# Patient Record
Sex: FEMALE | Race: BLACK OR AFRICAN AMERICAN | NOT HISPANIC OR LATINO | Employment: UNEMPLOYED | URBAN - METROPOLITAN AREA
[De-identification: names, ages, dates, MRNs, and addresses within clinical notes are randomized per-mention and may not be internally consistent; named-entity substitution may affect disease eponyms.]

---

## 2017-01-09 PROCEDURE — 96523 IRRIG DRUG DELIVERY DEVICE: CPT

## 2017-01-09 PROCEDURE — 25000125 ZZHC RX 250: Performed by: OBSTETRICS & GYNECOLOGY

## 2017-01-09 RX ORDER — HEPARIN SODIUM (PORCINE) LOCK FLUSH IV SOLN 100 UNIT/ML 100 UNIT/ML
5 SOLUTION INTRAVENOUS ONCE
Status: COMPLETED | OUTPATIENT
Start: 2017-01-09 | End: 2017-01-09

## 2017-01-09 RX ADMIN — SODIUM CHLORIDE, PRESERVATIVE FREE 5 ML: 5 INJECTION INTRAVENOUS at 10:40

## 2017-01-09 NOTE — NURSING NOTE
Chief Complaint   Patient presents with     Port Flush     Port flushed by RN     Port accessed by RN with 20g 3/4in Gripper needle. Labs drawn, port flushed with NS and Heparin and de-accessed.  Christa Cline RN

## 2017-01-23 ENCOUNTER — PRE VISIT (OUTPATIENT)
Dept: UROLOGY | Facility: CLINIC | Age: 52
End: 2017-01-23

## 2017-01-23 ENCOUNTER — ONCOLOGY VISIT (OUTPATIENT)
Dept: ONCOLOGY | Facility: CLINIC | Age: 52
End: 2017-01-23
Attending: OBSTETRICS & GYNECOLOGY
Payer: MEDICAID

## 2017-01-23 VITALS
BODY MASS INDEX: 33.63 KG/M2 | SYSTOLIC BLOOD PRESSURE: 152 MMHG | HEIGHT: 67 IN | DIASTOLIC BLOOD PRESSURE: 86 MMHG | WEIGHT: 214.3 LBS | HEART RATE: 87 BPM | TEMPERATURE: 98.2 F | OXYGEN SATURATION: 100 %

## 2017-01-23 DIAGNOSIS — C53.9 CERVICAL CANCER (H): Primary | ICD-10-CM

## 2017-01-23 PROCEDURE — 99215 OFFICE O/P EST HI 40 MIN: CPT | Mod: ZP | Performed by: OBSTETRICS & GYNECOLOGY

## 2017-01-23 ASSESSMENT — PAIN SCALES - GENERAL: PAINLEVEL: NO PAIN (0)

## 2017-01-23 NOTE — PROGRESS NOTES
Follow Up Notes on Referred Patient    Date: 2017       Dr. Joyce Charles MD  No address on file       RE: Angella St  : 1965  DAYSI: 2017    Dear Dr. Joyce Charles:    Angella St is a 51 year old woman with a diagnosis of  Recurrent cervical cancer.           Patient presents today for followup.  Again, she had recurrent cervical cancer after a radical hysterectomy in [facility] without any adjuvant treatment.  Left pelvic sidewall recurrence as well as a lymph node recurrence.  She has been treated with 9 cycles of Cisplatin, Taxol and Avastin based on  protocol.  Since then, been stable of disease with no progression since 2015.  She also has a nonfunctioning left kidney due to complete obstruction of left ureter and sidewall recurrence.  She has had a left nephrostomy placed which works without difficulties for her.  She is otherwise eating and drinking well.  No nausea, vomiting, fevers, or chills.  No vaginal bleeding or B symptoms.         Past Medical History:    Past Medical History   Diagnosis Date     Chronic kidney disease      Cancer (H)      Metastatic squamous cell cervical carcinoma     Hypertension      Obesity      BMI >30     Anemia          Past Surgical History:    Past Surgical History   Procedure Laterality Date     Hysterectomy       [facility], [place]     Genitourinary surgery  2015     PNT placement     Cystoscopy, retrogrades, combined Left 2016     Procedure: COMBINED CYSTOSCOPY, RETROGRADES;  Surgeon: Deja Salinas MD;  [facility]     Combined cystoscopy, insert stent ureter(s) Left 2016     Procedure: COMBINED CYSTOSCOPY, INSERT STENT URETER(S);  Surgeon: Deja Salinas MD;  [facility]         Health Maintenance Due   Topic Date Due     GRACIELA QUESTIONNAIRE 1 YEAR  1965     PHQ-9 Q1YR (NO INBASKET)  1965     HEPATITIS C SCREENING  1981     MAMMO SCREEN Q2 YR (SYSTEM ASSIGNED)   11/02/2003     COLON CANCER SCREEN (SYSTEM ASSIGNED)  11/02/2013     INFLUENZA VACCINE (SYSTEM ASSIGNED)  09/01/2016       Current Medications:     Current Outpatient Prescriptions   Medication Sig Dispense Refill     enalapril (VASOTEC) 20 MG tablet Take 1 tablet (20 mg) by mouth daily 90 tablet 1     amLODIPine (NORVASC) 5 MG tablet Take 1 tablet (5 mg) by mouth daily 90 tablet 1     Acetaminophen (TYLENOL PO) Take 1,000 mg by mouth       Ferrous Sulfate (IRON SUPPLEMENT PO) Take 27 mg by mouth daily (with breakfast)       lidocaine-prilocaine (EMLA) cream Apply topically as needed for moderate pain Apply to chest port site 30 minutes prior to access 30 g 1     senna-docusate (SENOKOT-S;PERICOLACE) 8.6-50 MG per tablet Take 2 tablets by mouth 2 times daily to prevent constipation with narcotic pain medication. Hold if you have loose stools. 60 tablet 1     COMPRESSION STOCKINGS 1 each daily 2 Units 1     Pyridoxine HCl (VITAMIN B6 PO) Take 50 mg by mouth daily       Simethicone 125 MG CAPS Take 1 tablet by mouth 4 times daily as needed 60 capsule 3     omeprazole (PRILOSEC) 40 MG capsule Take 1 capsule (40 mg) by mouth daily 90 capsule 3     LORazepam (ATIVAN) 1 MG tablet Take 1 tablet (1 mg) by mouth 2 times daily as needed (Anxiety, Nausea/Vomiting or Sleep) 45 tablet 2     ondansetron (ZOFRAN) 8 MG tablet Take 1 tablet (8 mg) by mouth every 8 hours as needed for nausea 30 tablet 1     alum & mag hydroxide-simethicone (MYLANTA/MAALOX) 200-200-20 MG/5ML SUSP Take 30 mLs by mouth every 6 hours as needed for indigestion       polyethylene glycol (MIRALAX/GLYCOLAX) packet Take 17 g by mouth 2 times daily 60 packet 5     ranitidine (ZANTAC) 150 MG tablet Take 1 tablet (150 mg) by mouth 2 times daily (Patient taking differently: Take 150 mg by mouth 2 times daily as needed ) 60 tablet 5         Allergies:      No Known Allergies     Social History:     Social History   Substance Use Topics     Smoking status: Never  "Smoker      Smokeless tobacco: Not on file     Alcohol Use: No      Comment: Does not drink alcohol       History   Drug Use No     Comment: No drug use         Family History:       Family History   Problem Relation Age of Onset     Hypertension Brother          Physical Exam:     /86 mmHg  Pulse 87  Temp(Src) 98.2  F (36.8  C) (Oral)  Ht 1.702 m (5' 7\")  Wt 97.206 kg (214 lb 4.8 oz)  BMI 33.56 kg/m2  SpO2 100%  Body mass index is 33.56 kg/(m^2).    General Appearance: healthy and alert, no distress     HEMATOLOGIC:  No cervical, supra or infraclavicular or axillary lymphadenopathy.  No inguinal lymphadenopathy   ABDOMEN:  Soft, nontender, nondistended.  No organomegaly.   PELVIC:  Normal external genitalia, normal vagina mucosa, normal vaginal cuff.  No adnexal masses or tenderness.  Rectovaginal confirms.         Assessment:    Angella St is a 51 year old woman with a diagnosis of  Recurrent cervical cancer.     A total of 40 minutes was spent with the patient, 30 minutes of which were spent in counseling the patient and/or treatment planning.    1.  Recurrent cervical cancer.   2.  Status post 9 cycles of cisplatin, Taxol and Avastin based on  protocol.   3.  Left ureteral obstruction with nephrostomy tube.   4.  Atrophic left kidney.        I discussed with the patient in detail that her scan shows no disease progression.  She has been now off treatment since 11/2015.  We will see her back at the 18-month haresh, which will be this summer with another CT chest, abdomen and pelvis.  We have discussed in detail options for her with, again, obstruction of the left ureter.  My preference would be to keep the nephrostomy tube as this has not caused her any symptoms at this point, and then address a more permanent solution if there, is again, no evidence of disease progression by 18 months.  She again will stay here, as she would not be able to get any treatment back in [facility] if needed.  Again, " once we have hit 18 months and is stable from a cancer perspective, could consider either left nephrectomy versus niyah or retrograde ureteroscopy with a stent placement to open up the area versus do an intervention to resect some of the mass on the left pelvic sidewall versus lysing it.  The patient agrees with this plan.  She is very appreciative of her care.  All questions were answered.     Lam Moran MD, MS    Department of Obstetrics and Gynecology   Division of Gynecologic Oncology   [facility]  Phone: [phone]          CC  Patient Care Team:  Arthur Hernandez MD as PCP - General  Arthur Hernandez MD as PCP - Internal Medicine  Christy Kraus RN as Continuity Care Coordinator (Oncology)  Arthur Hernandez MD as Resident  Melida Rutledge APRN CNP as Referring Physician (Nurse Practitioner - Women's Health)  Shamir Pugh MD as MD (Internal Medicine)  Lam Moran MD as MD (Obstetrics & Gynecology, Maternal & Fetal Medicine)  Deja Salinas MD as MD (Urology)  Maryse Rodriguez RN as Registered Nurse  SELF, REFERRED

## 2017-01-23 NOTE — Clinical Note
Angella St  was seen in our clinic on 1/23/2017    Index [id number]    To Whom It MayConcern:     Angella St is a patient of mine at the [facility]. She has been treated for her recurrent cervical cancer, her last CT scan was 1/18/2017. This showed stable disease. She will need close follow up for the next 6 months.     If you have any questions or concerns please call the clinic [phone]      Provider Signature:         Lam Moran MD

## 2017-01-23 NOTE — Clinical Note
2017       RE: Angella St  [address]   [place] [facility] [address]     Dear Colleague,    Thank you for referring your patient, Angella St, to the [facility]. Please see a copy of my visit note below.                Follow Up Notes on Referred Patient    Date: 2017       Dr. Joyce Charles MD  No address on file       RE: Angella St  : 1965  DAYSI: 2017    Dear Dr. Joyce Charles:    Angella St is a 51 year old woman with a diagnosis of  Recurrent cervical cancer.            Dictation on: 2017  1:50 PM by: FELISHA WATSON [[email or web address]]         Past Medical History:    Past Medical History   Diagnosis Date     Chronic kidney disease      Cancer (H)      Metastatic squamous cell cervical carcinoma     Hypertension      Obesity      BMI >30     Anemia          Past Surgical History:    Past Surgical History   Procedure Laterality Date     Hysterectomy       [place], [place]     Genitourinary surgery  2015     PNT placement     Cystoscopy, retrogrades, combined Left 2016     Procedure: COMBINED CYSTOSCOPY, RETROGRADES;  Surgeon: Deja Salinas MD;  [facility]     Combined cystoscopy, insert stent ureter(s) Left 2016     Procedure: COMBINED CYSTOSCOPY, INSERT STENT URETER(S);  Surgeon: Deja Salinas MD;  [facility]         Health Maintenance Due   Topic Date Due     GRACIELA QUESTIONNAIRE 1 YEAR  1965     PHQ-9 Q1YR (NO INBASKET)  1965     HEPATITIS C SCREENING  1981     MAMMO SCREEN Q2 YR (SYSTEM ASSIGNED)  2003     COLON CANCER SCREEN (SYSTEM ASSIGNED)  2013     INFLUENZA VACCINE (SYSTEM ASSIGNED)  2016       Current Medications:     Current Outpatient Prescriptions   Medication Sig Dispense Refill     enalapril (VASOTEC) 20 MG tablet Take 1 tablet (20 mg) by mouth daily 90 tablet 1     amLODIPine (NORVASC) 5 MG tablet Take 1 tablet (5 mg) by mouth daily 90 tablet 1      "Acetaminophen (TYLENOL PO) Take 1,000 mg by mouth       Ferrous Sulfate (IRON SUPPLEMENT PO) Take 27 mg by mouth daily (with breakfast)       lidocaine-prilocaine (EMLA) cream Apply topically as needed for moderate pain Apply to chest port site 30 minutes prior to access 30 g 1     senna-docusate (SENOKOT-S;PERICOLACE) 8.6-50 MG per tablet Take 2 tablets by mouth 2 times daily to prevent constipation with narcotic pain medication. Hold if you have loose stools. 60 tablet 1     COMPRESSION STOCKINGS 1 each daily 2 Units 1     Pyridoxine HCl (VITAMIN B6 PO) Take 50 mg by mouth daily       Simethicone 125 MG CAPS Take 1 tablet by mouth 4 times daily as needed 60 capsule 3     omeprazole (PRILOSEC) 40 MG capsule Take 1 capsule (40 mg) by mouth daily 90 capsule 3     LORazepam (ATIVAN) 1 MG tablet Take 1 tablet (1 mg) by mouth 2 times daily as needed (Anxiety, Nausea/Vomiting or Sleep) 45 tablet 2     ondansetron (ZOFRAN) 8 MG tablet Take 1 tablet (8 mg) by mouth every 8 hours as needed for nausea 30 tablet 1     alum & mag hydroxide-simethicone (MYLANTA/MAALOX) 200-200-20 MG/5ML SUSP Take 30 mLs by mouth every 6 hours as needed for indigestion       polyethylene glycol (MIRALAX/GLYCOLAX) packet Take 17 g by mouth 2 times daily 60 packet 5     ranitidine (ZANTAC) 150 MG tablet Take 1 tablet (150 mg) by mouth 2 times daily (Patient taking differently: Take 150 mg by mouth 2 times daily as needed ) 60 tablet 5         Allergies:      No Known Allergies     Social History:     Social History   Substance Use Topics     Smoking status: Never Smoker      Smokeless tobacco: Not on file     Alcohol Use: No      Comment: Does not drink alcohol       History   Drug Use No     Comment: No drug use         Family History:       Family History   Problem Relation Age of Onset     Hypertension Brother          Physical Exam:     /86 mmHg  Pulse 87  Temp(Src) 98.2  F (36.8  C) (Oral)  Ht 1.702 m (5' 7\")  Wt 97.206 kg (214 lb " 4.8 oz)  BMI 33.56 kg/m2  SpO2 100%  Body mass index is 33.56 kg/(m^2).    General Appearance: healthy and alert, no distress      Dictation on: 01/23/2017  3:22 PM by: FELISHA WATSON [[email or web address]]         Assessment:    Angella St is a 51 year old woman with a diagnosis of  Recurrent cervical cancer.     A total of 40 minutes was spent with the patient, 30 minutes of which were spent in counseling the patient and/or treatment planning.     Dictation on: 01/23/2017  3:25 PM by: FELISHA WATSON [[email or web address]]     Felisha Watson MD, MS    Department of Obstetrics and Gynecology   Division of Gynecologic Oncology   [facility]  Phone: [phone]          CC  Patient Care Team:  Arthur Hernandez MD as PCP - General  Arthur Hernandez MD as PCP - Internal Medicine  Christy Kraus RN as Continuity Care Coordinator (Oncology)  Arthur Hernandez MD as Resident  Melida Rutledge APRN CNP as Referring Physician (Nurse Practitioner - Women's Health)  Shamir Pugh MD as MD (Internal Medicine)  Felisha Watson MD as MD (Obstetrics & Gynecology, Maternal & Fetal Medicine)  Deja Salinas MD as MD (Urology)  Maryse Rodriguez RN as Registered Nurse  SELF, REFERRED      Patient presents today for followup.  Again, she had recurrent cervical cancer after a radical hysterectomy in [facility] without any adjuvant treatment.  Left pelvic sidewall recurrence as well as a lymph node recurrence.  She has been treated with 9 cycles of Taxol and Avastin based on  protocol.  Since then, been stable of disease with no progression since 11/2015.  She also has a nonfunctioning left kidney due to complete obstruction of left ureter and sidewall recurrence.  She has had a left nephrostomy placed which works without difficulties for her.  She is otherwise eating and drinking well.  No nausea, vomiting, fevers, or chills.  No vaginal bleeding or B symptoms.      HEMATOLOGIC:  No cervical, supra or infraclavicular or axillary lymphadenopathy.  No inguinal lymphadenopathy   ABDOMEN:  Soft, nontender, nondistended.  No organomegaly.   PELVIC:  Normal external genitalia, normal vagina mucosa, normal vaginal cuff.  No adnexal masses or tenderness.  Rectovaginal confirms.     1.  Recurrent cervical cancer.   2.  Status post 9 cycles of cisplatin, Taxol and Avastin based on  protocol.   3.  Left ureteral obstruction with nephrostomy tube.   4.  Atrophic left kidney.        I discussed with the patient in detail that her scan shows no disease progression.  She has been now off treatment since 11/2015.  We will see her back at the 18-month haresh, which will be this summer with another CT chest, abdomen and pelvis.  We have discussed in detail options for her with, again, obstruction of the left ureter.  My preference would be to keep the nephrostomy tube as this has not caused her any symptoms at this point, and then address a more permanent solution if there, is again, no evidence of disease progression by 18 months.  She again will stay here, as she would not be able to get any treatment back in [facility] if needed.  Again, once we have hit 18 months and is stable from a cancer perspective, could consider either left nephrectomy versus niyah or retrograde ureteroscopy with a stent placement to open up the area versus do an intervention to resect some of the mass on the left pelvic sidewall versus lysing it.  The patient agrees with this plan.  She is very appreciative of her care.  All questions were answered.      Again, thank you for allowing me to participate in the care of your patient.      Sincerely,    Lam Moran MD

## 2017-01-23 NOTE — MR AVS SNAPSHOT
After Visit Summary   1/23/2017    Angella St    MRN: [id number]           Patient Information     Date Of Birth          1965        Visit Information        Provider Department      1/23/2017 1:20 PM Lam Moran MD [facility]        Today's Diagnoses     Cervical cancer (H)    -  1        Follow-ups after your visit        Your next 10 appointments already scheduled     Jan 27, 2017  2:15 PM   (Arrive by 2:00 PM)   Return Visit with Deja Salinas MD   [facility]rologic [facility] ([facility])    [address]  [address] [place] [address]   [phone]            Jean Claude 15, 2017  9:00 AM   (Arrive by 8:45 AM)   CT CHEST ABDOMEN PELVIS W/O & W CONTRAST with UC[facility]1   [facility] CT ([facility])    [address]  [address] [place] [address] [phone]           Please bring any scans or X-rays taken at other hospitals, if similar tests were done. Also bring a list of your medicines, including vitamins, minerals and over-the-counter drugs. It is safest to leave personal items at home.  Be sure to tell your doctor:   If you have any allergies.   If there s any chance you are pregnant.   If you are breastfeeding.   If you have any special needs.  You may have contrast for this exam. To prepare:   Do not eat or drink for 2 hours before your exam. If you need to take medicine, you may take it with small sips of water. (We may ask you to take liquid medicine as well.)   The day before your exam, drink extra fluids at least six 8-ounce glasses (unless your doctor tells you to restrict your fluids).  Patients over 70 or patients with diabetes or kidney problems:   If you haven t had a blood test (creatinine test) within the last 30 days, go to your clinic or Diagnostic Imaging Department for this test.  If you have diabetes:   If your kidney  function is normal, continue taking your metformin (Avandamet, Glucophage, Glucovance, Metaglip) on the day of your exam.   If your kidney function is abnormal, wait 48 hours before restarting this medicine.  You will have oral contrast for this exam:   You will drink the contrast at home. Get this from your clinic or Diagnostic Imaging Department. Please follow the directions given.  Please wear loose clothing, such as a sweat suit or jogging clothes. Avoid snaps, zippers and other metal. We may ask you to undress and put on a hospital gown.  If you have any questions, please call the Imaging Department where you will have your exam.            Jun 19, 2017  2:00 PM   (Arrive by 1:45 PM)   Return Visit with Lam Moran MD   [facility] ([facility])    [address] [place] [address] [phone]              Future tests that were ordered for you today     Open Future Orders        Priority Expected Expires Ordered    CT Chest Abdomen Pelvis w/o & w Contrast Routine  1/24/2018 1/23/2017            Who to contact     If you have questions or need follow up information about today's clinic visit or your schedule please contact [facility] directly at [phone].  Normal or non-critical lab and imaging results will be communicated to you by MyChart, letter or phone within 4 business days after the clinic has received the results. If you do not hear from us within 7 days, please contact the clinic through [employer] or phone. If you have a critical or abnormal lab result, we will notify you by phone as soon as possible.  Submit refill requests through [employer] or call your pharmacy and they will forward the refill request to us. Please allow 3 business days for your refill to be completed.          Additional Information About Your Visit        [employer] Information     [employer] gives you secure access to your electronic  "health record. If you see a primary care provider, you can also send messages to your care team and make appointments. If you have questions, please call your primary care clinic.  If you do not have a primary care provider, please call [phone] and they will assist you.        Care EveryWhere ID     This is your Care EveryWhere ID. This could be used by other organizations to access your [place] medical records  [id number]        Your Vitals Were     Pulse Temperature Height BMI (Body Mass Index) Pulse Oximetry       87 98.2  F (36.8  C) (Oral) 1.702 m (5' 7\") 33.56 kg/m2 100%        Blood Pressure from Last 3 Encounters:   01/23/17 152/86   12/16/16 133/84   12/11/16 126/75    Weight from Last 3 Encounters:   01/23/17 97.206 kg (214 lb 4.8 oz)   12/16/16 98.204 kg (216 lb 8 oz)   12/11/16 95.255 kg (210 lb)                 Today's Medication Changes          These changes are accurate as of: 1/23/17  2:14 PM.  If you have any questions, ask your nurse or doctor.               These medicines have changed or have updated prescriptions.        Dose/Directions    ranitidine 150 MG tablet   Commonly known as:  ZANTAC   This may have changed:    - when to take this  - reasons to take this   Used for:  Esophageal reflux        Dose:  150 mg   Take 1 tablet (150 mg) by mouth 2 times daily   Quantity:  60 tablet   Refills:  5                Primary Care Provider Office Phone # Fax #    Atrhur Hernandez [phone]       [address]  [facility] [place] [address]        Thank you!     Thank you for choosing [facility]  for your care. Our goal is always to provide you with excellent care. Hearing back from our patients is one way we can continue to improve our services. Please take a few minutes to complete the written survey that you may receive in the mail after your visit with us. Thank you!             Your Updated Medication List - Protect others around you: " Learn how to safely use, store and throw away your medicines at www.disposemymeds.org.          This list is accurate as of: 1/23/17  2:14 PM.  Always use your most recent med list.                   Brand Name Dispense Instructions for use    alum & mag hydroxide-simethicone 200-200-20 MG/5ML Susp suspension    MYLANTA/MAALOX     Take 30 mLs by mouth every 6 hours as needed for indigestion       amLODIPine 5 MG tablet    NORVASC    90 tablet    Take 1 tablet (5 mg) by mouth daily       COMPRESSION STOCKINGS     2 Units    1 each daily       enalapril 20 MG tablet    VASOTEC    90 tablet    Take 1 tablet (20 mg) by mouth daily       IRON SUPPLEMENT PO      Take 27 mg by mouth daily (with breakfast)       lidocaine-prilocaine cream    EMLA    30 g    Apply topically as needed for moderate pain Apply to chest port site 30 minutes prior to access       LORazepam 1 MG tablet    ATIVAN    45 tablet    Take 1 tablet (1 mg) by mouth 2 times daily as needed (Anxiety, Nausea/Vomiting or Sleep)       omeprazole 40 MG capsule    priLOSEC    90 capsule    Take 1 capsule (40 mg) by mouth daily       ondansetron 8 MG tablet    ZOFRAN    30 tablet    Take 1 tablet (8 mg) by mouth every 8 hours as needed for nausea       polyethylene glycol Packet    MIRALAX/GLYCOLAX    60 packet    Take 17 g by mouth 2 times daily       ranitidine 150 MG tablet    ZANTAC    60 tablet    Take 1 tablet (150 mg) by mouth 2 times daily       senna-docusate 8.6-50 MG per tablet    SENOKOT-S;PERICOLACE    60 tablet    Take 2 tablets by mouth 2 times daily to prevent constipation with narcotic pain medication. Hold if you have loose stools.       Simethicone 125 MG Caps     60 capsule    Take 1 tablet by mouth 4 times daily as needed       TYLENOL PO      Take 1,000 mg by mouth       VITAMIN B6 PO      Take 50 mg by mouth daily

## 2017-01-27 ENCOUNTER — OFFICE VISIT (OUTPATIENT)
Dept: UROLOGY | Facility: CLINIC | Age: 52
End: 2017-01-27

## 2017-01-27 VITALS
WEIGHT: 215 LBS | BODY MASS INDEX: 33.74 KG/M2 | HEIGHT: 67 IN | DIASTOLIC BLOOD PRESSURE: 69 MMHG | SYSTOLIC BLOOD PRESSURE: 123 MMHG | HEART RATE: 72 BPM

## 2017-01-27 DIAGNOSIS — N13.1 HYDRONEPHROSIS WITH URETERAL STRICTURE, NOT ELSEWHERE CLASSIFIED: Primary | ICD-10-CM

## 2017-01-27 PROCEDURE — 87086 URINE CULTURE/COLONY COUNT: CPT | Performed by: UROLOGY

## 2017-01-27 PROCEDURE — 87088 URINE BACTERIA CULTURE: CPT | Performed by: UROLOGY

## 2017-01-27 PROCEDURE — 87186 SC STD MICRODIL/AGAR DIL: CPT | Performed by: UROLOGY

## 2017-01-27 ASSESSMENT — PAIN SCALES - GENERAL: PAINLEVEL: NO PAIN (0)

## 2017-01-27 NOTE — PROGRESS NOTES
Office Visit Note    Jan 27, 2017 1965    UROLOGIC DIAGNOSES:    Non-function kidney     CURRENT INTERVENTIONS:    Nephrostomy tube     History:    Patient has history of cervical cancer with obstructed left ureter.   Had attempted ureteroscopy but could not even pass a wire past the obstructed area.   Patient has had several renal scans that show no function in the left kidney but patient still produces some urine and develops pain (and infection) with when nephrostomy tube is capped.       We discussed left nephrectomy vs percutaneous antegrade ureteroscopy to open the ureter (though this is unlikely to be successful given what I have seen on ureteroscopy).     Patient recently saw her gynecology oncologist and he noted stable disease, but noted he would like to defer intervention for her ureteral obstruction until after follow up in one year.       Imaging:      Labs:      MEDICATIONS:    Current outpatient prescriptions:      enalapril (VASOTEC) 20 MG tablet, Take 1 tablet (20 mg) by mouth daily, Disp: 90 tablet, Rfl: 1     amLODIPine (NORVASC) 5 MG tablet, Take 1 tablet (5 mg) by mouth daily, Disp: 90 tablet, Rfl: 1     Acetaminophen (TYLENOL PO), Take 1,000 mg by mouth, Disp: , Rfl:      senna-docusate (SENOKOT-S;PERICOLACE) 8.6-50 MG per tablet, Take 2 tablets by mouth 2 times daily to prevent constipation with narcotic pain medication. Hold if you have loose stools., Disp: 60 tablet, Rfl: 1     COMPRESSION STOCKINGS, 1 each daily, Disp: 2 Units, Rfl: 1     Ferrous Sulfate (IRON SUPPLEMENT PO), Take 27 mg by mouth daily (with breakfast), Disp: , Rfl:      Pyridoxine HCl (VITAMIN B6 PO), Take 50 mg by mouth daily, Disp: , Rfl:      Simethicone 125 MG CAPS, Take 1 tablet by mouth 4 times daily as needed, Disp: 60 capsule, Rfl: 3     omeprazole (PRILOSEC) 40 MG capsule, Take 1 capsule (40 mg) by mouth daily, Disp: 90 capsule, Rfl: 3     LORazepam (ATIVAN) 1 MG tablet, Take 1 tablet (1 mg) by mouth 2  "times daily as needed (Anxiety, Nausea/Vomiting or Sleep), Disp: 45 tablet, Rfl: 2     ondansetron (ZOFRAN) 8 MG tablet, Take 1 tablet (8 mg) by mouth every 8 hours as needed for nausea, Disp: 30 tablet, Rfl: 1     lidocaine-prilocaine (EMLA) cream, Apply topically as needed for moderate pain Apply to chest port site 30 minutes prior to access, Disp: 30 g, Rfl: 1     alum & mag hydroxide-simethicone (MYLANTA/MAALOX) 200-200-20 MG/5ML SUSP, Take 30 mLs by mouth every 6 hours as needed for indigestion, Disp: , Rfl:      polyethylene glycol (MIRALAX/GLYCOLAX) packet, Take 17 g by mouth 2 times daily, Disp: 60 packet, Rfl: 5     ranitidine (ZANTAC) 150 MG tablet, Take 1 tablet (150 mg) by mouth 2 times daily (Patient taking differently: Take 150 mg by mouth 2 times daily as needed ), Disp: 60 tablet, Rfl: 5  No current facility-administered medications for this visit.    Facility-Administered Medications Ordered in Other Visits:      heparin 100 UNIT/ML injection 5 mL, 5 mL, Intracatheter, Q8H, Madi Brannon MD, 5 mL at 09/29/16 1013     heparin 100 UNIT/ML injection 500 Units, 500 Units, Intracatheter, Q8H, Javier Webster MD, 500 Units at 04/04/16 1002    ALLERGIES:   No Known Allergies    REVIEW OF SYSTEMS: Ten point review of systems without change as outlined in HPI    SURGICAL HISTORY:    Past Surgical History   Procedure Laterality Date     Hysterectomy  2011     [place]     Genitourinary surgery  5/2015     PNT placement     Cystoscopy, retrogrades, combined Left 6/13/2016     Procedure: COMBINED CYSTOSCOPY, RETROGRADES;  Surgeon: Deja Salinas MD;  [facility]     Combined cystoscopy, insert stent ureter(s) Left 6/13/2016     Procedure: COMBINED CYSTOSCOPY, INSERT STENT URETER(S);  Surgeon: Deja Salinas MD;  [facility]         PHYSICAL EXAM:    /69 mmHg  Pulse 72  Ht 1.702 m (5' 7\")  Wt 97.523 kg (215 lb)  BMI 33.67 kg/m2    HEENT: Normocephalic and " atraumatic    Cardiac: Not done    Back/Flank: Not done    CNS/PNS: Alert and oriented    Respiratory: Normal nonlabored breathing    Abdomen: Soft nontender and nondistended    Peripheral Vascular: No peripheral edema    Mental Status: Normal    External Genitalia: Not done    Bladder: Not done    Urethra: Not done     Vagina: Not done    Cystoscopy:  Not Done      Urinalysis:  UA RESULTS:  Recent Labs   Lab Test  12/11/16   0534   COLOR  Light Yellow   APPEARANCE  Clear   URINEGLC  250   URINEBILI  Negative   URINEKETONE  Negative   SG  1.010   UBLD  Large*   URINEPH  8.5*   PROTEIN  Negative   NITRITE  Negative   LEUKEST  Large*   RBCU  2   WBCU  5         IMPRESSION:    52 y/o F with non-functioning left kidney with severe ureteral obstruction     PLAN:    Will send PNT urine for urine culture   Schedule for PNT tube change   Will follow up in one year for updates (with q 3 nephrostomy tube changes)   Will plan for nephrectomy once cleared by gyn onc     Total Time:  15 minutes   Time in Consultation: greater than 50%         Discussed possible pyelo, PNT tube change, possible intervention for ureteral obstruction

## 2017-01-27 NOTE — Clinical Note
1/27/2017       RE: [address]  [address]   [facility] [address]     Dear Colleague,    Thank you for referring your patient, Angella St, to the [facility] AND [facility] FOR PROSTATE AND UROLOGIC CANCERS at [facility]. Please see a copy of my visit note below.    Office Visit Note    Jan 27, 2017 1965    UROLOGIC DIAGNOSES:    Non-function kidney     CURRENT INTERVENTIONS:    Nephrostomy tube     History:    Patient has history of cervical cancer with obstructed left ureter.   Had attempted ureteroscopy but could not even pass a wire past the obstructed area.   Patient has had several renal scans that show no function in the left kidney but patient still produces some urine and develops pain (and infection) with when nephrostomy tube is capped.       We discussed left nephrectomy vs percutaneous antegrade ureteroscopy to open the ureter (though this is unlikely to be successful given what I have seen on ureteroscopy).     Patient recently saw her gynecology oncologist and he noted stable disease, but noted he would like to defer intervention for her ureteral obstruction until after follow up in one year.       Imaging:      Labs:      MEDICATIONS:    Current outpatient prescriptions:      enalapril (VASOTEC) 20 MG tablet, Take 1 tablet (20 mg) by mouth daily, Disp: 90 tablet, Rfl: 1     amLODIPine (NORVASC) 5 MG tablet, Take 1 tablet (5 mg) by mouth daily, Disp: 90 tablet, Rfl: 1     Acetaminophen (TYLENOL PO), Take 1,000 mg by mouth, Disp: , Rfl:      senna-docusate (SENOKOT-S;PERICOLACE) 8.6-50 MG per tablet, Take 2 tablets by mouth 2 times daily to prevent constipation with narcotic pain medication. Hold if you have loose stools., Disp: 60 tablet, Rfl: 1     COMPRESSION STOCKINGS, 1 each daily, Disp: 2 Units, Rfl: 1     Ferrous Sulfate (IRON SUPPLEMENT PO), Take 27 mg by mouth daily (with breakfast), Disp: , Rfl:      Pyridoxine HCl (VITAMIN B6  PO), Take 50 mg by mouth daily, Disp: , Rfl:      Simethicone 125 MG CAPS, Take 1 tablet by mouth 4 times daily as needed, Disp: 60 capsule, Rfl: 3     omeprazole (PRILOSEC) 40 MG capsule, Take 1 capsule (40 mg) by mouth daily, Disp: 90 capsule, Rfl: 3     LORazepam (ATIVAN) 1 MG tablet, Take 1 tablet (1 mg) by mouth 2 times daily as needed (Anxiety, Nausea/Vomiting or Sleep), Disp: 45 tablet, Rfl: 2     ondansetron (ZOFRAN) 8 MG tablet, Take 1 tablet (8 mg) by mouth every 8 hours as needed for nausea, Disp: 30 tablet, Rfl: 1     lidocaine-prilocaine (EMLA) cream, Apply topically as needed for moderate pain Apply to chest port site 30 minutes prior to access, Disp: 30 g, Rfl: 1     alum & mag hydroxide-simethicone (MYLANTA/MAALOX) 200-200-20 MG/5ML SUSP, Take 30 mLs by mouth every 6 hours as needed for indigestion, Disp: , Rfl:      polyethylene glycol (MIRALAX/GLYCOLAX) packet, Take 17 g by mouth 2 times daily, Disp: 60 packet, Rfl: 5     ranitidine (ZANTAC) 150 MG tablet, Take 1 tablet (150 mg) by mouth 2 times daily (Patient taking differently: Take 150 mg by mouth 2 times daily as needed ), Disp: 60 tablet, Rfl: 5  No current facility-administered medications for this visit.    Facility-Administered Medications Ordered in Other Visits:      heparin 100 UNIT/ML injection 5 mL, 5 mL, Intracatheter, Q8H, Madi Brannon MD, 5 mL at 09/29/16 1013     heparin 100 UNIT/ML injection 500 Units, 500 Units, Intracatheter, Q8H, Javier Webster MD, 500 Units at 04/04/16 1002    ALLERGIES:   No Known Allergies    REVIEW OF SYSTEMS: Ten point review of systems without change as outlined in HPI    SURGICAL HISTORY:    Past Surgical History   Procedure Laterality Date     Hysterectomy  2011     [place], [place]     Genitourinary surgery  5/2015     PNT placement     Cystoscopy, retrogrades, combined Left 6/13/2016     Procedure: COMBINED CYSTOSCOPY, RETROGRADES;  Surgeon: Deja Salinas MD;  Location:  OR      "Combined cystoscopy, insert stent ureter(s) Left 6/13/2016     Procedure: COMBINED CYSTOSCOPY, INSERT STENT URETER(S);  Surgeon: Deja Salinas MD;  Location:  OR         PHYSICAL EXAM:    /69 mmHg  Pulse 72  Ht 1.702 m (5' 7\")  Wt 97.523 kg (215 lb)  BMI 33.67 kg/m2    HEENT: Normocephalic and atraumatic    Cardiac: Not done    Back/Flank: Not done    CNS/PNS: Alert and oriented    Respiratory: Normal nonlabored breathing    Abdomen: Soft nontender and nondistended    Peripheral Vascular: No peripheral edema    Mental Status: Normal    External Genitalia: Not done    Bladder: Not done    Urethra: Not done     Vagina: Not done    Cystoscopy:  Not Done      Urinalysis:  UA RESULTS:  Recent Labs   Lab Test  12/11/16   0534   COLOR  Light Yellow   APPEARANCE  Clear   URINEGLC  250   URINEBILI  Negative   URINEKETONE  Negative   SG  1.010   UBLD  Large*   URINEPH  8.5*   PROTEIN  Negative   NITRITE  Negative   LEUKEST  Large*   RBCU  2   WBCU  5         IMPRESSION:    50 y/o F with non-functioning left kidney with severe ureteral obstruction     PLAN:    Will send PNT urine for urine culture   Schedule for PNT tube change   Will follow up in one year for updates (with q 3 nephrostomy tube changes)   Will plan for nephrectomy once cleared by gyn onc     Total Time:  15 minutes   Time in Consultation: greater than 50%         Discussed possible pyelo, PNT tube change, possible intervention for ureteral obstruction     Quick Note:    Reviewed by Dr. Salinas team. Per note: Left detailed message on  that Dr. Salinas has prescribed antibiotic for her. Asked that she return my call so that I know she received this message. Maryse Rodriguez RN  ______         "

## 2017-01-30 ENCOUNTER — CARE COORDINATION (OUTPATIENT)
Dept: UROLOGY | Facility: CLINIC | Age: 52
End: 2017-01-30

## 2017-01-30 LAB
BACTERIA SPEC CULT: ABNORMAL
MICRO REPORT STATUS: ABNORMAL
MICROORGANISM SPEC CULT: ABNORMAL
MICROORGANISM SPEC CULT: ABNORMAL
SPECIMEN SOURCE: ABNORMAL

## 2017-01-30 RX ORDER — SULFAMETHOXAZOLE/TRIMETHOPRIM 800-160 MG
1 TABLET ORAL 2 TIMES DAILY
Qty: 20 TABLET | Refills: 0 | Status: SHIPPED | OUTPATIENT
Start: 2017-01-30 | End: 2017-02-09

## 2017-01-30 NOTE — PROGRESS NOTES
Quick Note:    Reviewed by Dr. Salinas team. Per note: Left detailed message on VM that Dr. Salinas has prescribed antibiotic for her. Asked that she return my call so that I know she received this message. Maryse Rodriguez RN  ______

## 2017-01-30 NOTE — PROGRESS NOTES
Left detailed message on  that Dr. Salinas has prescribed antibiotic for her. Asked that she return my call so that I know she received this message. Maryse Rodriguez RN

## 2017-01-31 ENCOUNTER — CARE COORDINATION (OUTPATIENT)
Dept: UROLOGY | Facility: CLINIC | Age: 52
End: 2017-01-31

## 2017-01-31 NOTE — PROGRESS NOTES
Patient returned call indicating that she received my message to start antibiotics. Maryse Rodriguez RN

## 2017-02-27 ENCOUNTER — OFFICE VISIT (OUTPATIENT)
Dept: INTERNAL MEDICINE | Facility: CLINIC | Age: 52
End: 2017-02-27

## 2017-02-27 VITALS
DIASTOLIC BLOOD PRESSURE: 87 MMHG | BODY MASS INDEX: 33.92 KG/M2 | WEIGHT: 216.6 LBS | TEMPERATURE: 97.5 F | SYSTOLIC BLOOD PRESSURE: 138 MMHG | HEART RATE: 78 BPM

## 2017-02-27 DIAGNOSIS — K08.89 TOOTH PAIN: Primary | ICD-10-CM

## 2017-02-27 PROCEDURE — 96523 IRRIG DRUG DELIVERY DEVICE: CPT

## 2017-02-27 PROCEDURE — 25000128 H RX IP 250 OP 636: Performed by: OBSTETRICS & GYNECOLOGY

## 2017-02-27 RX ORDER — HEPARIN SODIUM (PORCINE) LOCK FLUSH IV SOLN 100 UNIT/ML 100 UNIT/ML
5 SOLUTION INTRAVENOUS DAILY PRN
Status: DISCONTINUED | OUTPATIENT
Start: 2017-02-27 | End: 2017-03-07 | Stop reason: HOSPADM

## 2017-02-27 RX ADMIN — SODIUM CHLORIDE, PRESERVATIVE FREE 5 ML: 5 INJECTION INTRAVENOUS at 08:27

## 2017-02-27 ASSESSMENT — PAIN SCALES - GENERAL: PAINLEVEL: NO PAIN (0)

## 2017-02-27 NOTE — MR AVS SNAPSHOT
After Visit Summary   2/27/2017    Angella St    MRN: [id number]           Patient Information     Date Of Birth          1965        Visit Information        Provider Department      2/27/2017 7:40 AM Shamir Pugh MD [facility]        Today's Diagnoses     Tooth pain    -  1      Care Instructions    Primary Care Center Medication Refill Request Information:  * Please contact your pharmacy regarding ANY request for medication refills.  ** [facility] Prescription Fax = [phone]  * Please allow 3 business days for routine medication refills.  * Please allow 5 business days for controlled substance medication refills.     Primary Care Center Test Result notification information:  *You will be notified with in 7-10 days of your appointment day regarding the results of your test.  If you are on MyChart you will be notified as soon as the provider has reviewed the results and signed off on them.          Follow-ups after your visit        Additional Services     DENTAL REFERRAL       Assessment and treatment of tooth pain                  Your next 10 appointments already scheduled     Feb 27, 2017  8:30 AM CST   [place] [place]   [facility] [place] ([facility])    [address] [place] [address]   [phone]            Mar 27, 2017 10:30 AM CDT   [place] [place]   [facility] [place] ([facility])    [address] [place] [address]   [phone]            Apr 27, 2017 10:30 AM CDT   [place] [place]   [facility] [place] ([facility])    [address] [place] [address]   [phone]            May 29, 2017 10:30 AM CDT   [place] [place]   [facility] [place] ([facility])    [address] [address]  [address] [facility] [address]   [phone]            Jean Claude 15, 2017  9:00 AM CDT   (Arrive by 8:45 AM)   CT CHEST ABDOMEN PELVIS W/O & W CONTRAST with [facility]   [facility] CT ([facility])    [address] [place] [address]   [phone]           Please bring any scans or X-rays taken at other hospitals, if similar tests were done. Also bring a list of your medicines, including vitamins, minerals and over-the-counter drugs. It is safest to leave personal items at home.  Be sure to tell your doctor:   If you have any allergies.   If there s any chance you are pregnant.   If you are breastfeeding.   If you have any special needs.  You may have contrast for this exam. To prepare:   Do not eat or drink for 2 hours before your exam. If you need to take medicine, you may take it with small sips of water. (We may ask you to take liquid medicine as well.)   The day before your exam, drink extra fluids at least six 8-ounce glasses (unless your doctor tells you to restrict your fluids).  Patients over 70 or patients with diabetes or kidney problems:   If you haven t had a blood test (creatinine test) within the last 30 days, go to your clinic or Diagnostic Imaging Department for this test.  If you have diabetes:   If your kidney function is normal, continue taking your metformin (Avandamet, Glucophage, Glucovance, Metaglip) on the day of your exam.   If your kidney function is abnormal, wait 48 hours before restarting this medicine.  You will have oral contrast for this exam:   You will drink the contrast at home. Get this from your clinic or Diagnostic Imaging Department. Please follow the directions given.  Please wear loose clothing, such as a sweat suit or jogging clothes. Avoid snaps, zippers and other metal. We may ask you to undress and put on a hospital gown.  If you have any questions, please call the Imaging  Department where you will have your exam.            Jun 19, 2017  1:30 PM CDT   [place]  [place]   [facility])    [address] [place] [address] [phone]            Jun 19, 2017  2:00 PM CDT   (Arrive by 1:45 PM)   Return Visit with Lam Moran MD   [facility] ([facility])    [address] [place] [address] [phone]              Who to contact     Please call your clinic at [phone] to:    Ask questions about your health    Make or cancel appointments    Discuss your medicines    Learn about your test results    Speak to your doctor   If you have compliments or concerns about an experience at your clinic, or if you wish to file a complaint, please contact [facility] Patient Relations at [phone] or email us at [email or web address]         Additional Information About Your Visit        [employer] Information     [employer] gives you secure access to your electronic health record. If you see a primary care provider, you can also send messages to your care team and make appointments. If you have questions, please call your primary care clinic.  If you do not have a primary care provider, please call [phone] and they will assist you.      [employer] is an electronic gateway that provides easy, online access to your medical records. With [employer], you can request a clinic appointment, read your test results, renew a prescription or communicate with your care team.     To access your existing account, please contact your [facility] or call [phone] for assistance.        Care EveryWhere ID     This is your Care EveryWhere ID. This could be used by other organizations to access your [facility] medical records  [id number]        Your Vitals Were     Pulse  Temperature BMI (Body Mass Index)             78 97.5  F (36.4  C) 33.92 kg/m2          Blood Pressure from Last 3 Encounters:   02/27/17 138/87   01/27/17 123/69   01/23/17 152/86    Weight from Last 3 Encounters:   02/27/17 98.2 kg (216 lb 9.6 oz)   01/27/17 97.5 kg (215 lb)   01/23/17 97.2 kg (214 lb 4.8 oz)              We Performed the Following     DENTAL REFERRAL          Today's Medication Changes          These changes are accurate as of: 2/27/17  7:51 AM.  If you have any questions, ask your nurse or doctor.               These medicines have changed or have updated prescriptions.        Dose/Directions    ranitidine 150 MG tablet   Commonly known as:  ZANTAC   This may have changed:    - when to take this  - reasons to take this   Used for:  Esophageal reflux        Dose:  150 mg   Take 1 tablet (150 mg) by mouth 2 times daily   Quantity:  60 tablet   Refills:  5                Primary Care Provider Office Phone # Fax #    Arthur Hernandez [phone]       [address] [address]        Thank you!     Thank you for choosing [facility]  for your care. Our goal is always to provide you with excellent care. Hearing back from our patients is one way we can continue to improve our services. Please take a few minutes to complete the written survey that you may receive in the mail after your visit with us. Thank you!             Your Updated Medication List - Protect others around you: Learn how to safely use, store and throw away your medicines at www.disposemymeds.org.          This list is accurate as of: 2/27/17  7:51 AM.  Always use your most recent med list.                   Brand Name Dispense Instructions for use    alum & mag hydroxide-simethicone 200-200-20 MG/5ML Susp suspension    MYLANTA/MAALOX     Take 30 mLs by mouth every 6 hours as needed for indigestion       amLODIPine 5 MG tablet    NORVASC    90 tablet    Take 1 tablet  (5 mg) by mouth daily       COMPRESSION STOCKINGS     2 Units    1 each daily       enalapril 20 MG tablet    VASOTEC    90 tablet    Take 1 tablet (20 mg) by mouth daily       IRON SUPPLEMENT PO      Take 27 mg by mouth daily (with breakfast)       lidocaine-prilocaine cream    EMLA    30 g    Apply topically as needed for moderate pain Apply to chest port site 30 minutes prior to access       LORazepam 1 MG tablet    ATIVAN    45 tablet    Take 1 tablet (1 mg) by mouth 2 times daily as needed (Anxiety, Nausea/Vomiting or Sleep)       omeprazole 40 MG capsule    priLOSEC    90 capsule    Take 1 capsule (40 mg) by mouth daily       ondansetron 8 MG tablet    ZOFRAN    30 tablet    Take 1 tablet (8 mg) by mouth every 8 hours as needed for nausea       polyethylene glycol Packet    MIRALAX/GLYCOLAX    60 packet    Take 17 g by mouth 2 times daily       ranitidine 150 MG tablet    ZANTAC    60 tablet    Take 1 tablet (150 mg) by mouth 2 times daily       senna-docusate 8.6-50 MG per tablet    SENOKOT-S;PERICOLACE    60 tablet    Take 2 tablets by mouth 2 times daily to prevent constipation with narcotic pain medication. Hold if you have loose stools.       Simethicone 125 MG Caps     60 capsule    Take 1 tablet by mouth 4 times daily as needed       TYLENOL PO      Take 1,000 mg by mouth       VITAMIN B6 PO      Take 50 mg by mouth daily

## 2017-02-27 NOTE — NURSING NOTE
Chief Complaint   Patient presents with     Dental Pain     upper dental pain     ILIANA ANAND at 7:30 AM on 2/27/2017.

## 2017-02-27 NOTE — PROGRESS NOTES
PRIMARY CARE CLINIC    Resident Clinic Note        Visit Date: February 27, 2017    Angella St  MRN: [id number]  YOB: 1965    HPI:  Angella St is a 51 year old female  has a past medical history of Anemia; Cancer (H); Chronic kidney disease; Hypertension (2007); and Obesity. She also has no past medical history of Asymptomatic human immunodeficiency virus (HIV) infection status (H); Cerebral palsy (H); Chronic infection; Congenital renal agenesis and dysgenesis; Goiter; Gout; Hernia, abdominal; History of spinal cord injury; History of thrombophlebitis; Horseshoe kidney; Hydrocephalus; Mumps; Palpitations; Parkinsons disease (H); Spider veins; Spina bifida (H); STD (sexually transmitted disease); Swelling, mass, or lump in head and neck; Tethered cord (H); or Tuberculosis.    Patient presents to clinic to assess tooth pain that has been present since September bilaterally, worse on the right and is increased with chewing.  Pain is only localized in the tooth with radiation into the roof of her mouth.  The pain is between and 1 and 7 out of 10, and says that she is using Listerine to attempt to treat the pain.  She also take tylenol for the pain occasionally, but is not looking for stronger pain medication at today's visit.  Her last visit to a Dentist was over 3 year ago, and has had tooth pain like this and cavities filled in the past.    No mouth swelling, throat pain or problems swallowing. No fever, chills, and night sweats.      ROS:  5 point ROS was reviewed and negative other than stated in HPI    Past medical history reviewed.    Past Medical History   Diagnosis Date     Anemia      Cancer (H)      Metastatic squamous cell cervical carcinoma     Chronic kidney disease      Hypertension 2007     Obesity      BMI >30       No Known Allergies    Past Surgical History   Procedure Laterality Date     Hysterectomy  2011     [place], [place]     Genitourinary surgery  5/2015     PNT placement      Cystoscopy, retrogrades, combined Left 2016     Procedure: COMBINED CYSTOSCOPY, RETROGRADES;  Surgeon: Deja Salinas MD;  [facility]     Combined cystoscopy, insert stent ureter(s) Left 2016     Procedure: COMBINED CYSTOSCOPY, INSERT STENT URETER(S);  Surgeon: Deja Salinas MD;  [facility]       Family History   Problem Relation Age of Onset     Hypertension Brother        Social History     Social History     Marital status:      Spouse name: N/A     Number of children: 3     Years of education: N/A     Occupational History     no working      Social History Main Topics     Smoking status: Never Smoker     Smokeless tobacco: Not on file     Alcohol use No      Comment: Does not drink alcohol     Drug use: No      Comment: No drug use     Sexual activity: Not Currently     Other Topics Concern     Not on file     Social History Narrative    Mom  from [place] in her 40's    Dad  in his 60's presumed to be from hunger as a result of the on going war in [facility] at the time.    Patient has a brother and a sister who are alive and well.      Patient has three children:  Female age 37 alive and well; Female age 35 alive and well; Female age 33 alive and well    Patient reports  is in [facility].       Current Outpatient Prescriptions   Medication     enalapril (VASOTEC) 20 MG tablet     amLODIPine (NORVASC) 5 MG tablet     Acetaminophen (TYLENOL PO)     senna-docusate (SENOKOT-S;PERICOLACE) 8.6-50 MG per tablet     COMPRESSION STOCKINGS     Ferrous Sulfate (IRON SUPPLEMENT PO)     Pyridoxine HCl (VITAMIN B6 PO)     Simethicone 125 MG CAPS     omeprazole (PRILOSEC) 40 MG capsule     LORazepam (ATIVAN) 1 MG tablet     ondansetron (ZOFRAN) 8 MG tablet     lidocaine-prilocaine (EMLA) cream     alum & mag hydroxide-simethicone (MYLANTA/MAALOX) 200-200-20 MG/5ML SUSP     polyethylene glycol (MIRALAX/GLYCOLAX) packet     ranitidine (ZANTAC) 150 MG tablet     No  current facility-administered medications for this visit.      Facility-Administered Medications Ordered in Other Visits   Medication     heparin 100 UNIT/ML injection 5 mL     heparin 100 UNIT/ML injection 500 Units     Vitals:  /87  Pulse 78  Temp 97.5  F (36.4  C)  Wt 98.2 kg (216 lb 9.6 oz)  BMI 33.92 kg/m2    Physical Exam:  General: NAD, pleasant female  HEENT: Oral mucosa moist and non-erythematous, PERRLA, EOM intact  Dental: Pain localized in tooth 3 and 14, with old filling seen in tooth 14.  No signs of swelling of acute soft tissue infection.  Extremities: WWP, no pedal edema  Neuro: AAOx3, no lateralizing symptoms or focal neurologic deficits    Assessment/Plan:  Angella was seen today for dental pain.    # Tooth pain:  Bilateral upper molar pain with no signs of infection or systemic symptoms, last dental appointment 3 years prior.  Referral to dentistry requested and provided to the patient at today's visit.   -     DENTAL REFERRAL    Health Maintenance: No health Maintenance completed this visit    Follow-up: No acute follow up needed at this time    Patient seen and discussed with Dr. Keaton Warner MD, A  PGY-1, Internal Medicine   [phone]       Pt was seen and examined with Dr. Warner.  I agree with his documentation as noted above.    My additional comments: None    Shamir Pugh

## 2017-02-27 NOTE — PATIENT INSTRUCTIONS
Primary Care Center Medication Refill Request Information:  * Please contact your pharmacy regarding ANY request for medication refills.  ** [facility] Prescription Fax = [phone]  * Please allow 3 business days for routine medication refills.  * Please allow 5 business days for controlled substance medication refills.     Primary Care Center Test Result notification information:  *You will be notified with in 7-10 days of your appointment day regarding the results of your test.  If you are on MyChart you will be notified as soon as the provider has reviewed the results and signed off on them.

## 2017-02-27 NOTE — NURSING NOTE
Chief Complaint   Patient presents with     Port Flush     port accessed by RN, flushed with NS and heparin and de-accessed immediately. Pt tolerated well.      Bhavana English

## 2017-03-09 ENCOUNTER — ANESTHESIA (OUTPATIENT)
Dept: SURGERY | Facility: AMBULATORY SURGERY CENTER | Age: 52
End: 2017-03-09

## 2017-03-09 ENCOUNTER — ANESTHESIA EVENT (OUTPATIENT)
Dept: SURGERY | Facility: AMBULATORY SURGERY CENTER | Age: 52
End: 2017-03-09

## 2017-03-09 ENCOUNTER — HOSPITAL ENCOUNTER (OUTPATIENT)
Facility: AMBULATORY SURGERY CENTER | Age: 52
End: 2017-03-09
Attending: PHYSICIAN ASSISTANT

## 2017-03-09 VITALS
DIASTOLIC BLOOD PRESSURE: 90 MMHG | HEART RATE: 73 BPM | WEIGHT: 216 LBS | RESPIRATION RATE: 14 BRPM | BODY MASS INDEX: 35.99 KG/M2 | SYSTOLIC BLOOD PRESSURE: 133 MMHG | TEMPERATURE: 98.4 F | HEIGHT: 65 IN | OXYGEN SATURATION: 100 %

## 2017-03-09 DIAGNOSIS — N13.1 HYDRONEPHROSIS WITH URETERAL STRICTURE: Primary | ICD-10-CM

## 2017-03-09 LAB
BASOPHILS # BLD AUTO: 0 10E9/L (ref 0–0.2)
BASOPHILS NFR BLD AUTO: 0.5 %
DIFFERENTIAL METHOD BLD: NORMAL
EOSINOPHIL # BLD AUTO: 0.1 10E9/L (ref 0–0.7)
EOSINOPHIL NFR BLD AUTO: 1.8 %
ERYTHROCYTE [DISTWIDTH] IN BLOOD BY AUTOMATED COUNT: 13.5 % (ref 10–15)
HCT VFR BLD AUTO: 38.9 % (ref 35–47)
HGB BLD-MCNC: 12.5 G/DL (ref 11.7–15.7)
IMM GRANULOCYTES # BLD: 0 10E9/L (ref 0–0.4)
IMM GRANULOCYTES NFR BLD: 0.3 %
INR PPP: 0.93 (ref 0.86–1.14)
LYMPHOCYTES # BLD AUTO: 3.3 10E9/L (ref 0.8–5.3)
LYMPHOCYTES NFR BLD AUTO: 54.2 %
MCH RBC QN AUTO: 26.8 PG (ref 26.5–33)
MCHC RBC AUTO-ENTMCNC: 32.1 G/DL (ref 31.5–36.5)
MCV RBC AUTO: 84 FL (ref 78–100)
MONOCYTES # BLD AUTO: 0.4 10E9/L (ref 0–1.3)
MONOCYTES NFR BLD AUTO: 6.7 %
NEUTROPHILS # BLD AUTO: 2.2 10E9/L (ref 1.6–8.3)
NEUTROPHILS NFR BLD AUTO: 36.5 %
NRBC # BLD AUTO: 0 10*3/UL
NRBC BLD AUTO-RTO: 0 /100
PLATELET # BLD AUTO: 227 10E9/L (ref 150–450)
RBC # BLD AUTO: 4.66 10E12/L (ref 3.8–5.2)
WBC # BLD AUTO: 6 10E9/L (ref 4–11)

## 2017-03-09 DEVICE — IMPLANTABLE DEVICE
Type: IMPLANTABLE DEVICE | Site: BACK | Status: NON-FUNCTIONAL
Removed: 2017-10-30

## 2017-03-09 RX ORDER — ONDANSETRON 4 MG/1
4 TABLET, ORALLY DISINTEGRATING ORAL EVERY 30 MIN PRN
Status: DISCONTINUED | OUTPATIENT
Start: 2017-03-09 | End: 2017-03-10 | Stop reason: HOSPADM

## 2017-03-09 RX ORDER — SODIUM CHLORIDE 9 MG/ML
INJECTION, SOLUTION INTRAVENOUS CONTINUOUS
Status: DISCONTINUED | OUTPATIENT
Start: 2017-03-09 | End: 2017-03-10 | Stop reason: HOSPADM

## 2017-03-09 RX ORDER — MEPERIDINE HYDROCHLORIDE 25 MG/ML
12.5 INJECTION INTRAMUSCULAR; INTRAVENOUS; SUBCUTANEOUS
Status: DISCONTINUED | OUTPATIENT
Start: 2017-03-09 | End: 2017-03-10 | Stop reason: HOSPADM

## 2017-03-09 RX ORDER — FENTANYL CITRATE 50 UG/ML
25-50 INJECTION, SOLUTION INTRAMUSCULAR; INTRAVENOUS EVERY 5 MIN PRN
Status: DISCONTINUED | OUTPATIENT
Start: 2017-03-09 | End: 2017-03-10 | Stop reason: HOSPADM

## 2017-03-09 RX ORDER — IOPAMIDOL 510 MG/ML
INJECTION, SOLUTION INTRAVASCULAR PRN
Status: DISCONTINUED | OUTPATIENT
Start: 2017-03-09 | End: 2017-03-09 | Stop reason: HOSPADM

## 2017-03-09 RX ORDER — PROPOFOL 10 MG/ML
INJECTION, EMULSION INTRAVENOUS PRN
Status: DISCONTINUED | OUTPATIENT
Start: 2017-03-09 | End: 2017-03-09

## 2017-03-09 RX ORDER — SODIUM CHLORIDE, SODIUM LACTATE, POTASSIUM CHLORIDE, CALCIUM CHLORIDE 600; 310; 30; 20 MG/100ML; MG/100ML; MG/100ML; MG/100ML
INJECTION, SOLUTION INTRAVENOUS CONTINUOUS
Status: DISCONTINUED | OUTPATIENT
Start: 2017-03-09 | End: 2017-03-10 | Stop reason: HOSPADM

## 2017-03-09 RX ORDER — NALOXONE HYDROCHLORIDE 0.4 MG/ML
.1-.4 INJECTION, SOLUTION INTRAMUSCULAR; INTRAVENOUS; SUBCUTANEOUS
Status: DISCONTINUED | OUTPATIENT
Start: 2017-03-09 | End: 2017-03-10 | Stop reason: HOSPADM

## 2017-03-09 RX ORDER — ONDANSETRON 2 MG/ML
4 INJECTION INTRAMUSCULAR; INTRAVENOUS EVERY 30 MIN PRN
Status: DISCONTINUED | OUTPATIENT
Start: 2017-03-09 | End: 2017-03-10 | Stop reason: HOSPADM

## 2017-03-09 RX ORDER — HEPARIN SODIUM (PORCINE) LOCK FLUSH IV SOLN 100 UNIT/ML 100 UNIT/ML
5 SOLUTION INTRAVENOUS
Status: DISCONTINUED | OUTPATIENT
Start: 2017-03-09 | End: 2017-03-10 | Stop reason: HOSPADM

## 2017-03-09 RX ORDER — AMPICILLIN 1 G/1
1 INJECTION, POWDER, FOR SOLUTION INTRAMUSCULAR; INTRAVENOUS
Status: DISCONTINUED | OUTPATIENT
Start: 2017-03-09 | End: 2017-03-10 | Stop reason: HOSPADM

## 2017-03-09 RX ADMIN — PROPOFOL 25 MG: 10 INJECTION, EMULSION INTRAVENOUS at 08:40

## 2017-03-09 RX ADMIN — SODIUM CHLORIDE: 9 INJECTION, SOLUTION INTRAVENOUS at 08:36

## 2017-03-09 RX ADMIN — PROPOFOL 25 MG: 10 INJECTION, EMULSION INTRAVENOUS at 08:45

## 2017-03-09 ASSESSMENT — LIFESTYLE VARIABLES: TOBACCO_USE: 0

## 2017-03-09 ASSESSMENT — COPD QUESTIONNAIRES: COPD: 0

## 2017-03-09 NOTE — PROCEDURES
Interventional Radiology Brief Post Procedure Note    Procedure: @FVRISFRMTLINK([id number])@    Proceduralist: Bridger Meyer PA-C    Assistant: None    Time Out: Prior to the start of the procedure and with procedural staff participation, I verbally confirmed the patient s identity using two indicators, relevant allergies, that the procedure was appropriate and matched the consent or emergent situation, and that the correct equipment/implants were available. Immediately prior to starting the procedure I conducted the Time Out with the procedural staff and re-confirmed the patient s name, procedure, and site/side. (The Joint Commission universal protocol was followed.)  Yes    Sedation: Monitored Anesthesia Care (MAC) administered and documented by Anesthesia Care Provider    Findings: Completed routine 3 month exchange of left percutaneous nephrostomy tube. New 8 [place] locking pigtail drain placed. No immediate complication. Plan: return in 3 months for routine exchange. Dx: Hydronephrosis with ureteral stricture. Chucky MAC <1    Estimated Blood Loss: Minimal    Fluoroscopy Time: Less than 1 minute    SPECIMENS: None    Complications: 1. None     Condition: Stable    Plan:     Comments: See dictated procedure note for full details.    Bridger Meyer PA-C

## 2017-03-09 NOTE — IP AVS SNAPSHOT
MRN:[id number]                      After Visit Summary   3/9/2017    Angella St    MRN: [id number]           Thank you!     Thank you for choosing [facility] for your care. Our goal is always to provide you with excellent care. Hearing back from our patients is one way we can continue to improve our services. Please take a few minutes to complete the written survey that you may receive in the mail after you visit with us. Thank you!        Patient Information     Date Of Birth          1965        About your hospital stay     You were admitted on:  March 9, 2017 You last received care in the:  [facility]    You were discharged on:  March 9, 2017       Who to Call     For medical emergencies, please call 911.  For non-urgent questions about your medical care, please call your primary care provider or clinic, [phone]  For questions related to your surgery, please call your surgery clinic        Attending Provider     Provider Specialty    Keanu Betts PA-C Radiology       Primary Care Provider Office Phone # Fax #    Arthur Hernandez [phone]       [address] [address]        Your next 10 appointments already scheduled     Mar 27, 2017 10:30 AM CDT   [facility] with [facility]   [facility] ([facility])    [address] [place] [address]   [phone]            Apr 27, 2017 10:30 AM CDT   [facility]   [facility] ([facility])    [address] [address]   [phone]            May 29, 2017 10:30 AM CDT   [facility]   [facility] ([facility])    [address] [address]   [phone]            Jean Claude 15, 2017   9:00 AM CDT   (Arrive by 8:45 AM)   CT CHEST ABDOMEN PELVIS W/O & W CONTRAST with [facility]   [facility] CT ([facility])    [address] [facility]  [address] [facility] [address] [phone]           Please bring any scans or X-rays taken at other hospitals, if similar tests were done. Also bring a list of your medicines, including vitamins, minerals and over-the-counter drugs. It is safest to leave personal items at home.  Be sure to tell your doctor:   If you have any allergies.   If there s any chance you are pregnant.   If you are breastfeeding.   If you have any special needs.  You may have contrast for this exam. To prepare:   Do not eat or drink for 2 hours before your exam. If you need to take medicine, you may take it with small sips of water. (We may ask you to take liquid medicine as well.)   The day before your exam, drink extra fluids at least six 8-ounce glasses (unless your doctor tells you to restrict your fluids).  Patients over 70 or patients with diabetes or kidney problems:   If you haven t had a blood test (creatinine test) within the last 30 days, go to your clinic or Diagnostic Imaging Department for this test.  If you have diabetes:   If your kidney function is normal, continue taking your metformin (Avandamet, Glucophage, Glucovance, Metaglip) on the day of your exam.   If your kidney function is abnormal, wait 48 hours before restarting this medicine.  You will have oral contrast for this exam:   You will drink the contrast at home. Get this from your clinic or Diagnostic Imaging Department. Please follow the directions given.  Please wear loose clothing, such as a sweat suit or jogging clothes. Avoid snaps, zippers and other metal. We may ask you to undress and put on a hospital gown.  If you have any questions, please call the Imaging Department where you will have your exam.            Jun 19, 2017  1:30 PM CDT   [facility] Lab Draw with [facility]  "LAB DRAW   [facility] Lab Draw ([facility])    [address] [facility]  [address] [facility] [address] [phone]            Jun 19, 2017  2:00 PM CDT   (Arrive by 1:45 PM)   Return Visit with Lam Moran MD   [facility] ([facility])    [address] [id number] [phone]              Future tests that were ordered for you     IR Nephrostomy Tube Change Left                 Further instructions from your care team       Care for nephrostomy tube per routine at home. Follow up for replacement in 90 days.    Pending Results     Date and Time Order Name Status Description    3/9/2017 0755 IR NEPHROSTOMY TUBE CHANGE LEFT In process             Admission Information     Date & Time Provider Department Dept. Phone    3/9/2017 Keanu Betts PA-C [facility] [phone]      Your Vitals Were     Blood Pressure Pulse Temperature Respirations Height Weight    133/90 73 98.4  F (36.9  C) (Oral) 14 1.651 m (5' 5\") 98 kg (216 lb)    Pulse Oximetry BMI (Body Mass Index)                100% 35.94 kg/m2          [employer] Information     [employer] gives you secure access to your electronic health record. If you see a primary care provider, you can also send messages to your care team and make appointments. If you have questions, please call your primary care clinic.  If you do not have a primary care provider, please call [phone] and they will assist you.      [employer] is an electronic gateway that provides easy, online access to your medical records. With [employer], you can request a clinic appointment, read your test results, renew a prescription or communicate with your care team.     To access your existing account, please contact your [facility] or call [phone] for assistance.        Care EveryWhere ID     This is your Care " EveryWhere ID. This could be used by other organizations to access your [facility] medical records  [id number]           Review of your medicines      CONTINUE these medicines which have NOT CHANGED        Dose / Directions    amLODIPine 5 MG tablet   Commonly known as:  NORVASC   Used for:  HTN (hypertension)        Dose:  5 mg   Take 1 tablet (5 mg) by mouth daily   Quantity:  90 tablet   Refills:  1       COMPRESSION STOCKINGS   Used for:  Leg swelling        Dose:  1 each   1 each daily   Quantity:  2 Units   Refills:  1       enalapril 20 MG tablet   Commonly known as:  VASOTEC   Used for:  HTN (hypertension)        Dose:  20 mg   Take 1 tablet (20 mg) by mouth daily   Quantity:  90 tablet   Refills:  1       IRON SUPPLEMENT PO        Dose:  27 mg   Take 27 mg by mouth daily (with breakfast)   Refills:  0       lidocaine-prilocaine cream   Commonly known as:  EMLA   Used for:  Cervical cancer (H)        Apply topically as needed for moderate pain Apply to chest port site 30 minutes prior to access   Quantity:  30 g   Refills:  1       VITAMIN B6 PO        Dose:  50 mg   Take 50 mg by mouth daily   Refills:  0                Protect others around you: Learn how to safely use, store and throw away your medicines at www.disposemymeds.org.             Medication List: This is a list of all your medications and when to take them. Check marks below indicate your daily home schedule. Keep this list as a reference.      Medications           Morning Afternoon Evening Bedtime As Needed    amLODIPine 5 MG tablet   Commonly known as:  NORVASC   Take 1 tablet (5 mg) by mouth daily                                COMPRESSION STOCKINGS   1 each daily                                enalapril 20 MG tablet   Commonly known as:  VASOTEC   Take 1 tablet (20 mg) by mouth daily                                IRON SUPPLEMENT PO   Take 27 mg by mouth daily (with breakfast)                                lidocaine-prilocaine cream    Commonly known as:  EMLA   Apply topically as needed for moderate pain Apply to chest port site 30 minutes prior to access                                VITAMIN B6 PO   Take 50 mg by mouth daily

## 2017-03-09 NOTE — ANESTHESIA PREPROCEDURE EVALUATION
Angella St is a 51 year old female with a PMH of  Hydronephrosis with Ureteral Rupture, Not Elsewhere Specifi* who is scheduled for Procedure(s):  Nephrostomy Tube Change, Left - Wound Class: I-Clean    NPO Status: Adequate.  > 6 hours solids, > 2 hours clear liquids.       Past Surgical History   Procedure Laterality Date     Hysterectomy  2011     [place], [place]     Genitourinary surgery  5/2015     PNT placement     Cystoscopy, retrogrades, combined Left 6/13/2016     Procedure: COMBINED CYSTOSCOPY, RETROGRADES;  Surgeon: Deja Salinas MD;  [facility]     Combined cystoscopy, insert stent ureter(s) Left 6/13/2016     Procedure: COMBINED CYSTOSCOPY, INSERT STENT URETER(S);  Surgeon: Deja Salinas MD;  [facility]       Anesthesia Evaluation     . Pt has had prior anesthetic. Type: General    No history of anesthetic complications     ROS/MED HX    ENT/Pulmonary:      (-) tobacco use, asthma and COPD   Neurologic:      (-) CVA, TIA and Neuropathy   Cardiovascular:     (+) hypertension----. : . . . :. .      (-) CAD, irregular heartbeat/palpitations and stent   METS/Exercise Tolerance:     Hematologic:        (-) anemia   Musculoskeletal:         GI/Hepatic:        (-) GERD and liver disease   Renal/Genitourinary:     (+) chronic renal disease,       Endo:     (+) Obesity, .   (-) Type I DM, Type II DM and thyroid disease   Psychiatric:         Infectious Disease:  - neg infectious disease ROS       Malignancy:         Other:               Physical Exam  Normal systems: cardiovascular, pulmonary and dental    Airway   Mallampati: II  TM distance: >3 FB  Neck ROM: full    Dental     Cardiovascular   Rhythm and rate: regular and normal      Pulmonary    breath sounds clear to auscultation                    Anesthesia Plan      History & Physical Review  History and physical reviewed and following examination; no interval change.    ASA Status:  2 .        Plan for MAC (with GA  backup) with Intravenous induction. Maintenance will be TIVA.  Reason for MAC:  Deep or markedly invasive procedure (G8)  PONV prophylaxis:  Ondansetron       Postoperative Care  Postoperative pain management:  IV analgesics.      Consents  Anesthetic plan, risks, benefits and alternatives discussed with:  Patient..        Gianfranco Aquino MD  8:13 AM March 9, 2017                       .

## 2017-03-09 NOTE — ANESTHESIA POSTPROCEDURE EVALUATION
Patient: Angella St    Procedure(s):  Nephrostomy Tube Change, Left - Wound Class: I-Clean    Diagnosis:Hydronephrosis with Ureteral Rupture, Not Elsewhere Specified  Diagnosis Additional Information: No value filed.    Anesthesia Type:  MAC    Note:  Anesthesia Post Evaluation    Patient location during evaluation: Phase 2  Patient participation: Able to fully participate in evaluation  Level of consciousness: awake and alert  Pain management: adequate  Airway patency: patent  Cardiovascular status: acceptable  Respiratory status: acceptable  Hydration status: acceptable  PONV: none     Anesthetic complications: None          Last vitals:  Vitals:    03/09/17 0801 03/09/17 0907   BP: 136/83 133/90   Pulse: 73    Resp:  14   Temp: 36.6  C (97.9  F) 36.9  C (98.4  F)   SpO2: 100% 100%         Electronically Signed By: Gianfranco Aquino MD  March 9, 2017  9:22 AM

## 2017-03-09 NOTE — ANESTHESIA CARE TRANSFER NOTE
Patient: Angella St    Procedure(s):  Nephrostomy Tube Change, Left - Wound Class: I-Clean    Diagnosis: Hydronephrosis with Ureteral Rupture, Not Elsewhere Specified  Diagnosis Additional Information: No value filed.    Anesthesia Type:   MAC     Note:  Airway :Room Air  Patient transferred to:Phase II        Vitals: (Last set prior to Anesthesia Care Transfer)    CRNA VITALS  3/9/2017 0828 - 3/9/2017 0901      3/9/2017             SpO2: 99 %    Resp Rate (observed): (!)  1    Resp Rate (set): 10                Electronically Signed By: BARBARA Luna CRNA  March 9, 2017  9:01 AM

## 2017-03-27 DIAGNOSIS — I10 HTN (HYPERTENSION): ICD-10-CM

## 2017-03-27 NOTE — NURSING NOTE
Chief Complaint   Patient presents with     Port Flush     Port accessed by RN.      Routine port flush with NS and Heparin.   Carolina Morrow RN

## 2017-03-29 RX ORDER — AMLODIPINE BESYLATE 5 MG/1
5 TABLET ORAL DAILY
Qty: 90 TABLET | Refills: 3 | Status: SHIPPED | OUTPATIENT
Start: 2017-03-29 | End: 2018-03-27

## 2017-03-29 NOTE — TELEPHONE ENCOUNTER
amLODIPine (NORVASC) 5 MG      Last Written Prescription Date:  7/19/16  Last Fill Quantity: 90,   # refills: 1  Last Office Visit : 2/27/17  Future Office visit:  NONE  BP Readings from Last 3 Encounters:   03/09/17 133/90   02/27/17 138/87   01/27/17 123/69

## 2017-04-05 ENCOUNTER — TELEPHONE (OUTPATIENT)
Dept: ONCOLOGY | Facility: CLINIC | Age: 52
End: 2017-04-05

## 2017-04-05 NOTE — TELEPHONE ENCOUNTER
Patient had called clinic with a tooth ache on her Right side. She was having a hard time making an appointment anywhere and asked for assistance. Patient was called with the below information.    RN called [facility]. They have a walk-in clinic daily. Per  the information is as follows:  [address]   [place], [place]  [address]  M-TH 8-3:30  F 8-12  [phone]

## 2017-04-27 DIAGNOSIS — I10 HTN (HYPERTENSION): ICD-10-CM

## 2017-04-27 PROCEDURE — 25000128 H RX IP 250 OP 636: Performed by: OBSTETRICS & GYNECOLOGY

## 2017-04-27 PROCEDURE — 96523 IRRIG DRUG DELIVERY DEVICE: CPT

## 2017-04-27 RX ORDER — HEPARIN SODIUM (PORCINE) LOCK FLUSH IV SOLN 100 UNIT/ML 100 UNIT/ML
500 SOLUTION INTRAVENOUS EVERY 8 HOURS
Status: DISCONTINUED | OUTPATIENT
Start: 2017-04-27 | End: 2017-04-27 | Stop reason: HOSPADM

## 2017-04-27 RX ADMIN — SODIUM CHLORIDE, PRESERVATIVE FREE 500 UNITS: 5 INJECTION INTRAVENOUS at 11:10

## 2017-04-28 RX ORDER — ENALAPRIL MALEATE 20 MG/1
20 TABLET ORAL DAILY
Qty: 90 TABLET | Refills: 1 | Status: SHIPPED | OUTPATIENT
Start: 2017-04-28 | End: 2018-02-15

## 2017-04-28 NOTE — TELEPHONE ENCOUNTER
enalapril (VASOTEC) 20 MG       Last Written Prescription Date: 7/19/16  Last Fill Quantity: 90, # refills: 1  Last Office Visit : 2/27/17  Next Clinic Visit: NONE    Potassium   Date Value Ref Range Status   12/11/2016 3.6 3.4 - 5.3 mmol/L Final     Creatinine   Date Value Ref Range Status   12/11/2016 0.69 0.52 - 1.04 mg/dL Final     BP Readings from Last 3 Encounters:   03/09/17 133/90   02/27/17 138/87   01/27/17 123/69

## 2017-05-29 PROCEDURE — 25000128 H RX IP 250 OP 636: Performed by: OBSTETRICS & GYNECOLOGY

## 2017-05-29 PROCEDURE — 96523 IRRIG DRUG DELIVERY DEVICE: CPT

## 2017-05-29 RX ORDER — HEPARIN SODIUM (PORCINE) LOCK FLUSH IV SOLN 100 UNIT/ML 100 UNIT/ML
5 SOLUTION INTRAVENOUS DAILY PRN
Status: DISCONTINUED | OUTPATIENT
Start: 2017-05-29 | End: 2017-06-06 | Stop reason: HOSPADM

## 2017-05-29 RX ADMIN — SODIUM CHLORIDE, PRESERVATIVE FREE 5 ML: 5 INJECTION INTRAVENOUS at 11:04

## 2017-05-29 NOTE — NURSING NOTE
Port accessed by RN, flushed with NS and heparin and de-accessed immediately. Pt tolerated well and will return in one month. Bhavana English

## 2017-06-26 ENCOUNTER — APPOINTMENT (OUTPATIENT)
Dept: LAB | Facility: CLINIC | Age: 52
End: 2017-06-26
Attending: OBSTETRICS & GYNECOLOGY
Payer: MEDICAID

## 2017-06-26 ENCOUNTER — ONCOLOGY VISIT (OUTPATIENT)
Dept: ONCOLOGY | Facility: CLINIC | Age: 52
End: 2017-06-26
Attending: OBSTETRICS & GYNECOLOGY
Payer: MEDICAID

## 2017-06-26 VITALS
BODY MASS INDEX: 35.64 KG/M2 | RESPIRATION RATE: 16 BRPM | WEIGHT: 213.9 LBS | HEART RATE: 73 BPM | DIASTOLIC BLOOD PRESSURE: 70 MMHG | HEIGHT: 65 IN | OXYGEN SATURATION: 100 % | SYSTOLIC BLOOD PRESSURE: 138 MMHG | TEMPERATURE: 97.6 F

## 2017-06-26 DIAGNOSIS — C53.9 CERVICAL CANCER (H): Primary | ICD-10-CM

## 2017-06-26 PROCEDURE — 25000128 H RX IP 250 OP 636: Mod: ZF | Performed by: OBSTETRICS & GYNECOLOGY

## 2017-06-26 PROCEDURE — 99212 OFFICE O/P EST SF 10 MIN: CPT | Mod: ZF

## 2017-06-26 PROCEDURE — 99215 OFFICE O/P EST HI 40 MIN: CPT | Mod: ZP | Performed by: OBSTETRICS & GYNECOLOGY

## 2017-06-26 RX ORDER — HYDROCHLOROTHIAZIDE 25 MG/1
25 TABLET ORAL
COMMUNITY
Start: 2015-05-01 | End: 2018-03-27

## 2017-06-26 RX ORDER — HYDROMORPHONE HYDROCHLORIDE 2 MG/1
2-4 TABLET ORAL
Status: ON HOLD | COMMUNITY
Start: 2015-05-01 | End: 2018-05-03

## 2017-06-26 RX ORDER — HEPARIN SODIUM (PORCINE) LOCK FLUSH IV SOLN 100 UNIT/ML 100 UNIT/ML
5 SOLUTION INTRAVENOUS ONCE
Status: COMPLETED | OUTPATIENT
Start: 2017-06-26 | End: 2017-06-26

## 2017-06-26 RX ADMIN — SODIUM CHLORIDE, PRESERVATIVE FREE 5 ML: 5 INJECTION INTRAVENOUS at 09:03

## 2017-06-26 ASSESSMENT — PAIN SCALES - GENERAL: PAINLEVEL: NO PAIN (0)

## 2017-06-26 NOTE — NURSING NOTE
"Oncology Rooming Note    June 26, 2017 9:29 AM   Angella St is a 51 year old female who presents for:    Chief Complaint   Patient presents with     Port Flush     Port flushed by RN.      Oncology Clinic Visit     return patient visit for 5 mo ck related to Cervical cancer (H)     Initial Vitals: /70  Pulse 73  Temp 97.6  F (36.4  C) (Oral)  Resp 16  Ht 1.651 m (5' 5\")  Wt 97 kg (213 lb 14.4 oz)  SpO2 100%  BMI 35.59 kg/m2 Estimated body mass index is 35.59 kg/(m^2) as calculated from the following:    Height as of this encounter: 1.651 m (5' 5\").    Weight as of this encounter: 97 kg (213 lb 14.4 oz). Body surface area is 2.11 meters squared.  No Pain (0) Comment: Data Unavailable   No LMP recorded. Patient has had a hysterectomy.  Allergies reviewed: yes  Medications reviewed: Yes    Medications: Medication refills not needed today.  Pharmacy name entered into [facility]: [facility] [place], [place] - [address] [facility] [phone]    Clinical concerns: none dr. gary was notified.    5 minutes for nursing intake (face to face time)     Salina Alcocer CMA              "

## 2017-06-26 NOTE — LETTER
2017       RE: Angella St  C/O MO RODRIGUEZ  [address]   [facility] [address]     Dear Colleague,    Thank you for referring your patient, Angella St, to the [facility]. Please see a copy of my visit note below.                Follow Up Notes on Referred Patient    Date: 2017       Dr. Joyce Charles MD  No address on file       RE: Angella St  : 1965  DAYSI: 2017    Dear Dr. Jyoce Charles:    Angella St is a 51 year old woman with a diagnosis of recurrent cervical cancer.      The patient presents today for followup.  She has been doing well.  No nausea or vomiting, fevers or chills.  Normal urinary and bowel function, as well as her left nephrostomy tube is in place working without difficulties.  No B symptoms.  As a matter of fact, she has gained weight.         Past Medical History:    Past Medical History:   Diagnosis Date     Anemia      Cancer (H)     Metastatic squamous cell cervical carcinoma     Chronic kidney disease      Hypertension      Obesity     BMI >30         Past Surgical History:    Past Surgical History:   Procedure Laterality Date     COMBINED CYSTOSCOPY, INSERT STENT URETER(S) Left 2016    Procedure: COMBINED CYSTOSCOPY, INSERT STENT URETER(S);  Surgeon: Deja Salinas MD;  [facility]     CYSTOSCOPY, RETROGRADES, COMBINED Left 2016    Procedure: COMBINED CYSTOSCOPY, RETROGRADES;  Surgeon: Deja Salinas MD;  [facility]     GENITOURINARY SURGERY  2015    PNT placement     HYSTERECTOMY      [place], [place]     PERCUTANEOUS NEPHROSTOMY Left 3/9/2017    Procedure: PERCUTANEOUS NEPHROSTOMY;  Surgeon: Bridger Meyer PA-C;  [facility]         Health Maintenance Due   Topic Date Due     GRACIELA QUESTIONNAIRE 1 YEAR  1965     PHQ-9 Q1YR  1965     HEPATITIS C SCREENING  1981     MAMMO SCREEN Q2 YR (SYSTEM ASSIGNED)  2003     COLON CANCER SCREEN (SYSTEM  "ASSIGNED)  11/02/2013       Current Medications:     Current Outpatient Prescriptions   Medication Sig Dispense Refill     hydrochlorothiazide (HYDRODIURIL) 25 MG tablet Take 25 mg by mouth       HYDROmorphone (DILAUDID) 2 MG tablet Take 2-4 mg by mouth       enalapril (VASOTEC) 20 MG tablet Take 1 tablet (20 mg) by mouth daily 90 tablet 1     amLODIPine (NORVASC) 5 MG tablet Take 1 tablet (5 mg) by mouth daily 90 tablet 3     COMPRESSION STOCKINGS 1 each daily 2 Units 1     Ferrous Sulfate (IRON SUPPLEMENT PO) Take 27 mg by mouth daily (with breakfast)       Pyridoxine HCl (VITAMIN B6 PO) Take 50 mg by mouth daily       lidocaine-prilocaine (EMLA) cream Apply topically as needed for moderate pain Apply to chest port site 30 minutes prior to access 30 g 1         Allergies:      No Known Allergies     Social History:     Social History   Substance Use Topics     Smoking status: Never Smoker     Smokeless tobacco: Not on file     Alcohol use No      Comment: Does not drink alcohol       History   Drug Use No     Comment: No drug use         Family History:         Family History   Problem Relation Age of Onset     Hypertension Brother          Physical Exam:     /70  Pulse 73  Temp 97.6  F (36.4  C) (Oral)  Resp 16  Ht 1.651 m (5' 5\")  Wt 97 kg (213 lb 14.4 oz)  SpO2 100%  BMI 35.59 kg/m2  Body mass index is 35.59 kg/(m^2).    General Appearance: healthy and alert, no distress     IMMUNOLOGIC:  No cervical, supra-, infraclavicular, axillary or inguinal lymphadenopathy.     ABDOMEN:  Soft, nontender and nondistended.  No organomegaly.   PELVIC:  Normal external genitalia.  Normal-appearing vaginal mucosa.  Well-healed vaginal cuff.  No adnexal mass or tenderness.  Rectovaginal confirms.             Assessment:    Angella St is a 51 year old woman with a diagnosis of recurrent cervical cancer.     A total of 40 minutes was spent with the patient, 30 minutes of which were spent in counseling the patient " and/or treatment planning.    1.  Recurrent cervical cancer.   2.  Status post 9 cycles of cis/Taxol and Avastin based on .   3.  Stable disease.     4.  Left urinary diversion and nephrostomy tube.      I had a long discussion with the patient.  Again, the disease is unchanged.  She still has a mass on the left pelvic exam with this.  She has been off treatment now for 18 months.  Again, 9 cycles of cis/Taxol and Avastin based on .  We discussed that we will see her back again in 6 months with another scan of the chest CT, abdomen and pelvis.  We again discussed the possibility of a permanent solution with a possible left nephrectomy versus stent placement.  We discussed we will have them exchange the nephrostomy tube now.  We can do that every 3 months and then reassess once we are at 2 years and at the end of the year.  If she again has stable disease, we could proceed with that.  The patient agrees with this plan.  She is very appreciative of her care.  All questions were answered.       Lam Moran MD, MS    Department of Obstetrics and Gynecology   Division of Gynecologic Oncology   [facility]  Phone: [phone]        CC  Patient Care Team:  Arthur Hernandez MD as PCP - General  Christy Kraus RN as Continuity Care Coordinator (Oncology)  Melida Rutledge APRN CNP as Referring Physician (Nurse Practitioner - Women's Health)  Shamir Pugh MD as MD (Internal Medicine)  Deja Salinas MD as MD (Urology)  Maryse Rodriguez, TONYA as Registered Nurse

## 2017-06-26 NOTE — PROGRESS NOTES
Follow Up Notes on Referred Patient    Date: 2017       Dr. Joyce Charles MD  No address on file       RE: Angella St  : 1965  DAYSI: 2017    Dear Dr. Joyce Charles:    Angella St is a 51 year old woman with a diagnosis of recurrent cervical cancer.      The patient presents today for followup.  She has been doing well.  No nausea or vomiting, fevers or chills.  Normal urinary and bowel function, as well as her left nephrostomy tube is in place working without difficulties.  No B symptoms.  As a matter of fact, she has gained weight.         Past Medical History:    Past Medical History:   Diagnosis Date     Anemia      Cancer (H)     Metastatic squamous cell cervical carcinoma     Chronic kidney disease      Hypertension      Obesity     BMI >30         Past Surgical History:    Past Surgical History:   Procedure Laterality Date     COMBINED CYSTOSCOPY, INSERT STENT URETER(S) Left 2016    Procedure: COMBINED CYSTOSCOPY, INSERT STENT URETER(S);  Surgeon: Deja Salinas MD;  [facility]     CYSTOSCOPY, RETROGRADES, COMBINED Left 2016    Procedure: COMBINED CYSTOSCOPY, RETROGRADES;  Surgeon: Deja Salinas MD;  [facility]     GENITOURINARY SURGERY  2015    PNT placement     HYSTERECTOMY      [facility], [place]     PERCUTANEOUS NEPHROSTOMY Left 3/9/2017    Procedure: PERCUTANEOUS NEPHROSTOMY;  Surgeon: Bridger Meyer PA-C;  [facility]         Health Maintenance Due   Topic Date Due     GRACIELA QUESTIONNAIRE 1 YEAR  1965     PHQ-9 Q1YR  1965     HEPATITIS C SCREENING  1981     MAMMO SCREEN Q2 YR (SYSTEM ASSIGNED)  2003     COLON CANCER SCREEN (SYSTEM ASSIGNED)  2013       Current Medications:     Current Outpatient Prescriptions   Medication Sig Dispense Refill     hydrochlorothiazide (HYDRODIURIL) 25 MG tablet Take 25 mg by mouth       HYDROmorphone (DILAUDID) 2 MG tablet Take 2-4 mg by mouth        "enalapril (VASOTEC) 20 MG tablet Take 1 tablet (20 mg) by mouth daily 90 tablet 1     amLODIPine (NORVASC) 5 MG tablet Take 1 tablet (5 mg) by mouth daily 90 tablet 3     COMPRESSION STOCKINGS 1 each daily 2 Units 1     Ferrous Sulfate (IRON SUPPLEMENT PO) Take 27 mg by mouth daily (with breakfast)       Pyridoxine HCl (VITAMIN B6 PO) Take 50 mg by mouth daily       lidocaine-prilocaine (EMLA) cream Apply topically as needed for moderate pain Apply to chest port site 30 minutes prior to access 30 g 1         Allergies:      No Known Allergies     Social History:     Social History   Substance Use Topics     Smoking status: Never Smoker     Smokeless tobacco: Not on file     Alcohol use No      Comment: Does not drink alcohol       History   Drug Use No     Comment: No drug use         Family History:         Family History   Problem Relation Age of Onset     Hypertension Brother          Physical Exam:     /70  Pulse 73  Temp 97.6  F (36.4  C) (Oral)  Resp 16  Ht 1.651 m (5' 5\")  Wt 97 kg (213 lb 14.4 oz)  SpO2 100%  BMI 35.59 kg/m2  Body mass index is 35.59 kg/(m^2).    General Appearance: healthy and alert, no distress     IMMUNOLOGIC:  No cervical, supra-, infraclavicular, axillary or inguinal lymphadenopathy.     ABDOMEN:  Soft, nontender and nondistended.  No organomegaly.   PELVIC:  Normal external genitalia.  Normal-appearing vaginal mucosa.  Well-healed vaginal cuff.  No adnexal mass or tenderness.  Rectovaginal confirms.             Assessment:    Angella St is a 51 year old woman with a diagnosis of recurrent cervical cancer.     A total of 40 minutes was spent with the patient, 30 minutes of which were spent in counseling the patient and/or treatment planning.    1.  Recurrent cervical cancer.   2.  Status post 9 cycles of cis/Taxol and Avastin based on .   3.  Stable disease.     4.  Left urinary diversion and nephrostomy tube.      I had a long discussion with the patient.  Again, " the disease is unchanged.  She still has a mass on the left pelvic exam with this.  She has been off treatment now for 18 months.  Again, 9 cycles of cis/Taxol and Avastin based on .  We discussed that we will see her back again in 6 months with another scan of the chest CT, abdomen and pelvis.  We again discussed the possibility of a permanent solution with a possible left nephrectomy versus stent placement.  We discussed we will have them exchange the nephrostomy tube now.  We can do that every 3 months and then reassess once we are at 2 years and at the end of the year.  If she again has stable disease, we could proceed with that.  The patient agrees with this plan.  She is very appreciative of her care.  All questions were answered.       Lam Moran MD, MS    Department of Obstetrics and Gynecology   Division of Gynecologic Oncology   [facility]  Phone: [phone]        CC  Patient Care Team:  Arthur Hernandez MD as PCP - General  Arthur Hernandez MD as PCP - Internal Medicine  Christy Kraus RN as Continuity Care Coordinator (Oncology)  Arthur Hernandez MD as Resident  Melida Rutledge APRN CNP as Referring Physician (Nurse Practitioner - Women's Health)  Shamir Pugh MD as MD (Internal Medicine)  Lam Moran MD as MD (Obstetrics & Gynecology, Maternal & Fetal Medicine)  Deja Salinas MD as MD (Urology)  Maryse Rodriguez RN as Registered Nurse  SELF, REFERRED

## 2017-06-26 NOTE — NURSING NOTE
Chief Complaint   Patient presents with     Port Flush     Port flushed by RN.      Port accessed,blood return noted, flushed with normal saline and heparin.     Carolina Morrow RN

## 2017-06-26 NOTE — MR AVS SNAPSHOT
After Visit Summary   6/26/2017    Angella St    MRN: [id number]           Patient Information     Date Of Birth          1965        Visit Information        Provider Department      6/26/2017 10:00 AM Lam Moran MD [facility]        Today's Diagnoses     Cervical cancer (H)    -  1       Follow-ups after your visit        Your next 10 appointments already scheduled     Jan 08, 2018  9:20 AM CST   (Arrive by 9:05 AM)   CT CHEST ABDOMEN PELVIS W/O & W CONTRAST with UCCT2   [facility] [place] [facility] ([facility])    [address] [address] [phone]           Please bring any scans or X-rays taken at other hospitals, if similar tests were done. Also bring a list of your medicines, including vitamins, minerals and over-the-counter drugs. It is safest to leave personal items at home.  Be sure to tell your doctor:   If you have any allergies.   If there s any chance you are pregnant.   If you are breastfeeding.   If you have any special needs.  You may have contrast for this exam. To prepare:   Do not eat or drink for 2 hours before your exam. If you need to take medicine, you may take it with small sips of water. (We may ask you to take liquid medicine as well.)   The day before your exam, drink extra fluids at least six 8-ounce glasses (unless your doctor tells you to restrict your fluids).  Patients over 70 or patients with diabetes or kidney problems:   If you haven t had a blood test (creatinine test) within the last 30 days, go to your clinic or Diagnostic Imaging Department for this test.  If you have diabetes:   If your kidney function is normal, continue taking your metformin (Avandamet, Glucophage, Glucovance, Metaglip) on the day of your exam.   If your kidney function is abnormal, wait 48 hours before restarting this medicine.  You will have oral contrast for this exam:   You will drink the  contrast at home. Get this from your clinic or Diagnostic Imaging Department. Please follow the directions given.  Please wear loose clothing, such as a sweat suit or jogging clothes. Avoid snaps, zippers and other metal. We may ask you to undress and put on a hospital gown.  If you have any questions, please call the Imaging Department where you will have your exam.            Jan 08, 2018 11:20 AM CST   (Arrive by 11:05 AM)   Return Visit with Lam Moran MD   [facility] ([facility])    [address] [place] [address]  2nd [facility] [address] [phone]              Future tests that were ordered for you today     Open Future Orders        Priority Expected Expires Ordered    CT Chest Abdomen Pelvis w/o & w Contrast Routine  6/26/2018 6/26/2017            Who to contact     If you have questions or need follow up information about today's clinic visit or your schedule please contact [facility] directly at [phone].  Normal or non-critical lab and imaging results will be communicated to you by [email or web address], letter or phone within 4 business days after the clinic has received the results. If you do not hear from us within 7 days, please contact the clinic through [email or web address] or phone. If you have a critical or abnormal lab result, we will notify you by phone as soon as possible.  Submit refill requests through [employer] or call your pharmacy and they will forward the refill request to us. Please allow 3 business days for your refill to be completed.          Additional Information About Your Visit        [employer] Information     [employer] gives you secure access to your electronic health record. If you see a primary care provider, you can also send messages to your care team and make appointments. If you have questions, please call your primary care clinic.  If you do not have a primary care provider, please call [phone] and they will assist  "you.        Care EveryWhere ID     This is your Care EveryWhere ID. This could be used by other organizations to access your [place] medical records  [id number]        Your Vitals Were     Pulse Temperature Respirations Height Pulse Oximetry BMI (Body Mass Index)    73 97.6  F (36.4  C) (Oral) 16 1.651 m (5' 5\") 100% 35.59 kg/m2       Blood Pressure from Last 3 Encounters:   06/26/17 138/70   03/09/17 133/90   02/27/17 138/87    Weight from Last 3 Encounters:   06/26/17 97 kg (213 lb 14.4 oz)   03/09/17 98 kg (216 lb)   02/27/17 98.2 kg (216 lb 9.6 oz)              Today, you had the following     No orders found for display         Today's Medication Changes          These changes are accurate as of: 6/26/17 10:08 AM.  If you have any questions, ask your nurse or doctor.               Start taking these medicines.        Dose/Directions    iohexol 140 MG/ML Soln solution   Commonly known as:  OMNIPAQUE   Used for:  Cervical cancer (H)   Started by:  Lam Moran MD        Mix entire bottle (50ml) of contast with 600ml (20 ounces) of water and drink half 2 hrs prior to CT scan and half 1 hr prior to scan   Quantity:  50 mL   Refills:  0            Where to get your medicines      These medications were sent to [facility] [place], [place] - [address]  [address]-Pending sale to [facility] [address]    Hours:  TRANSPLANT PHONE NUMBER [phone] Phone:  [phone]     iohexol 140 MG/ML Soln solution                Primary Care Provider Office Phone # Fax #    Arthur Hernandez [phone]       [facility] [address] [facility] [address]  [facility] [address]        Equal Access to Services     DORIS RODRIGUES AH: Sabiha Jo, wacleve luqbib, qaybta kaalmajudah decker, jin yusuf. So [facility] [phone].    ATENCIÓN: Si habla español, tiene a andrew disposición servicios gratuitos de asistencia lingüística. Llame " al [phone].    We comply with applicable federal civil rights laws and [place] laws. We do not discriminate on the basis of race, color, national origin, age, disability sex, sexual orientation or gender identity.            Thank you!     Thank you for choosing [facility]  for your care. Our goal is always to provide you with excellent care. Hearing back from our patients is one way we can continue to improve our services. Please take a few minutes to complete the written survey that you may receive in the mail after your visit with us. Thank you!             Your Updated Medication List - Protect others around you: Learn how to safely use, store and throw away your medicines at www.disposemymeds.org.          This list is accurate as of: 6/26/17 10:08 AM.  Always use your most recent med list.                   Brand Name Dispense Instructions for use Diagnosis    amLODIPine 5 MG tablet    NORVASC    90 tablet    Take 1 tablet (5 mg) by mouth daily    HTN (hypertension)       COMPRESSION STOCKINGS     2 Units    1 each daily    Leg swelling       enalapril 20 MG tablet    VASOTEC    90 tablet    Take 1 tablet (20 mg) by mouth daily    HTN (hypertension)       hydrochlorothiazide 25 MG tablet    HYDRODIURIL     Take 25 mg by mouth        HYDROmorphone 2 MG tablet    DILAUDID     Take 2-4 mg by mouth        iohexol 140 MG/ML Soln solution    OMNIPAQUE    50 mL    Mix entire bottle (50ml) of contast with 600ml (20 ounces) of water and drink half 2 hrs prior to CT scan and half 1 hr prior to scan    Cervical cancer (H)       IRON SUPPLEMENT PO      Take 27 mg by mouth daily (with breakfast)        lidocaine-prilocaine cream    EMLA    30 g    Apply topically as needed for moderate pain Apply to chest port site 30 minutes prior to access    Cervical cancer (H)       VITAMIN B6 PO      Take 50 mg by mouth daily

## 2017-06-28 ENCOUNTER — HOSPITAL ENCOUNTER (OUTPATIENT)
Facility: AMBULATORY SURGERY CENTER | Age: 52
End: 2017-06-28
Attending: PHYSICIAN ASSISTANT

## 2017-06-28 ENCOUNTER — SURGERY (OUTPATIENT)
Age: 52
End: 2017-06-28

## 2017-06-28 ENCOUNTER — ANESTHESIA EVENT (OUTPATIENT)
Dept: SURGERY | Facility: AMBULATORY SURGERY CENTER | Age: 52
End: 2017-06-28

## 2017-06-28 ENCOUNTER — ANESTHESIA (OUTPATIENT)
Dept: SURGERY | Facility: AMBULATORY SURGERY CENTER | Age: 52
End: 2017-06-28

## 2017-06-28 VITALS
HEART RATE: 81 BPM | DIASTOLIC BLOOD PRESSURE: 72 MMHG | HEIGHT: 65 IN | SYSTOLIC BLOOD PRESSURE: 118 MMHG | WEIGHT: 213.9 LBS | BODY MASS INDEX: 35.64 KG/M2 | RESPIRATION RATE: 14 BRPM | TEMPERATURE: 97 F | OXYGEN SATURATION: 99 %

## 2017-06-28 DIAGNOSIS — N13.30 HYDRONEPHROSIS, UNSPECIFIED HYDRONEPHROSIS TYPE: Primary | ICD-10-CM

## 2017-06-28 RX ORDER — LIDOCAINE HYDROCHLORIDE 20 MG/ML
INJECTION, SOLUTION INFILTRATION; PERINEURAL PRN
Status: DISCONTINUED | OUTPATIENT
Start: 2017-06-28 | End: 2017-06-28

## 2017-06-28 RX ORDER — LIDOCAINE 40 MG/G
CREAM TOPICAL
Status: DISCONTINUED | OUTPATIENT
Start: 2017-06-28 | End: 2017-06-29 | Stop reason: HOSPADM

## 2017-06-28 RX ORDER — HEPARIN SODIUM (PORCINE) LOCK FLUSH IV SOLN 100 UNIT/ML 100 UNIT/ML
5 SOLUTION INTRAVENOUS
Status: DISCONTINUED | OUTPATIENT
Start: 2017-06-28 | End: 2017-06-29 | Stop reason: HOSPADM

## 2017-06-28 RX ORDER — SODIUM CHLORIDE 9 MG/ML
INJECTION, SOLUTION INTRAVENOUS CONTINUOUS
Status: DISCONTINUED | OUTPATIENT
Start: 2017-06-28 | End: 2017-06-29 | Stop reason: HOSPADM

## 2017-06-28 RX ORDER — HEPARIN SODIUM,PORCINE 10 UNIT/ML
5-10 VIAL (ML) INTRAVENOUS
Status: DISCONTINUED | OUTPATIENT
Start: 2017-06-28 | End: 2017-06-29 | Stop reason: HOSPADM

## 2017-06-28 RX ORDER — AMPICILLIN 1 G/1
1 INJECTION, POWDER, FOR SOLUTION INTRAMUSCULAR; INTRAVENOUS
Status: COMPLETED | OUTPATIENT
Start: 2017-06-28 | End: 2017-06-28

## 2017-06-28 RX ORDER — HEPARIN SODIUM,PORCINE 10 UNIT/ML
5-10 VIAL (ML) INTRAVENOUS EVERY 24 HOURS
Status: DISCONTINUED | OUTPATIENT
Start: 2017-06-28 | End: 2017-06-29 | Stop reason: HOSPADM

## 2017-06-28 RX ORDER — GLYCOPYRROLATE 0.2 MG/ML
INJECTION, SOLUTION INTRAMUSCULAR; INTRAVENOUS PRN
Status: DISCONTINUED | OUTPATIENT
Start: 2017-06-28 | End: 2017-06-28

## 2017-06-28 RX ORDER — SODIUM CHLORIDE, SODIUM LACTATE, POTASSIUM CHLORIDE, CALCIUM CHLORIDE 600; 310; 30; 20 MG/100ML; MG/100ML; MG/100ML; MG/100ML
INJECTION, SOLUTION INTRAVENOUS CONTINUOUS
Status: DISCONTINUED | OUTPATIENT
Start: 2017-06-28 | End: 2017-06-29 | Stop reason: HOSPADM

## 2017-06-28 RX ORDER — IOPAMIDOL 510 MG/ML
INJECTION, SOLUTION INTRAVASCULAR PRN
Status: DISCONTINUED | OUTPATIENT
Start: 2017-06-28 | End: 2017-06-28 | Stop reason: HOSPADM

## 2017-06-28 RX ORDER — PROPOFOL 10 MG/ML
INJECTION, EMULSION INTRAVENOUS PRN
Status: DISCONTINUED | OUTPATIENT
Start: 2017-06-28 | End: 2017-06-28

## 2017-06-28 RX ADMIN — IOPAMIDOL 10 ML: 510 INJECTION, SOLUTION INTRAVASCULAR at 10:35

## 2017-06-28 RX ADMIN — PROPOFOL 20 MG: 10 INJECTION, EMULSION INTRAVENOUS at 10:26

## 2017-06-28 RX ADMIN — LIDOCAINE HYDROCHLORIDE 60 MG: 20 INJECTION, SOLUTION INFILTRATION; PERINEURAL at 10:20

## 2017-06-28 RX ADMIN — PROPOFOL 20 MG: 10 INJECTION, EMULSION INTRAVENOUS at 10:30

## 2017-06-28 RX ADMIN — PROPOFOL 20 MG: 10 INJECTION, EMULSION INTRAVENOUS at 10:23

## 2017-06-28 RX ADMIN — PROPOFOL 20 MG: 10 INJECTION, EMULSION INTRAVENOUS at 10:20

## 2017-06-28 RX ADMIN — PROPOFOL 20 MG: 10 INJECTION, EMULSION INTRAVENOUS at 10:33

## 2017-06-28 RX ADMIN — PROPOFOL 20 MG: 10 INJECTION, EMULSION INTRAVENOUS at 10:34

## 2017-06-28 RX ADMIN — SODIUM CHLORIDE, SODIUM LACTATE, POTASSIUM CHLORIDE, CALCIUM CHLORIDE: 600; 310; 30; 20 INJECTION, SOLUTION INTRAVENOUS at 10:14

## 2017-06-28 RX ADMIN — AMPICILLIN 1 G: 1 INJECTION, POWDER, FOR SOLUTION INTRAMUSCULAR; INTRAVENOUS at 10:18

## 2017-06-28 RX ADMIN — PROPOFOL 20 MG: 10 INJECTION, EMULSION INTRAVENOUS at 10:32

## 2017-06-28 RX ADMIN — Medication 3 ML: at 10:35

## 2017-06-28 RX ADMIN — HEPARIN SODIUM (PORCINE) LOCK FLUSH IV SOLN 100 UNIT/ML 5 ML: 100 SOLUTION at 11:04

## 2017-06-28 RX ADMIN — PROPOFOL 20 MG: 10 INJECTION, EMULSION INTRAVENOUS at 10:35

## 2017-06-28 RX ADMIN — PROPOFOL 20 MG: 10 INJECTION, EMULSION INTRAVENOUS at 10:28

## 2017-06-28 RX ADMIN — GLYCOPYRROLATE 0.2 MG: 0.2 INJECTION, SOLUTION INTRAMUSCULAR; INTRAVENOUS at 10:31

## 2017-06-28 NOTE — DISCHARGE INSTRUCTIONS
A collaboration between [facility] and [facility]  Experts in minimally invasive, targeted treatments performed using imaging guidance    Percutaneous Nephrostomy Tube (PNT) or Ureterostomy Tube Exchange    Today you had your existing kidney drainage tube catheter exchanged, either for periodic routine change or because there was malfunction or infection issues.    One of our Radiology PAs performed this procedure for you today:  ? Keanu Betts, [facility], PA-C  ? SAMSON Scott PA-C  ? Melvin Beltran PA-C  ? Maryann Chavez MS, PA-C    After you go home:  - Drink plenty of fluids.  Generally 6-8 (8 ounce) glasses a day is recommended.  - Resume your regular diet unless otherwise ordered by a medical provider.  - Keep the dressing clean and dry.  Change the dressing if it gets wet or soiled.  Routinely change the dressing every 2-3 days to inspect the insertion site.  When changing dressings, clean around tube site gently with a washcloth and antibacterial soap.  - Take care during strenuous activities, such as mowing the lawn, vacuuming, playing sports, or engaging in anything that will cause your drain to be pulled or moved.    For 24 hours after any sedation used:  - Relax and take it easy.  No strenuous activities.  - Do not drive or operate machines at home or at work.  - No alcohol consumption.  - Do not make any important or legal decisions.    Call our Interventional Radiology (IR) service if:  - If you start bleeding from the procedure site.  If you do start to bleed from the site, lie down and hold some pressure on the site.  Our radiology provider can help you decide if you need to return to the hospital.  - If you develop persistent nausea or vomiting.  - If you develop hives or a rash or any unexplained itching.    Additional Instructions:    Please call our IR service for the following problems:        - If no urine is draining from your tube.   "First, check that tube is not kinked.  - If urine is leaking from around tube.  - If your urine output becomes very foul (bad) smelling or new blood is seen in the urine.  - If the skin around the tube is swollen, reddened, painful, or has any discharge.  - If you have persistent pain in your back, over your kidney.  - If you have a fever of greater than 100.5  F and chills.  - If you feel nauseated and \"just not right.\"      [facility]  Interventional Radiology (IR)  [address] [facility]  2nd Floor, [facility] [place] [address]    Contact Number:  [phone]  (IR control desk)  - Monday - Friday 8:00 am - 4:30 pm    After hours for urgent concerns:  [phone]  - After 4:30 pm Monday - Friday, Weekends and Holidays.   - Ask for Interventional Radiology on-call.  Someone is available 24 hours a day.  - [facility] toll free number:  [phone]               A collaboration between [facility] and [facility]  Experts in minimally invasive, targeted treatments performed using imaging guidance    Percutaneous Nephrostomy Tube (PNT) or Ureterostomy Tube Exchange    Today you had your existing kidney drainage tube catheter exchanged, either for periodic routine change or because there was malfunction or infection issues.    One of our Radiology PAs performed this procedure for you today:  ? Keanu Betts [facility], ESTELLE  ? SAMSON Scott PA-C  ? Melvin Beltran PA-C  ? Maryann Chavez MS, PA-C    After you go home:  - Drink plenty of fluids.  Generally 6-8 (8 ounce) glasses a day is recommended.  - Resume your regular diet unless otherwise ordered by a medical provider.  - Keep the dressing clean and dry.  Change the dressing if it gets wet or soiled.  Routinely change the dressing every 2-3 days to inspect the insertion site.  When changing dressings, clean around tube site gently with a washcloth and antibacterial soap.  - Take " "care during strenuous activities, such as mowing the lawn, vacuuming, playing sports, or engaging in anything that will cause your drain to be pulled or moved.    For 24 hours after any sedation used:  - Relax and take it easy.  No strenuous activities.  - Do not drive or operate machines at home or at work.  - No alcohol consumption.  - Do not make any important or legal decisions.    Call our Interventional Radiology (IR) service if:  - If you start bleeding from the procedure site.  If you do start to bleed from the site, lie down and hold some pressure on the site.  Our radiology provider can help you decide if you need to return to the hospital.  - If you develop persistent nausea or vomiting.  - If you develop hives or a rash or any unexplained itching.    Additional Instructions:    Please call our IR service for the following problems:        - If no urine is draining from your tube.  First, check that tube is not kinked.  - If urine is leaking from around tube.  - If your urine output becomes very foul (bad) smelling or new blood is seen in the urine.  - If the skin around the tube is swollen, reddened, painful, or has any discharge.  - If you have persistent pain in your back, over your kidney.  - If you have a fever of greater than 100.5  F and chills.  - If you feel nauseated and \"just not right.\"      [facility]  Interventional Radiology (IR)  [address] [place] [address]    Contact Number:  [phone]  (IR control desk)  - Monday - Friday 8:00 am - 4:30 pm    After hours for urgent concerns:  [phone]  - After 4:30 pm Monday - Friday, Weekends and Holidays.   - Ask for Interventional Radiology on-call.  Someone is available 24 hours a day.  - [facility] toll free number:  [phone]               "

## 2017-06-28 NOTE — ANESTHESIA POSTPROCEDURE EVALUATION
Patient: Angella St    Procedure(s):  Left Nephrostomy Tube Change - Wound Class: I-Clean    Diagnosis:Hydronephrosis, Unspecified  Diagnosis Additional Information: No value filed.    Anesthesia Type:  MAC    Note:  Anesthesia Post Evaluation    Patient location during evaluation: Phase 2  Patient participation: Able to fully participate in evaluation  Level of consciousness: awake and alert  Pain management: adequate  Airway patency: patent  Cardiovascular status: acceptable  Respiratory status: acceptable  Hydration status: acceptable  PONV: none     Anesthetic complications: None          Last vitals:  Vitals:    06/28/17 0938   BP: 137/86   Pulse: 64   Resp: 16   Temp: 36.6  C (97.9  F)   SpO2: 98%         Electronically Signed By: Danni Wolf MD  June 28, 2017  10:56 AM

## 2017-06-28 NOTE — ANESTHESIA CARE TRANSFER NOTE
Patient: Angella St    Procedure(s):  Left Nephrostomy Tube Change - Wound Class: I-Clean    Diagnosis: Hydronephrosis, Unspecified  Diagnosis Additional Information: No value filed.    Anesthesia Type:   MAC     Note:  Airway :Room Air  Patient transferred to:Phase II  Comments: Arrive Phase II, Stable, Airway Intact  122/78, 89,16,98,36  All questions answered.      Vitals: (Last set prior to Anesthesia Care Transfer)    CRNA VITALS  6/28/2017 1016 - 6/28/2017 1053      6/28/2017             EKG: NSR                Electronically Signed By: BARBARA Herman CRNA  June 28, 2017  10:53 AM

## 2017-06-28 NOTE — IP AVS SNAPSHOT
MRN:[id number]                      After Visit Summary   6/28/2017    Angella St    MRN: [id number]           Thank you!     Thank you for choosing [facility] for your care. Our goal is always to provide you with excellent care. Hearing back from our patients is one way we can continue to improve our services. Please take a few minutes to complete the written survey that you may receive in the mail after you visit with us. Thank you!        Patient Information     Date Of Birth          1965        About your hospital stay     You were admitted on:  June 28, 2017 You last received care in the:  [facility]    You were discharged on:  June 28, 2017       Who to Call     For medical emergencies, please call 911.  For non-urgent questions about your medical care, please call your primary care provider or clinic, [phone]  For questions related to your surgery, please call your surgery clinic        Attending Provider     Provider Specialty    Bridger Meyer PA-C Radiology       Primary Care Provider Office Phone # Fax #    Arthur Hernandez [phone]      Your next 10 appointments already scheduled     Jan 08, 2018  9:20 AM CST   (Arrive by 9:05 AM)   CT CHEST ABDOMEN PELVIS W/O & W CONTRAST with UCCT2   [facility] ([facility])    [address] [address] [phone]           Please bring any scans or X-rays taken at other hospitals, if similar tests were done. Also bring a list of your medicines, including vitamins, minerals and over-the-counter drugs. It is safest to leave personal items at home.  Be sure to tell your doctor:   If you have any allergies.   If there s any chance you are pregnant.   If you are breastfeeding.   If you have any special needs.  You may have contrast for this exam. To prepare:   Do not eat or drink for 2 hours before your exam. If  you need to take medicine, you may take it with small sips of water. (We may ask you to take liquid medicine as well.)   The day before your exam, drink extra fluids at least six 8-ounce glasses (unless your doctor tells you to restrict your fluids).  Patients over 70 or patients with diabetes or kidney problems:   If you haven t had a blood test (creatinine test) within the last 30 days, go to your clinic or Diagnostic Imaging Department for this test.  If you have diabetes:   If your kidney function is normal, continue taking your metformin (Avandamet, Glucophage, Glucovance, Metaglip) on the day of your exam.   If your kidney function is abnormal, wait 48 hours before restarting this medicine.  You will have oral contrast for this exam:   You will drink the contrast at home. Get this from your clinic or Diagnostic Imaging Department. Please follow the directions given.  Please wear loose clothing, such as a sweat suit or jogging clothes. Avoid snaps, zippers and other metal. We may ask you to undress and put on a hospital gown.  If you have any questions, please call the Imaging Department where you will have your exam.            Jan 08, 2018 11:20 AM CST   (Arrive by 11:05 AM)   Return Visit with Lam Moran MD   [facility] ([facility])    [address] [place] [address] [phone]              Future tests that were ordered for you     IR Nephrostomy Tube Change Left       Routine exchange of 8 Fr LEFT PNT                  Further instructions from your care team         A collaboration between [facility] and [facility]  Experts in minimally invasive, targeted treatments performed using imaging guidance    Percutaneous Nephrostomy Tube (PNT) or Ureterostomy Tube Exchange    Today you had your existing kidney drainage tube catheter exchanged, either for periodic routine change  or because there was malfunction or infection issues.    One of our Radiology PAs performed this procedure for you today:  ? Keanu Betts, [facility] ESTELLE  ? SAMSON Scott PA-C  ? Melvin Beltran PA-C  ? Maryann Chavez MS, ESTELLE    After you go home:  - Drink plenty of fluids.  Generally 6-8 (8 ounce) glasses a day is recommended.  - Resume your regular diet unless otherwise ordered by a medical provider.  - Keep the dressing clean and dry.  Change the dressing if it gets wet or soiled.  Routinely change the dressing every 2-3 days to inspect the insertion site.  When changing dressings, clean around tube site gently with a washcloth and antibacterial soap.  - Take care during strenuous activities, such as mowing the lawn, vacuuming, playing sports, or engaging in anything that will cause your drain to be pulled or moved.    For 24 hours after any sedation used:  - Relax and take it easy.  No strenuous activities.  - Do not drive or operate machines at home or at work.  - No alcohol consumption.  - Do not make any important or legal decisions.    Call our Interventional Radiology (IR) service if:  - If you start bleeding from the procedure site.  If you do start to bleed from the site, lie down and hold some pressure on the site.  Our radiology provider can help you decide if you need to return to the hospital.  - If you develop persistent nausea or vomiting.  - If you develop hives or a rash or any unexplained itching.    Additional Instructions:    Please call our IR service for the following problems:        - If no urine is draining from your tube.  First, check that tube is not kinked.  - If urine is leaking from around tube.  - If your urine output becomes very foul (bad) smelling or new blood is seen in the urine.  - If the skin around the tube is swollen, reddened, painful, or has any discharge.  - If you have persistent pain in your back, over your kidney.  - If you have a fever of greater than 100.5  F  "and chills.  - If you feel nauseated and \"just not right.\"      [facility]  Interventional Radiology (IR)  [address] [facility]  2nd Floor, [facility] [place] [address]    Contact Number:  [phone]  (IR control desk)  - Monday - Friday 8:00 am - 4:30 pm    After hours for urgent concerns:  [phone]  - After 4:30 pm Monday - Friday, Weekends and Holidays.   - Ask for Interventional Radiology on-call.  Someone is available 24 hours a day.  - [facility] toll free number:  [phone]               A collaboration between [facility] and [facility]  Experts in minimally invasive, targeted treatments performed using imaging guidance    Percutaneous Nephrostomy Tube (PNT) or Ureterostomy Tube Exchange    Today you had your existing kidney drainage tube catheter exchanged, either for periodic routine change or because there was malfunction or infection issues.    One of our Radiology PAs performed this procedure for you today:  ? Keanu Betts [facility] [place]  ? SAMSON Scott PA-C  ? Melvin Beltran PA-C  ? Maryann Chavez MS, PA-C    After you go home:  - Drink plenty of fluids.  Generally 6-8 (8 ounce) glasses a day is recommended.  - Resume your regular diet unless otherwise ordered by a medical provider.  - Keep the dressing clean and dry.  Change the dressing if it gets wet or soiled.  Routinely change the dressing every 2-3 days to inspect the insertion site.  When changing dressings, clean around tube site gently with a washcloth and antibacterial soap.  - Take care during strenuous activities, such as mowing the lawn, vacuuming, playing sports, or engaging in anything that will cause your drain to be pulled or moved.    For 24 hours after any sedation used:  - Relax and take it easy.  No strenuous activities.  - Do not drive or operate machines at home or at work.  - No alcohol consumption.  - Do not make any important " "or legal decisions.    Call our Interventional Radiology (IR) service if:  - If you start bleeding from the procedure site.  If you do start to bleed from the site, lie down and hold some pressure on the site.  Our radiology provider can help you decide if you need to return to the hospital.  - If you develop persistent nausea or vomiting.  - If you develop hives or a rash or any unexplained itching.    Additional Instructions:    Please call our IR service for the following problems:        - If no urine is draining from your tube.  First, check that tube is not kinked.  - If urine is leaking from around tube.  - If your urine output becomes very foul (bad) smelling or new blood is seen in the urine.  - If the skin around the tube is swollen, reddened, painful, or has any discharge.  - If you have persistent pain in your back, over your kidney.  - If you have a fever of greater than 100.5  F and chills.  - If you feel nauseated and \"just not right.\"      [facility]  Interventional Radiology (IR)  [address] [place] [address]    Contact Number:  [phone]  (IR control desk)  - Monday - Friday 8:00 am - 4:30 pm    After hours for urgent concerns:  [phone]  - After 4:30 pm Monday - Friday, Weekends and Holidays.   - Ask for Interventional Radiology on-call.  Someone is available 24 hours a day.  - [facility] toll free number:  [phone]                 Pending Results     Date and Time Order Name Status Description    6/28/2017 0936 IR NEPHROSTOMY TUBE CHANGE LEFT In process             Admission Information     Date & Time Provider Department Dept. Phone    6/28/2017 Bridger Meyer PA-C [facility] [phone]      Your Vitals Were     Blood Pressure Pulse Temperature Respirations Height Weight    116/74 80 96.8  F (36  C) (Temporal) 14 1.651 m (5' 5\") 97 kg (213 lb 14.4 oz)    Pulse Oximetry BMI (Body " Mass Index)                99% 35.59 kg/m2          Infusion Resource Information     Infusion Resource gives you secure access to your electronic health record. If you see a primary care provider, you can also send messages to your care team and make appointments. If you have questions, please call your primary care clinic.  If you do not have a primary care provider, please call [phone] and they will assist you.      Infusion Resource is an electronic gateway that provides easy, online access to your medical records. With Infusion Resource, you can request a clinic appointment, read your test results, renew a prescription or communicate with your care team.     To access your existing account, please contact your [facility] or call [phone] for assistance.        Care EveryWhere ID     This is your Care EveryWhere ID. This could be used by other organizations to access your [place] medical records  [id number]        Equal Access to Services     DORIS RODRIGUES : Sabiha Jo, saleem dela cruz, razia decker, jin yusuf. So [facility] [phone].    ATENCIÓN: Si habla español, tiene a andrew disposición servicios gratuitos de asistencia lingüística. Shemar al [phone].    We comply with applicable federal civil rights laws and [place] laws. We do not discriminate on the basis of race, color, national origin, age, disability sex, sexual orientation or gender identity.               Review of your medicines      CONTINUE these medicines which have NOT CHANGED        Dose / Directions    amLODIPine 5 MG tablet   Commonly known as:  NORVASC   Used for:  HTN (hypertension)        Dose:  5 mg   Take 1 tablet (5 mg) by mouth daily   Quantity:  90 tablet   Refills:  3       COMPRESSION STOCKINGS   Used for:  Leg swelling        Dose:  1 each   1 each daily   Quantity:  2 Units   Refills:  1       enalapril 20 MG tablet   Commonly known as:  VASOTEC   Used for:  HTN  (hypertension)        Dose:  20 mg   Take 1 tablet (20 mg) by mouth daily   Quantity:  90 tablet   Refills:  1       hydrochlorothiazide 25 MG tablet   Commonly known as:  HYDRODIURIL        Dose:  25 mg   Take 25 mg by mouth   Refills:  0       HYDROmorphone 2 MG tablet   Commonly known as:  DILAUDID        Dose:  2-4 mg   Take 2-4 mg by mouth   Refills:  0       * iohexol 140 MG/ML Soln solution   Commonly known as:  OMNIPAQUE   Used for:  Cervical cancer (H)        Mix entire bottle (50ml) of contast with 600ml (20 ounces) of water and drink half 2 hrs prior to CT scan and half 1 hr prior to scan   Quantity:  50 mL   Refills:  0       * iohexol 140 MG/ML Soln solution   Commonly known as:  OMNIPAQUE   Used for:  Cervical cancer (H)        Mix entire bottle (50ml) of contast with 600ml (20 ounces) of water and drink half 2 hrs prior to CT scan and half 1 hr prior to scan   Quantity:  50 mL   Refills:  0       IRON SUPPLEMENT PO        Dose:  27 mg   Take 27 mg by mouth daily (with breakfast)   Refills:  0       lidocaine-prilocaine cream   Commonly known as:  EMLA   Used for:  Cervical cancer (H)        Apply topically as needed for moderate pain Apply to chest port site 30 minutes prior to access   Quantity:  30 g   Refills:  1       VITAMIN B6 PO        Dose:  50 mg   Take 50 mg by mouth daily   Refills:  0       * Notice:  This list has 2 medication(s) that are the same as other medications prescribed for you. Read the directions carefully, and ask your doctor or other care provider to review them with you.             Protect others around you: Learn how to safely use, store and throw away your medicines at www.disposemymeds.org.             Medication List: This is a list of all your medications and when to take them. Check marks below indicate your daily home schedule. Keep this list as a reference.      Medications           Morning Afternoon Evening Bedtime As Needed    amLODIPine 5 MG tablet   Commonly  known as:  NORVASC   Take 1 tablet (5 mg) by mouth daily                                COMPRESSION STOCKINGS   1 each daily                                enalapril 20 MG tablet   Commonly known as:  VASOTEC   Take 1 tablet (20 mg) by mouth daily                                hydrochlorothiazide 25 MG tablet   Commonly known as:  HYDRODIURIL   Take 25 mg by mouth                                HYDROmorphone 2 MG tablet   Commonly known as:  DILAUDID   Take 2-4 mg by mouth                                * iohexol 140 MG/ML Soln solution   Commonly known as:  OMNIPAQUE   Mix entire bottle (50ml) of contast with 600ml (20 ounces) of water and drink half 2 hrs prior to CT scan and half 1 hr prior to scan                                * iohexol 140 MG/ML Soln solution   Commonly known as:  OMNIPAQUE   Mix entire bottle (50ml) of contast with 600ml (20 ounces) of water and drink half 2 hrs prior to CT scan and half 1 hr prior to scan                                IRON SUPPLEMENT PO   Take 27 mg by mouth daily (with breakfast)                                lidocaine-prilocaine cream   Commonly known as:  EMLA   Apply topically as needed for moderate pain Apply to chest port site 30 minutes prior to access                                VITAMIN B6 PO   Take 50 mg by mouth daily                                * Notice:  This list has 2 medication(s) that are the same as other medications prescribed for you. Read the directions carefully, and ask your doctor or other care provider to review them with you.

## 2017-06-28 NOTE — IP AVS SNAPSHOT
[facility]    [address] [place] [address]    Phone:  [phone]    Fax:  [phone]                                       After Visit Summary   6/28/2017    Angella St    MRN: [id number]           After Visit Summary Signature Page     I have received my discharge instructions, and my questions have been answered. I have discussed any challenges I see with this plan with the nurse or doctor.    ..........................................................................................................................................  Patient/Patient Representative Signature      ..........................................................................................................................................  Patient Representative Print Name and Relationship to Patient    ..................................................               ................................................  Date                                            Time    ..........................................................................................................................................  Reviewed by Signature/Title    ...................................................              ..............................................  Date                                                            Time

## 2017-06-28 NOTE — PROGRESS NOTES
A collaboration between [facility] and [facility]  Experts in minimally invasive, targeted treatments performed using imaging guidance    Abbreviated History & Physical    Patient Name:  Angella St   YOB: 1965  Medical Record Number (MRN):  [id number]  Age:  51 year old female      Requested IR procedure:  Exchange of LEFT percutaneous nephrostomy tube    Indication / Associated Diagnosis:  Ureteral stricture; cervical cancer    Any history of sleep apnea?   No     Any history of problems with sedation?   No    -----    Patient Data:    Physical Exam  Cardiovascular -  RRR w/o mm    Respiratory -  CTA B    Airway -  Opens mouth w/o difficulty    Other -  n/a      Past Medical History:   Diagnosis Date     Anemia      Cancer (H)     Metastatic squamous cell cervical carcinoma     Chronic kidney disease      Hypertension 2007     Obesity     BMI >30         Patient Active Problem List    Diagnosis Date Noted     Hydronephrosis 07/25/2016     Priority: Medium     Drug-induced peripheral neuropathy (H) 11/27/2015     Priority: Medium     Hypertension      Priority: Medium     Cervical cancer (H) 05/20/2015     Priority: Medium     Encounter for long-term current use of medication 05/20/2015     Priority: Medium     updating diagnosis code for icd10 cutover       Abdominal pain 05/20/2015     Priority: Medium         Prescription Medications as of 6/28/2017             hydrochlorothiazide (HYDRODIURIL) 25 MG tablet Take 25 mg by mouth    HYDROmorphone (DILAUDID) 2 MG tablet Take 2-4 mg by mouth    iohexol (OMNIPAQUE) 140 MG/ML SOLN solution Mix entire bottle (50ml) of contast with 600ml (20 ounces) of water and drink half 2 hrs prior to CT scan and half 1 hr prior to scan    iohexol (OMNIPAQUE) 140 MG/ML SOLN solution Mix entire bottle (50ml) of contast with 600ml (20 ounces) of water and drink half 2 hrs prior to CT scan and half 1 hr prior to scan     enalapril (VASOTEC) 20 MG tablet Take 1 tablet (20 mg) by mouth daily    amLODIPine (NORVASC) 5 MG tablet Take 1 tablet (5 mg) by mouth daily    COMPRESSION STOCKINGS 1 each daily    Ferrous Sulfate (IRON SUPPLEMENT PO) Take 27 mg by mouth daily (with breakfast)    Pyridoxine HCl (VITAMIN B6 PO) Take 50 mg by mouth daily    lidocaine-prilocaine (EMLA) cream Apply topically as needed for moderate pain Apply to chest port site 30 minutes prior to access      Facility Administered Medications as of 6/28/2017             lidocaine 1 % 1 mL 1 mL by Other route every hour as needed (mild pain with VAD insertion or accessing implanted port)    lidocaine (LMX4) kit Apply topically every hour as needed for pain (with VAD insertion or accessing implanted port.)    sodium chloride (PF) 0.9% PF flush 3 mL 3 mLs by Intracatheter route every hour as needed for line flush    sodium chloride (PF) 0.9% PF flush 3 mL 3 mLs by Intracatheter route every 8 hours    0.9% sodium chloride infusion Inject into the vein continuous    ampicillin (OMNIPEN) 1 g vial to attach to  ml bag for ADULTS or 50 ml bag for PEDS Inject 1 g into the vein Pre-Op/Pre-procedure x 1 dose    gentamicin (GARAMYCIN) 160 mg in NaCl 0.9 % 50 mL intermittent infusion Inject 160 mg into the vein Pre-Op/Pre-procedure x 1 dose    medication instruction continuous prn    lidocaine 1% 30 mL + sodium Bicarb 8.4% 4 mL Inject 30 mLs as directed once    lactated ringers infusion Inject into the vein continuous    lidocaine 1 % 1 mL 1 mL by Other route every hour as needed (mild pain with VAD insertion or accessing implanted port)    lidocaine (LMX4) kit Apply topically every hour as needed for moderate pain (with VAD insertion or accessing implanted port)    sodium chloride (PF) 0.9% PF flush 10-20 mL 10-20 mLs by Intracatheter route every hour as needed for line flush or post meds or blood draw (To flush each CVC Implanted port.)    heparin lock flush 10 UNIT/ML  "injection 5-10 mL 5-10 mLs by Intracatheter route every 24 hours    heparin lock flush 10 UNIT/ML injection 5-10 mL 5-10 mLs by Intracatheter route every hour as needed for other (to lock each port in dual implanted port.)    heparin 100 UNIT/ML injection 5 mL 5 mLs by Intracatheter route every 28 days    sodium chloride (PF) 0.9% PF flush 10-20 mL 10-20 mLs by Intracatheter route every hour as needed for line flush or post meds or blood draw    lidocaine (LMX4) kit Apply topically once as needed for moderate pain (with VAD insertion or accessing implanted port)    sodium chloride (PF) 0.9% PF flush 10-20 mL Inject 10-20 mLs into the vein every hour as needed for line flush or post meds or blood draw    heparin 100 UNIT/ML injection 5 mL 5 mLs by Intracatheter route every 8 hours    heparin 100 UNIT/ML injection 500 Units 5 mLs (500 Units) by Intracatheter route every 8 hours            No Known Allergies      Complete Blood Count:  Lab Results   Component Value Date     03/09/2017       Coagulation:  Lab Results   Component Value Date    INR 0.93 03/09/2017       Vital Signs:  /86  Pulse 64  Temp 97.9  F (36.6  C) (Oral)  Resp 16  Ht 1.651 m (5' 5\")  Wt 97 kg (213 lb 14.4 oz)  SpO2 98%  BMI 35.59 kg/m2    -----        "

## 2017-06-28 NOTE — ANESTHESIA PREPROCEDURE EVALUATION
ANESTHESIA PREOP EVALUATION    Procedure: PERCUTANEOUS NEPHROSTOMY    HPI:     PMHx/PSHx/ROS:  Past Medical History:   Diagnosis Date     Anemia      Cancer (H)     Metastatic squamous cell cervical carcinoma     Chronic kidney disease      Hypertension 2007     Obesity     BMI >30       Past Surgical History:   Procedure Laterality Date     COMBINED CYSTOSCOPY, INSERT STENT URETER(S) Left 6/13/2016    Procedure: COMBINED CYSTOSCOPY, INSERT STENT URETER(S);  Surgeon: Deja Salinas MD;  Location: [facility]     CYSTOSCOPY, RETROGRADES, COMBINED Left 6/13/2016    Procedure: COMBINED CYSTOSCOPY, RETROGRADES;  Surgeon: Deja Salinas MD;  Location: [facility]     GENITOURINARY SURGERY  5/2015    PNT placement     HYSTERECTOMY  2011    [place], [place]     PERCUTANEOUS NEPHROSTOMY Left 3/9/2017    Procedure: PERCUTANEOUS NEPHROSTOMY;  Surgeon: Bridger Meyer PA-C;  Location: [facility] OR         Past Anes Hx: No personal or family h/o anesthesia problems    Soc Hx:   Tobacco:   EtOH:     Allergies: No Known Allergies    Meds:     (Not in a hospital admission)    Current Outpatient Prescriptions   Medication Sig Dispense Refill     hydrochlorothiazide (HYDRODIURIL) 25 MG tablet Take 25 mg by mouth       HYDROmorphone (DILAUDID) 2 MG tablet Take 2-4 mg by mouth       iohexol (OMNIPAQUE) 140 MG/ML SOLN solution Mix entire bottle (50ml) of contast with 600ml (20 ounces) of water and drink half 2 hrs prior to CT scan and half 1 hr prior to scan 50 mL 0     iohexol (OMNIPAQUE) 140 MG/ML SOLN solution Mix entire bottle (50ml) of contast with 600ml (20 ounces) of water and drink half 2 hrs prior to CT scan and half 1 hr prior to scan 50 mL 0     enalapril (VASOTEC) 20 MG tablet Take 1 tablet (20 mg) by mouth daily 90 tablet 1     amLODIPine (NORVASC) 5 MG tablet Take 1 tablet (5 mg) by mouth daily 90 tablet 3     COMPRESSION STOCKINGS 1 each daily 2 Units 1     Ferrous Sulfate (IRON SUPPLEMENT PO) Take 27 mg by  "mouth daily (with breakfast)       Pyridoxine HCl (VITAMIN B6 PO) Take 50 mg by mouth daily       lidocaine-prilocaine (EMLA) cream Apply topically as needed for moderate pain Apply to chest port site 30 minutes prior to access 30 g 1       Physical Exam:  VS: T Data Unavailable, P Data Unavailable, BP Data Unavailable, R Data Unavailable, SpO2       Weight:   Wt Readings from Last 2 Encounters:   06/26/17 97 kg (213 lb 14.4 oz)   03/09/17 98 kg (216 lb)     Height:   Ht Readings from Last 2 Encounters:   06/26/17 1.651 m (5' 5\")   03/09/17 1.651 m (5' 5\")           BMP:  Lab Results   Component Value Date     12/11/2016      Lab Results   Component Value Date    POTASSIUM 3.6 12/11/2016     Lab Results   Component Value Date    CHLORIDE 108 12/11/2016     Lab Results   Component Value Date    HERON 8.5 12/11/2016     Lab Results   Component Value Date    CO2 27 12/11/2016     Lab Results   Component Value Date    BUN 8 12/11/2016     Lab Results   Component Value Date    CR 0.69 12/11/2016     Lab Results   Component Value Date     12/11/2016        CBC:  Lab Results   Component Value Date    WBC 6.0 03/09/2017     Lab Results   Component Value Date    HGB 12.5 03/09/2017     Lab Results   Component Value Date    HCT 38.9 03/09/2017     Lab Results   Component Value Date     03/09/2017        Imaging:  No images are attached to the encounter.    6/28/2017  9:17 AM                      Anesthesia Plan      History & Physical Review  History and physical reviewed and following examination; no interval change.    ASA Status:  2 .    NPO Status:  > 8 hours    Plan for MAC with Intravenous and Propofol induction. Maintenance will be TIVA.  Reason for MAC:  Deep or markedly invasive procedure (G8)  PONV prophylaxis:  Ondansetron (or other 5HT-3)       Postoperative Care  Postoperative pain management:  IV analgesics, Oral pain medications and Multi-modal analgesia.  "     Consents                          .

## 2017-06-28 NOTE — PROCEDURES
Interventional Radiology Brief Post Procedure Note    Procedure:  IR NEPHROSTOMY TUBE CHANGE LEFT [[id number] (Type: Custom)]    Proceduralist: Bridger Meyer PA-C    Assistant: None    Time Out: Prior to the start of the procedure and with procedural staff participation, I verbally confirmed the patient s identity using two indicators, relevant allergies, that the procedure was appropriate and matched the consent or emergent situation, and that the correct equipment/implants were available. Immediately prior to starting the procedure I conducted the Time Out with the procedural staff and re-confirmed the patient s name, procedure, and site/side. (The Joint Commission universal protocol was followed.)  Yes        Sedation: Monitored Anesthesia Care (MAC) administered and documented by Anesthesia Care Provider    Findings: Completed routine exchange of LEFT percutaneous nephrostomy tube(s). New 8 Slovak locking pigtail drain(s) placed. No immediate complication. Plan: return in 3 months for routine exchange. Dx: Hydronephrosis, unspecified hydronephrosis type. B[place]y. MAC <1    Estimated Blood Loss: None    Fluoroscopy Time:  <1 minute(s)    SPECIMENS: None    Complications: 1. None     Condition: Stable    Plan:     Comments: See dictated procedure note for full details.    Bridger Meyer PA-C

## 2017-08-11 PROCEDURE — 96523 IRRIG DRUG DELIVERY DEVICE: CPT

## 2017-08-11 PROCEDURE — 25000128 H RX IP 250 OP 636: Performed by: OBSTETRICS & GYNECOLOGY

## 2017-08-11 RX ORDER — HEPARIN SODIUM (PORCINE) LOCK FLUSH IV SOLN 100 UNIT/ML 100 UNIT/ML
500 SOLUTION INTRAVENOUS EVERY 8 HOURS
Status: DISCONTINUED | OUTPATIENT
Start: 2017-08-11 | End: 2017-08-19 | Stop reason: HOSPADM

## 2017-08-11 RX ADMIN — SODIUM CHLORIDE, PRESERVATIVE FREE 500 UNITS: 5 INJECTION INTRAVENOUS at 09:59

## 2017-08-11 NOTE — NURSING NOTE
Chief Complaint   Patient presents with     Port Flush     port accessed with 20 gauge 3/4 inch gripper needle, line flushed      Belinda Honeycutt RN

## 2017-10-16 ENCOUNTER — APPOINTMENT (OUTPATIENT)
Dept: LAB | Facility: CLINIC | Age: 52
End: 2017-10-16
Attending: OBSTETRICS & GYNECOLOGY
Payer: MEDICAID

## 2017-10-16 ENCOUNTER — ONCOLOGY VISIT (OUTPATIENT)
Dept: ONCOLOGY | Facility: CLINIC | Age: 52
End: 2017-10-16
Attending: OBSTETRICS & GYNECOLOGY
Payer: MEDICAID

## 2017-10-16 VITALS
TEMPERATURE: 97.4 F | HEART RATE: 81 BPM | WEIGHT: 216.6 LBS | BODY MASS INDEX: 36.09 KG/M2 | OXYGEN SATURATION: 99 % | SYSTOLIC BLOOD PRESSURE: 134 MMHG | DIASTOLIC BLOOD PRESSURE: 82 MMHG | RESPIRATION RATE: 16 BRPM | HEIGHT: 65 IN

## 2017-10-16 DIAGNOSIS — C53.9 MALIGNANT NEOPLASM OF CERVIX, UNSPECIFIED SITE (H): Primary | ICD-10-CM

## 2017-10-16 PROCEDURE — 25000128 H RX IP 250 OP 636: Mod: ZF | Performed by: OBSTETRICS & GYNECOLOGY

## 2017-10-16 PROCEDURE — 36591 DRAW BLOOD OFF VENOUS DEVICE: CPT

## 2017-10-16 PROCEDURE — 99212 OFFICE O/P EST SF 10 MIN: CPT | Mod: ZF

## 2017-10-16 PROCEDURE — 99214 OFFICE O/P EST MOD 30 MIN: CPT | Mod: ZP | Performed by: OBSTETRICS & GYNECOLOGY

## 2017-10-16 RX ORDER — HEPARIN SODIUM (PORCINE) LOCK FLUSH IV SOLN 100 UNIT/ML 100 UNIT/ML
5 SOLUTION INTRAVENOUS ONCE
Status: COMPLETED | OUTPATIENT
Start: 2017-10-16 | End: 2017-10-16

## 2017-10-16 RX ADMIN — SODIUM CHLORIDE, PRESERVATIVE FREE 5 ML: 5 INJECTION INTRAVENOUS at 16:09

## 2017-10-16 ASSESSMENT — PAIN SCALES - GENERAL: PAINLEVEL: NO PAIN (0)

## 2017-10-16 NOTE — LETTER
10/16/2017       RE: Angella St  [address]  [address]   [facility] [address]     Dear Colleague,    Thank you for referring your patient, Angella St, to the [facility]. Please see a copy of my visit note below.                Follow Up Notes on Referred Patient    Date: 10/16/2017       Dr. Joyce Charles MD  No address on file       RE: Angella St  : 1965  DAYSI: 10/16/2017    Dear Dr. Joyce Charles:    Angella St is a 51 year old woman with a diagnosis of recurrent cervical cancer.     Patient presents for followup.  She has been doing well.  No nausea, vomiting, fever or chills.  Normal urinary and bowel function.  No B symptoms.  No vaginal bleeding.  She still has her left nephrostomy tube.  It is functioning well and needs to be exchanged again.           Past Medical History:    Past Medical History:   Diagnosis Date     Anemia      Cancer (H)     Metastatic squamous cell cervical carcinoma     Chronic kidney disease      Hypertension      Obesity     BMI >30         Past Surgical History:    Past Surgical History:   Procedure Laterality Date     COMBINED CYSTOSCOPY, INSERT STENT URETER(S) Left 2016    Procedure: COMBINED CYSTOSCOPY, INSERT STENT URETER(S);  Surgeon: Deja Salinas MD;  [facility]     CYSTOSCOPY, RETROGRADES, COMBINED Left 2016    Procedure: COMBINED CYSTOSCOPY, RETROGRADES;  Surgeon: Deja Salinas MD;  [facility]     GENITOURINARY SURGERY  2015    PNT placement     HYSTERECTOMY      [place], [place]     PERCUTANEOUS NEPHROSTOMY Left 3/9/2017    Procedure: PERCUTANEOUS NEPHROSTOMY;  Surgeon: Bridger Meyer PA-C;  [facility]     PERCUTANEOUS NEPHROSTOMY Left 2017    Procedure: PERCUTANEOUS NEPHROSTOMY;  Left Nephrostomy Tube Change;  Surgeon: Bridger Meyer PA-C;  Location: [facility]         Health Maintenance Due   Topic Date Due     GRACIELA QUESTIONNAIRE 1 YEAR   "11/02/1981     PHQ-9 Q1YR  11/02/1981     HEPATITIS C SCREENING  11/02/1981     MAMMO SCREEN Q2 YR (SYSTEM ASSIGNED)  11/02/2013     COLON CANCER SCREEN (SYSTEM ASSIGNED)  11/02/2013     INFLUENZA VACCINE (SYSTEM ASSIGNED)  09/01/2017       Current Medications:     Current Outpatient Prescriptions   Medication Sig Dispense Refill     hydrochlorothiazide (HYDRODIURIL) 25 MG tablet Take 25 mg by mouth       HYDROmorphone (DILAUDID) 2 MG tablet Take 2-4 mg by mouth       iohexol (OMNIPAQUE) 140 MG/ML SOLN solution Mix entire bottle (50ml) of contast with 600ml (20 ounces) of water and drink half 2 hrs prior to CT scan and half 1 hr prior to scan 50 mL 0     iohexol (OMNIPAQUE) 140 MG/ML SOLN solution Mix entire bottle (50ml) of contast with 600ml (20 ounces) of water and drink half 2 hrs prior to CT scan and half 1 hr prior to scan 50 mL 0     enalapril (VASOTEC) 20 MG tablet Take 1 tablet (20 mg) by mouth daily 90 tablet 1     amLODIPine (NORVASC) 5 MG tablet Take 1 tablet (5 mg) by mouth daily 90 tablet 3     COMPRESSION STOCKINGS 1 each daily 2 Units 1     Ferrous Sulfate (IRON SUPPLEMENT PO) Take 27 mg by mouth daily (with breakfast)       Pyridoxine HCl (VITAMIN B6 PO) Take 50 mg by mouth daily       lidocaine-prilocaine (EMLA) cream Apply topically as needed for moderate pain Apply to chest port site 30 minutes prior to access 30 g 1         Allergies:      No Known Allergies     Social History:     Social History   Substance Use Topics     Smoking status: Never Smoker     Smokeless tobacco: Not on file     Alcohol use No      Comment: Does not drink alcohol       History   Drug Use No     Comment: No drug use         Family History:       Family History   Problem Relation Age of Onset     Hypertension Brother          Physical Exam:     /82 (BP Location: Right arm, Cuff Size: Adult Large)  Pulse 81  Temp 97.4  F (36.3  C) (Oral)  Resp 16  Ht 1.651 m (5' 5\")  Wt 98.2 kg (216 lb 9.6 oz)  SpO2 99%  BMI " 36.04 kg/m2  Body mass index is 36.04 kg/(m^2).    General Appearance: healthy and alert, no distress           Assessment:    Angella St is a 51 year old woman with a diagnosis of recurrent cervical cancer.     A total of 30 minutes was spent with the patient, 25minutes of which were spent in counseling the patient and/or treatment planning.      1.  Recurrent cervical cancer.     2.  Status post 9 cycles based on  protocol.   3.  Left ureteral obstruction.   4.  Left nephrostomy tube.      I had a long discussion with the patient that she will require another exchange of her left nephrostomy tube.  We are planning to do that this month and then we will see her back in January where she needs another surveillance CT scan.  If there is no disease progression, then we could consider removing the left nephrostomy tube at that time and obtain a functional analysis of her left kidney.  Depending on the function as prior discussed with Urology, she might have to undergo left nephrectomy at that point in order to remove the tube.  She also still has a permanent IV PowerPort which requires once a month flushing.  Given that she has that as well as still active surveillance and high possibility for disease recurrence and need for exchange of a left nephrostomy tube, I do not believe that it would be medically justified for her to return to [facility].  We should, at minimum, wait until next January and then find a possible permanent solution with possible left nephrectomy.  In discussion with the patient, I do not believe that she could receive adequate care in [facility], especially if she does have disease recurrence that required treatment based on  protocol including Avastin, which most likely is not available in [facility].  Once we have a more permanent solution for her left nephrostomy as well as tube as well as over 2 years out from last systemic treatment which would be the end of this year, her work here  would be reassessed at that time.  The patient agrees with this plan and is very appreciative of her care here.  All questions were answered.       Lam Moran MD, MS    Department of Obstetrics and Gynecology   Division of Gynecologic Oncology   [facility]  Phone: [phone]        CC  Patient Care Team:  Arthur Hernandez MD as PCP - General  Christy Kraus RN as Continuity Care Coordinator (Oncology)  Melida Rutledge APRN CNP as Referring Physician (Nurse Practitioner - Women's Health)  Shamir Pugh MD as MD (Internal Medicine)  Deja Salinas MD as MD (Urology)  Maryse Rodriguez, RN as Registered Nurse

## 2017-10-16 NOTE — NURSING NOTE
"Chief Complaint   Patient presents with     Port Flush     Port accessed and flushed with saline and heparin. Vitals taken and pt checked in for appt     Port accessed with 20g 3/4\" gripper needle by RN, line flushed with saline and heparin.  Vitals taken. Pt checked in for appointment(s).    Odilia Haywood RN  "

## 2017-10-16 NOTE — NURSING NOTE
"Oncology Rooming Note    October 16, 2017 4:44 PM   Angella St is a 51 year old female who presents for:    Chief Complaint   Patient presents with     Port Flush     Port accessed and flushed with saline and heparin. Vitals taken and pt checked in for appt     Oncology Clinic Visit     Cervical Ca- F/U     Initial Vitals: /82 (BP Location: Right arm, Cuff Size: Adult Large)  Pulse 81  Temp 97.4  F (36.3  C) (Oral)  Resp 16  Ht 1.651 m (5' 5\")  Wt 98.2 kg (216 lb 9.6 oz)  SpO2 99%  BMI 36.04 kg/m2 Estimated body mass index is 36.04 kg/(m^2) as calculated from the following:    Height as of this encounter: 1.651 m (5' 5\").    Weight as of this encounter: 98.2 kg (216 lb 9.6 oz). Body surface area is 2.12 meters squared.  No Pain (0) Comment: Data Unavailable   No LMP recorded. Patient has had a hysterectomy.  Allergies reviewed: Yes  Medications reviewed: Yes    Medications: Medication refills not needed today.  Pharmacy name entered into [facility]: [facility] [place], [place] - [address] [place] [address] [phone]    Clinical concerns: no clinical concerns  provider was notified.    7 minutes for nursing intake (face to face time)     Brisa Griffith CMA              "

## 2017-10-16 NOTE — PROGRESS NOTES
Follow Up Notes on Referred Patient    Date: 10/16/2017       Dr. Joyce Charles MD  No address on file       RE: Angella St  : 1965  DAYSI: 10/16/2017    Dear Dr. Joyce Charles:    Angella St is a 51 year old woman with a diagnosis of recurrent cervical cancer.     Patient presents for followup.  She has been doing well.  No nausea, vomiting, fever or chills.  Normal urinary and bowel function.  No B symptoms.  No vaginal bleeding.  She still has her left nephrostomy tube.  It is functioning well and needs to be exchanged again.           Past Medical History:    Past Medical History:   Diagnosis Date     Anemia      Cancer (H)     Metastatic squamous cell cervical carcinoma     Chronic kidney disease      Hypertension      Obesity     BMI >30         Past Surgical History:    Past Surgical History:   Procedure Laterality Date     COMBINED CYSTOSCOPY, INSERT STENT URETER(S) Left 2016    Procedure: COMBINED CYSTOSCOPY, INSERT STENT URETER(S);  Surgeon: Deja Salinas MD;  [facility]     CYSTOSCOPY, RETROGRADES, COMBINED Left 2016    Procedure: COMBINED CYSTOSCOPY, RETROGRADES;  Surgeon: Deja Salinas MD;  [facility]     GENITOURINARY SURGERY  2015    PNT placement     HYSTERECTOMY      [place]     PERCUTANEOUS NEPHROSTOMY Left 3/9/2017    Procedure: PERCUTANEOUS NEPHROSTOMY;  Surgeon: Bridger Meyer PA-C;  [facility]     PERCUTANEOUS NEPHROSTOMY Left 2017    Procedure: PERCUTANEOUS NEPHROSTOMY;  Left Nephrostomy Tube Change;  Surgeon: Bridger Meyer PA-C;  [facility]         Health Maintenance Due   Topic Date Due     GRACIELA QUESTIONNAIRE 1 YEAR  1981     PHQ-9 Q1YR  1981     HEPATITIS C SCREENING  1981     MAMMO SCREEN Q2 YR (SYSTEM ASSIGNED)  2013     COLON CANCER SCREEN (SYSTEM ASSIGNED)  2013     INFLUENZA VACCINE (SYSTEM ASSIGNED)  2017       Current  "Medications:     Current Outpatient Prescriptions   Medication Sig Dispense Refill     hydrochlorothiazide (HYDRODIURIL) 25 MG tablet Take 25 mg by mouth       HYDROmorphone (DILAUDID) 2 MG tablet Take 2-4 mg by mouth       iohexol (OMNIPAQUE) 140 MG/ML SOLN solution Mix entire bottle (50ml) of contast with 600ml (20 ounces) of water and drink half 2 hrs prior to CT scan and half 1 hr prior to scan 50 mL 0     iohexol (OMNIPAQUE) 140 MG/ML SOLN solution Mix entire bottle (50ml) of contast with 600ml (20 ounces) of water and drink half 2 hrs prior to CT scan and half 1 hr prior to scan 50 mL 0     enalapril (VASOTEC) 20 MG tablet Take 1 tablet (20 mg) by mouth daily 90 tablet 1     amLODIPine (NORVASC) 5 MG tablet Take 1 tablet (5 mg) by mouth daily 90 tablet 3     COMPRESSION STOCKINGS 1 each daily 2 Units 1     Ferrous Sulfate (IRON SUPPLEMENT PO) Take 27 mg by mouth daily (with breakfast)       Pyridoxine HCl (VITAMIN B6 PO) Take 50 mg by mouth daily       lidocaine-prilocaine (EMLA) cream Apply topically as needed for moderate pain Apply to chest port site 30 minutes prior to access 30 g 1         Allergies:      No Known Allergies     Social History:     Social History   Substance Use Topics     Smoking status: Never Smoker     Smokeless tobacco: Not on file     Alcohol use No      Comment: Does not drink alcohol       History   Drug Use No     Comment: No drug use         Family History:       Family History   Problem Relation Age of Onset     Hypertension Brother          Physical Exam:     /82 (BP Location: Right arm, Cuff Size: Adult Large)  Pulse 81  Temp 97.4  F (36.3  C) (Oral)  Resp 16  Ht 1.651 m (5' 5\")  Wt 98.2 kg (216 lb 9.6 oz)  SpO2 99%  BMI 36.04 kg/m2  Body mass index is 36.04 kg/(m^2).    General Appearance: healthy and alert, no distress           Assessment:    Angella tS is a 51 year old woman with a diagnosis of recurrent cervical cancer.     A total of 30 minutes was spent " with the patient, 25minutes of which were spent in counseling the patient and/or treatment planning.      1.  Recurrent cervical cancer.     2.  Status post 9 cycles based on  protocol.   3.  Left ureteral obstruction.   4.  Left nephrostomy tube.      I had a long discussion with the patient that she will require another exchange of her left nephrostomy tube.  We are planning to do that this month and then we will see her back in January where she needs another surveillance CT scan.  If there is no disease progression, then we could consider removing the left nephrostomy tube at that time and obtain a functional analysis of her left kidney.  Depending on the function as prior discussed with Urology, she might have to undergo left nephrectomy at that point in order to remove the tube.  She also still has a permanent IV PowerPort which requires once a month flushing.  Given that she has that as well as still active surveillance and high possibility for disease recurrence and need for exchange of a left nephrostomy tube, I do not believe that it would be medically justified for her to return to [facility].  We should, at minimum, wait until next January and then find a possible permanent solution with possible left nephrectomy.  In discussion with the patient, I do not believe that she could receive adequate care in [facility], especially if she does have disease recurrence that required treatment based on  protocol including Avastin, which most likely is not available in [facility].  Once we have a more permanent solution for her left nephrostomy as well as tube as well as over 2 years out from last systemic treatment which would be the end of this year, her work here would be reassessed at that time.  The patient agrees with this plan and is very appreciative of her care here.  All questions were answered.       Lam Moran MD, MS    Department of Obstetrics and Gynecology    Division of Gynecologic Oncology   [facility]  Phone: [phone]        CC  Patient Care Team:  Arthur Hernandez MD as PCP - General  Arthur Hernandez MD as PCP - Internal Medicine  Christy Kraus, RN as Continuity Care Coordinator (Oncology)  Arthur Hernandez MD as Resident  Melida Rutledge APRN CNP as Referring Physician (Nurse Practitioner - Women's Health)  Shamir Pugh MD as MD (Internal Medicine)  Lam Moran MD as MD (Obstetrics & Gynecology, Maternal & Fetal Medicine)  Deja Salinas MD as MD (Urology)  Maryse Rodriguez, RN as Registered Nurse  SELF, REFERRED

## 2017-10-30 ENCOUNTER — HOSPITAL ENCOUNTER (OUTPATIENT)
Facility: AMBULATORY SURGERY CENTER | Age: 52
End: 2017-10-30
Attending: PHYSICIAN ASSISTANT

## 2017-10-30 ENCOUNTER — ANESTHESIA EVENT (OUTPATIENT)
Dept: SURGERY | Facility: AMBULATORY SURGERY CENTER | Age: 52
End: 2017-10-30

## 2017-10-30 ENCOUNTER — SURGERY (OUTPATIENT)
Age: 52
End: 2017-10-30

## 2017-10-30 ENCOUNTER — ANESTHESIA (OUTPATIENT)
Dept: SURGERY | Facility: AMBULATORY SURGERY CENTER | Age: 52
End: 2017-10-30

## 2017-10-30 VITALS
DIASTOLIC BLOOD PRESSURE: 68 MMHG | OXYGEN SATURATION: 100 % | BODY MASS INDEX: 36.14 KG/M2 | WEIGHT: 216.9 LBS | HEIGHT: 65 IN | SYSTOLIC BLOOD PRESSURE: 111 MMHG | TEMPERATURE: 97.4 F | RESPIRATION RATE: 12 BRPM

## 2017-10-30 DIAGNOSIS — N13.30 HYDRONEPHROSIS, UNSPECIFIED HYDRONEPHROSIS TYPE: Primary | ICD-10-CM

## 2017-10-30 DEVICE — IMPLANTABLE DEVICE: Type: IMPLANTABLE DEVICE | Site: KIDNEY | Status: FUNCTIONAL

## 2017-10-30 RX ORDER — SODIUM CHLORIDE, SODIUM LACTATE, POTASSIUM CHLORIDE, CALCIUM CHLORIDE 600; 310; 30; 20 MG/100ML; MG/100ML; MG/100ML; MG/100ML
INJECTION, SOLUTION INTRAVENOUS CONTINUOUS
Status: DISCONTINUED | OUTPATIENT
Start: 2017-10-30 | End: 2017-10-31 | Stop reason: HOSPADM

## 2017-10-30 RX ORDER — PROPOFOL 10 MG/ML
INJECTION, EMULSION INTRAVENOUS CONTINUOUS PRN
Status: DISCONTINUED | OUTPATIENT
Start: 2017-10-30 | End: 2017-10-30

## 2017-10-30 RX ORDER — PROPOFOL 10 MG/ML
INJECTION, EMULSION INTRAVENOUS PRN
Status: DISCONTINUED | OUTPATIENT
Start: 2017-10-30 | End: 2017-10-30

## 2017-10-30 RX ORDER — MEPERIDINE HYDROCHLORIDE 25 MG/ML
12.5 INJECTION INTRAMUSCULAR; INTRAVENOUS; SUBCUTANEOUS
Status: DISCONTINUED | OUTPATIENT
Start: 2017-10-30 | End: 2017-10-31 | Stop reason: HOSPADM

## 2017-10-30 RX ORDER — NALOXONE HYDROCHLORIDE 0.4 MG/ML
.1-.4 INJECTION, SOLUTION INTRAMUSCULAR; INTRAVENOUS; SUBCUTANEOUS
Status: DISCONTINUED | OUTPATIENT
Start: 2017-10-30 | End: 2017-10-31 | Stop reason: HOSPADM

## 2017-10-30 RX ORDER — ONDANSETRON 4 MG/1
4 TABLET, ORALLY DISINTEGRATING ORAL EVERY 30 MIN PRN
Status: DISCONTINUED | OUTPATIENT
Start: 2017-10-30 | End: 2017-10-31 | Stop reason: HOSPADM

## 2017-10-30 RX ORDER — SODIUM CHLORIDE, SODIUM LACTATE, POTASSIUM CHLORIDE, CALCIUM CHLORIDE 600; 310; 30; 20 MG/100ML; MG/100ML; MG/100ML; MG/100ML
INJECTION, SOLUTION INTRAVENOUS CONTINUOUS PRN
Status: DISCONTINUED | OUTPATIENT
Start: 2017-10-30 | End: 2017-10-30

## 2017-10-30 RX ORDER — LIDOCAINE HYDROCHLORIDE 20 MG/ML
INJECTION, SOLUTION INFILTRATION; PERINEURAL PRN
Status: DISCONTINUED | OUTPATIENT
Start: 2017-10-30 | End: 2017-10-30

## 2017-10-30 RX ORDER — ONDANSETRON 2 MG/ML
4 INJECTION INTRAMUSCULAR; INTRAVENOUS EVERY 30 MIN PRN
Status: DISCONTINUED | OUTPATIENT
Start: 2017-10-30 | End: 2017-10-31 | Stop reason: HOSPADM

## 2017-10-30 RX ORDER — IOPAMIDOL 510 MG/ML
INJECTION, SOLUTION INTRAVASCULAR PRN
Status: DISCONTINUED | OUTPATIENT
Start: 2017-10-30 | End: 2017-10-30 | Stop reason: HOSPADM

## 2017-10-30 RX ADMIN — IOPAMIDOL 10 ML: 510 INJECTION, SOLUTION INTRAVASCULAR at 11:36

## 2017-10-30 RX ADMIN — PROPOFOL 100 MCG/KG/MIN: 10 INJECTION, EMULSION INTRAVENOUS at 11:11

## 2017-10-30 RX ADMIN — PROPOFOL 50 MG: 10 INJECTION, EMULSION INTRAVENOUS at 11:10

## 2017-10-30 RX ADMIN — PROPOFOL 30 MG: 10 INJECTION, EMULSION INTRAVENOUS at 11:12

## 2017-10-30 RX ADMIN — LIDOCAINE HYDROCHLORIDE 40 MG: 20 INJECTION, SOLUTION INFILTRATION; PERINEURAL at 11:09

## 2017-10-30 RX ADMIN — SODIUM CHLORIDE, SODIUM LACTATE, POTASSIUM CHLORIDE, CALCIUM CHLORIDE: 600; 310; 30; 20 INJECTION, SOLUTION INTRAVENOUS at 11:00

## 2017-10-30 RX ADMIN — Medication 1 ML: at 11:35

## 2017-10-30 NOTE — IP AVS SNAPSHOT
MRN:[id number]                      After Visit Summary   10/30/2017    Angella St    MRN: [id number]           Thank you!     Thank you for choosing [facility] for your care. Our goal is always to provide you with excellent care. Hearing back from our patients is one way we can continue to improve our services. Please take a few minutes to complete the written survey that you may receive in the mail after you visit with us. Thank you!        Patient Information     Date Of Birth          1965        About your hospital stay     You were admitted on:  October 30, 2017 You last received care in the:  [facility]    You were discharged on:  October 30, 2017       Who to Call     For medical emergencies, please call 911.  For non-urgent questions about your medical care, please call your primary care provider or clinic, [phone]  For questions related to your surgery, please call your surgery clinic        Attending Provider     Provider Maryann Paz PA-C Physician Assistant       Primary Care Provider Office Phone # Fax #    Arthur Hernandez -[phone]      Your next 10 appointments already scheduled     Jan 08, 2018  9:20 AM CST   (Arrive by 9:05 AM)   CT CHEST ABDOMEN PELVIS W/O & W CONTRAST with UCCT2   [facility] ([facility])    [address] [address] [phone]           Please bring any scans or X-rays taken at other hospitals, if similar tests were done. Also bring a list of your medicines, including vitamins, minerals and over-the-counter drugs. It is safest to leave personal items at home.  Be sure to tell your doctor:   If you have any allergies.   If there s any chance you are pregnant.   If you are breastfeeding.   If you have any special needs.  You may have contrast for this exam. To prepare:   Do not eat or drink for 2 hours before your  exam. If you need to take medicine, you may take it with small sips of water. (We may ask you to take liquid medicine as well.)   The day before your exam, drink extra fluids at least six 8-ounce glasses (unless your doctor tells you to restrict your fluids).  Patients over 70 or patients with diabetes or kidney problems:   If you haven t had a blood test (creatinine test) within the last 30 days, go to your clinic or Diagnostic Imaging Department for this test.  If you have diabetes:   If your kidney function is normal, continue taking your metformin (Avandamet, Glucophage, Glucovance, Metaglip) on the day of your exam.   If your kidney function is abnormal, wait 48 hours before restarting this medicine.  You will have oral contrast for this exam:   You will drink the contrast at home. Get this from your clinic or Diagnostic Imaging Department. Please follow the directions given.  Please wear loose clothing, such as a sweat suit or jogging clothes. Avoid snaps, zippers and other metal. We may ask you to undress and put on a hospital gown.  If you have any questions, please call the Imaging Department where you will have your exam.            Jan 08, 2018 11:20 AM CST   (Arrive by 11:05 AM)   Return Visit with Lam oMran MD   [facility] ([facility])    [address] [place] [address] [phone]              Further instructions from your care team         A collaboration between [facility] and [facility]  Experts in minimally invasive, targeted treatments performed using imaging guidance    Percutaneous Nephrostomy Tube (PNT) or Ureterostomy Tube Exchange    Today you had your existing kidney drainage tube catheter exchanged, either for periodic routine change or because there was malfunction or infection issues.    One of our Radiology PAs performed this procedure for you  "today:  ? Keanu Betts, [facility], ESTELLE  ? Bridger Meyer MPAS, ESTELLE  ? Melvin Beltran PA-C  ? Maryann Chavez MS, ESTELLE  ? Edi Beard PA-C    After you go home:  - Drink plenty of fluids.  Generally 6-8 (8 ounce) glasses a day is recommended.  - Resume your regular diet unless otherwise ordered by a medical provider.  - Keep the dressing clean and dry.  Change the dressing if it gets wet or soiled.  Routinely change the dressing every 2-3 days to inspect the insertion site.  When changing dressings, clean around tube site gently with a washcloth and antibacterial soap.  - Take care during strenuous activities, such as mowing the lawn, vacuuming, playing sports, or engaging in anything that will cause your drain to be pulled or moved.    For 24 hours after any sedation used:  - Relax and take it easy.  No strenuous activities.  - Do not drive or operate machines at home or at work.  - No alcohol consumption.  - Do not make any important or legal decisions.    Call our Interventional Radiology (IR) service if:  - If you start bleeding from the procedure site.  If you do start to bleed from the site, lie down and hold some pressure on the site.  Our radiology provider can help you decide if you need to return to the hospital.  - If you develop persistent nausea or vomiting.  - If you develop hives or a rash or any unexplained itching.    Additional Instructions:    Please call our IR service for the following problems:        - If no urine is draining from your tube.  First, check that tube is not kinked.  - If urine is leaking from around tube.  - If your urine output becomes very foul (bad) smelling or new blood is seen in the urine.  - If the skin around the tube is swollen, reddened, painful, or has any discharge.  - If you have persistent pain in your back, over your kidney.  - If you have a fever of greater than 100.5  F and chills.  - If you feel nauseated and \"just not right.\"      [facility] " "[facility]  Interventional Radiology (IR)  [address]  2nd Floor, [place] [address]    Contact Number:  [phone]  (IR control desk)  - Monday - Friday 8:00 am - 4:30 pm    After hours for urgent concerns:  [phone]  - After 4:30 pm Monday - Friday, Weekends and Holidays.   - Ask for Interventional Radiology on-call.  Someone is available 24 hours a day.  - [facility] toll free number:  [phone]                 Pending Results     Date and Time Order Name Status Description    10/30/2017 1005 IR NEPHROSTOMY TUBE CHANGE LEFT In process             Admission Information     Date & Time Provider Department Dept. Phone    10/30/2017 Maryann Chavez PA-C [facility] [phone]      Your Vitals Were     Blood Pressure Temperature Respirations Height Weight Pulse Oximetry    133/78 97.7  F (36.5  C) (Oral) 16 1.651 m (5' 5\") 98.4 kg (216 lb 14.4 oz) 100%    BMI (Body Mass Index)                   36.09 kg/m2           Giphy Information     Giphy gives you secure access to your electronic health record. If you see a primary care provider, you can also send messages to your care team and make appointments. If you have questions, please call your primary care clinic.  If you do not have a primary care provider, please call [phone] and they will assist you.      Giphy is an electronic gateway that provides easy, online access to your medical records. With Giphy, you can request a clinic appointment, read your test results, renew a prescription or communicate with your care team.     To access your existing account, please contact your [facility] or call [phone] for assistance.        Care EveryWhere ID     This is your Care EveryWhere ID. This could be used by other organizations to access your [facility] medical records  [id number]        Equal Access to Services     DORIS RODRIGUES AH: Sabiha jain " Sosweetieali, waaxda luqadaha, qaybta kaalmada adejacqueline, jin whaleylyla paramjit. So [facility] [phone].    ATENCIÓN: Si edy schmitt, tiene a andrew disposición servicios gratuitos de asistencia lingüística. Shemar al [phone].    We comply with applicable federal civil rights laws and [place] laws. We do not discriminate on the basis of race, color, national origin, age, disability, sex, sexual orientation, or gender identity.               Review of your medicines      UNREVIEWED medicines. Ask your doctor about these medicines        Dose / Directions    amLODIPine 5 MG tablet   Commonly known as:  NORVASC   Used for:  HTN (hypertension)        Dose:  5 mg   Take 1 tablet (5 mg) by mouth daily   Quantity:  90 tablet   Refills:  3       enalapril 20 MG tablet   Commonly known as:  VASOTEC   Used for:  HTN (hypertension)        Dose:  20 mg   Take 1 tablet (20 mg) by mouth daily   Quantity:  90 tablet   Refills:  1       hydrochlorothiazide 25 MG tablet   Commonly known as:  HYDRODIURIL        Dose:  25 mg   Take 25 mg by mouth   Refills:  0       HYDROmorphone 2 MG tablet   Commonly known as:  DILAUDID        Dose:  2-4 mg   Take 2-4 mg by mouth   Refills:  0       IRON SUPPLEMENT PO        Dose:  27 mg   Take 27 mg by mouth daily (with breakfast)   Refills:  0       lidocaine-prilocaine cream   Commonly known as:  EMLA   Used for:  Cervical cancer (H)        Apply topically as needed for moderate pain Apply to chest port site 30 minutes prior to access   Quantity:  30 g   Refills:  1       VITAMIN B6 PO        Dose:  50 mg   Take 50 mg by mouth daily   Refills:  0         CONTINUE these medicines which have NOT CHANGED        Dose / Directions    COMPRESSION STOCKINGS   Used for:  Leg swelling        Dose:  1 each   1 each daily   Quantity:  2 Units   Refills:  1       * iohexol 140 MG/ML Soln solution   Commonly known as:  OMNIPAQUE   Used for:  Cervical cancer (H)        Mix entire bottle  (50ml) of contast with 600ml (20 ounces) of water and drink half 2 hrs prior to CT scan and half 1 hr prior to scan   Quantity:  50 mL   Refills:  0       * iohexol 140 MG/ML Soln solution   Commonly known as:  OMNIPAQUE   Used for:  Cervical cancer (H)        Mix entire bottle (50ml) of contast with 600ml (20 ounces) of water and drink half 2 hrs prior to CT scan and half 1 hr prior to scan   Quantity:  50 mL   Refills:  0       * Notice:  This list has 2 medication(s) that are the same as other medications prescribed for you. Read the directions carefully, and ask your doctor or other care provider to review them with you.             Protect others around you: Learn how to safely use, store and throw away your medicines at www.disposemymeds.org.             Medication List: This is a list of all your medications and when to take them. Check marks below indicate your daily home schedule. Keep this list as a reference.      Medications           Morning Afternoon Evening Bedtime As Needed    amLODIPine 5 MG tablet   Commonly known as:  NORVASC   Take 1 tablet (5 mg) by mouth daily                                COMPRESSION STOCKINGS   1 each daily                                enalapril 20 MG tablet   Commonly known as:  VASOTEC   Take 1 tablet (20 mg) by mouth daily                                hydrochlorothiazide 25 MG tablet   Commonly known as:  HYDRODIURIL   Take 25 mg by mouth                                HYDROmorphone 2 MG tablet   Commonly known as:  DILAUDID   Take 2-4 mg by mouth                                * iohexol 140 MG/ML Soln solution   Commonly known as:  OMNIPAQUE   Mix entire bottle (50ml) of contast with 600ml (20 ounces) of water and drink half 2 hrs prior to CT scan and half 1 hr prior to scan                                * iohexol 140 MG/ML Soln solution   Commonly known as:  OMNIPAQUE   Mix entire bottle (50ml) of contast with 600ml (20 ounces) of water and drink half 2 hrs prior  to CT scan and half 1 hr prior to scan                                IRON SUPPLEMENT PO   Take 27 mg by mouth daily (with breakfast)                                lidocaine-prilocaine cream   Commonly known as:  EMLA   Apply topically as needed for moderate pain Apply to chest port site 30 minutes prior to access                                VITAMIN B6 PO   Take 50 mg by mouth daily                                * Notice:  This list has 2 medication(s) that are the same as other medications prescribed for you. Read the directions carefully, and ask your doctor or other care provider to review them with you.

## 2017-10-30 NOTE — ANESTHESIA CARE TRANSFER NOTE
Patient: Angella St    Procedure(s):  Left Nephrostomy Tube Change - Wound Class: I-Clean    Diagnosis: Hydronephrosis  Diagnosis Additional Information: No value filed.    Anesthesia Type:   MAC     Note:  Airway :Room Air  Patient transferred to:Phase II  Comments: Patient awake and breathing spont. VSS. No complaints of pain or nausea. Report to  RNHandoff Report: Identifed the Patient, Identified the Reponsible Provider, Reviewed the pertinent medical history, Discussed the surgical course, Reviewed Intra-OP anesthesia mangement and issues during anesthesia, Set expectations for post-procedure period and Allowed opportunity for questions and acknowledgement of understanding      Vitals: (Last set prior to Anesthesia Care Transfer)    CRNA VITALS  10/30/2017 1114 - 10/30/2017 1148      10/30/2017             Resp Rate (set): 10                Electronically Signed By: BARBARA Duque CRNA  October 30, 2017  11:48 AM

## 2017-10-30 NOTE — ANESTHESIA PREPROCEDURE EVALUATION
ANESTHESIA PREOP EVALUATION    Procedure: PERCUTANEOUS NEPHROSTOMY    HPI:     PMHx/PSHx/ROS:  Past Medical History:   Diagnosis Date     Anemia      Cancer (H)     Metastatic squamous cell cervical carcinoma     Chronic kidney disease      Hypertension 2007     Obesity     BMI >30       Past Surgical History:   Procedure Laterality Date     COMBINED CYSTOSCOPY, INSERT STENT URETER(S) Left 6/13/2016    Procedure: COMBINED CYSTOSCOPY, INSERT STENT URETER(S);  Surgeon: Deaj Salinas MD;  [facility]     CYSTOSCOPY, RETROGRADES, COMBINED Left 6/13/2016    Procedure: COMBINED CYSTOSCOPY, RETROGRADES;  Surgeon: Deja Salinas MD;  [facility]     GENITOURINARY SURGERY  5/2015    PNT placement     HYSTERECTOMY  2011    [facility], [place]     PERCUTANEOUS NEPHROSTOMY Left 3/9/2017    Procedure: PERCUTANEOUS NEPHROSTOMY;  Surgeon: Bridger Meyer PA-C;  [facility]     PERCUTANEOUS NEPHROSTOMY Left 6/28/2017    Procedure: PERCUTANEOUS NEPHROSTOMY;  Left Nephrostomy Tube Change;  Surgeon: Bridger Meyer PA-C;  [facility]         Past Anes Hx: No personal or family h/o anesthesia problems    Soc Hx:   Tobacco:   EtOH:     Allergies: No Known Allergies    Meds:     (Not in a hospital admission)    Current Outpatient Prescriptions   Medication Sig Dispense Refill     enalapril (VASOTEC) 20 MG tablet Take 1 tablet (20 mg) by mouth daily 90 tablet 1     amLODIPine (NORVASC) 5 MG tablet Take 1 tablet (5 mg) by mouth daily 90 tablet 3     hydrochlorothiazide (HYDRODIURIL) 25 MG tablet Take 25 mg by mouth       HYDROmorphone (DILAUDID) 2 MG tablet Take 2-4 mg by mouth       iohexol (OMNIPAQUE) 140 MG/ML SOLN solution Mix entire bottle (50ml) of contast with 600ml (20 ounces) of water and drink half 2 hrs prior to CT scan and half 1 hr prior to scan 50 mL 0     iohexol (OMNIPAQUE) 140 MG/ML SOLN solution Mix entire bottle (50ml) of contast with 600ml (20 ounces) of water and  "drink half 2 hrs prior to CT scan and half 1 hr prior to scan 50 mL 0     COMPRESSION STOCKINGS 1 each daily 2 Units 1     Ferrous Sulfate (IRON SUPPLEMENT PO) Take 27 mg by mouth daily (with breakfast)       Pyridoxine HCl (VITAMIN B6 PO) Take 50 mg by mouth daily       lidocaine-prilocaine (EMLA) cream Apply topically as needed for moderate pain Apply to chest port site 30 minutes prior to access 30 g 1       Physical Exam:  VS: T Data Unavailable, P Data Unavailable, BP Data Unavailable, R Data Unavailable, SpO2 100%     Weight:   Wt Readings from Last 2 Encounters:   10/30/17 98.4 kg (216 lb 14.4 oz)   10/16/17 98.2 kg (216 lb 9.6 oz)     Height:   Ht Readings from Last 2 Encounters:   10/30/17 1.651 m (5' 5\")   10/16/17 1.651 m (5' 5\")           BMP:  Lab Results   Component Value Date     12/11/2016      Lab Results   Component Value Date    POTASSIUM 3.6 12/11/2016     Lab Results   Component Value Date    CHLORIDE 108 12/11/2016     Lab Results   Component Value Date    HERON 8.5 12/11/2016     Lab Results   Component Value Date    CO2 27 12/11/2016     Lab Results   Component Value Date    BUN 8 12/11/2016     Lab Results   Component Value Date    CR 0.69 12/11/2016     Lab Results   Component Value Date     12/11/2016        CBC:  Lab Results   Component Value Date    WBC 6.0 03/09/2017     Lab Results   Component Value Date    HGB 12.5 03/09/2017     Lab Results   Component Value Date    HCT 38.9 03/09/2017     Lab Results   Component Value Date     03/09/2017        Imaging:  No images are attached to the encounter.    6/28/2017  9:17 AM                              Anesthesia Plan      History & Physical Review  History and physical reviewed and following examination; no interval change.    ASA Status:  2 .    NPO Status:  > 8 hours    Plan for MAC with Intravenous and Propofol induction. Maintenance will be TIVA.  Reason for MAC:  Deep or markedly invasive procedure (G8)  PONV " prophylaxis:  Ondansetron (or other 5HT-3)       Postoperative Care  Postoperative pain management:  IV analgesics, Oral pain medications and Multi-modal analgesia.      Consents                          .

## 2017-10-30 NOTE — PROCEDURES
Interventional Radiology Brief Post Procedure Note    Procedure: Left Percutaneous Nephrostomy Tube Change    Proceduralist: Maryann Chavez MS, ESTELLE    Assistant: None    Time Out: Prior to the start of the procedure and with procedural staff participation, I verbally confirmed the patient s identity using two indicators, relevant allergies, that the procedure was appropriate and matched the consent or emergent situation, and that the correct equipment/implants were available. Immediately prior to starting the procedure I conducted the Time Out with the procedural staff and re-confirmed the patient s name, procedure, and site/side. (The Joint Commission universal protocol was followed.)  Yes      Sedation: Monitored Anesthesia Care (MAC) administered and documented by Anesthesia Care Provider    Findings: Completed fluoroscopy-guided left percutaneous nephrostomy tube exchange. New 8 Wallisian locking pigtail drain exchanged over wire. No immediate complication.     Estimated Blood Loss: Minimal    Fluoroscopy Time:  minute(s)    SPECIMENS: None    Complications: 1. None     Condition: Stable    Plan: Return in 3 months for routine change or sooner if necessary. Follow up per primary team.     Comments: See dictated procedure note for full details.    Maryann Chavez PA-C

## 2017-10-30 NOTE — ANESTHESIA POSTPROCEDURE EVALUATION
Patient: Angella St    Procedure(s):  Left Nephrostomy Tube Change - Wound Class: I-Clean    Diagnosis:Hydronephrosis  Diagnosis Additional Information: No value filed.    Anesthesia Type:  MAC    Note:  Anesthesia Post Evaluation    Patient location during evaluation: Phase 2  Patient participation: Able to fully participate in evaluation  Level of consciousness: awake  Pain management: adequate  Airway patency: patent  Cardiovascular status: acceptable  Respiratory status: acceptable  Hydration status: acceptable  PONV: none     Anesthetic complications: None          Last vitals:  Vitals:    10/30/17 0939 10/30/17 1148 10/30/17 1155   BP: 133/78 107/67 111/68   Resp: 16 12 12   Temp: 36.5  C (97.7  F) 36.6  C (97.8  F) 36.3  C (97.4  F)   SpO2: 100% 100% 100%         Electronically Signed By: Marbin Us MD  October 30, 2017  1:05 PM

## 2017-10-30 NOTE — IP AVS SNAPSHOT
[facility]    [address] [place] [id number]    Phone:  [phone]    Fax:  [phone]                                       After Visit Summary   10/30/2017    Angella St    MRN: [id number]           After Visit Summary Signature Page     I have received my discharge instructions, and my questions have been answered. I have discussed any challenges I see with this plan with the nurse or doctor.    ..........................................................................................................................................  Patient/Patient Representative Signature      ..........................................................................................................................................  Patient Representative Print Name and Relationship to Patient    ..................................................               ................................................  Date                                            Time    ..........................................................................................................................................  Reviewed by Signature/Title    ...................................................              ..............................................  Date                                                            Time

## 2017-10-30 NOTE — DISCHARGE INSTRUCTIONS
A collaboration between [facility] and [facility]  Experts in minimally invasive, targeted treatments performed using imaging guidance    Percutaneous Nephrostomy Tube (PNT) or Ureterostomy Tube Exchange    Today you had your existing kidney drainage tube catheter exchanged, either for periodic routine change or because there was malfunction or infection issues.    One of our Radiology PAs performed this procedure for you today:  ? Keanu Betts, [facility], PA-C  ? SAMSON Scott, PA-C  ? Melvin Beltran PA-C  ? Maryann Chavez MS, PA-C  ? Edi Beard PA-C    After you go home:  - Drink plenty of fluids.  Generally 6-8 (8 ounce) glasses a day is recommended.  - Resume your regular diet unless otherwise ordered by a medical provider.  - Keep the dressing clean and dry.  Change the dressing if it gets wet or soiled.  Routinely change the dressing every 2-3 days to inspect the insertion site.  When changing dressings, clean around tube site gently with a washcloth and antibacterial soap.  - Take care during strenuous activities, such as mowing the lawn, vacuuming, playing sports, or engaging in anything that will cause your drain to be pulled or moved.    For 24 hours after any sedation used:  - Relax and take it easy.  No strenuous activities.  - Do not drive or operate machines at home or at work.  - No alcohol consumption.  - Do not make any important or legal decisions.    Call our Interventional Radiology (IR) service if:  - If you start bleeding from the procedure site.  If you do start to bleed from the site, lie down and hold some pressure on the site.  Our radiology provider can help you decide if you need to return to the hospital.  - If you develop persistent nausea or vomiting.  - If you develop hives or a rash or any unexplained itching.    Additional Instructions:    Please call our IR service for the following problems:        - If no urine is  "draining from your tube.  First, check that tube is not kinked.  - If urine is leaking from around tube.  - If your urine output becomes very foul (bad) smelling or new blood is seen in the urine.  - If the skin around the tube is swollen, reddened, painful, or has any discharge.  - If you have persistent pain in your back, over your kidney.  - If you have a fever of greater than 100.5  F and chills.  - If you feel nauseated and \"just not right.\"      [facility]  Interventional Radiology (IR)  [address]  [address] [facility] [place] [address]    Contact Number:  [phone]  (IR control desk)  - Monday - Friday 8:00 am - 4:30 pm    After hours for urgent concerns:  [phone]  - After 4:30 pm Monday - Friday, Weekends and Holidays.   - Ask for Interventional Radiology on-call.  Someone is available 24 hours a day.  - [facility] toll free number:  [phone]               "

## 2017-12-07 PROCEDURE — 25000128 H RX IP 250 OP 636: Performed by: OBSTETRICS & GYNECOLOGY

## 2017-12-07 PROCEDURE — 96523 IRRIG DRUG DELIVERY DEVICE: CPT

## 2017-12-07 RX ORDER — HEPARIN SODIUM (PORCINE) LOCK FLUSH IV SOLN 100 UNIT/ML 100 UNIT/ML
5 SOLUTION INTRAVENOUS EVERY 8 HOURS
Status: DISCONTINUED | OUTPATIENT
Start: 2017-12-07 | End: 2017-12-15 | Stop reason: HOSPADM

## 2017-12-07 RX ADMIN — SODIUM CHLORIDE, PRESERVATIVE FREE 5 ML: 5 INJECTION INTRAVENOUS at 14:16

## 2018-01-08 ENCOUNTER — RADIANT APPOINTMENT (OUTPATIENT)
Dept: CT IMAGING | Facility: CLINIC | Age: 53
End: 2018-01-08
Attending: OBSTETRICS & GYNECOLOGY
Payer: MEDICAID

## 2018-01-08 ENCOUNTER — ONCOLOGY VISIT (OUTPATIENT)
Dept: ONCOLOGY | Facility: CLINIC | Age: 53
End: 2018-01-08
Attending: OBSTETRICS & GYNECOLOGY
Payer: MEDICAID

## 2018-01-08 VITALS
BODY MASS INDEX: 36.67 KG/M2 | TEMPERATURE: 98.5 F | HEIGHT: 65 IN | SYSTOLIC BLOOD PRESSURE: 159 MMHG | HEART RATE: 75 BPM | RESPIRATION RATE: 18 BRPM | DIASTOLIC BLOOD PRESSURE: 96 MMHG | OXYGEN SATURATION: 99 % | WEIGHT: 220.1 LBS

## 2018-01-08 DIAGNOSIS — C53.9 CERVICAL CANCER (H): ICD-10-CM

## 2018-01-08 DIAGNOSIS — C53.9 MALIGNANT NEOPLASM OF CERVIX, UNSPECIFIED SITE (H): Primary | ICD-10-CM

## 2018-01-08 LAB
CREAT BLD-MCNC: 0.7 MG/DL (ref 0.52–1.04)
GFR SERPL CREATININE-BSD FRML MDRD: 87 ML/MIN/1.7M2

## 2018-01-08 PROCEDURE — 99214 OFFICE O/P EST MOD 30 MIN: CPT | Mod: ZP | Performed by: OBSTETRICS & GYNECOLOGY

## 2018-01-08 PROCEDURE — G0463 HOSPITAL OUTPT CLINIC VISIT: HCPCS | Mod: ZF

## 2018-01-08 RX ORDER — IOPAMIDOL 755 MG/ML
132 INJECTION, SOLUTION INTRAVASCULAR ONCE
Status: COMPLETED | OUTPATIENT
Start: 2018-01-08 | End: 2018-01-08

## 2018-01-08 RX ORDER — HEPARIN SODIUM (PORCINE) LOCK FLUSH IV SOLN 100 UNIT/ML 100 UNIT/ML
5 SOLUTION INTRAVENOUS ONCE
Status: COMPLETED | OUTPATIENT
Start: 2018-01-08 | End: 2018-01-08

## 2018-01-08 RX ADMIN — IOPAMIDOL 132 ML: 755 INJECTION, SOLUTION INTRAVASCULAR at 09:30

## 2018-01-08 RX ADMIN — HEPARIN SODIUM (PORCINE) LOCK FLUSH IV SOLN 100 UNIT/ML 5 ML: 100 SOLUTION at 09:31

## 2018-01-08 ASSESSMENT — PAIN SCALES - GENERAL: PAINLEVEL: NO PAIN (0)

## 2018-01-08 NOTE — LETTER
2018     RE: Angella St  [address]  [address]   [facility] [address]     Dear Colleague,    Thank you for referring your patient, Angella St, to the [facility]. Please see a copy of my visit note below.                Follow Up Notes on Referred Patient    Date: 2018       Dr. Joyce Charles MD  No address on file       RE: Angella St  : 1965  DAYSI: 2018    Dear Dr. Joyce Charles:    Angella St is a 52 year old woman with a diagnosis of recurrent cervical cancer.           Patient presents today for followup.  She has been doing well.  No nausea, vomiting, fevers or chills.  Normal urinary and bowel function.  No vaginal bleeding.  No B symptoms.  Still has a nephrostomy tube in place on the left side.  Without any difficulties.  No signs of infection.         Past Medical History:    Past Medical History:   Diagnosis Date     Anemia      Cancer (H)     Metastatic squamous cell cervical carcinoma     Chronic kidney disease      Hypertension      Obesity     BMI >30         Past Surgical History:    Past Surgical History:   Procedure Laterality Date     COMBINED CYSTOSCOPY, INSERT STENT URETER(S) Left 2016    Procedure: COMBINED CYSTOSCOPY, INSERT STENT URETER(S);  Surgeon: Deja Salinas MD;  [facility]     CYSTOSCOPY, RETROGRADES, COMBINED Left 2016    Procedure: COMBINED CYSTOSCOPY, RETROGRADES;  Surgeon: Deja Salinas MD;  [facility]     GENITOURINARY SURGERY  2015    PNT placement     HYSTERECTOMY      [place], Marialuisa     PERCUTANEOUS NEPHROSTOMY Left 3/9/2017    Procedure: PERCUTANEOUS NEPHROSTOMY;  Surgeon: Bridger Meyer PA-C;  [facility]     PERCUTANEOUS NEPHROSTOMY Left 2017    Procedure: PERCUTANEOUS NEPHROSTOMY;  Left Nephrostomy Tube Change;  Surgeon: Bridger Meyer PA-C;  [facility]     PERCUTANEOUS NEPHROSTOMY Left 10/30/2017    Procedure:  PERCUTANEOUS NEPHROSTOMY;  Left Nephrostomy Tube Change;  Surgeon: Maryann Chavez PA-C;  [facility]         Health Maintenance Due   Topic Date Due     GRACIELA QUESTIONNAIRE 1 YEAR  11/02/1981     PHQ-9 Q1YR  11/02/1981     HEPATITIS C SCREENING  11/02/1981     MAMMO SCREEN Q2 YR (SYSTEM ASSIGNED)  11/02/2013     COLON CANCER SCREEN (SYSTEM ASSIGNED)  11/02/2013     INFLUENZA VACCINE (SYSTEM ASSIGNED)  09/01/2017       Current Medications:     Current Outpatient Prescriptions   Medication Sig Dispense Refill     enalapril (VASOTEC) 20 MG tablet Take 1 tablet (20 mg) by mouth daily 90 tablet 1     amLODIPine (NORVASC) 5 MG tablet Take 1 tablet (5 mg) by mouth daily 90 tablet 3     hydrochlorothiazide (HYDRODIURIL) 25 MG tablet Take 25 mg by mouth       HYDROmorphone (DILAUDID) 2 MG tablet Take 2-4 mg by mouth       iohexol (OMNIPAQUE) 140 MG/ML SOLN solution Mix entire bottle (50ml) of contast with 600ml (20 ounces) of water and drink half 2 hrs prior to CT scan and half 1 hr prior to scan (Patient not taking: Reported on 1/8/2018) 50 mL 0     iohexol (OMNIPAQUE) 140 MG/ML SOLN solution Mix entire bottle (50ml) of contast with 600ml (20 ounces) of water and drink half 2 hrs prior to CT scan and half 1 hr prior to scan (Patient not taking: Reported on 1/8/2018) 50 mL 0     COMPRESSION STOCKINGS 1 each daily (Patient not taking: Reported on 1/8/2018) 2 Units 1     Ferrous Sulfate (IRON SUPPLEMENT PO) Take 27 mg by mouth daily (with breakfast)       Pyridoxine HCl (VITAMIN B6 PO) Take 50 mg by mouth daily       lidocaine-prilocaine (EMLA) cream Apply topically as needed for moderate pain Apply to chest port site 30 minutes prior to access (Patient not taking: Reported on 1/8/2018) 30 g 1         Allergies:      No Known Allergies     Social History:     Social History   Substance Use Topics     Smoking status: Never Smoker     Smokeless tobacco: Never Used     Alcohol use No      Comment: Does not drink alcohol  "      History   Drug Use No     Comment: No drug use         Family History:         Family History   Problem Relation Age of Onset     Hypertension Brother          Physical Exam:     BP (!) 159/96  Pulse 75  Temp 98.5  F (36.9  C) (Oral)  Resp 18  Ht 1.651 m (5' 5\")  Wt 99.8 kg (220 lb 1.6 oz)  SpO2 99%  Breastfeeding? No  BMI 36.63 kg/m2  Body mass index is 36.63 kg/(m^2).    General Appearance: healthy and alert, no distress     HEMATOLOGIC:  No cervical, supraclavicular, infraclavicular, axillary or inguinal lymphadenopathy.   ABDOMEN:  Soft, nontender, nondistended.  No organomegaly.   PELVIC:  Normal external genitalia.  Normal vaginal mucosa.  Normal vaginal cuff.  No adnexal masses.  Rectovaginal confirms.             Assessment:    Angella St is a 52 year old woman with a diagnosis of recurrent cervical cancer.     A total of 30 minutes was spent with the patient, 20 minutes of which were spent in counseling the patient and/or treatment planning.      1.  Recurrent cervical cancer.   2.  Status post 9 cycles of  protocol.   3.  Left nephrostomy tube.        Discussed with the patient we will await the CT scan from today.  If there is stable disease and no signs of recurrence or progression, we will have her see the urologist for possible removal of the left nephrostomy, possible nephrectomy at the time after evaluation of functional kidney.  We will see her back in 6 months for surveillance visit.  If there is disease progression or recurrence, we will need to see her earlier for possible treatment.  The patient agrees with the plan.  She is very appreciative of her care.  All questions were answered.       Lam Moran MD, MS    Department of Obstetrics and Gynecology   Division of Gynecologic Oncology   [facility]  Phone: [phone]    CC  Patient Care Team:  Arthur Hernandez MD as PCP - General  Arthur Hernandez MD as PCP - Internal " Medicine  Christy Kraus, RN as Continuity Care Coordinator (Oncology)  Arthur Hernandez MD as Resident  Melida Rutledge APRN CNP as Referring Physician (Nurse Practitioner - Women's Health)  Shamir Pugh MD as MD (Internal Medicine)  Lam Moran MD as MD (Obstetrics & Gynecology, Maternal & Fetal Medicine)  Deja Salinas MD as MD (Urology)  Maryse Rodriguez, RN as Registered Nurse  SELF, REFERRED

## 2018-01-08 NOTE — NURSING NOTE
"Oncology Rooming Note    January 8, 2018 11:24 AM   Angella St is a 52 year old female who presents for:    Chief Complaint   Patient presents with     Oncology Clinic Visit     return patient visit for 6 month follow up related to Cervical cancer (H)     Initial Vitals: BP (!) 159/96  Pulse 75  Temp 98.5  F (36.9  C) (Oral)  Resp 18  Ht 1.651 m (5' 5\")  Wt 99.8 kg (220 lb 1.6 oz)  SpO2 99%  Breastfeeding? No  BMI 36.63 kg/m2 Estimated body mass index is 36.63 kg/(m^2) as calculated from the following:    Height as of this encounter: 1.651 m (5' 5\").    Weight as of this encounter: 99.8 kg (220 lb 1.6 oz). Body surface area is 2.14 meters squared.  No Pain (0) Comment: Data Unavailable   No LMP recorded. Patient has had a hysterectomy.  Allergies reviewed: Yes  Medications reviewed: Yes    Medications: Medication refills not needed today.  Pharmacy name entered into [facility]: [facility] [place], [place] - [address] [facility] [phone]    Clinical concerns: no concerns dr. gary was notified.    7 minutes for nursing intake (face to face time)     Salina Alcocer CMA              "

## 2018-01-08 NOTE — DISCHARGE INSTRUCTIONS

## 2018-01-08 NOTE — PROGRESS NOTES
Follow Up Notes on Referred Patient    Date: 2018       Dr. Joyce Charles MD  No address on file       RE: Angella St  : 1965  DAYSI: 2018    Dear Dr. Joyce Charles:    Angella St is a 52 year old woman with a diagnosis of recurrent cervical cancer.           Patient presents today for followup.  She has been doing well.  No nausea, vomiting, fevers or chills.  Normal urinary and bowel function.  No vaginal bleeding.  No B symptoms.  Still has a nephrostomy tube in place on the left side.  Without any difficulties.  No signs of infection.         Past Medical History:    Past Medical History:   Diagnosis Date     Anemia      Cancer (H)     Metastatic squamous cell cervical carcinoma     Chronic kidney disease      Hypertension      Obesity     BMI >30         Past Surgical History:    Past Surgical History:   Procedure Laterality Date     COMBINED CYSTOSCOPY, INSERT STENT URETER(S) Left 2016    Procedure: COMBINED CYSTOSCOPY, INSERT STENT URETER(S);  Surgeon: Deja Salinas MD;  [facility]     CYSTOSCOPY, RETROGRADES, COMBINED Left 2016    Procedure: COMBINED CYSTOSCOPY, RETROGRADES;  Surgeon: Deja Salinas MD;  [facility]     GENITOURINARY SURGERY  2015    PNT placement     HYSTERECTOMY      [place], Marialuisa     PERCUTANEOUS NEPHROSTOMY Left 3/9/2017    Procedure: PERCUTANEOUS NEPHROSTOMY;  Surgeon: Bridger Meyer PA-C;  [facility]     PERCUTANEOUS NEPHROSTOMY Left 2017    Procedure: PERCUTANEOUS NEPHROSTOMY;  Left Nephrostomy Tube Change;  Surgeon: Bridger Meyer PA-C;  [facility]     PERCUTANEOUS NEPHROSTOMY Left 10/30/2017    Procedure: PERCUTANEOUS NEPHROSTOMY;  Left Nephrostomy Tube Change;  Surgeon: Maryann Chavez PA-C;  [facility]         Health Maintenance Due   Topic Date Due     GRACIELA QUESTIONNAIRE 1 YEAR  1981     PHQ-9 Q1YR  1981     HEPATITIS C SCREENING  1981      MAMMO SCREEN Q2 YR (SYSTEM ASSIGNED)  11/02/2013     COLON CANCER SCREEN (SYSTEM ASSIGNED)  11/02/2013     INFLUENZA VACCINE (SYSTEM ASSIGNED)  09/01/2017       Current Medications:     Current Outpatient Prescriptions   Medication Sig Dispense Refill     enalapril (VASOTEC) 20 MG tablet Take 1 tablet (20 mg) by mouth daily 90 tablet 1     amLODIPine (NORVASC) 5 MG tablet Take 1 tablet (5 mg) by mouth daily 90 tablet 3     hydrochlorothiazide (HYDRODIURIL) 25 MG tablet Take 25 mg by mouth       HYDROmorphone (DILAUDID) 2 MG tablet Take 2-4 mg by mouth       iohexol (OMNIPAQUE) 140 MG/ML SOLN solution Mix entire bottle (50ml) of contast with 600ml (20 ounces) of water and drink half 2 hrs prior to CT scan and half 1 hr prior to scan (Patient not taking: Reported on 1/8/2018) 50 mL 0     iohexol (OMNIPAQUE) 140 MG/ML SOLN solution Mix entire bottle (50ml) of contast with 600ml (20 ounces) of water and drink half 2 hrs prior to CT scan and half 1 hr prior to scan (Patient not taking: Reported on 1/8/2018) 50 mL 0     COMPRESSION STOCKINGS 1 each daily (Patient not taking: Reported on 1/8/2018) 2 Units 1     Ferrous Sulfate (IRON SUPPLEMENT PO) Take 27 mg by mouth daily (with breakfast)       Pyridoxine HCl (VITAMIN B6 PO) Take 50 mg by mouth daily       lidocaine-prilocaine (EMLA) cream Apply topically as needed for moderate pain Apply to chest port site 30 minutes prior to access (Patient not taking: Reported on 1/8/2018) 30 g 1         Allergies:      No Known Allergies     Social History:     Social History   Substance Use Topics     Smoking status: Never Smoker     Smokeless tobacco: Never Used     Alcohol use No      Comment: Does not drink alcohol       History   Drug Use No     Comment: No drug use         Family History:         Family History   Problem Relation Age of Onset     Hypertension Brother          Physical Exam:     BP (!) 159/96  Pulse 75  Temp 98.5  F (36.9  C) (Oral)  Resp 18  Ht 1.651 m  "(5' 5\")  Wt 99.8 kg (220 lb 1.6 oz)  SpO2 99%  Breastfeeding? No  BMI 36.63 kg/m2  Body mass index is 36.63 kg/(m^2).    General Appearance: healthy and alert, no distress     HEMATOLOGIC:  No cervical, supraclavicular, infraclavicular, axillary or inguinal lymphadenopathy.   ABDOMEN:  Soft, nontender, nondistended.  No organomegaly.   PELVIC:  Normal external genitalia.  Normal vaginal mucosa.  Normal vaginal cuff.  No adnexal masses.  Rectovaginal confirms.             Assessment:    Angella St is a 52 year old woman with a diagnosis of recurrent cervical cancer.     A total of 30 minutes was spent with the patient, 20 minutes of which were spent in counseling the patient and/or treatment planning.      1.  Recurrent cervical cancer.   2.  Status post 9 cycles of  protocol.   3.  Left nephrostomy tube.        Discussed with the patient we will await the CT scan from today.  If there is stable disease and no signs of recurrence or progression, we will have her see the urologist for possible removal of the left nephrostomy, possible nephrectomy at the time after evaluation of functional kidney.  We will see her back in 6 months for surveillance visit.  If there is disease progression or recurrence, we will need to see her earlier for possible treatment.  The patient agrees with the plan.  She is very appreciative of her care.  All questions were answered.       Lam Moran MD, MS    Department of Obstetrics and Gynecology   Division of Gynecologic Oncology   [facility]  Phone: [phone]      CC  Patient Care Team:  Arthur Hernandez MD as PCP - General  Arthur Hernandez MD as PCP - Internal Medicine  Christy Kraus RN as Continuity Care Coordinator (Oncology)  Arthur Hernandez MD as Resident  Melida Rutledge APRN CNP as Referring Physician (Nurse Practitioner - Women's Health)  Shamir Pugh MD as MD (Internal Medicine)  Lam Moran, " MD as MD (Obstetrics & Gynecology, Maternal & Fetal Medicine)  Deja Salinas MD as MD (Urology)  Maryse Rodriguez, RN as Registered Nurse  SELF, REFERRED

## 2018-01-08 NOTE — MR AVS SNAPSHOT
After Visit Summary   1/8/2018    Angella tS    MRN: [id number]           Patient Information     Date Of Birth          1965        Visit Information        Provider Department      1/8/2018 11:20 AM Lam Moran MD [facility]        Today's Diagnoses     Malignant neoplasm of cervix, unspecified site (H)    -  1       Follow-ups after your visit        Your next 10 appointments already scheduled     Jul 12, 2018  9:20 AM CDT   (Arrive by 9:05 AM)   CT CHEST ABDOMEN PELVIS W/O & W CONTRAST with UCCT2   [facility] [place] [facility] ([facility])    [address]  1st Floor  [facility] [address] [phone]           Please bring any scans or X-rays taken at other hospitals, if similar tests were done. Also bring a list of your medicines, including vitamins, minerals and over-the-counter drugs. It is safest to leave personal items at home.  Be sure to tell your doctor:   If you have any allergies.   If there s any chance you are pregnant.   If you are breastfeeding.   If you have any special needs.  You may have contrast for this exam. To prepare:   Do not eat or drink for 2 hours before your exam. If you need to take medicine, you may take it with small sips of water. (We may ask you to take liquid medicine as well.)   The day before your exam, drink extra fluids at least six 8-ounce glasses (unless your doctor tells you to restrict your fluids).  Patients over 70 or patients with diabetes or kidney problems:   If you haven t had a blood test (creatinine test) within the last 30 days, go to your clinic or Diagnostic Imaging Department for this test.  If you have diabetes:   If your kidney function is normal, continue taking your metformin (Avandamet, Glucophage, Glucovance, Metaglip) on the day of your exam.   If your kidney function is abnormal, wait 48 hours before restarting this medicine.  You will have oral contrast for this  exam:   You will drink the contrast at home. Get this from your clinic or Diagnostic Imaging Department. Please follow the directions given.  Please wear loose clothing, such as a sweat suit or jogging clothes. Avoid snaps, zippers and other metal. We may ask you to undress and put on a hospital gown.  If you have any questions, please call the Imaging Department where you will have your exam.            Jul 16, 2018 11:00 AM CDT   (Arrive by 10:45 AM)   Return Visit with Lam Moran MD   [facility] ([facility] [place])    [address]  [address]  [facility] [address] [phone]              Who to contact     If you have questions or need follow up information about today's clinic visit or your schedule please contact [facility] directly at [phone].  Normal or non-critical lab and imaging results will be communicated to you by MyChart, letter or phone within 4 business days after the clinic has received the results. If you do not hear from us within 7 days, please contact the clinic through [employer] or phone. If you have a critical or abnormal lab result, we will notify you by phone as soon as possible.  Submit refill requests through [employer] or call your pharmacy and they will forward the refill request to us. Please allow 3 business days for your refill to be completed.          Additional Information About Your Visit        MyChart Information     [employer] gives you secure access to your electronic health record. If you see a primary care provider, you can also send messages to your care team and make appointments. If you have questions, please call your primary care clinic.  If you do not have a primary care provider, please call [phone] and they will assist you.        Care EveryWhere ID     This is your Care EveryWhere ID. This could be used by other organizations to access your [place] medical records  [id number]    "     Your Vitals Were     Pulse Temperature Respirations Height Pulse Oximetry Breastfeeding?    75 98.5  F (36.9  C) (Oral) 18 1.651 m (5' 5\") 99% No    BMI (Body Mass Index)                   36.63 kg/m2            Blood Pressure from Last 3 Encounters:   01/08/18 (!) 159/96   10/30/17 111/68   10/16/17 134/82    Weight from Last 3 Encounters:   01/08/18 99.8 kg (220 lb 1.6 oz)   10/30/17 98.4 kg (216 lb 14.4 oz)   10/16/17 98.2 kg (216 lb 9.6 oz)              Today, you had the following     No orders found for display       Primary Care Provider Office Phone # Fax #    Arthur Hernandez [phone]       [address]  [facility] [address]        Equal Access to Services     [facility] CenterLASHONDA : Hadii cindy carreon hadasho Sozi, waaxda luqadaha, qaybta kaalmada adeegyada, jin castañeda haydanielito connolly . So [facility] [phone].    ATENCIÓN: Si habla español, tiene a andrew disposición servicios gratuitos de asistencia lingüística. Shemar al [phone].    We comply with applicable federal civil rights laws and [place] laws. We do not discriminate on the basis of race, color, national origin, age, disability, sex, sexual orientation, or gender identity.            Thank you!     Thank you for choosing [facility]  for your care. Our goal is always to provide you with excellent care. Hearing back from our patients is one way we can continue to improve our services. Please take a few minutes to complete the written survey that you may receive in the mail after your visit with us. Thank you!             Your Updated Medication List - Protect others around you: Learn how to safely use, store and throw away your medicines at www.disposemymeds.org.          This list is accurate as of: 1/8/18 11:59 PM.  Always use your most recent med list.                   Brand Name Dispense Instructions for use Diagnosis    amLODIPine 5 MG tablet    NORVASC    90 tablet    " Take 1 tablet (5 mg) by mouth daily    HTN (hypertension)       COMPRESSION STOCKINGS     2 Units    1 each daily    Leg swelling       enalapril 20 MG tablet    VASOTEC    90 tablet    Take 1 tablet (20 mg) by mouth daily    HTN (hypertension)       hydrochlorothiazide 25 MG tablet    HYDRODIURIL     Take 25 mg by mouth        HYDROmorphone 2 MG tablet    DILAUDID     Take 2-4 mg by mouth        * iohexol 140 MG/ML Soln solution    OMNIPAQUE    50 mL    Mix entire bottle (50ml) of contast with 600ml (20 ounces) of water and drink half 2 hrs prior to CT scan and half 1 hr prior to scan    Cervical cancer (H)       * iohexol 140 MG/ML Soln solution    OMNIPAQUE    50 mL    Mix entire bottle (50ml) of contast with 600ml (20 ounces) of water and drink half 2 hrs prior to CT scan and half 1 hr prior to scan    Cervical cancer (H)       IRON SUPPLEMENT PO      Take 27 mg by mouth daily (with breakfast)        lidocaine-prilocaine cream    EMLA    30 g    Apply topically as needed for moderate pain Apply to chest port site 30 minutes prior to access    Cervical cancer (H)       VITAMIN B6 PO      Take 50 mg by mouth daily        * Notice:  This list has 2 medication(s) that are the same as other medications prescribed for you. Read the directions carefully, and ask your doctor or other care provider to review them with you.

## 2018-01-19 DIAGNOSIS — C53.9 CERVICAL CANCER (H): Primary | ICD-10-CM

## 2018-01-19 NOTE — PROGRESS NOTES
Result reviewed by MD.Patient should follow up with Dr. Back in 6 months with CT CAP. Patient should also be scheduled with urology. Result released to [employer].

## 2018-01-24 ENCOUNTER — CARE COORDINATION (OUTPATIENT)
Dept: ONCOLOGY | Facility: CLINIC | Age: 53
End: 2018-01-24

## 2018-01-24 NOTE — PROGRESS NOTES
"Care Coordinator Note  \" Dr Moran wanted me to see my urologist so that I could possibley get my tube out.  Can your make that appt for me\"  Appt made for 3/16/18 at 9 AM Urology number is [phone].  Pt notified.      Patient verbalized back understanding of the above information discussed.   Christy WEEKS RN, OCN  Care Coordinator   Gynecologic Cancer   Office [phone]  Pager [phone] #[phone]  "

## 2018-02-15 DIAGNOSIS — I10 HTN (HYPERTENSION): ICD-10-CM

## 2018-02-15 PROCEDURE — 96523 IRRIG DRUG DELIVERY DEVICE: CPT

## 2018-02-15 PROCEDURE — 25000128 H RX IP 250 OP 636: Performed by: OBSTETRICS & GYNECOLOGY

## 2018-02-15 RX ORDER — HEPARIN SODIUM (PORCINE) LOCK FLUSH IV SOLN 100 UNIT/ML 100 UNIT/ML
5 SOLUTION INTRAVENOUS EVERY 8 HOURS PRN
Status: DISCONTINUED | OUTPATIENT
Start: 2018-02-15 | End: 2018-02-23 | Stop reason: HOSPADM

## 2018-02-15 RX ADMIN — SODIUM CHLORIDE, PRESERVATIVE FREE 5 ML: 5 INJECTION INTRAVENOUS at 10:28

## 2018-02-15 NOTE — NURSING NOTE
Chief Complaint   Patient presents with     Port Flush     Port flushed and hep locked by RN.     Natalia Aguilar RN

## 2018-02-16 RX ORDER — ENALAPRIL MALEATE 20 MG/1
20 TABLET ORAL DAILY
Qty: 30 TABLET | Refills: 0 | Status: SHIPPED | OUTPATIENT
Start: 2018-02-16 | End: 2018-03-27

## 2018-02-16 NOTE — TELEPHONE ENCOUNTER
enalapril (VASOTEC) 20 MG tablet    Last Written Prescription Date:  4/28/17  Last Fill Quantity: 90,   # refills: 1  Last Office Visit :2/27/17  Future Office visit:  None    Scheduling has been notified to contact the pt for appointment.      Routing  Because: bp> 140/90, due for annual appt 2/18.  30 tabs to pharmacy.

## 2018-02-26 ENCOUNTER — PRE VISIT (OUTPATIENT)
Dept: UROLOGY | Facility: CLINIC | Age: 53
End: 2018-02-26

## 2018-03-16 ENCOUNTER — OFFICE VISIT (OUTPATIENT)
Dept: UROLOGY | Facility: CLINIC | Age: 53
End: 2018-03-16
Payer: MEDICAID

## 2018-03-16 VITALS
BODY MASS INDEX: 36.09 KG/M2 | SYSTOLIC BLOOD PRESSURE: 136 MMHG | WEIGHT: 216.6 LBS | HEART RATE: 81 BPM | HEIGHT: 65 IN | DIASTOLIC BLOOD PRESSURE: 83 MMHG

## 2018-03-16 DIAGNOSIS — N13.1 HYDRONEPHROSIS WITH URETERAL STRICTURE, NOT ELSEWHERE CLASSIFIED: ICD-10-CM

## 2018-03-16 DIAGNOSIS — N12 PYELONEPHRITIS: Primary | ICD-10-CM

## 2018-03-16 ASSESSMENT — PAIN SCALES - GENERAL: PAINLEVEL: NO PAIN (0)

## 2018-03-16 NOTE — PATIENT INSTRUCTIONS
Preventive Care:    Colorectal Cancer Screening: During our visit today, we discussed that it is recommended you receive colorectal cancer screening. Please call or make an appointment with your primary care provider to discuss this. You may also call the [facility] scheduling line ([phone]) to set up a colonoscopy appointment.

## 2018-03-16 NOTE — MR AVS SNAPSHOT
After Visit Summary   3/16/2018    Angella St    MRN: [id number]           Patient Information     Date Of Birth          1965        Visit Information        Provider Department      3/16/2018 9:00 AM Deja Salinas MD [facility]        Today's Diagnoses     Pyelonephritis    -  1    Hydronephrosis with ureteral stricture, not elsewhere classified          Care Instructions    Preventive Care:    Colorectal Cancer Screening: During our visit today, we discussed that it is recommended you receive colorectal cancer screening. Please call or make an appointment with your primary care provider to discuss this. You may also call the [facility] scheduling line ([phone]) to set up a colonoscopy appointment.              Follow-ups after your visit        Your next 10 appointments already scheduled     Mar 27, 2018  9:40 AM CDT   (Arrive by 9:25 AM)   PHYSICAL with Shamir Pugh MD   [facility] ([facility] [place])    [address] [place] [address]  4th Floor  [facility] [address] [phone]            Jul 12, 2018  9:20 AM CDT   (Arrive by 9:05 AM)   CT CHEST ABDOMEN PELVIS W/O & W CONTRAST with UCCT2   [facility] ([facility])    [address] [place] [address]  1st Floor  [facility] [address] [phone]           Please bring any scans or X-rays taken at other hospitals, if similar tests were done. Also bring a list of your medicines, including vitamins, minerals and over-the-counter drugs. It is safest to leave personal items at home.  Be sure to tell your doctor:   If you have any allergies.   If there s any chance you are pregnant.   If you are breastfeeding.  You may have contrast for this exam. To prepare:   Do not eat or drink for 2 hours before your exam. If you need to take medicine, you may take it with small sips of water. (We may ask you to take  liquid medicine as well.)   The day before your exam, drink extra fluids at least six 8-ounce glasses (unless your doctor tells you to restrict your fluids).   You will be given instructions on how to drink the contrast.  Patients over 70 or patients with diabetes or kidney problems:   If you haven t had a blood test (creatinine test) within the last 30 days, the Cardiologist/Radiologist may require you to get this test prior to your exam.  If you have diabetes:   Continue to take your metformin medication on the day of your exam  Please wear loose clothing, such as a sweat suit or jogging clothes. Avoid snaps, zippers and other metal. We may ask you to undress and put on a hospital gown.  If you have any questions, please call the Imaging Department where you will have your exam.            Jul 16, 2018 11:00 AM CDT   (Arrive by 10:45 AM)   Return Visit with Lam Moran MD   [facility] ([facility])    [address]  [address]  [facility] [address] [phone]              Who to contact     Please call your clinic at [phone] to:    Ask questions about your health    Make or cancel appointments    Discuss your medicines    Learn about your test results    Speak to your doctor            Additional Information About Your Visit        [employer] Information     [employer] gives you secure access to your electronic health record. If you see a primary care provider, you can also send messages to your care team and make appointments. If you have questions, please call your primary care clinic.  If you do not have a primary care provider, please call [phone] and they will assist you.      [employer] is an electronic gateway that provides easy, online access to your medical records. With [employer], you can request a clinic appointment, read your test results, renew a prescription or communicate with your care team.     To access your existing account,  "please contact your [facility] or call [phone] for assistance.        Care EveryWhere ID     This is your Care EveryWhere ID. This could be used by other organizations to access your [place] medical records  [id number]        Your Vitals Were     Pulse Height BMI (Body Mass Index)             81 1.651 m (5' 5\") 36.04 kg/m2          Blood Pressure from Last 3 Encounters:   03/16/18 136/83   01/08/18 (!) 159/96   10/30/17 111/68    Weight from Last 3 Encounters:   03/16/18 98.2 kg (216 lb 9.6 oz)   01/08/18 99.8 kg (220 lb 1.6 oz)   10/30/17 98.4 kg (216 lb 14.4 oz)              We Performed the Following     [place]-Operative Worksheet  (Urology General)        Primary Care Provider Office Phone # Fax #    Shamir Pugh [phone]       [address] [place] [address]        Equal Access to Services     [facility] : Hadii aad ku hadasho Soomaali, waaxda luqadaha, qaybta kaalmada adeegyada, waxsandro castañeda haydanielito connolly . So [facility] [phone].    ATENCIÓN: Si habla español, tiene a andrew disposición servicios gratuitos de asistencia lingüística. Llame al [phone].    We comply with applicable federal civil rights laws and [place] laws. We do not discriminate on the basis of race, color, national origin, age, disability, sex, sexual orientation, or gender identity.            Thank you!     Thank you for choosing [facility] UROLOGY AND [facility] PROST[facility] UROLOGIC CANCERS  for your care. Our goal is always to provide you with excellent care. Hearing back from our patients is one way we can continue to improve our services. Please take a few minutes to complete the written survey that you may receive in the mail after your visit with us. Thank you!             Your Updated Medication List - Protect others around you: Learn how to safely use, store and throw away your medicines at www.disposemymeds.org.          This list is " accurate as of 3/16/18 11:59 PM.  Always use your most recent med list.                   Brand Name Dispense Instructions for use Diagnosis    amLODIPine 5 MG tablet    NORVASC    90 tablet    Take 1 tablet (5 mg) by mouth daily    HTN (hypertension)       COMPRESSION STOCKINGS     2 Units    1 each daily    Leg swelling       enalapril 20 MG tablet    VASOTEC    30 tablet    Take 1 tablet (20 mg) by mouth daily    HTN (hypertension)       hydrochlorothiazide 25 MG tablet    HYDRODIURIL     Take 25 mg by mouth        HYDROmorphone 2 MG tablet    DILAUDID     Take 2-4 mg by mouth        * iohexol 140 MG/ML Soln solution    OMNIPAQUE    50 mL    Mix entire bottle (50ml) of contast with 600ml (20 ounces) of water and drink half 2 hrs prior to CT scan and half 1 hr prior to scan    Cervical cancer (H)       * iohexol 140 MG/ML Soln solution    OMNIPAQUE    50 mL    Mix entire bottle (50ml) of contast with 600ml (20 ounces) of water and drink half 2 hrs prior to CT scan and half 1 hr prior to scan    Cervical cancer (H)       * iohexol 140 MG/ML Soln solution    OMNIPAQUE    50 mL    Mix entire bottle (50ml) of contast with 600ml (20 ounces) of water and drink half 2 hrs prior to CT scan and half 1 hr prior to scan    Cervical cancer (H)       IRON SUPPLEMENT PO      Take 27 mg by mouth daily (with breakfast)        lidocaine-prilocaine cream    EMLA    30 g    Apply topically as needed for moderate pain Apply to chest port site 30 minutes prior to access    Cervical cancer (H)       VITAMIN B6 PO      Take 50 mg by mouth daily        * Notice:  This list has 3 medication(s) that are the same as other medications prescribed for you. Read the directions carefully, and ask your doctor or other care provider to review them with you.

## 2018-03-16 NOTE — PROGRESS NOTES
Reason for visit:  Follow up for consideration of left nephrectomy    HPI: Angella St is a 52 year old female with history of cervical cancer with involvement of the left ureter.  She has undergoing unsuccessful attempt at ureteroscopy but subsequently required PNT placement.  She has been managed with chronic PNT and q3-4 monthly exchanges since 2015.  A renogram performed 2016 showed minimal L kidney function.  She states that she produces about 50cc of urine per day in the L nephrostomy.     From an oncology standpoint, she has been stable and without recurrence.  Per the oncology note, remains disease free and presents today for consideration of left nephrectomy due to non-functional left kidney.  Prior attempts at capping the nephrostomy tube have resulted in infection.    Current meds  Remainder reviewed in EMR.    Current Outpatient Prescriptions   Medication Sig Dispense Refill     enalapril (VASOTEC) 20 MG tablet Take 1 tablet (20 mg) by mouth daily 30 tablet 0     iohexol (OMNIPAQUE) 140 MG/ML SOLN solution Mix entire bottle (50ml) of contast with 600ml (20 ounces) of water and drink half 2 hrs prior to CT scan and half 1 hr prior to scan 50 mL 0     hydrochlorothiazide (HYDRODIURIL) 25 MG tablet Take 25 mg by mouth       HYDROmorphone (DILAUDID) 2 MG tablet Take 2-4 mg by mouth       iohexol (OMNIPAQUE) 140 MG/ML SOLN solution Mix entire bottle (50ml) of contast with 600ml (20 ounces) of water and drink half 2 hrs prior to CT scan and half 1 hr prior to scan 50 mL 0     iohexol (OMNIPAQUE) 140 MG/ML SOLN solution Mix entire bottle (50ml) of contast with 600ml (20 ounces) of water and drink half 2 hrs prior to CT scan and half 1 hr prior to scan 50 mL 0     amLODIPine (NORVASC) 5 MG tablet Take 1 tablet (5 mg) by mouth daily 90 tablet 3     COMPRESSION STOCKINGS 1 each daily 2 Units 1     Ferrous Sulfate (IRON SUPPLEMENT PO) Take 27 mg by mouth daily (with breakfast)       Pyridoxine HCl (VITAMIN B6 PO)  "Take 50 mg by mouth daily       lidocaine-prilocaine (EMLA) cream Apply topically as needed for moderate pain Apply to chest port site 30 minutes prior to access 30 g 1           OBJECTIVE:  /83  Pulse 81  Ht 1.651 m (5' 5\")  Wt 98.2 kg (216 lb 9.6 oz)  BMI 36.04 kg/m2    EXAM:  GENERAL: No acute distress. Well nourished.   HEENT:  Sclerae anicteric.  Conjunctivae pink.  Moist mucous membranes.  LUNGS:  Non-labored breathing.  ABDOMEN: Soft, non-tender  no organomegaly, non-distended  EXTREMITIES:  Warm, well perfused.  No Lower extremity edema bilaterally.  NEURO: normal gait, no focal deficits.     Labs/imaging: relevant results as above  Creatine 1/8/2018 0.7    ASSESSMENT: Angella St is a 52 year old female with nonfunctioning L kidney with infections with clamp trials of the Left nephrostomy.    PLAN:   - will plan for lap left nephrectomy, possible open nephrectomy next available     Patient seen and plan discussed with Dr. Salinas    I spent over 15 minutes with the patient.  Over half this time was spent on counseling for non-functioning kidney.      I have seen and examined the patient with Dr. Villafuerte today. We developed the plan as noted above and I agree.     Deja Salinas MD     CC  Patient Care Team:  Shamir Pugh MD as PCP - General (Internal Medicine)  Arthur Hernandez MD as PCP - Internal Medicine  Christy Kraus RN as Continuity Care Coordinator (Oncology)  Arthur Hernandez MD as Resident  Melida Rutledge APRN CNP as Referring Physician (Nurse Practitioner - Women's Health)  Shamir Pugh MD as MD (Internal Medicine)  Lam oMran MD as MD (Obstetrics & Gynecology, Maternal & Fetal Medicine)  Deja Salinas MD as MD (Urology)  Maryse Rodriguez, RN as Registered Nurse  Deja Salinas MD as MD (Urology)  ESTABLISHED PATIENT      "

## 2018-03-16 NOTE — LETTER
3/16/2018       RE: Angelal St  C/O MO RODRIGUEZ  [address]   [facility] [address]     Dear Colleague,    Thank you for referring your patient, Angella St, to the [facility] UROLOGY AND INST FOR PROSTATE AND UROLOGIC CANCERS at [facility]. Please see a copy of my visit note below.    Reason for visit:  Follow up for consideration of left nephrectomy    HPI: Angella St is a 52 year old female with history of cervical cancer with involvement of the left ureter.  She has undergoing unsuccessful attempt at ureteroscopy but subsequently required PNT placement.  She has been managed with chronic PNT and q3-4 monthly exchanges since 2015.  A renogram performed 2016 showed minimal L kidney function.  She states that she produces about 50cc of urine per day in the L nephrostomy.     From an oncology standpoint, she has been stable and without recurrence.  Per the oncology note, remains disease free and presents today for consideration of left nephrectomy due to non-functional left kidney.  Prior attempts at capping the nephrostomy tube have resulted in infection.    Current meds  Remainder reviewed in EMR.    Current Outpatient Prescriptions   Medication Sig Dispense Refill     enalapril (VASOTEC) 20 MG tablet Take 1 tablet (20 mg) by mouth daily 30 tablet 0     iohexol (OMNIPAQUE) 140 MG/ML SOLN solution Mix entire bottle (50ml) of contast with 600ml (20 ounces) of water and drink half 2 hrs prior to CT scan and half 1 hr prior to scan 50 mL 0     hydrochlorothiazide (HYDRODIURIL) 25 MG tablet Take 25 mg by mouth       HYDROmorphone (DILAUDID) 2 MG tablet Take 2-4 mg by mouth       iohexol (OMNIPAQUE) 140 MG/ML SOLN solution Mix entire bottle (50ml) of contast with 600ml (20 ounces) of water and drink half 2 hrs prior to CT scan and half 1 hr prior to scan 50 mL 0     iohexol (OMNIPAQUE) 140 MG/ML SOLN solution Mix entire bottle (50ml) of contast with 600ml (20 ounces)  "of water and drink half 2 hrs prior to CT scan and half 1 hr prior to scan 50 mL 0     amLODIPine (NORVASC) 5 MG tablet Take 1 tablet (5 mg) by mouth daily 90 tablet 3     COMPRESSION STOCKINGS 1 each daily 2 Units 1     Ferrous Sulfate (IRON SUPPLEMENT PO) Take 27 mg by mouth daily (with breakfast)       Pyridoxine HCl (VITAMIN B6 PO) Take 50 mg by mouth daily       lidocaine-prilocaine (EMLA) cream Apply topically as needed for moderate pain Apply to chest port site 30 minutes prior to access 30 g 1           OBJECTIVE:  /83  Pulse 81  Ht 1.651 m (5' 5\")  Wt 98.2 kg (216 lb 9.6 oz)  BMI 36.04 kg/m2    EXAM:  GENERAL: No acute distress. Well nourished.   HEENT:  Sclerae anicteric.  Conjunctivae pink.  Moist mucous membranes.  LUNGS:  Non-labored breathing.  ABDOMEN: Soft, non-tender  no organomegaly, non-distended  EXTREMITIES:  Warm, well perfused.  No Lower extremity edema bilaterally.  NEURO: normal gait, no focal deficits.     Labs/imaging: relevant results as above  Creatine 1/8/2018 0.7    ASSESSMENT: Angella St is a 52 year old female with nonfunctioning L kidney with infections with clamp trials of the Left nephrostomy.    PLAN:   - will plan for lap left nephrectomy, possible open nephrectomy next available     Patient seen and plan discussed with Dr. Salinas    I spent over 15 minutes with the patient.  Over half this time was spent on counseling for non-functioning kidney.      I have seen and examined the patient with Dr. Villafuerte today. We developed the plan as noted above and I agree.     Deja Salinas MD     CC  Patient Care Team:  Shamir uPgh MD as PCP - General (Internal Medicine)  Arthur Hernandez MD as PCP - Internal Medicine  Christy Kraus, RN as Continuity Care Coordinator (Oncology)  Arthur Hernandez MD as Resident  Melida Rutledge APRN CNP as Referring Physician (Nurse Practitioner - Women's Health)  Shamir Pugh MD as MD (Internal " Medicine)  Lam Moran MD as MD (Obstetrics & Gynecology, Maternal & Fetal Medicine)  Deja Salinas MD as MD (Urology)  Maryse Rodriguez RN as Registered Nurse  Deja Salinas MD as MD (Urology)  ESTABLISHED PATIENT

## 2018-03-16 NOTE — NURSING NOTE
Chief Complaint   Patient presents with     RECHECK     discuss surgery        Courtney Childers MA

## 2018-03-27 ENCOUNTER — OFFICE VISIT (OUTPATIENT)
Dept: INTERNAL MEDICINE | Facility: CLINIC | Age: 53
End: 2018-03-27
Payer: MEDICAID

## 2018-03-27 VITALS
TEMPERATURE: 98.3 F | DIASTOLIC BLOOD PRESSURE: 82 MMHG | HEART RATE: 83 BPM | BODY MASS INDEX: 34.07 KG/M2 | OXYGEN SATURATION: 98 % | HEIGHT: 67 IN | WEIGHT: 217.1 LBS | SYSTOLIC BLOOD PRESSURE: 143 MMHG

## 2018-03-27 DIAGNOSIS — C53.9 MALIGNANT NEOPLASM OF CERVIX, UNSPECIFIED SITE (H): ICD-10-CM

## 2018-03-27 DIAGNOSIS — Z12.31 VISIT FOR SCREENING MAMMOGRAM: ICD-10-CM

## 2018-03-27 DIAGNOSIS — E04.2 NON-TOXIC MULTINODULAR GOITER: ICD-10-CM

## 2018-03-27 DIAGNOSIS — I10 HYPERTENSION, UNSPECIFIED TYPE: ICD-10-CM

## 2018-03-27 DIAGNOSIS — E78.5 HYPERLIPIDEMIA, UNSPECIFIED HYPERLIPIDEMIA TYPE: Primary | ICD-10-CM

## 2018-03-27 DIAGNOSIS — Z11.59 NEED FOR HEPATITIS C SCREENING TEST: ICD-10-CM

## 2018-03-27 RX ORDER — HYDROCHLOROTHIAZIDE 25 MG/1
25 TABLET ORAL DAILY
Qty: 100 TABLET | Refills: 3 | Status: ON HOLD | OUTPATIENT
Start: 2018-03-27 | End: 2018-05-06

## 2018-03-27 RX ORDER — AMLODIPINE BESYLATE 10 MG/1
10 TABLET ORAL DAILY
Qty: 100 TABLET | Refills: 3 | Status: SHIPPED | OUTPATIENT
Start: 2018-03-27 | End: 2018-05-25

## 2018-03-27 RX ORDER — ENALAPRIL MALEATE 20 MG/1
20 TABLET ORAL DAILY
Qty: 100 TABLET | Refills: 3 | Status: ON HOLD | OUTPATIENT
Start: 2018-03-27 | End: 2018-05-06

## 2018-03-27 ASSESSMENT — ENCOUNTER SYMPTOMS
NECK PAIN: 0
MYALGIAS: 1
BACK PAIN: 0
JOINT SWELLING: 0
MUSCLE WEAKNESS: 0
STIFFNESS: 0
ARTHRALGIAS: 0
MUSCLE CRAMPS: 0

## 2018-03-27 ASSESSMENT — PAIN SCALES - GENERAL: PAINLEVEL: NO PAIN (0)

## 2018-03-27 NOTE — NURSING NOTE
Chief Complaint   Patient presents with     Physical     Patient here for annual physical.     Kassandra Lin LPN at 9:53 AM on 3/27/2018.

## 2018-03-27 NOTE — MR AVS SNAPSHOT
After Visit Summary   3/27/2018    Angella St    MRN: [id number]           Patient Information     Date Of Birth          1965        Visit Information        Provider Department      3/27/2018 9:40 AM Shamir Pugh MD [facility]        Today's Diagnoses     Hyperlipidemia, unspecified hyperlipidemia type    -  1    Need for hepatitis C screening test        Visit for screening mammogram        Hypertension, unspecified type        Malignant neoplasm of cervix, unspecified site (H)        Non-toxic multinodular goiter          Care Instructions    Primary Care Center: [phone]     Primary Care Center Medication Refill Request Information:  * Please contact your pharmacy regarding ANY request for medication refills.  ** [facility] Prescription Fax = [phone]  * Please allow 3 business days for routine medication refills.  * Please allow 5 business days for controlled substance medication refills.     Primary Care Center Test Result notification information:  *You will be notified with in 7-10 days of your appointment day regarding the results of your test.  If you are on MyChart you will be notified as soon as the provider has reviewed the results and signed off on them.            Follow-ups after your visit        Follow-up notes from your care team     Return in about 2 months (around 5/27/2018).      Your next 10 appointments already scheduled     Apr 04, 2018  9:45 AM CDT   LAB with  LAB   [facility] ([facility] [place])    [address] [place] [address] [phone]           Please do not eat 10-12 hours before your appointment if you are coming in fasting for labs on lipids, cholesterol, or glucose (sugar). This does not apply to pregnant women. Water, hot tea and black coffee (with nothing added) are okay. Do not drink other fluids, diet soda or chew gum.            Apr 04, 2018 10:00 AM CDT   US THYROID  "with UCUS1   [facility] ([facility])    [address]  1st Floor  [facility] [id number] [phone]           Please bring a list of your medicines (including vitamins, minerals and over-the-counter drugs). Also, tell your doctor about any allergies you may have. Wear comfortable clothes and leave your valuables at home.  You do not need to do anything special to prepare for your exam.  Please call the Imaging Department at your exam site with any questions.            Apr 04, 2018 10:45 AM CDT   (Arrive by 10:30 AM)   MA SCREENING DIGITAL BILATERAL with [id number]   [facility] ([facility])    [address]  2nd Floor  [facility] [id number] [phone]           Do not use any powder, lotion or deodorant under your arms or on your breast. If you do, we will ask you to remove it before your exam.  Wear comfortable, two-piece clothing.  If you have any allergies, tell your care team.  Bring any previous mammograms from other facilities or have them mailed to the breast center. Three-dimensional (3D) mammograms are available at [place] locations in [facility], [facility], [place], [place], and [place]. [facility] locations include [place] and [facility] Center in [place]. Benefits of 3D mammograms include: - Improved rate of cancer detection - Decreases your chance of having to go back for more tests, which means fewer: - \"False-positive\" results (This means that there is an abnormal area but it isn't cancer.) - Invasive testing procedures, such as a biopsy or surgery - Can provide clearer images of the breast if you have dense breast tissue. 3D mammography is an optional exam that anyone can have with a 2D mammogram. It doesn't replace or take the place of a 2D mammogram. 2D mammograms remain an effective screening test for all women.  Not all insurance companies " cover the cost of a 3D mammogram. Check with your insurance.            May 03, 2018   Procedure with Deja Salinas MD   [facility] (--)    [address]  [place] [address]   [phone]            May 29, 2018 10:00 AM CDT   (Arrive by 9:45 AM)   Return Visit with Shamir Pugh MD   [facility] ([facility])    [address]  4th Floor  [facility] [address] [phone]            Jul 12, 2018  9:20 AM CDT   (Arrive by 9:05 AM)   CT CHEST ABDOMEN PELVIS W/O & W CONTRAST with UCCT2   [facility] CT ([facility])    [address]  1st Floor  [facility] [address] [phone]           Please bring any scans or X-rays taken at other hospitals, if similar tests were done. Also bring a list of your medicines, including vitamins, minerals and over-the-counter drugs. It is safest to leave personal items at home.  Be sure to tell your doctor:   If you have any allergies.   If there s any chance you are pregnant.   If you are breastfeeding.  You may have contrast for this exam. To prepare:   Do not eat or drink for 2 hours before your exam. If you need to take medicine, you may take it with small sips of water. (We may ask you to take liquid medicine as well.)   The day before your exam, drink extra fluids at least six 8-ounce glasses (unless your doctor tells you to restrict your fluids).   You will be given instructions on how to drink the contrast.  Patients over 70 or patients with diabetes or kidney problems:   If you haven t had a blood test (creatinine test) within the last 30 days, the Cardiologist/Radiologist may require you to get this test prior to your exam.  If you have diabetes:   Continue to take your metformin medication on the day of your exam  Please wear loose clothing, such as a sweat suit or jogging clothes. Avoid snaps, zippers and other  metal. We may ask you to undress and put on a hospital gown.  If you have any questions, please call the Imaging Department where you will have your exam.            Jul 16, 2018 11:00 AM CDT   (Arrive by 10:45 AM)   Return Visit with Lam Moran MD   [facility] ([facility] [place])    [address]  [address]  [facility] [address] [phone]              Future tests that were ordered for you today     Open Future Orders        Priority Expected Expires Ordered    US Thyroid Routine  3/27/2019 3/27/2018    Hepatitis C Screen Reflex to HCV RNA Quant and Genotype Routine 3/27/2018 3/27/2019 3/27/2018    MA SCREENING DIGITAL BILAT - Future  (s+30) Routine  3/27/2019 3/27/2018    Lipid Profile Routine  3/27/2019 3/27/2018    TSH with free T4 reflex Routine  3/27/2019 3/27/2018            Who to contact     Please call your clinic at [phone] to:    Ask questions about your health    Make or cancel appointments    Discuss your medicines    Learn about your test results    Speak to your doctor            Additional Information About Your Visit        [employer] Information     [employer] gives you secure access to your electronic health record. If you see a primary care provider, you can also send messages to your care team and make appointments. If you have questions, please call your primary care clinic.  If you do not have a primary care provider, please call [phone] and they will assist you.      [employer] is an electronic gateway that provides easy, online access to your medical records. With [employer], you can request a clinic appointment, read your test results, renew a prescription or communicate with your care team.     To access your existing account, please contact your [facility] or call [phone] for assistance.        Care EveryWhere ID     This is your Care EveryWhere ID. This could be used by other organizations  "to access your [place] medical records  [id number]        Your Vitals Were     Pulse Temperature Height Pulse Oximetry Breastfeeding? BMI (Body Mass Index)    83 98.3  F (36.8  C) (Oral) 1.69 m (5' 6.53\") 98% No 34.48 kg/m2       Blood Pressure from Last 3 Encounters:   03/27/18 143/82   03/16/18 136/83   01/08/18 (!) 159/96    Weight from Last 3 Encounters:   03/27/18 98.5 kg (217 lb 1.6 oz)   03/16/18 98.2 kg (216 lb 9.6 oz)   01/08/18 99.8 kg (220 lb 1.6 oz)                 Today's Medication Changes          These changes are accurate as of 3/27/18 11:14 AM.  If you have any questions, ask your nurse or doctor.               These medicines have changed or have updated prescriptions.        Dose/Directions    amLODIPine 10 MG tablet   Commonly known as:  NORVASC   This may have changed:    - medication strength  - how much to take   Used for:  Hypertension, unspecified type   Changed by:  Shamir Pugh MD        Dose:  10 mg   Take 1 tablet (10 mg) by mouth daily   Quantity:  100 tablet   Refills:  3       hydrochlorothiazide 25 MG tablet   Commonly known as:  HYDRODIURIL   This may have changed:  when to take this   Used for:  Hypertension, unspecified type   Changed by:  Shamir Pugh MD        Dose:  25 mg   Take 1 tablet (25 mg) by mouth daily   Quantity:  100 tablet   Refills:  3            Where to get your medicines      These medications were sent to [facility] [address]  [address], [facility] [address]    Hours:  TRANSPLANT PHONE NUMBER [phone] Phone:  [phone]     amLODIPine 10 MG tablet    enalapril 20 MG tablet    hydrochlorothiazide 25 MG tablet                Primary Care Provider Office Phone # Fax #    Shamir Pugh [phone]       [address] [place] [address] [address]  [facility] [address]        Equal Access to Services     DORIS RODRIGUES AH: Sabiha jain " Carolinezi, waregisda luqadaha, qaybta kaalmada brianne, jin yusuf. So [facility] [phone].    ATENCIÓN: Si edy schmitt, tiene a andrew disposición servicios gratuitos de asistencia lingüística. Shemar al [phone].    We comply with applicable federal civil rights laws and [place] laws. We do not discriminate on the basis of race, color, national origin, age, disability, sex, sexual orientation, or gender identity.            Thank you!     Thank you for choosing [facility]  for your care. Our goal is always to provide you with excellent care. Hearing back from our patients is one way we can continue to improve our services. Please take a few minutes to complete the written survey that you may receive in the mail after your visit with us. Thank you!             Your Updated Medication List - Protect others around you: Learn how to safely use, store and throw away your medicines at www.disposemymeds.org.          This list is accurate as of 3/27/18 11:14 AM.  Always use your most recent med list.                   Brand Name Dispense Instructions for use Diagnosis    amLODIPine 10 MG tablet    NORVASC    100 tablet    Take 1 tablet (10 mg) by mouth daily    Hypertension, unspecified type       COMPRESSION STOCKINGS     2 Units    1 each daily    Leg swelling       enalapril 20 MG tablet    VASOTEC    100 tablet    Take 1 tablet (20 mg) by mouth daily    Hypertension, unspecified type       hydrochlorothiazide 25 MG tablet    HYDRODIURIL    100 tablet    Take 1 tablet (25 mg) by mouth daily    Hypertension, unspecified type       HYDROmorphone 2 MG tablet    DILAUDID     Take 2-4 mg by mouth        * iohexol 140 MG/ML Soln solution    OMNIPAQUE    50 mL    Mix entire bottle (50ml) of contast with 600ml (20 ounces) of water and drink half 2 hrs prior to CT scan and half 1 hr prior to scan    Cervical cancer (H)       * iohexol 140 MG/ML Soln solution    OMNIPAQUE    50 mL     Mix entire bottle (50ml) of contast with 600ml (20 ounces) of water and drink half 2 hrs prior to CT scan and half 1 hr prior to scan    Cervical cancer (H)       * iohexol 140 MG/ML Soln solution    OMNIPAQUE    50 mL    Mix entire bottle (50ml) of contast with 600ml (20 ounces) of water and drink half 2 hrs prior to CT scan and half 1 hr prior to scan    Cervical cancer (H)       IRON SUPPLEMENT PO      Take 27 mg by mouth daily (with breakfast)        lidocaine-prilocaine cream    EMLA    30 g    Apply topically as needed for moderate pain Apply to chest port site 30 minutes prior to access    Cervical cancer (H)       VITAMIN B6 PO      Take 50 mg by mouth daily        * Notice:  This list has 3 medication(s) that are the same as other medications prescribed for you. Read the directions carefully, and ask your doctor or other care provider to review them with you.

## 2018-03-27 NOTE — PATIENT INSTRUCTIONS
[facility]: [phone]     [facility] Medication Refill Request Information:  * Please contact your pharmacy regarding ANY request for medication refills.  ** [facility] Prescription Fax = [phone]  * Please allow 3 business days for routine medication refills.  * Please allow 5 business days for controlled substance medication refills.     [facility] Test Result notification information:  *You will be notified with in 7-10 days of your appointment day regarding the results of your test.  If you are on MyChart you will be notified as soon as the provider has reviewed the results and signed off on them.

## 2018-03-27 NOTE — PROGRESS NOTES
HPI  Angella St is a 52 year old female with hx of cervical cancer s/p JESSICA and chemotherapy c/b left ureteral involvement with hydronephrosis with recurrent pyelonephritis and CKD requiring chronic PNT presenting for routine follow up. She is being followed by gyn-oncology and nephrology. Most recently, there has been no evidence of disease recurrence and the plan is for a laparoscopic nephrectomy. She is excited for the procedure so she can be done with managing a PNT. She is requesting a letter for sick leave from her job back in [employer] for this procedure. She also needs refills of her antihypertensives. Her home BPs have been in the 140s/90s, currently on enalapril, HCTZ, and amlodipine. No new abdominal pain, fevers, chills, or weight loss. Her last colonoscopy was in 2015. She has never had a mammogram.     ROS: 10 point ROS neg other than the symptoms noted above in the HPI.    Past Medical History:   Diagnosis Date     Anemia      Cancer (H)     Metastatic squamous cell cervical carcinoma     Chronic kidney disease      Hypertension 2007     Obesity     BMI >30     Past Surgical History:   Procedure Laterality Date     COMBINED CYSTOSCOPY, INSERT STENT URETER(S) Left 6/13/2016    Procedure: COMBINED CYSTOSCOPY, INSERT STENT URETER(S);  Surgeon: Deja Salinas MD;  [facility]     CYSTOSCOPY, RETROGRADES, COMBINED Left 6/13/2016    Procedure: COMBINED CYSTOSCOPY, RETROGRADES;  Surgeon: Deja Salinas MD;  [facility]     GENITOURINARY SURGERY  5/2015    PNT placement     HYSTERECTOMY  2011    [facility] [place]     PERCUTANEOUS NEPHROSTOMY Left 3/9/2017    Procedure: PERCUTANEOUS NEPHROSTOMY;  Surgeon: Bridger Meyer PA-C;  [facility]     PERCUTANEOUS NEPHROSTOMY Left 6/28/2017    Procedure: PERCUTANEOUS NEPHROSTOMY;  Left Nephrostomy Tube Change;  Surgeon: Bridger Meyer PA-C;  [facility]     PERCUTANEOUS NEPHROSTOMY Left 10/30/2017    Procedure:  "PERCUTANEOUS NEPHROSTOMY;  Left Nephrostomy Tube Change;  Surgeon: Maryann Chavez PA-C;  Location: [facility] OR     Family History   Problem Relation Age of Onset     Hypertension Brother      Social History     Social History     Marital status:      Spouse name: N/A     Number of children: 3     Years of education: N/A     Occupational History     no working      Social History Main Topics     Smoking status: Never Smoker     Smokeless tobacco: Never Used     Alcohol use No      Comment: Does not drink alcohol     Drug use: No      Comment: No drug use     Sexual activity: Not Currently     Other Topics Concern     None     Social History Narrative    Mom  from [place] in her 40's    Dad  in his 60's presumed to be from hunger as a result of the on going war in [facility] at the time.    Patient has a brother and a sister who are alive and well.      Patient has three children:  Female age 37 alive and well; Female age 35 alive and well; Female age 33 alive and well    Patient reports  is in [facility].       Exam:  /82  Pulse 83  Temp 98.3  F (36.8  C) (Oral)  Ht 1.69 m (5' 6.53\")  Wt 98.5 kg (217 lb 1.6 oz)  SpO2 98%  Breastfeeding? No  BMI 34.48 kg/m2  217 lbs 1.6 oz    Physical Exam   The patient is alert, oriented with a clear sensorium.   Skin shows no lesions or rashes and good turgor.   Head is normocephalic and atraumatic.   Eyes show PERRLA with benign optic fundi.   Ears show normal TMs bilaterally.   Mouth shows clear oral mucosa.   Neck shows thyromegaly with multiple mobile, non-tender nodules. No LAD or other masses.  Back is non tender.  Lungs are clear to percussion and auscultation.   Heart shows normal S1 and S2 without murmur or gallop.   Abdomen is soft and nontender without masses or organomegaly. Nephrostomy tube in place.  Extremities show no edema and no evidence of active synovitis.   Neurologic examination shows cranial nerves II-XII intact. Motor shows 5/5 " strength.    ASSESSMENT  1) History of cervical cancer s/p JESSICA and chemotherapy, no evidence of recurrence  2) Ureteral obstruction w/ hydronephrosis/recurrent pyelonephritis and chronic PNT, planned lap nephrectomy  3) Hypertension  4) Multinodular goiter    PLAN  Blood pressure >140/80 at home, will increase amlodipine to 10 mg daily. She will start taking her antihypertensives at night. Will get routine A1C, lipid panel, HepC. Will repeat TSH and US thyroid to monitor thyroid nodules. She will get a mammogram as well.      This document was prepared by Noah Agrawal, acting as a medical scribe, on behalf of Dr. Pugh, based on observations and statements made by the provider. This document has been reviewed and approved by the provider.    The medical student acted as scribe and the encounter documented above was completely performed by myself.  Supervising Doctor Shamir Pugh MD  General Internal Medicine  Primary Care Center  [phone]

## 2018-03-27 NOTE — LETTER
[facility]  [address]  4th [address]  [facility] [address]          March 27, 2018    RE:  Angella St                                                                                                                                                       C/O MO RODRIGUEZ  [address]   [facility] [address]            To whom it may concern:    Angella St is under my professional care for    Hypertension, unspecified type  Hyperlipidemia, unspecified hyperlipidemia type  Malignant neoplasm of cervix, unspecified site (H)  Non-toxic multinodular goiter   She is unable to travel or leave this country safely until at least January of 2019 for medical concerns.    Sincerely,        Shamir Pugh MD

## 2018-04-04 ENCOUNTER — RADIANT APPOINTMENT (OUTPATIENT)
Dept: ULTRASOUND IMAGING | Facility: CLINIC | Age: 53
End: 2018-04-04
Attending: INTERNAL MEDICINE
Payer: MEDICAID

## 2018-04-04 ENCOUNTER — RADIANT APPOINTMENT (OUTPATIENT)
Dept: MAMMOGRAPHY | Facility: CLINIC | Age: 53
End: 2018-04-04
Attending: INTERNAL MEDICINE
Payer: MEDICAID

## 2018-04-04 DIAGNOSIS — E78.5 HYPERLIPIDEMIA, UNSPECIFIED HYPERLIPIDEMIA TYPE: ICD-10-CM

## 2018-04-04 DIAGNOSIS — E04.2 NON-TOXIC MULTINODULAR GOITER: ICD-10-CM

## 2018-04-04 DIAGNOSIS — Z12.31 VISIT FOR SCREENING MAMMOGRAM: ICD-10-CM

## 2018-04-04 DIAGNOSIS — E04.2 NON-TOXIC MULTINODULAR GOITER: Primary | ICD-10-CM

## 2018-04-04 DIAGNOSIS — Z11.59 NEED FOR HEPATITIS C SCREENING TEST: ICD-10-CM

## 2018-04-04 LAB
CHOLEST SERPL-MCNC: 186 MG/DL
HCV AB SERPL QL IA: NONREACTIVE
HDLC SERPL-MCNC: 52 MG/DL
LDLC SERPL CALC-MCNC: 117 MG/DL
NONHDLC SERPL-MCNC: 135 MG/DL
TRIGL SERPL-MCNC: 88 MG/DL
TSH SERPL DL<=0.005 MIU/L-ACNC: 1.81 MU/L (ref 0.4–4)

## 2018-04-04 PROCEDURE — 96523 IRRIG DRUG DELIVERY DEVICE: CPT

## 2018-04-04 PROCEDURE — 25000128 H RX IP 250 OP 636: Performed by: OBSTETRICS & GYNECOLOGY

## 2018-04-04 RX ORDER — HEPARIN SODIUM (PORCINE) LOCK FLUSH IV SOLN 100 UNIT/ML 100 UNIT/ML
5 SOLUTION INTRAVENOUS EVERY 8 HOURS
Status: DISCONTINUED | OUTPATIENT
Start: 2018-04-04 | End: 2018-04-04 | Stop reason: HOSPADM

## 2018-04-04 RX ADMIN — SODIUM CHLORIDE, PRESERVATIVE FREE 5 ML: 5 INJECTION INTRAVENOUS at 11:28

## 2018-04-04 NOTE — IP AVS SNAPSHOT
[facility]    [address] [place] [address]    Phone:  [phone]    Fax:  [phone]                                       After Visit Summary   3/9/2017    Angella St    MRN: [id number]           After Visit Summary Signature Page     I have received my discharge instructions, and my questions have been answered. I have discussed any challenges I see with this plan with the nurse or doctor.    ..........................................................................................................................................  Patient/Patient Representative Signature      ..........................................................................................................................................  Patient Representative Print Name and Relationship to Patient    ..................................................               ................................................  Date                                            Time    ..........................................................................................................................................  Reviewed by Signature/Title    ...................................................              ..............................................  Date                                                            Time           The letter requested has been written. Please fax per patient request.

## 2018-04-10 ENCOUNTER — APPOINTMENT (OUTPATIENT)
Dept: ULTRASOUND IMAGING | Facility: CLINIC | Age: 53
End: 2018-04-10
Attending: EMERGENCY MEDICINE
Payer: MEDICAID

## 2018-04-10 ENCOUNTER — HOSPITAL ENCOUNTER (EMERGENCY)
Facility: CLINIC | Age: 53
Discharge: HOME | End: 2018-04-10
Attending: EMERGENCY MEDICINE | Admitting: EMERGENCY MEDICINE
Payer: MEDICAID

## 2018-04-10 VITALS
BODY MASS INDEX: 34.06 KG/M2 | HEART RATE: 82 BPM | HEIGHT: 67 IN | OXYGEN SATURATION: 97 % | TEMPERATURE: 97.8 F | RESPIRATION RATE: 18 BRPM | SYSTOLIC BLOOD PRESSURE: 130 MMHG | WEIGHT: 217 LBS | DIASTOLIC BLOOD PRESSURE: 79 MMHG

## 2018-04-10 DIAGNOSIS — R10.9 ACUTE LEFT FLANK PAIN: ICD-10-CM

## 2018-04-10 LAB
ALBUMIN SERPL-MCNC: 3.6 G/DL (ref 3.4–5)
ALBUMIN UR-MCNC: 300 MG/DL
ALBUMIN UR-MCNC: NEGATIVE MG/DL
ALP SERPL-CCNC: 55 U/L (ref 40–150)
ALT SERPL W P-5'-P-CCNC: 19 U/L (ref 0–50)
ANION GAP SERPL CALCULATED.3IONS-SCNC: 8 MMOL/L (ref 3–14)
APPEARANCE UR: ABNORMAL
APPEARANCE UR: CLEAR
AST SERPL W P-5'-P-CCNC: 14 U/L (ref 0–45)
BACTERIA #/AREA URNS HPF: ABNORMAL /HPF
BACTERIA #/AREA URNS HPF: ABNORMAL /HPF
BASOPHILS # BLD AUTO: 0 10E9/L (ref 0–0.2)
BASOPHILS NFR BLD AUTO: 0.3 %
BILIRUB SERPL-MCNC: 0.7 MG/DL (ref 0.2–1.3)
BILIRUB UR QL STRIP: NEGATIVE
BILIRUB UR QL STRIP: NEGATIVE
BUN SERPL-MCNC: 10 MG/DL (ref 7–30)
CALCIUM SERPL-MCNC: 8.7 MG/DL (ref 8.5–10.1)
CHLORIDE SERPL-SCNC: 107 MMOL/L (ref 94–109)
CO2 SERPL-SCNC: 26 MMOL/L (ref 20–32)
COLOR UR AUTO: YELLOW
COLOR UR AUTO: YELLOW
CREAT SERPL-MCNC: 0.65 MG/DL (ref 0.52–1.04)
DIFFERENTIAL METHOD BLD: NORMAL
EOSINOPHIL # BLD AUTO: 0.2 10E9/L (ref 0–0.7)
EOSINOPHIL NFR BLD AUTO: 2.7 %
ERYTHROCYTE [DISTWIDTH] IN BLOOD BY AUTOMATED COUNT: 13.6 % (ref 10–15)
FATTY CASTS #/AREA URNS LPF: NEGATIVE /LPF
GFR SERPL CREATININE-BSD FRML MDRD: >90 ML/MIN/1.7M2
GLUCOSE SERPL-MCNC: 128 MG/DL (ref 70–99)
GLUCOSE UR STRIP-MCNC: NEGATIVE MG/DL
GLUCOSE UR STRIP-MCNC: NEGATIVE MG/DL
HCT VFR BLD AUTO: 37.4 % (ref 35–47)
HGB BLD-MCNC: 11.9 G/DL (ref 11.7–15.7)
HGB UR QL STRIP: ABNORMAL
HGB UR QL STRIP: ABNORMAL
IMM GRANULOCYTES # BLD: 0 10E9/L (ref 0–0.4)
IMM GRANULOCYTES NFR BLD: 0.1 %
KETONES UR STRIP-MCNC: NEGATIVE MG/DL
KETONES UR STRIP-MCNC: NEGATIVE MG/DL
LEUKOCYTE ESTERASE UR QL STRIP: ABNORMAL
LEUKOCYTE ESTERASE UR QL STRIP: NEGATIVE
LYMPHOCYTES # BLD AUTO: 2.7 10E9/L (ref 0.8–5.3)
LYMPHOCYTES NFR BLD AUTO: 38.5 %
MCH RBC QN AUTO: 27 PG (ref 26.5–33)
MCHC RBC AUTO-ENTMCNC: 31.8 G/DL (ref 31.5–36.5)
MCV RBC AUTO: 85 FL (ref 78–100)
MONOCYTES # BLD AUTO: 0.5 10E9/L (ref 0–1.3)
MONOCYTES NFR BLD AUTO: 7.3 %
MUCOUS THREADS #/AREA URNS LPF: PRESENT /LPF
MUCOUS THREADS #/AREA URNS LPF: PRESENT /LPF
NEUTROPHILS # BLD AUTO: 3.6 10E9/L (ref 1.6–8.3)
NEUTROPHILS NFR BLD AUTO: 51.1 %
NITRATE UR QL: NEGATIVE
NITRATE UR QL: NEGATIVE
NRBC # BLD AUTO: 0 10*3/UL
NRBC BLD AUTO-RTO: 0 /100
PH UR STRIP: 7 PH (ref 5–7)
PH UR STRIP: 8 PH (ref 5–7)
PLATELET # BLD AUTO: 209 10E9/L (ref 150–450)
POTASSIUM SERPL-SCNC: 3.6 MMOL/L (ref 3.4–5.3)
PROT SERPL-MCNC: 7.8 G/DL (ref 6.8–8.8)
RBC # BLD AUTO: 4.4 10E12/L (ref 3.8–5.2)
RBC #/AREA URNS AUTO: 116 /HPF (ref 0–2)
RBC #/AREA URNS AUTO: 12 /HPF (ref 0–2)
SODIUM SERPL-SCNC: 141 MMOL/L (ref 133–144)
SOURCE: ABNORMAL
SOURCE: ABNORMAL
SP GR UR STRIP: 1.01 (ref 1–1.03)
SP GR UR STRIP: 1.01 (ref 1–1.03)
SQUAMOUS #/AREA URNS AUTO: 1 /HPF (ref 0–1)
SQUAMOUS #/AREA URNS AUTO: <1 /HPF (ref 0–1)
TRANS CELLS #/AREA URNS HPF: <1 /HPF (ref 0–1)
UROBILINOGEN UR STRIP-MCNC: NORMAL MG/DL (ref 0–2)
UROBILINOGEN UR STRIP-MCNC: NORMAL MG/DL (ref 0–2)
WBC # BLD AUTO: 7.1 10E9/L (ref 4–11)
WBC #/AREA URNS AUTO: 2 /HPF (ref 0–5)
WBC #/AREA URNS AUTO: 57 /HPF (ref 0–5)

## 2018-04-10 PROCEDURE — 96361 HYDRATE IV INFUSION ADD-ON: CPT | Performed by: EMERGENCY MEDICINE

## 2018-04-10 PROCEDURE — 96360 HYDRATION IV INFUSION INIT: CPT | Performed by: EMERGENCY MEDICINE

## 2018-04-10 PROCEDURE — 76770 US EXAM ABDO BACK WALL COMP: CPT

## 2018-04-10 PROCEDURE — 99284 EMERGENCY DEPT VISIT MOD MDM: CPT | Mod: 25 | Performed by: EMERGENCY MEDICINE

## 2018-04-10 PROCEDURE — 85025 COMPLETE CBC W/AUTO DIFF WBC: CPT | Performed by: EMERGENCY MEDICINE

## 2018-04-10 PROCEDURE — 87088 URINE BACTERIA CULTURE: CPT | Performed by: EMERGENCY MEDICINE

## 2018-04-10 PROCEDURE — 87086 URINE CULTURE/COLONY COUNT: CPT | Performed by: EMERGENCY MEDICINE

## 2018-04-10 PROCEDURE — 81001 URINALYSIS AUTO W/SCOPE: CPT | Mod: 91 | Performed by: EMERGENCY MEDICINE

## 2018-04-10 PROCEDURE — 99285 EMERGENCY DEPT VISIT HI MDM: CPT | Mod: Z6 | Performed by: EMERGENCY MEDICINE

## 2018-04-10 PROCEDURE — 87040 BLOOD CULTURE FOR BACTERIA: CPT | Performed by: EMERGENCY MEDICINE

## 2018-04-10 PROCEDURE — 80053 COMPREHEN METABOLIC PANEL: CPT | Performed by: EMERGENCY MEDICINE

## 2018-04-10 PROCEDURE — 87186 SC STD MICRODIL/AGAR DIL: CPT | Performed by: EMERGENCY MEDICINE

## 2018-04-10 PROCEDURE — 25000128 H RX IP 250 OP 636: Performed by: EMERGENCY MEDICINE

## 2018-04-10 RX ORDER — ACETAMINOPHEN AND CODEINE PHOSPHATE 300; 30 MG/1; MG/1
1-2 TABLET ORAL EVERY 6 HOURS PRN
Qty: 20 TABLET | Refills: 0 | Status: ON HOLD | OUTPATIENT
Start: 2018-04-10 | End: 2018-05-03

## 2018-04-10 RX ORDER — HEPARIN SODIUM (PORCINE) LOCK FLUSH IV SOLN 100 UNIT/ML 100 UNIT/ML
5 SOLUTION INTRAVENOUS
Status: DISCONTINUED | OUTPATIENT
Start: 2018-04-10 | End: 2018-04-10 | Stop reason: HOSPADM

## 2018-04-10 RX ORDER — SULFAMETHOXAZOLE/TRIMETHOPRIM 800-160 MG
1 TABLET ORAL 2 TIMES DAILY
Qty: 20 TABLET | Refills: 0 | Status: SHIPPED | OUTPATIENT
Start: 2018-04-10 | End: 2018-04-13 | Stop reason: ALTCHOICE

## 2018-04-10 RX ADMIN — SODIUM CHLORIDE, PRESERVATIVE FREE 5 ML: 5 INJECTION INTRAVENOUS at 12:41

## 2018-04-10 RX ADMIN — SODIUM CHLORIDE 1000 ML: 9 INJECTION, SOLUTION INTRAVENOUS at 10:18

## 2018-04-10 ASSESSMENT — ENCOUNTER SYMPTOMS
WHEEZING: 0
VOMITING: 0
FEVER: 1
NAUSEA: 0
SHORTNESS OF BREATH: 0
FLANK PAIN: 1
APPETITE CHANGE: 0
HEMATURIA: 1

## 2018-04-10 NOTE — ED NOTES
Pt arrives to ED with complaints of L flank pain. Pt reports pain started on Sunday and has become worse. Pt has L nephrostomy tube in place, since 2015. Nephrostomy tube output is yellow/cloudy. Pt reports there was some blood present from nephrostomy tube yesterday.

## 2018-04-10 NOTE — ED AVS SNAPSHOT
[facility], Emergency Department    [address] [facility] [address]    Phone:  [phone]                                       Angella St   MRN: [id number]    Department:  [facility], Emergency Department   Date of Visit:  4/10/2018           Patient Information     Date Of Birth          1965        Your diagnoses for this visit were:     Acute left flank pain        You were seen by Yang Valencia MD and Lisa Rivera MD.        Discharge Instructions       Take medications as prescribed.   Return to the ER if symptoms worsen.   Please make an appointment to follow up with Urology Clinic (phone: [phone]) in 2-4 days if not improving.      Your next 10 appointments already scheduled     May 03, 2018   Procedure with Deja Salinas MD   [facility] (--)    [address]  [facility] [address]   [phone]            May 29, 2018 10:00 AM CDT   (Arrive by 9:45 AM)   Return Visit with Shamir Pugh MD   [facility] ([facility])    [address] [place] [address]  4th Floor  [facility] [id number] [phone]            Jul 12, 2018  9:20 AM CDT   (Arrive by 9:05 AM)   CT CHEST ABDOMEN PELVIS W/O & W CONTRAST with UCCT2   [facility] ([facility])    [address] [facility]  [address] [facility] [id number] [phone]           Please bring any scans or X-rays taken at other hospitals, if similar tests were done. Also bring a list of your medicines, including vitamins, minerals and over-the-counter drugs. It is safest to leave personal items at home.  Be sure to tell your doctor:   If you have any allergies.   If there s any chance you are pregnant.   If you are breastfeeding.  You may have contrast for this exam. To prepare:   Do not eat or drink for 2 hours before your exam. If you need to take medicine, you may take it with small  sips of water. (We may ask you to take liquid medicine as well.)   The day before your exam, drink extra fluids at least six 8-ounce glasses (unless your doctor tells you to restrict your fluids).   You will be given instructions on how to drink the contrast.  Patients over 70 or patients with diabetes or kidney problems:   If you haven t had a blood test (creatinine test) within the last 30 days, the Cardiologist/Radiologist may require you to get this test prior to your exam.  If you have diabetes:   Continue to take your metformin medication on the day of your exam  Please wear loose clothing, such as a sweat suit or jogging clothes. Avoid snaps, zippers and other metal. We may ask you to undress and put on a hospital gown.  If you have any questions, please call the Imaging Department where you will have your exam.            Jul 16, 2018 11:00 AM CDT   (Arrive by 10:45 AM)   Return Visit with Lam Moran MD   [facility] ([facility])    [address] [place] [address]  [address] [place] [address] [phone]              24 Hour Appointment Hotline       To make an appointment at any [facility], call [phone]). If you don't have a family doctor or clinic, we will help you find one. [place] clinics are conveniently located to serve the needs of you and your family.             Review of your medicines      START taking        Dose / Directions Last dose taken    acetaminophen-codeine 300-30 MG per tablet   Commonly known as:  TYLENOL WITH CODEINE #3   Dose:  1-2 tablet   Quantity:  20 tablet        Take 1-2 tablets by mouth every 6 hours as needed for pain   Refills:  0        sulfamethoxazole-trimethoprim 800-160 MG per tablet   Commonly known as:  BACTRIM DS   Dose:  1 tablet   Quantity:  20 tablet        Take 1 tablet by mouth 2 times daily for 10 days   Refills:  0          Our records show that you are taking the medicines  listed below. If these are incorrect, please call your family doctor or clinic.        Dose / Directions Last dose taken    amLODIPine 10 MG tablet   Commonly known as:  NORVASC   Dose:  10 mg   Quantity:  100 tablet        Take 1 tablet (10 mg) by mouth daily   Refills:  3        COMPRESSION STOCKINGS   Dose:  1 each   Quantity:  2 Units        1 each daily   Refills:  1        enalapril 20 MG tablet   Commonly known as:  VASOTEC   Dose:  20 mg   Quantity:  100 tablet        Take 1 tablet (20 mg) by mouth daily   Refills:  3        hydrochlorothiazide 25 MG tablet   Commonly known as:  HYDRODIURIL   Dose:  25 mg   Quantity:  100 tablet        Take 1 tablet (25 mg) by mouth daily   Refills:  3        HYDROmorphone 2 MG tablet   Commonly known as:  DILAUDID   Dose:  2-4 mg        Take 2-4 mg by mouth   Refills:  0        * iohexol 140 MG/ML Soln solution   Commonly known as:  OMNIPAQUE   Quantity:  50 mL        Mix entire bottle (50ml) of contast with 600ml (20 ounces) of water and drink half 2 hrs prior to CT scan and half 1 hr prior to scan   Refills:  0        * iohexol 140 MG/ML Soln solution   Commonly known as:  OMNIPAQUE   Quantity:  50 mL        Mix entire bottle (50ml) of contast with 600ml (20 ounces) of water and drink half 2 hrs prior to CT scan and half 1 hr prior to scan   Refills:  0        * iohexol 140 MG/ML Soln solution   Commonly known as:  OMNIPAQUE   Quantity:  50 mL        Mix entire bottle (50ml) of contast with 600ml (20 ounces) of water and drink half 2 hrs prior to CT scan and half 1 hr prior to scan   Refills:  0        IRON SUPPLEMENT PO   Dose:  27 mg        Take 27 mg by mouth daily (with breakfast)   Refills:  0        lidocaine-prilocaine cream   Commonly known as:  EMLA   Quantity:  30 g        Apply topically as needed for moderate pain Apply to chest port site 30 minutes prior to access   Refills:  1        VITAMIN B6 PO   Dose:  50 mg        Take 50 mg by mouth daily   Refills:  0         * Notice:  This list has 3 medication(s) that are the same as other medications prescribed for you. Read the directions carefully, and ask your doctor or other care provider to review them with you.            Prescriptions were sent or printed at these locations (2 Prescriptions)                   Other Prescriptions                Printed at Department/Unit printer (2 of 2)         sulfamethoxazole-trimethoprim (BACTRIM DS) 800-160 MG per tablet               acetaminophen-codeine (TYLENOL WITH CODEINE #3) 300-30 MG per tablet                Procedures and tests performed during your visit     Procedure/Test Number of Times Performed    Blood culture 1    CBC with platelets differential 1    Comprehensive metabolic panel 1    UA with Microscopic 2    US Renal Complete 1    Urine Culture 2      Orders Needing Specimen Collection     Ordered          04/10/18 0931  Blood culture - STAT, Prio: STAT, Needs to be Collected     Scheduled Task Status   04/10/18 0931 Collect Blood culture Open   Order Class:  PCU Collect                  Pending Results     Date and Time Order Name Status Description    4/10/2018 1227 UA with Microscopic In process     4/10/2018 0931 Blood culture In process     4/10/2018 0931 Urine Culture Preliminary             Pending Culture Results     Date and Time Order Name Status Description    4/10/2018 1227 UA with Microscopic In process     4/10/2018 0931 Blood culture In process     4/10/2018 0931 Urine Culture Preliminary             Pending Results Instructions     If you had any lab results that were not finalized at the time of your Discharge, you can call the ED Lab Result RN at [phone]. You will be contacted by this team for any positive Lab results or changes in treatment. The nurses are available 7 days a week from 10A to 6:30P.  You can leave a message 24 hours per day and they will return your call.        Thank you for choosing [facility]       Thank you for choosing  [facility] for your care. Our goal is always to provide you with excellent care. Hearing back from our patients is one way we can continue to improve our services. Please take a few minutes to complete the written survey that you may receive in the mail after you visit with us. Thank you!        [facility] [employer] Information     [facility] gives you secure access to your electronic health record. If you see a primary care provider, you can also send messages to your care team and make appointments. If you have questions, please call your primary care clinic.  If you do not have a primary care provider, please call [phone] and they will assist you.        Care EveryWhere ID     This is your Care EveryWhere ID. This could be used by other organizations to access your [facility] medical records  [id number]        Equal Access to Services     DORIS RODRIGUES : Sabiha Jo, saleem dela cruz, razia decker, jin yusuf. So [facility] [phone].    ATENCIÓN: Si habla español, tiene a andrew disposición servicios gratuitos de asistencia lingüística. Llame al [phone].    We comply with applicable federal civil rights laws and [place] laws. We do not discriminate on the basis of race, color, national origin, age, disability, sex, sexual orientation, or gender identity.            After Visit Summary       This is your record. Keep this with you and show to your community pharmacist(s) and doctor(s) at your next visit.

## 2018-04-10 NOTE — ED PROVIDER NOTES
[facility] EMERGENCY DEPARTMENT ([facility])  4/10/18   History     Chief Complaint   Patient presents with     Flank Pain     HPI  Angella St is a 52 year old female with a medical history significant for cervical cancer s/p JESSICA and chemotherapy c/b left ureteral involvement with hydronephrosis with recurrent pyelonephritis, has left nephrostomy in place, CKD requiring chronic PNT and hypertension who presents to the Emergency Department for evaluation of left flank pain.  Patient states that her pain is not constant, hurts with certain movements including walking and with hitting bumps in the vehicle.  Patient also states that she began noticing blood in her urine.  She states that the blood has been in her nephrostomy.  Patient denies any right-sided flank pain.  Patient reports that her nephrostomy was last changed approximately 4 months ago.  Patient also complains of some subjective fevers.  She denies any nausea, vomiting or breathing difficulties.  She states that she has been eating normal.  She also states that she has been taking her antihypertensives.  Of note, patient is scheduled to have her left kidney removed on 5/3/2018.    I have reviewed the Medications, Allergies, Past Medical and Surgical History, and Social History in the [employer] system.    Past Medical History:   Diagnosis Date     Anemia      Cancer (H)     Metastatic squamous cell cervical carcinoma     Chronic kidney disease      Hypertension 2007     Obesity     BMI >30       Past Surgical History:   Procedure Laterality Date     COMBINED CYSTOSCOPY, INSERT STENT URETER(S) Left 6/13/2016    Procedure: COMBINED CYSTOSCOPY, INSERT STENT URETER(S);  Surgeon: Deja Salinas MD;  [facility]     CYSTOSCOPY, RETROGRADES, COMBINED Left 6/13/2016    Procedure: COMBINED CYSTOSCOPY, RETROGRADES;  Surgeon: Deja Salinas MD;  [facility]     GENITOURINARY SURGERY  5/2015    PNT placement     HYSTERECTOMY  2011     Count includes the [facility] [place]     PERCUTANEOUS NEPHROSTOMY Left 3/9/2017    Procedure: PERCUTANEOUS NEPHROSTOMY;  Surgeon: Bridger Meyer PA-C;  [facility]     PERCUTANEOUS NEPHROSTOMY Left 6/28/2017    Procedure: PERCUTANEOUS NEPHROSTOMY;  Left Nephrostomy Tube Change;  Surgeon: Bridger Meyer PA-C;  [facility]     PERCUTANEOUS NEPHROSTOMY Left 10/30/2017    Procedure: PERCUTANEOUS NEPHROSTOMY;  Left Nephrostomy Tube Change;  Surgeon: Maryann Chavez PA-C;  [facility] OR       Family History   Problem Relation Age of Onset     Hypertension Brother        Social History   Substance Use Topics     Smoking status: Never Smoker     Smokeless tobacco: Never Used     Alcohol use No      Comment: Does not drink alcohol       Current Facility-Administered Medications   Medication     0.9% sodium chloride BOLUS     Current Outpatient Prescriptions   Medication     amLODIPine (NORVASC) 10 MG tablet     enalapril (VASOTEC) 20 MG tablet     hydrochlorothiazide (HYDRODIURIL) 25 MG tablet     iohexol (OMNIPAQUE) 140 MG/ML SOLN solution     HYDROmorphone (DILAUDID) 2 MG tablet     iohexol (OMNIPAQUE) 140 MG/ML SOLN solution     iohexol (OMNIPAQUE) 140 MG/ML SOLN solution     COMPRESSION STOCKINGS     Ferrous Sulfate (IRON SUPPLEMENT PO)     Pyridoxine HCl (VITAMIN B6 PO)     lidocaine-prilocaine (EMLA) cream     Facility-Administered Medications Ordered in Other Encounters   Medication     heparin 100 UNIT/ML injection 5 mL     heparin 100 UNIT/ML injection 500 Units      No Known Allergies      Review of Systems   Constitutional: Positive for fever (subjective). Negative for appetite change.   Respiratory: Negative for shortness of breath and wheezing.    Gastrointestinal: Negative for nausea and vomiting.   Genitourinary: Positive for flank pain (left) and hematuria.   All other systems reviewed and are negative.      Physical Exam   BP: (!) 149/98  Pulse: 81  Temp: 97.8  F (36.6  C)  Height: 170.2 cm (5'  "7\")  Weight: 98.4 kg (217 lb)  SpO2: 100 %      Physical Exam   Constitutional: She is oriented to person, place, and time. She appears well-developed and well-nourished.   HENT:   Head: Normocephalic and atraumatic.   Neck: Normal range of motion.   Cardiovascular: Normal rate, regular rhythm and normal heart sounds.    Pulmonary/Chest: Effort normal and breath sounds normal. No respiratory distress.   Abdominal: Soft. She exhibits no distension. There is no tenderness. There is no rebound.   Genitourinary:   Genitourinary Comments: Left sided nephrostomy tube; about 50 ml of of dark orange urine in bad; site clean; no erythema or drainage;    Musculoskeletal: She exhibits no tenderness.   Neurological: She is alert and oriented to person, place, and time.   Skin: Skin is warm and dry.   Psychiatric: She has a normal mood and affect. Her behavior is normal. Thought content normal.       ED Course   9:23 AM  The patient was seen and examined by Lisa Rivera MD in Room ED11.     ED Course     Procedures             Critical Care time:  none         Results for orders placed or performed during the hospital encounter of 04/10/18   US Renal Complete    Narrative    EXAMINATION: US RENAL COMPLETE, 4/10/2018 10:12 AM     COMPARISON: Renal ultrasound 12/11/2016    HISTORY: Left flank pain with PNT replacement.    FINDINGS: Technically difficult exam due to over shadowing bowel gas.    Right kidney: Measures 13.1 cm in length. Parenchyma is of normal  thickness and echogenicity. No focal mass. No hydronephrosis.    Left kidney: Measures 7.6 cm in length. Slight thinning of the cortex  and normal echogenicity. No focal mass. No hydronephrosis.  Percutaneous nephrostomy tube is partially visualized within the left  kidney.    Bladder: Unremarkable.      Impression    IMPRESSION:  1.  Atrophic left kidney slightly decreased in size since prior  examination with partially visualized percutaneous nephrostomy tube.  No " hydronephrosis.  2.  No hydronephrosis on the right.    I have personally reviewed the examination and initial interpretation  and I agree with the findings.    ANNELIESE HALL MD   UA with Microscopic   Result Value Ref Range    Color Urine Yellow     Appearance Urine Clear     Glucose Urine Negative NEG^Negative mg/dL    Bilirubin Urine Negative NEG^Negative    Ketones Urine Negative NEG^Negative mg/dL    Specific Gravity Urine 1.012 1.003 - 1.035    Blood Urine Small (A) NEG^Negative    pH Urine 7.0 5.0 - 7.0 pH    Protein Albumin Urine Negative NEG^Negative mg/dL    Urobilinogen mg/dL Normal 0.0 - 2.0 mg/dL    Nitrite Urine Negative NEG^Negative    Leukocyte Esterase Urine Negative NEG^Negative    Source Urine     WBC Urine 2 0 - 5 /HPF    RBC Urine 12 (H) 0 - 2 /HPF    Bacteria Urine Few (A) NEG^Negative /HPF    Squamous Epithelial /HPF Urine 1 0 - 1 /HPF    Transitional Epi <1 0 - 1 /HPF    Mucous Urine Present (A) NEG^Negative /LPF   CBC with platelets differential   Result Value Ref Range    WBC 7.1 4.0 - 11.0 10e9/L    RBC Count 4.40 3.8 - 5.2 10e12/L    Hemoglobin 11.9 11.7 - 15.7 g/dL    Hematocrit 37.4 35.0 - 47.0 %    MCV 85 78 - 100 fl    MCH 27.0 26.5 - 33.0 pg    MCHC 31.8 31.5 - 36.5 g/dL    RDW 13.6 10.0 - 15.0 %    Platelet Count 209 150 - 450 10e9/L    Diff Method Automated Method     % Neutrophils 51.1 %    % Lymphocytes 38.5 %    % Monocytes 7.3 %    % Eosinophils 2.7 %    % Basophils 0.3 %    % Immature Granulocytes 0.1 %    Nucleated RBCs 0 0 /100    Absolute Neutrophil 3.6 1.6 - 8.3 10e9/L    Absolute Lymphocytes 2.7 0.8 - 5.3 10e9/L    Absolute Monocytes 0.5 0.0 - 1.3 10e9/L    Absolute Eosinophils 0.2 0.0 - 0.7 10e9/L    Absolute Basophils 0.0 0.0 - 0.2 10e9/L    Abs Immature Granulocytes 0.0 0 - 0.4 10e9/L    Absolute Nucleated RBC 0.0    Comprehensive metabolic panel   Result Value Ref Range    Sodium 141 133 - 144 mmol/L    Potassium 3.6 3.4 - 5.3 mmol/L    Chloride 107 94 - 109 mmol/L     Carbon Dioxide 26 20 - 32 mmol/L    Anion Gap 8 3 - 14 mmol/L    Glucose 128 (H) 70 - 99 mg/dL    Urea Nitrogen 10 7 - 30 mg/dL    Creatinine 0.65 0.52 - 1.04 mg/dL    GFR Estimate >90 >60 mL/min/1.7m2    GFR Estimate If Black >90 >60 mL/min/1.7m2    Calcium 8.7 8.5 - 10.1 mg/dL    Bilirubin Total 0.7 0.2 - 1.3 mg/dL    Albumin 3.6 3.4 - 5.0 g/dL    Protein Total 7.8 6.8 - 8.8 g/dL    Alkaline Phosphatase 55 40 - 150 U/L    ALT 19 0 - 50 U/L    AST 14 0 - 45 U/L   UA with Microscopic   Result Value Ref Range    Color Urine Yellow     Appearance Urine Slightly Cloudy     Glucose Urine Negative NEG^Negative mg/dL    Bilirubin Urine Negative NEG^Negative    Ketones Urine Negative NEG^Negative mg/dL    Specific Gravity Urine 1.006 1.003 - 1.035    Blood Urine Moderate (A) NEG^Negative    pH Urine 8.0 (H) 5.0 - 7.0 pH    Protein Albumin Urine 300 (A) NEG^Negative mg/dL    Urobilinogen mg/dL Normal 0.0 - 2.0 mg/dL    Nitrite Urine Negative NEG^Negative    Leukocyte Esterase Urine Large (A) NEG^Negative    Source Left Nephrostomy     WBC Urine 57 (H) 0 - 5 /HPF    RBC Urine 116 (H) 0 - 2 /HPF    Bacteria Urine Few (A) NEG^Negative /HPF    Squamous Epithelial /HPF Urine <1 0 - 1 /HPF    Mucous Urine Present (A) NEG^Negative /LPF    Fatty Casts NEGATIVE NEG^Negative /LPF   Urine Culture   Result Value Ref Range    Specimen Description Unspecified Urine     Special Requests Specimen received in preservative     Culture Micro PENDING    Blood culture   Result Value Ref Range    Specimen Description Blood PTC     Culture Micro No growth after 3 hours    Urine Culture   Result Value Ref Range    Specimen Description Catheterized Urine Left Nephrostomy     Special Requests Specimen received in preservative     Culture Micro PENDING      Medications   0.9% sodium chloride BOLUS (0 mLs Intravenous Stopped 4/10/18 2756)            Labs Ordered and Resulted from Time of ED Arrival Up to the Time of Departure from the ED - No data  to display     No results found for this or any previous visit (from the past 24 hour(s)).           Assessments & Plan (with Medical Decision Making)  Patient is a 52-year-old female with a history of a PNT on her left side who is scheduled to have a left nephrectomy done in about 3 weeks due to decreased function of her left kidney as well as recurrent infections.  Patient presents to the ER here saying that she has been having intermittent pain.  She says that the pain hurts with movement.  She has no tenderness on palpation.  She has no signs of skin infection.  The tube has been draining normally.  Patient here had a UA that shows no signs of infection.  The patient also has stable labs.  Her white count is 7.1.  Patient's creatinine is stable at 0.65.  We did obtain an ultrasound of her kidney that shows an atrophic left kidney which has been decreased in size, but PNT tube in place.  No hydronephrosis.  I discussed the case with Dr. Canales who is on-call for neurology.  He agrees with treating the patient empirically with antibiotics.  Patient instructed to return to the ER if symptoms worsen.  Due to the tube not needing to be replaced, urology does not recommend any further objections.  I discussed the plan of care with the patient who agrees with the plan.       I have reviewed the nursing notes.    I have reviewed the findings, diagnosis, plan and need for follow up with the patient.    New Prescriptions    No medications on file       Final diagnoses:   Acute left flank pain     Ray RICHARDSON, am serving as a trained medical scribe to document services personally performed by Lisa Rivera MD, based on the provider's statements to me.   Lisa RICHARDSON MD, was physically present and have reviewed and verified the accuracy of this note documented by Ray Avila.    4/10/2018   [facility], EMERGENCY DEPARTMENT     Lisa Rivera MD  04/10/18 1294

## 2018-04-10 NOTE — DISCHARGE INSTRUCTIONS
Take medications as prescribed.   Return to the ER if symptoms worsen.   Please make an appointment to follow up with Urology Clinic (phone: [phone]) in 2-4 days if not improving.

## 2018-04-10 NOTE — ED AVS SNAPSHOT
[facility], [place], Emergency Department    [address] [place] [id number]    Phone:  [phone]                                       [address]   MRN: [id number]    Department:  [facility], Emergency Department   Date of Visit:  4/10/2018           After Visit Summary Signature Page     I have received my discharge instructions, and my questions have been answered. I have discussed any challenges I see with this plan with the nurse or doctor.    ..........................................................................................................................................  Patient/Patient Representative Signature      ..........................................................................................................................................  Patient Representative Print Name and Relationship to Patient    ..................................................               ................................................  Date                                            Time    ..........................................................................................................................................  Reviewed by Signature/Title    ...................................................              ..............................................  Date                                                            Time

## 2018-04-11 LAB
BACTERIA SPEC CULT: NO GROWTH
Lab: NORMAL
SPECIMEN SOURCE: NORMAL

## 2018-04-13 ENCOUNTER — TELEPHONE (OUTPATIENT)
Dept: EMERGENCY MEDICINE | Facility: CLINIC | Age: 53
End: 2018-04-13

## 2018-04-13 LAB
BACTERIA SPEC CULT: ABNORMAL
Lab: ABNORMAL
SPECIMEN SOURCE: ABNORMAL

## 2018-04-13 RX ORDER — CEFDINIR 300 MG/1
300 CAPSULE ORAL 2 TIMES DAILY
Qty: 14 CAPSULE | Refills: 0 | Status: SHIPPED | OUTPATIENT
Start: 2018-04-13 | End: 2018-04-20

## 2018-04-13 NOTE — TELEPHONE ENCOUNTER
[facility] Emergency Department Lab result notification [Adult-Female]    [place] ED lab result protocol used  Urine Culture Protocol    Reason for call  Notify of lab results, assess symptoms,  review ED providers recommendations/discharge instructions (if necessary) and advise per ED lab result f/u protocol    Lab Result (including Rx patient on, if applicable)  Final Urine Culture Report on 4/13/18.  Emergency Dept discharge antibiotic prescribed: Sulfamethoxazole-Trimethoprim (Bactrim DS, Septra DS) 800-160 mg PO tablet,  1 tablet by mouth 2 times daily for 10 days.  Bacteria #1: >100,000 colonies/mLStaphylococcus aureus  is [RESISTANT] to antibiotic  Bacteria #2: >100,000 colonies/mL Proteus mirabilis is [RESISTANT] to antibiotic  Bacteria #3: 10,000 to 50,000 colonies/mL Strain 2 Escherichia coli is [RESISTANT] to antibiotic  Bacteria #4: 10,000 to 50,000 colonies/mLKlebsiella oxytoca  is [SUSCEPTIBLE] to antibiotic  Recommendations in treatment per  ED Lab result protocol.  Information table from ED Provider visit on 04/10/2018  ED diagnosis Acute left flank pain   ED provider Lisa Rivera MD   Symptoms reported at ED visit (Chief complaint, HPI) a 52 year old female with a medical history significant for cervical cancer s/p JESSICA and chemotherapy c/b left ureteral involvement with hydronephrosis with recurrent pyelonephritis, has left nephrostomy in place, CKD requiring chronic PNT and hypertension who presents to the Emergency Department for evaluation of left flank pain.  Patient states that her pain is not constant, hurts with certain movements including walking and with hitting bumps in the vehicle.  Patient also states that she began noticing blood in her urine.  She states that the blood has been in her nephrostomy.  Patient denies any right-sided flank pain.  Patient reports that her nephrostomy was last changed approximately 4 months ago.  Patient also complains of some subjective fevers.   She denies any nausea, vomiting or breathing difficulties.  She states that she has been eating normal.  She also states that she has been taking her antihypertensives.  Of note, patient is scheduled to have her left kidney removed on 5/3/2018.   ED providers Impression and Plan (applicable information)  a 52-year-old female with a history of a PNT on her left side who is scheduled to have a left nephrectomy done in about 3 weeks due to decreased function of her left kidney as well as recurrent infections.  Patient presents to the ER here saying that she has been having intermittent pain.  She says that the pain hurts with movement.  She has no tenderness on palpation.  She has no signs of skin infection.  The tube has been draining normally.  Patient here had a UA that shows no signs of infection.  The patient also has stable labs.  Her white count is 7.1.  Patient's creatinine is stable at 0.65.  We did obtain an ultrasound of her kidney that shows an atrophic left kidney which has been decreased in size, but PNT tube in place.  No hydronephrosis.  I discussed the case with Dr. Canales who is on-call for neurology.  He agrees with treating the patient empirically with antibiotics.  Patient instructed to return to the ER if symptoms worsen.  Due to the tube not needing to be replaced, urology does not recommend any further objections.  I discussed the plan of care with the patient who agrees with the plan.   Significant Medical hx, if applicable Reviewed   Coumadin/Warfarin [Yes /No] No   Creatinine Level (mg/dl) 0.65   Creatinine clearance (ml/min), if applicable 152   Pregnant (Yes/No/NA) no   Breastfeeding (Yes/No/NA) no   Allergies NKA   Weight, if applicable 98.4 kg      RN Assessment (Patient s current Symptoms), include time called.  [Insert Left message here if message left]  At 1021 pt states she is doing better.    RN Recommendations/Instructions per [place] ED lab result protocol  Patient notified of lab  result and treatment recommendations.  Rx for Cefdinir (Omnicef) 300 mg capsule, 1 capsule (300 mg) by mouth 2 times daily for 7 days sent to [Pharmacy - [facility]].  RN reviewed information about Stopping the Bactrim Ds and Starting the Cefdinir and f/u after medication is complete     Please Contact your PCP clinic or return to the Emergency department if your:    Symptoms worsen or other concerning symptom's.    PCP follow-up Questions asked: YES       Marta Espinoza RN  [facility] RN  Lung Nodule and ED Lab Result F/u RN  Epic pool (ED late result f/u RN): [id number]  FV INCIDENTAL RADIOLOGY F/U NURSES: P [id number]  # [phone]    Copy of Lab result   Exam Information   Exam Date Exam Time Accession # Results    4/10/18 12:31 PM [id number]    Component Results   Component Collected Lab   Specimen Description 04/10/2018 12:31    Catheterized Urine Left Nephrostomy   Special Requests 04/10/2018 12:31 PM 75   Specimen received in preservative   Culture Micro (Abnormal) 04/10/2018 12:31    >100,000 colonies/mL   Proteus mirabilis      Culture Micro (Abnormal) 04/10/2018 12:31    >100,000 colonies/mL   Staphylococcus aureus      Culture Micro (Abnormal) 04/10/2018 12:31    10,000 to 50,000 colonies/mL   Klebsiella oxytoca      Culture Micro (Abnormal) 04/10/2018 12:31    10,000 to 50,000 colonies/mL   Strain 2   Escherichia coli      Culture & Susceptibility   ESCHERICHIA COLI   Antibiotic Interpretation Sensitivity Unit Method Status   AMPICILLIN Resistant >=32 ug/mL ABEL Final   AMPICILLIN/SULBACTAM Resistant >=32 ug/mL ABEL Final   CEFAZOLIN Sensitive <=4 ug/mL ABEL Final   Comment: Cefazolin ABEL breakpoints are for the treatment of uncomplicated urinary tract   infections.  For the treatment of systemic infections, please contact the   laboratory for additional testing.   CEFEPIME Sensitive <=1 ug/mL ABEL Final   CEFOXITIN Sensitive <=4  ug/mL ABEL Final   CEFTAZIDIME Sensitive <=1 ug/mL ABEL Final   CEFTRIAXONE Sensitive <=1 ug/mL ABEL Final   CIPROFLOXACIN Sensitive 0.5 ug/mL ABEL Final   GENTAMICIN Resistant >=16 ug/mL ABEL Final   LEVOFLOXACIN Sensitive 1 ug/mL ABEL Final   NITROFURANTOIN Sensitive <=16 ug/mL ABEL Final   Piperacillin/Tazo Sensitive <=4 ug/mL ABEL Final   TOBRAMYCIN Intermediate 8 ug/mL ABEL Final   Trimethoprim/Sulfa Resistant >=16/304 ug/mL ABEL Final         KLEBSIELLA OXYTOCA   Antibiotic Interpretation Sensitivity Unit Method Status   AMPICILLIN Resistant >=32 ug/mL ABEL Final   Comment: Intrinsically Resistant   AMPICILLIN/SULBACTAM Sensitive 8 ug/mL ABEL Final   CEFAZOLIN Intermediate 16 ug/mL ABEL Final   Comment: Cefazolin ABEL breakpoints are for the treatment of uncomplicated urinary tract   infections.  For the treatment of systemic infections, please contact the   laboratory for additional testing.   CEFEPIME Sensitive <=1 ug/mL ABEL Final   CEFOXITIN Sensitive <=4 ug/mL ABEL Final   CEFTAZIDIME Sensitive <=1 ug/mL ABEL Final   CEFTRIAXONE Sensitive <=1 ug/mL ABEL Final   CIPROFLOXACIN Sensitive <=0.25 ug/mL ABEL Final   GENTAMICIN Sensitive <=1 ug/mL ABEL Final   LEVOFLOXACIN Sensitive <=0.12 ug/mL ABEL Final   NITROFURANTOIN Intermediate 64 ug/mL ABEL Final   Piperacillin/Tazo Sensitive <=4 ug/mL ABEL Final   TOBRAMYCIN Sensitive <=1 ug/mL ABEL Final   Trimethoprim/Sulfa Sensitive <=1/19 ug/mL ABEL Final         PROTEUS MIRABILIS   Antibiotic Interpretation Sensitivity Unit Method Status   AMPICILLIN Sensitive <=2 ug/mL ABEL Final   AMPICILLIN/SULBACTAM Sensitive <=2 ug/mL ABEL Final   CEFAZOLIN Sensitive 8 ug/mL ABEL Final   Comment: Cefazolin ABEL breakpoints are for the treatment of uncomplicated urinary tract   infections.  For the treatment of systemic infections, please contact the   laboratory for additional testing.   CEFEPIME Sensitive <=1 ug/mL ABEL Final   CEFOXITIN Sensitive 8 ug/mL ABEL Final   CEFTAZIDIME Sensitive <=1 ug/mL ABEL  Final   CEFTRIAXONE Sensitive <=1 ug/mL ABEL Final   CIPROFLOXACIN Sensitive <=0.25 ug/mL ABEL Final   GENTAMICIN Sensitive 4 ug/mL ABEL Final   LEVOFLOXACIN Sensitive <=0.12 ug/mL ABEL Final   NITROFURANTOIN Resistant 256 ug/mL ABEL Final   Comment: Intrinsically Resistant   Piperacillin/Tazo Sensitive <=4 ug/mL ABEL Final   TOBRAMYCIN Sensitive <=1 ug/mL ABEL Final   Trimethoprim/Sulfa Resistant >=16/304 ug/mL ABEL Final         STAPHYLOCOCCUS AUREUS   Antibiotic Interpretation Sensitivity Unit Method Status   GENTAMICIN Sensitive <=0.5 ug/mL ABEL Final   NITROFURANTOIN Sensitive <=16 ug/mL ABEL Final   OXACILLIN Sensitive 0.5 ug/mL ABEL Final   PENICILLIN Resistant >=0.5 ug/mL ABEL Final   TETRACYCLINE Resistant >=16 ug/mL ABEL Final   Trimethoprim/Sulfa Resistant >=16/304 ug/mL ABEL Final   VANCOMYCIN Sensitive <=0.5 ug/mL ABEL Final

## 2018-04-16 LAB
BACTERIA SPEC CULT: NO GROWTH
SPECIMEN SOURCE: NORMAL

## 2018-05-03 ENCOUNTER — ANESTHESIA EVENT (OUTPATIENT)
Dept: SURGERY | Facility: CLINIC | Age: 53
End: 2018-05-03
Payer: MEDICAID

## 2018-05-03 ENCOUNTER — ANESTHESIA (OUTPATIENT)
Dept: SURGERY | Facility: CLINIC | Age: 53
End: 2018-05-03
Payer: MEDICAID

## 2018-05-03 ENCOUNTER — APPOINTMENT (OUTPATIENT)
Dept: GENERAL RADIOLOGY | Facility: CLINIC | Age: 53
End: 2018-05-03
Attending: UROLOGY
Payer: MEDICAID

## 2018-05-03 ENCOUNTER — HOSPITAL ENCOUNTER (INPATIENT)
Facility: CLINIC | Age: 53
LOS: 3 days | Discharge: HOME | End: 2018-05-06
Attending: UROLOGY | Admitting: UROLOGY
Payer: MEDICAID

## 2018-05-03 DIAGNOSIS — N13.30 HYDRONEPHROSIS, UNSPECIFIED HYDRONEPHROSIS TYPE: ICD-10-CM

## 2018-05-03 DIAGNOSIS — N12 RECURRENT PYELONEPHRITIS: Primary | ICD-10-CM

## 2018-05-03 LAB
ABO + RH BLD: NORMAL
ABO + RH BLD: NORMAL
ANION GAP SERPL CALCULATED.3IONS-SCNC: 9 MMOL/L (ref 3–14)
BLD GP AB SCN SERPL QL: NORMAL
BLD PROD TYP BPU: NORMAL
BLD UNIT ID BPU: 0
BLOOD BANK CMNT PATIENT-IMP: NORMAL
BLOOD PRODUCT CODE: NORMAL
BPU ID: NORMAL
BUN SERPL-MCNC: 12 MG/DL (ref 7–30)
CALCIUM SERPL-MCNC: 8 MG/DL (ref 8.5–10.1)
CHLORIDE SERPL-SCNC: 107 MMOL/L (ref 94–109)
CO2 SERPL-SCNC: 25 MMOL/L (ref 20–32)
CREAT SERPL-MCNC: 0.61 MG/DL (ref 0.52–1.04)
ERYTHROCYTE [DISTWIDTH] IN BLOOD BY AUTOMATED COUNT: 12.7 % (ref 10–15)
GFR SERPL CREATININE-BSD FRML MDRD: >90 ML/MIN/1.7M2
GLUCOSE SERPL-MCNC: 203 MG/DL (ref 70–99)
HCT VFR BLD AUTO: 27.9 % (ref 35–47)
HCT VFR BLD CALC: 25 %PCV (ref 35–47)
HGB BLD CALC-MCNC: 8.5 G/DL (ref 11.7–15.7)
HGB BLD-MCNC: 9.3 G/DL (ref 11.7–15.7)
HGB BLD-MCNC: 9.3 G/DL (ref 11.7–15.7)
MCH RBC QN AUTO: 27.1 PG (ref 26.5–33)
MCHC RBC AUTO-ENTMCNC: 33.3 G/DL (ref 31.5–36.5)
MCV RBC AUTO: 81 FL (ref 78–100)
NUM BPU REQUESTED: 2
PLATELET # BLD AUTO: 218 10E9/L (ref 150–450)
POTASSIUM BLD-SCNC: 4.1 MMOL/L (ref 3.4–5.3)
POTASSIUM SERPL-SCNC: 4 MMOL/L (ref 3.4–5.3)
RBC # BLD AUTO: 3.43 10E12/L (ref 3.8–5.2)
SODIUM BLD-SCNC: 139 MMOL/L (ref 133–144)
SODIUM SERPL-SCNC: 141 MMOL/L (ref 133–144)
SPECIMEN EXP DATE BLD: NORMAL
TRANSFUSION STATUS PATIENT QL: NORMAL
WBC # BLD AUTO: 13.4 10E9/L (ref 4–11)

## 2018-05-03 PROCEDURE — 8E0W4CZ ROBOTIC ASSISTED PROCEDURE OF TRUNK REGION, PERCUTANEOUS ENDOSCOPIC APPROACH: ICD-10-PCS | Performed by: UROLOGY

## 2018-05-03 PROCEDURE — 25000125 ZZHC RX 250: Performed by: UROLOGY

## 2018-05-03 PROCEDURE — 86901 BLOOD TYPING SEROLOGIC RH(D): CPT | Performed by: ANESTHESIOLOGY

## 2018-05-03 PROCEDURE — 37000009 ZZH ANESTHESIA TECHNICAL FEE, EACH ADDTL 15 MIN: Performed by: UROLOGY

## 2018-05-03 PROCEDURE — 27210995 ZZH RX 272: Performed by: UROLOGY

## 2018-05-03 PROCEDURE — 25800025 ZZH RX 258: Performed by: UROLOGY

## 2018-05-03 PROCEDURE — 25000128 H RX IP 250 OP 636: Performed by: NURSE ANESTHETIST, CERTIFIED REGISTERED

## 2018-05-03 PROCEDURE — 21400002 ZZH R&B CCU CICU CRITICAL

## 2018-05-03 PROCEDURE — 80048 BASIC METABOLIC PNL TOTAL CA: CPT | Performed by: UROLOGY

## 2018-05-03 PROCEDURE — 25000125 ZZHC RX 250: Performed by: NURSE ANESTHETIST, CERTIFIED REGISTERED

## 2018-05-03 PROCEDURE — 25000125 ZZHC RX 250: Performed by: ANESTHESIOLOGY

## 2018-05-03 PROCEDURE — 36415 COLL VENOUS BLD VENIPUNCTURE: CPT | Performed by: ANESTHESIOLOGY

## 2018-05-03 PROCEDURE — 25000128 H RX IP 250 OP 636: Performed by: UROLOGY

## 2018-05-03 PROCEDURE — 88307 TISSUE EXAM BY PATHOLOGIST: CPT | Mod: 26 | Performed by: UROLOGY

## 2018-05-03 PROCEDURE — 84295 ASSAY OF SERUM SODIUM: CPT

## 2018-05-03 PROCEDURE — 85027 COMPLETE CBC AUTOMATED: CPT | Performed by: UROLOGY

## 2018-05-03 PROCEDURE — 71000012 ZZH RECOVERY PHASE 1 LEVEL 1 FIRST HR: Performed by: UROLOGY

## 2018-05-03 PROCEDURE — 86850 RBC ANTIBODY SCREEN: CPT | Performed by: ANESTHESIOLOGY

## 2018-05-03 PROCEDURE — 86923 COMPATIBILITY TEST ELECTRIC: CPT | Performed by: ANESTHESIOLOGY

## 2018-05-03 PROCEDURE — 36000085 ZZH SURGERY LEVEL 8 1ST 30 MIN: Performed by: UROLOGY

## 2018-05-03 PROCEDURE — 85014 HEMATOCRIT: CPT

## 2018-05-03 PROCEDURE — P9016 RBC LEUKOCYTES REDUCED: HCPCS | Performed by: ANESTHESIOLOGY

## 2018-05-03 PROCEDURE — 40000986 XR CHEST 1 VW

## 2018-05-03 PROCEDURE — 88307 TISSUE EXAM BY PATHOLOGIST: CPT | Performed by: UROLOGY

## 2018-05-03 PROCEDURE — P9041 ALBUMIN (HUMAN),5%, 50ML: HCPCS | Performed by: NURSE ANESTHETIST, CERTIFIED REGISTERED

## 2018-05-03 PROCEDURE — 50545 LAPARO RADICAL NEPHRECTOMY: CPT | Mod: LT | Performed by: UROLOGY

## 2018-05-03 PROCEDURE — 37000008 ZZH ANESTHESIA TECHNICAL FEE, 1ST 30 MIN: Performed by: UROLOGY

## 2018-05-03 PROCEDURE — 25000128 H RX IP 250 OP 636: Performed by: ANESTHESIOLOGY

## 2018-05-03 PROCEDURE — 40000170 ZZH STATISTIC PRE-PROCEDURE ASSESSMENT II: Performed by: UROLOGY

## 2018-05-03 PROCEDURE — 25000566 ZZH SEVOFLURANE, EA 15 MIN: Performed by: UROLOGY

## 2018-05-03 PROCEDURE — 0TB14ZZ EXCISION OF LEFT KIDNEY, PERCUTANEOUS ENDOSCOPIC APPROACH: ICD-10-PCS | Performed by: UROLOGY

## 2018-05-03 PROCEDURE — 36000087 ZZH SURGERY LEVEL 8 EA 15 ADDTL MIN: Performed by: UROLOGY

## 2018-05-03 PROCEDURE — 84132 ASSAY OF SERUM POTASSIUM: CPT

## 2018-05-03 PROCEDURE — 86900 BLOOD TYPING SEROLOGIC ABO: CPT | Performed by: ANESTHESIOLOGY

## 2018-05-03 PROCEDURE — 27210794 ZZH OR GENERAL SUPPLY STERILE: Performed by: UROLOGY

## 2018-05-03 RX ORDER — ONDANSETRON 2 MG/ML
4 INJECTION INTRAMUSCULAR; INTRAVENOUS EVERY 6 HOURS PRN
Status: DISCONTINUED | OUTPATIENT
Start: 2018-05-03 | End: 2018-05-06 | Stop reason: HOSPADM

## 2018-05-03 RX ORDER — AMOXICILLIN 250 MG
2 CAPSULE ORAL 2 TIMES DAILY
Status: DISCONTINUED | OUTPATIENT
Start: 2018-05-03 | End: 2018-05-06 | Stop reason: HOSPADM

## 2018-05-03 RX ORDER — HEPARIN SODIUM 5000 [USP'U]/.5ML
5000 INJECTION, SOLUTION INTRAVENOUS; SUBCUTANEOUS EVERY 8 HOURS
Status: DISCONTINUED | OUTPATIENT
Start: 2018-05-04 | End: 2018-05-06 | Stop reason: HOSPADM

## 2018-05-03 RX ORDER — LIDOCAINE 40 MG/G
CREAM TOPICAL
Status: DISCONTINUED | OUTPATIENT
Start: 2018-05-03 | End: 2018-05-06 | Stop reason: HOSPADM

## 2018-05-03 RX ORDER — SODIUM CHLORIDE 9 MG/ML
INJECTION, SOLUTION INTRAVENOUS CONTINUOUS
Status: DISCONTINUED | OUTPATIENT
Start: 2018-05-03 | End: 2018-05-05

## 2018-05-03 RX ORDER — CEFAZOLIN SODIUM 1 G/3ML
1 INJECTION, POWDER, FOR SOLUTION INTRAMUSCULAR; INTRAVENOUS EVERY 8 HOURS
Status: COMPLETED | OUTPATIENT
Start: 2018-05-04 | End: 2018-05-04

## 2018-05-03 RX ORDER — GLYCOPYRROLATE 0.2 MG/ML
INJECTION, SOLUTION INTRAMUSCULAR; INTRAVENOUS PRN
Status: DISCONTINUED | OUTPATIENT
Start: 2018-05-03 | End: 2018-05-03

## 2018-05-03 RX ORDER — LIDOCAINE 40 MG/G
CREAM TOPICAL
Status: DISCONTINUED | OUTPATIENT
Start: 2018-05-03 | End: 2018-05-04

## 2018-05-03 RX ORDER — ONDANSETRON 4 MG/1
4 TABLET, ORALLY DISINTEGRATING ORAL EVERY 6 HOURS PRN
Status: DISCONTINUED | OUTPATIENT
Start: 2018-05-03 | End: 2018-05-06 | Stop reason: HOSPADM

## 2018-05-03 RX ORDER — ONDANSETRON 2 MG/ML
INJECTION INTRAMUSCULAR; INTRAVENOUS PRN
Status: DISCONTINUED | OUTPATIENT
Start: 2018-05-03 | End: 2018-05-03

## 2018-05-03 RX ORDER — NEOSTIGMINE METHYLSULFATE 1 MG/ML
VIAL (ML) INJECTION PRN
Status: DISCONTINUED | OUTPATIENT
Start: 2018-05-03 | End: 2018-05-03

## 2018-05-03 RX ORDER — LIDOCAINE HYDROCHLORIDE 20 MG/ML
INJECTION, SOLUTION INFILTRATION; PERINEURAL PRN
Status: DISCONTINUED | OUTPATIENT
Start: 2018-05-03 | End: 2018-05-03

## 2018-05-03 RX ORDER — FENTANYL CITRATE 50 UG/ML
INJECTION, SOLUTION INTRAMUSCULAR; INTRAVENOUS PRN
Status: DISCONTINUED | OUTPATIENT
Start: 2018-05-03 | End: 2018-05-03

## 2018-05-03 RX ORDER — SODIUM CHLORIDE, SODIUM LACTATE, POTASSIUM CHLORIDE, CALCIUM CHLORIDE 600; 310; 30; 20 MG/100ML; MG/100ML; MG/100ML; MG/100ML
INJECTION, SOLUTION INTRAVENOUS CONTINUOUS
Status: DISCONTINUED | OUTPATIENT
Start: 2018-05-03 | End: 2018-05-03

## 2018-05-03 RX ORDER — PROPOFOL 10 MG/ML
INJECTION, EMULSION INTRAVENOUS PRN
Status: DISCONTINUED | OUTPATIENT
Start: 2018-05-03 | End: 2018-05-03

## 2018-05-03 RX ORDER — HYDROMORPHONE HYDROCHLORIDE 1 MG/ML
.3-.5 INJECTION, SOLUTION INTRAMUSCULAR; INTRAVENOUS; SUBCUTANEOUS EVERY 5 MIN PRN
Status: DISCONTINUED | OUTPATIENT
Start: 2018-05-03 | End: 2018-05-03

## 2018-05-03 RX ORDER — NALOXONE HYDROCHLORIDE 0.4 MG/ML
.1-.4 INJECTION, SOLUTION INTRAMUSCULAR; INTRAVENOUS; SUBCUTANEOUS
Status: DISCONTINUED | OUTPATIENT
Start: 2018-05-03 | End: 2018-05-03

## 2018-05-03 RX ORDER — NITROGLYCERIN 0.4 MG/1
0.4 TABLET SUBLINGUAL EVERY 5 MIN PRN
Status: DISCONTINUED | OUTPATIENT
Start: 2018-05-03 | End: 2018-05-06 | Stop reason: HOSPADM

## 2018-05-03 RX ORDER — LABETALOL HYDROCHLORIDE 5 MG/ML
10 INJECTION, SOLUTION INTRAVENOUS EVERY 6 HOURS PRN
Status: DISCONTINUED | OUTPATIENT
Start: 2018-05-03 | End: 2018-05-04

## 2018-05-03 RX ORDER — HYDROMORPHONE HYDROCHLORIDE 1 MG/ML
.2-.3 INJECTION, SOLUTION INTRAMUSCULAR; INTRAVENOUS; SUBCUTANEOUS
Status: DISCONTINUED | OUTPATIENT
Start: 2018-05-03 | End: 2018-05-05

## 2018-05-03 RX ORDER — CEFAZOLIN SODIUM 1 G/3ML
1 INJECTION, POWDER, FOR SOLUTION INTRAMUSCULAR; INTRAVENOUS SEE ADMIN INSTRUCTIONS
Status: DISCONTINUED | OUTPATIENT
Start: 2018-05-03 | End: 2018-05-03 | Stop reason: HOSPADM

## 2018-05-03 RX ORDER — SODIUM CHLORIDE, SODIUM LACTATE, POTASSIUM CHLORIDE, CALCIUM CHLORIDE 600; 310; 30; 20 MG/100ML; MG/100ML; MG/100ML; MG/100ML
INJECTION, SOLUTION INTRAVENOUS CONTINUOUS PRN
Status: DISCONTINUED | OUTPATIENT
Start: 2018-05-03 | End: 2018-05-03

## 2018-05-03 RX ORDER — BUPIVACAINE HYDROCHLORIDE 2.5 MG/ML
INJECTION, SOLUTION EPIDURAL; INFILTRATION; INTRACAUDAL PRN
Status: DISCONTINUED | OUTPATIENT
Start: 2018-05-03 | End: 2018-05-03

## 2018-05-03 RX ORDER — NALOXONE HYDROCHLORIDE 0.4 MG/ML
.1-.4 INJECTION, SOLUTION INTRAMUSCULAR; INTRAVENOUS; SUBCUTANEOUS
Status: DISCONTINUED | OUTPATIENT
Start: 2018-05-03 | End: 2018-05-06 | Stop reason: HOSPADM

## 2018-05-03 RX ORDER — CEFAZOLIN SODIUM 2 G/100ML
2 INJECTION, SOLUTION INTRAVENOUS
Status: COMPLETED | OUTPATIENT
Start: 2018-05-03 | End: 2018-05-03

## 2018-05-03 RX ORDER — SODIUM CHLORIDE, SODIUM LACTATE, POTASSIUM CHLORIDE, CALCIUM CHLORIDE 600; 310; 30; 20 MG/100ML; MG/100ML; MG/100ML; MG/100ML
INJECTION, SOLUTION INTRAVENOUS CONTINUOUS
Status: DISCONTINUED | OUTPATIENT
Start: 2018-05-03 | End: 2018-05-03 | Stop reason: HOSPADM

## 2018-05-03 RX ORDER — OXYCODONE HYDROCHLORIDE 5 MG/1
5-10 TABLET ORAL
Status: DISCONTINUED | OUTPATIENT
Start: 2018-05-03 | End: 2018-05-06 | Stop reason: HOSPADM

## 2018-05-03 RX ORDER — ONDANSETRON 2 MG/ML
4 INJECTION INTRAMUSCULAR; INTRAVENOUS EVERY 30 MIN PRN
Status: DISCONTINUED | OUTPATIENT
Start: 2018-05-03 | End: 2018-05-03

## 2018-05-03 RX ORDER — ONDANSETRON 4 MG/1
4 TABLET, ORALLY DISINTEGRATING ORAL EVERY 30 MIN PRN
Status: DISCONTINUED | OUTPATIENT
Start: 2018-05-03 | End: 2018-05-03

## 2018-05-03 RX ORDER — LIDOCAINE 4 G/G
1 PATCH TOPICAL
Status: DISCONTINUED | OUTPATIENT
Start: 2018-05-04 | End: 2018-05-06 | Stop reason: HOSPADM

## 2018-05-03 RX ORDER — FENTANYL CITRATE 50 UG/ML
25-50 INJECTION, SOLUTION INTRAMUSCULAR; INTRAVENOUS
Status: DISCONTINUED | OUTPATIENT
Start: 2018-05-03 | End: 2018-05-03

## 2018-05-03 RX ORDER — AMOXICILLIN 250 MG
1 CAPSULE ORAL 2 TIMES DAILY
Status: DISCONTINUED | OUTPATIENT
Start: 2018-05-03 | End: 2018-05-06 | Stop reason: HOSPADM

## 2018-05-03 RX ORDER — ACETAMINOPHEN 325 MG/1
650 TABLET ORAL EVERY 4 HOURS PRN
Status: DISCONTINUED | OUTPATIENT
Start: 2018-05-06 | End: 2018-05-06 | Stop reason: HOSPADM

## 2018-05-03 RX ORDER — DEXAMETHASONE SODIUM PHOSPHATE 4 MG/ML
INJECTION, SOLUTION INTRA-ARTICULAR; INTRALESIONAL; INTRAMUSCULAR; INTRAVENOUS; SOFT TISSUE PRN
Status: DISCONTINUED | OUTPATIENT
Start: 2018-05-03 | End: 2018-05-03

## 2018-05-03 RX ORDER — ALBUMIN, HUMAN INJ 5% 5 %
SOLUTION INTRAVENOUS CONTINUOUS PRN
Status: DISCONTINUED | OUTPATIENT
Start: 2018-05-03 | End: 2018-05-03

## 2018-05-03 RX ORDER — ACETAMINOPHEN 325 MG/1
975 TABLET ORAL EVERY 8 HOURS
Status: DISCONTINUED | OUTPATIENT
Start: 2018-05-04 | End: 2018-05-06 | Stop reason: HOSPADM

## 2018-05-03 RX ORDER — AMLODIPINE BESYLATE 10 MG/1
10 TABLET ORAL EVERY EVENING
Status: DISCONTINUED | OUTPATIENT
Start: 2018-05-03 | End: 2018-05-06 | Stop reason: HOSPADM

## 2018-05-03 RX ORDER — HYDRALAZINE HYDROCHLORIDE 20 MG/ML
10 INJECTION INTRAMUSCULAR; INTRAVENOUS EVERY 6 HOURS PRN
Status: DISCONTINUED | OUTPATIENT
Start: 2018-05-03 | End: 2018-05-06 | Stop reason: HOSPADM

## 2018-05-03 RX ORDER — MAGNESIUM HYDROXIDE 1200 MG/15ML
LIQUID ORAL PRN
Status: DISCONTINUED | OUTPATIENT
Start: 2018-05-03 | End: 2018-05-03

## 2018-05-03 RX ADMIN — PHENYLEPHRINE HYDROCHLORIDE 200 MCG: 10 INJECTION, SOLUTION INTRAMUSCULAR; INTRAVENOUS; SUBCUTANEOUS at 13:37

## 2018-05-03 RX ADMIN — ROCURONIUM BROMIDE 30 MG: 10 INJECTION INTRAVENOUS at 14:57

## 2018-05-03 RX ADMIN — SODIUM CHLORIDE, POTASSIUM CHLORIDE, SODIUM LACTATE AND CALCIUM CHLORIDE: 600; 310; 30; 20 INJECTION, SOLUTION INTRAVENOUS at 14:31

## 2018-05-03 RX ADMIN — ROCURONIUM BROMIDE 10 MG: 10 INJECTION INTRAVENOUS at 13:49

## 2018-05-03 RX ADMIN — PHENYLEPHRINE HYDROCHLORIDE 100 MCG: 10 INJECTION, SOLUTION INTRAMUSCULAR; INTRAVENOUS; SUBCUTANEOUS at 21:48

## 2018-05-03 RX ADMIN — LIDOCAINE HYDROCHLORIDE 5 ML: 10 INJECTION, SOLUTION EPIDURAL; INFILTRATION; INTRACAUDAL; PERINEURAL at 12:05

## 2018-05-03 RX ADMIN — FENTANYL CITRATE 50 MCG: 50 INJECTION, SOLUTION INTRAMUSCULAR; INTRAVENOUS at 13:00

## 2018-05-03 RX ADMIN — PHENYLEPHRINE HYDROCHLORIDE 100 MCG: 10 INJECTION, SOLUTION INTRAMUSCULAR; INTRAVENOUS; SUBCUTANEOUS at 20:00

## 2018-05-03 RX ADMIN — CEFAZOLIN SODIUM 1 G: 2 INJECTION, SOLUTION INTRAVENOUS at 15:08

## 2018-05-03 RX ADMIN — FENTANYL CITRATE 25 MCG: 50 INJECTION, SOLUTION INTRAMUSCULAR; INTRAVENOUS at 20:47

## 2018-05-03 RX ADMIN — PHENYLEPHRINE HYDROCHLORIDE 100 MCG: 10 INJECTION, SOLUTION INTRAMUSCULAR; INTRAVENOUS; SUBCUTANEOUS at 13:33

## 2018-05-03 RX ADMIN — CISATRACURIUM BESYLATE 2 MG: 2 INJECTION INTRAVENOUS at 21:16

## 2018-05-03 RX ADMIN — DEXAMETHASONE SODIUM PHOSPHATE 4 MG: 4 INJECTION, SOLUTION INTRA-ARTICULAR; INTRALESIONAL; INTRAMUSCULAR; INTRAVENOUS; SOFT TISSUE at 13:42

## 2018-05-03 RX ADMIN — CISATRACURIUM BESYLATE 2 MG: 2 INJECTION INTRAVENOUS at 17:00

## 2018-05-03 RX ADMIN — HYDROMORPHONE HYDROCHLORIDE 0.5 MG: 1 INJECTION, SOLUTION INTRAMUSCULAR; INTRAVENOUS; SUBCUTANEOUS at 20:54

## 2018-05-03 RX ADMIN — CISATRACURIUM BESYLATE 2 MG: 2 INJECTION INTRAVENOUS at 17:46

## 2018-05-03 RX ADMIN — CEFAZOLIN SODIUM 1 G: 2 INJECTION, SOLUTION INTRAVENOUS at 19:05

## 2018-05-03 RX ADMIN — PHENYLEPHRINE HYDROCHLORIDE 100 MCG: 10 INJECTION, SOLUTION INTRAMUSCULAR; INTRAVENOUS; SUBCUTANEOUS at 20:21

## 2018-05-03 RX ADMIN — MIDAZOLAM 1 MG: 1 INJECTION INTRAMUSCULAR; INTRAVENOUS at 12:58

## 2018-05-03 RX ADMIN — ROCURONIUM BROMIDE 10 MG: 10 INJECTION INTRAVENOUS at 14:37

## 2018-05-03 RX ADMIN — CISATRACURIUM BESYLATE 4 MG: 2 INJECTION INTRAVENOUS at 19:19

## 2018-05-03 RX ADMIN — CISATRACURIUM BESYLATE 4 MG: 2 INJECTION INTRAVENOUS at 20:21

## 2018-05-03 RX ADMIN — PHENYLEPHRINE HYDROCHLORIDE 50 MCG: 10 INJECTION, SOLUTION INTRAMUSCULAR; INTRAVENOUS; SUBCUTANEOUS at 19:18

## 2018-05-03 RX ADMIN — CISATRACURIUM BESYLATE 2 MG: 2 INJECTION INTRAVENOUS at 17:59

## 2018-05-03 RX ADMIN — SODIUM CHLORIDE, POTASSIUM CHLORIDE, SODIUM LACTATE AND CALCIUM CHLORIDE: 600; 310; 30; 20 INJECTION, SOLUTION INTRAVENOUS at 18:47

## 2018-05-03 RX ADMIN — PHENYLEPHRINE HYDROCHLORIDE 100 MCG: 10 INJECTION, SOLUTION INTRAMUSCULAR; INTRAVENOUS; SUBCUTANEOUS at 19:32

## 2018-05-03 RX ADMIN — PHENYLEPHRINE HYDROCHLORIDE 50 MCG: 10 INJECTION, SOLUTION INTRAMUSCULAR; INTRAVENOUS; SUBCUTANEOUS at 21:12

## 2018-05-03 RX ADMIN — CEFAZOLIN SODIUM 1 G: 2 INJECTION, SOLUTION INTRAVENOUS at 17:05

## 2018-05-03 RX ADMIN — ALBUMIN (HUMAN): 12.5 SOLUTION INTRAVENOUS at 19:42

## 2018-05-03 RX ADMIN — PROPOFOL 140 MG: 10 INJECTION, EMULSION INTRAVENOUS at 13:00

## 2018-05-03 RX ADMIN — SODIUM CHLORIDE, POTASSIUM CHLORIDE, SODIUM LACTATE AND CALCIUM CHLORIDE: 600; 310; 30; 20 INJECTION, SOLUTION INTRAVENOUS at 12:56

## 2018-05-03 RX ADMIN — PHENYLEPHRINE HYDROCHLORIDE 100 MCG: 10 INJECTION, SOLUTION INTRAMUSCULAR; INTRAVENOUS; SUBCUTANEOUS at 13:42

## 2018-05-03 RX ADMIN — PHENYLEPHRINE HYDROCHLORIDE 100 MCG: 10 INJECTION, SOLUTION INTRAMUSCULAR; INTRAVENOUS; SUBCUTANEOUS at 20:04

## 2018-05-03 RX ADMIN — FENTANYL CITRATE 50 MCG: 50 INJECTION, SOLUTION INTRAMUSCULAR; INTRAVENOUS at 21:45

## 2018-05-03 RX ADMIN — PHENYLEPHRINE HYDROCHLORIDE 100 MCG: 10 INJECTION, SOLUTION INTRAMUSCULAR; INTRAVENOUS; SUBCUTANEOUS at 14:13

## 2018-05-03 RX ADMIN — PHENYLEPHRINE HYDROCHLORIDE 100 MCG: 10 INJECTION, SOLUTION INTRAMUSCULAR; INTRAVENOUS; SUBCUTANEOUS at 21:15

## 2018-05-03 RX ADMIN — SODIUM CHLORIDE, POTASSIUM CHLORIDE, SODIUM LACTATE AND CALCIUM CHLORIDE: 600; 310; 30; 20 INJECTION, SOLUTION INTRAVENOUS at 14:38

## 2018-05-03 RX ADMIN — ROCURONIUM BROMIDE 50 MG: 10 INJECTION INTRAVENOUS at 13:00

## 2018-05-03 RX ADMIN — CEFAZOLIN SODIUM 1 G: 2 INJECTION, SOLUTION INTRAVENOUS at 21:15

## 2018-05-03 RX ADMIN — SODIUM CHLORIDE, POTASSIUM CHLORIDE, SODIUM LACTATE AND CALCIUM CHLORIDE: 600; 310; 30; 20 INJECTION, SOLUTION INTRAVENOUS at 20:01

## 2018-05-03 RX ADMIN — MIDAZOLAM 1 MG: 1 INJECTION INTRAMUSCULAR; INTRAVENOUS at 12:55

## 2018-05-03 RX ADMIN — ONDANSETRON 4 MG: 2 INJECTION INTRAMUSCULAR; INTRAVENOUS at 21:26

## 2018-05-03 RX ADMIN — NEOSTIGMINE METHYLSULFATE 5 MG: 1 INJECTION, SOLUTION INTRAVENOUS at 21:43

## 2018-05-03 RX ADMIN — HYDROMORPHONE HYDROCHLORIDE 0.5 MG: 1 INJECTION, SOLUTION INTRAMUSCULAR; INTRAVENOUS; SUBCUTANEOUS at 21:46

## 2018-05-03 RX ADMIN — SODIUM CHLORIDE, POTASSIUM CHLORIDE, SODIUM LACTATE AND CALCIUM CHLORIDE: 600; 310; 30; 20 INJECTION, SOLUTION INTRAVENOUS at 12:06

## 2018-05-03 RX ADMIN — PHENYLEPHRINE HYDROCHLORIDE 100 MCG: 10 INJECTION, SOLUTION INTRAMUSCULAR; INTRAVENOUS; SUBCUTANEOUS at 13:15

## 2018-05-03 RX ADMIN — ALBUMIN (HUMAN): 12.5 SOLUTION INTRAVENOUS at 19:28

## 2018-05-03 RX ADMIN — FENTANYL CITRATE 25 MCG: 50 INJECTION, SOLUTION INTRAMUSCULAR; INTRAVENOUS at 18:35

## 2018-05-03 RX ADMIN — GLYCOPYRROLATE 0.8 MG: 0.2 INJECTION, SOLUTION INTRAMUSCULAR; INTRAVENOUS at 21:43

## 2018-05-03 RX ADMIN — CISATRACURIUM BESYLATE 4 MG: 2 INJECTION INTRAVENOUS at 16:00

## 2018-05-03 RX ADMIN — CISATRACURIUM BESYLATE 2 MG: 2 INJECTION INTRAVENOUS at 18:41

## 2018-05-03 RX ADMIN — LIDOCAINE HYDROCHLORIDE 60 MG: 20 INJECTION, SOLUTION INFILTRATION; PERINEURAL at 13:00

## 2018-05-03 RX ADMIN — CEFAZOLIN SODIUM 2 G: 2 INJECTION, SOLUTION INTRAVENOUS at 13:08

## 2018-05-03 RX ADMIN — FENTANYL CITRATE 50 MCG: 50 INJECTION, SOLUTION INTRAMUSCULAR; INTRAVENOUS at 14:07

## 2018-05-03 ASSESSMENT — COPD QUESTIONNAIRES: COPD: 0

## 2018-05-03 NOTE — IP AVS SNAPSHOT
MRN:[id number]                      After Visit Summary   5/3/2018    [address]ella St    MRN: [id number]           Thank you!     Thank you for choosing [facility] for your care. Our goal is always to provide you with excellent care. Hearing back from our patients is one way we can continue to improve our services. Please take a few minutes to complete the written survey that you may receive in the mail after you visit with us. Thank you!        Patient Information     Date Of Birth          1965        Designated Caregiver       Most Recent Value    Caregiver    Will someone help with your care after discharge? yes    Name of designated caregiver waldemar    Phone number of caregiver     Caregiver address [facility]      About your hospital stay     You were admitted on:  May 3, 2018 You last received care in the:  [place] [address] Oncology    You were discharged on:  May 6, 2018        Reason for your hospital stay       Post-op                  Who to Call     For medical emergencies, please call 911.  For non-urgent questions about your medical care, please call your primary care provider or clinic, [phone]  For questions related to your surgery, please call your surgery clinic        Attending Provider     Provider Deja Marie MD Urology       Primary Care Provider Office Phone # Fax #    Shamir Pugh [phone]      After Care Instructions     Diet       Follow this diet upon discharge: Regular            Discharge Instructions       Routine, Discharge Diet: -Regular Activity: - No strenuous exercise for 6 weeks. - No lifting, pushing, pulling more than 10 pounds for 6 weeks. Take care when pushing with your arms to stand up. - Do not strain your belly area. When you bend, sit up or twice, you could strain the area around your incision. - Do not strain with bowel movements. - Do not drive until you can press the  brake pedal quickly and fully without pain. - Do not operate a motor vehicle while taking narcotic pain medications. Medications: 1) PAIN: Oxycodone is a narcotic medication that has been prescribed for pain. Narcotics will cause sleepiness and constipation and can become addictive, therefore it is best to stop or reduce them as soon as you can. Any left over narcotics should be disposed of with an Authorized  for unneeded medications. Contact your [facility]'s or [facility]'s household trash and recycling service to learn about medication disposal options and guidelines for your area. If you decide to store this medication at home it should be kept in a locked cabinet to prevent access to children or visitors. To reduce your narcotic use, take Tylenol (acetaminophen 625mg). Do not take more than 4,000mg of Tylenol (acetaminophen/ APAP) from all sources in any 24 hour period since this can cause liver damage. Never drive, operate machinery or drink alcoholic beverages while you are taking narcotic pain medications. 2) CONSTIPATION: Pericolace (senna/docusate sodium) can be taken twice daily for prevention of constipation since surgery, pain medications and bladder spasm medications can all make you constipated. Please reduce or stop pericolace if you develop loose stools. Other over the counter solutions such as prune juice, miralax, fiber products, senna, and dulcolax can also be used. If you are taking the pericolace but still have not had a bowel movement in 3 days, start over-the-counter Milk of Magnesia taken twice daily until you have a nice bowel movement. Call the office with any concerns. Wound Care: - You may shower and get incisions wet starting 48 hrs after surgery. - Do not scrub incisions or submerge wounds (aka, bath, pool, hot tub, etc.) for 2 weeks or until wounds have healed and catheter is removed. - Remove wound dressing 48 hours after surgery if already not removed. - Your incisions  were closed with dissolvable suture that will not need to be removed . Commonly, purple dermabond glue is applied over the top of the sutures. Avoid using lotions or ointments on your incisions. - Leave incisions open to air. Cover with gauze only if needed for comfort or to protect clothing from drainage.  Follow-Up: - Call your primary care provider to touch base regarding your recent admission. - Follow up in 7 days in the urology clinic for catheter removal and trial of void. - Call or return sooner than your regularly scheduled visit if you develop any of the following: Fever (greater than 101.3F), uncontrolled pain, uncontrolled nausea or vomiting, as well as increased redness, swelling, or drainage from your wound.                  Follow-up Appointments     Follow-up and recommended labs and tests        Follow up with PCP on 05/22 as scheduled before.            Follow-up and recommended labs and tests        Follow-up with Dr. Salinas in 1-2 weeks.                  Your next 10 appointments already scheduled     May 29, 2018 10:00 AM CDT   (Arrive by 9:45 AM)   Return Visit with Shamir Pugh MD   [facility] ([facility])    [address] [place] [id number] [phone]            Jul 12, 2018  9:20 AM CDT   CT CHEST ABDOMEN PELVIS W/O & W CONTRAST with UCCT2   [facility] CT ([facility])    [address] [place] [id number] [phone]           Please bring any scans or X-rays taken at other hospitals, if similar tests were done. Also bring a list of your medicines, including vitamins, minerals and over-the-counter drugs. It is safest to leave personal items at home.  Be sure to tell your doctor:   If you have any allergies.   If there s any chance you are pregnant.   If you are breastfeeding.  How to prepare:   Do not eat or drink for 2 hours before your exam.  If you need to take medicine, you may take it with small sips of water. (We may ask you to take liquid medicine as well.)   Please wear loose clothing, such as a sweat suit or jogging clothes. Avoid snaps, zippers and other metal. We may ask you to undress and put on a hospital gown.  Please arrive 30 minutes early for your CT. Once in the department you might be asked to drink water 15-20 minutes prior to your exam.  If indicated you may be asked to drink an oral contrast in advance of your CT.  If this is the case, the imaging team will let you know or be in contact with you prior to your appointment  Patients over 70 or patients with diabetes or kidney problems:   If you haven t had a blood test (creatinine test) within the last 30 days, the Cardiologist/Radiologist may require you to get this test prior to your exam.  If you have diabetes:   Continue to take your metformin medication on the day of your exam  If you have any questions, please call the Imaging Department where you will have your exam.            Jul 16, 2018 11:00 AM CDT   (Arrive by 10:45 AM)   Return Visit with Lam Moran MD   [facility] ([facility])    [address] [place] [address]  [address] [place] [id number] [phone]              Pending Results     Date and Time Order Name Status Description    5/3/2018 2129 Surgical pathology exam In process             Statement of Approval     Ordered          05/06/18 1015  I have reviewed and agree with all the recommendations and orders detailed in this document.  EFFECTIVE NOW     Approved and electronically signed by:  Joshua Minor MD             Admission Information     Date & Time Provider Department Dept. Phone    5/3/2018 Deja Salinas MD [place] [address] Oncology [phone]      Your Vitals Were     Blood Pressure Temperature Respirations Height Weight Pulse Oximetry    122/59 (BP Location: Left arm)  "98.6  F (37  C) (Oral) 16 1.651 m (5' 5\") 102.6 kg (226 lb 3.2 oz) 97%    BMI (Body Mass Index)                   37.64 kg/m2           prollie Information     prollie gives you secure access to your electronic health record. If you see a primary care provider, you can also send messages to your care team and make appointments. If you have questions, please call your primary care clinic.  If you do not have a primary care provider, please call [phone] and they will assist you.        Care EveryWhere ID     This is your Care EveryWhere ID. This could be used by other organizations to access your [facility] medical records  [id number]        Equal Access to Services     DORIS RODRIGUES : Sabiha Jo, saleem dela cruz, razia decker, jin yusuf. So [facility] [phone].    ATENCIÓN: Si habla español, tiene a andrew disposición servicios gratuitos de asistencia lingüística. Llame al [phone].    We comply with applicable federal civil rights laws and [place] laws. We do not discriminate on the basis of race, color, national origin, age, disability, sex, sexual orientation, or gender identity.               Review of your medicines      START taking        Dose / Directions    oxyCODONE IR 5 MG tablet   Commonly known as:  ROXICODONE   Used for:  Hydronephrosis, unspecified hydronephrosis type        Dose:  5-10 mg   Take 1-2 tablets (5-10 mg) by mouth every 3 hours as needed for other (pain control or improvement in physical function. Hold dose for analgesic side effects.)   Quantity:  20 tablet   Refills:  0       senna-docusate 8.6-50 MG per tablet   Commonly known as:  SENOKOT-S;PERICOLACE   Used for:  Hydronephrosis, unspecified hydronephrosis type        Dose:  1 tablet   Take 1 tablet by mouth 2 times daily as needed for constipation   Quantity:  20 tablet   Refills:  1         CONTINUE these medicines which may have CHANGED, or have new prescriptions. " If we are uncertain of the size of tablets/capsules you have at home, strength may be listed as something that might have changed.        Dose / Directions    amLODIPine 10 MG tablet   Commonly known as:  NORVASC   This may have changed:  when to take this   Used for:  Hypertension, unspecified type        Dose:  10 mg   Take 1 tablet (10 mg) by mouth daily   Quantity:  100 tablet   Refills:  3         CONTINUE these medicines which have NOT CHANGED        Dose / Directions    COMPRESSION STOCKINGS   Used for:  Leg swelling        Dose:  1 each   1 each daily   Quantity:  2 Units   Refills:  1         STOP taking     enalapril 20 MG tablet   Commonly known as:  VASOTEC           hydrochlorothiazide 25 MG tablet   Commonly known as:  HYDRODIURIL                Where to get your medicines      These medications were sent to [facility] [place], [place] - [address]  [address], [place] [address]     Phone:  [phone]     senna-docusate 8.6-50 MG per tablet         Some of these will need a paper prescription and others can be bought over the counter. Ask your nurse if you have questions.     Bring a paper prescription for each of these medications     oxyCODONE IR 5 MG tablet                Protect others around you: Learn how to safely use, store and throw away your medicines at www.disposemymeds.org.        Information about OPIOIDS     PRESCRIPTION OPIOIDS: WHAT YOU NEED TO KNOW   You have a prescription for an opioid (narcotic) pain medicine. Opioids can cause addiction. If you have a history of chemical dependency of any type, you are at a higher risk of becoming addicted to opioids. Only take this medicine after all other options have been tried. Take it for as short a time and as few doses as possible.     Do not:    Drive. If you drive while taking these medicines, you could be arrested for driving under the influence (DUI).    Operate heavy machinery    Do any other dangerous  activities while taking these medicines.     Drink any alcohol while taking these medicines.      Take with any other medicines that contain acetaminophen. Read all labels carefully. Look for the word  acetaminophen  or  Tylenol.  Ask your pharmacist if you have questions or are unsure.    Store your pills in a secure place, locked if possible. We will not replace any lost or stolen medicine. If you don t finish your medicine, please throw away (dispose) as directed by your pharmacist. The [place] Pollution Control Agency has more information about safe disposal: https://www.pca.[facility].[place].us/living-green/managing-unwanted-medications    All opioids tend to cause constipation. Drink plenty of water and eat foods that have a lot of fiber, such as fruits, vegetables, prune juice, apple juice and high-fiber cereal. Take a laxative (Miralax, milk of magnesia, Colace, Senna) if you don t move your bowels at least every other day.              Medication List: This is a list of all your medications and when to take them. Check marks below indicate your daily home schedule. Keep this list as a reference.      Medications           Morning Afternoon Evening Bedtime As Needed    amLODIPine 10 MG tablet   Commonly known as:  NORVASC   Take 1 tablet (10 mg) by mouth daily   Last time this was given:  10 mg on 5/5/2018  9:28 PM                                COMPRESSION STOCKINGS   1 each daily                                oxyCODONE IR 5 MG tablet   Commonly known as:  ROXICODONE   Take 1-2 tablets (5-10 mg) by mouth every 3 hours as needed for other (pain control or improvement in physical function. Hold dose for analgesic side effects.)   Last time this was given:  5 mg on 5/6/2018 10:51 AM                                senna-docusate 8.6-50 MG per tablet   Commonly known as:  SENOKOT-S;PERICOLACE   Take 1 tablet by mouth 2 times daily as needed for constipation   Last time this was given:  2 tablets on 5/6/2018  8:37 AM

## 2018-05-03 NOTE — ANESTHESIA PREPROCEDURE EVALUATION
Procedure: Procedure(s):  DAVINCI XI NEPHRECTOMY  Preop diagnosis: left non functioning kidney    No Known Allergies  Past Medical History:   Diagnosis Date     Anemia      Cancer (H)     Metastatic squamous cell cervical carcinoma     Chronic kidney disease      Hypertension 2007     Obesity     BMI >30     Past Surgical History:   Procedure Laterality Date     COMBINED CYSTOSCOPY, INSERT STENT URETER(S) Left 6/13/2016    Procedure: COMBINED CYSTOSCOPY, INSERT STENT URETER(S);  Surgeon: Deja Salinas MD;  [facility]     CYSTOSCOPY, RETROGRADES, COMBINED Left 6/13/2016    Procedure: COMBINED CYSTOSCOPY, RETROGRADES;  Surgeon: Deja Salinas MD;  [facility]     GENITOURINARY SURGERY  5/2015    PNT placement     HYSTERECTOMY  2011    [facility], [place]     PERCUTANEOUS NEPHROSTOMY Left 3/9/2017    Procedure: PERCUTANEOUS NEPHROSTOMY;  Surgeon: Bridger Meyer PA-C;  [facility]     PERCUTANEOUS NEPHROSTOMY Left 6/28/2017    Procedure: PERCUTANEOUS NEPHROSTOMY;  Left Nephrostomy Tube Change;  Surgeon: Bridger Meyer PA-C;  [facility]     PERCUTANEOUS NEPHROSTOMY Left 10/30/2017    Procedure: PERCUTANEOUS NEPHROSTOMY;  Left Nephrostomy Tube Change;  Surgeon: Maryann Chavez PA-C;  [facility]     Social History   Substance Use Topics     Smoking status: Never Smoker     Smokeless tobacco: Never Used     Alcohol use No      Comment: Does not drink alcohol     Prior to Admission medications    Medication Sig Start Date End Date Taking? Authorizing Provider   amLODIPine (NORVASC) 10 MG tablet Take 1 tablet (10 mg) by mouth daily  Patient taking differently: Take 10 mg by mouth every evening  3/27/18  Yes Shamir Pugh MD   enalapril (VASOTEC) 20 MG tablet Take 1 tablet (20 mg) by mouth daily  Patient taking differently: Take 20 mg by mouth every evening  3/27/18  Yes Shamir Pugh MD   hydrochlorothiazide (HYDRODIURIL) 25 MG tablet Take  1 tablet (25 mg) by mouth daily  Patient taking differently: Take 25 mg by mouth every evening  3/27/18  Yes Shamir Pugh MD   COMPRESSION STOCKINGS 1 each daily 12/29/15   Nettie Martin MD     Current Facility-Administered Medications Ordered in Epic   Medication Dose Route Frequency Last Rate Last Dose     ceFAZolin (ANCEF) 1 g vial to attach to  ml bag for ADULT or 50 ml bag for PEDS  1 g Intravenous See Admin Instructions         ceFAZolin (ANCEF) intermittent infusion 2 g in 100 mL dextrose PRE-MIX  2 g Intravenous Pre-Op/Pre-procedure x 1 dose         heparin 100 UNIT/ML injection 5 mL  5 mL Intracatheter Q8H   5 mL at 09/29/16 1013     heparin 100 UNIT/ML injection 500 Units  500 Units Intracatheter Q8H   500 Units at 04/04/16 1002     lactated ringers infusion   Intravenous Continuous         lidocaine 1 % 1 mL  1 mL Other Q1H PRN         sodium chloride (PF) 0.9% PF flush 3 mL  3 mL Intracatheter Q8H         No current [facility]-ordered outpatient prescriptions on file.       lactated ringers       Wt Readings from Last 1 Encounters:   05/03/18 95.3 kg (210 lb)     Temp Readings from Last 1 Encounters:   05/03/18 36.2  C (97.2  F) (Temporal)     BP Readings from Last 6 Encounters:   05/03/18 148/88   04/10/18 130/79   03/27/18 143/82   03/16/18 136/83   01/08/18 (!) 159/96   10/30/17 111/68     Pulse Readings from Last 4 Encounters:   04/10/18 82   03/27/18 83   03/16/18 81   01/08/18 75     Resp Readings from Last 1 Encounters:   05/03/18 16     SpO2 Readings from Last 1 Encounters:   05/03/18 100%     Recent Labs   Lab Test  04/10/18   0952  12/11/16   0524   NA  141  144   POTASSIUM  3.6  3.6   CHLORIDE  107  108   CO2  26  27   ANIONGAP  8  8   GLC  128*  114*   BUN  10  8   CR  0.65  0.69   HERON  8.7  8.5     Recent Labs   Lab Test  04/10/18   0952  07/25/16   1210  07/23/16   0108   AST  14  13  16   ALT  19  20  24   ALKPHOS  55  48  48   BILITOTAL  0.7  1.2  0.6   LIPASE   --     --   93     Recent Labs   Lab Test  04/10/18   0952  03/09/17   0815   WBC  7.1  6.0   HGB  11.9  12.5   PLT  209  227     No results for input(s): ABO, RH in the last 48467 hours.  Recent Labs   Lab Test  03/09/17   0815  05/22/15   0710   INR  0.93  1.07      No results for input(s): TROPI in the last 99439 hours.  No results for input(s): PH, PCO2, PO2, HCO3 in the last 85859 hours.  No results for input(s): HCG in the last 57498 hours.  Recent Results (from the past 744 hour(s))   US Thyroid    Narrative    EXAMINATION: Thyroid ultrasound, 4/4/2018 10:10 AM     COMPARISON: 10/13/2015.    HISTORY: Nontoxic multinodular goiter.    Technique: Grayscale and color ultrasound imaging of the thyroid was  performed.    Findings:    Thyroid parenchyma: Heterogeneous with multiple nodules.    The right lobe of the thyroid measures: 1.3 x 1.8 x 5.9 cm     The thyroid isthmus measures: 0.7 cm     The left lobe of the thyroid measures: 4.1 x 3.1 x 6.8 cm     Right lobe:  There are 2 spongiform nodules with internal vascularity in the  superior and midpole of the right lobe measuring 0.4 x 0.4 x 0.7 cm  and 0.4 x 0.2 x 0.4 cm, respectively, stable from the prior study.    Dominant nodule 1:  Nodule measurement: 1.1 x 1.0 x 2.0 cm , inferior pole previously  measuring 2.0 x 1.3 x 2.1 cm  Echogenicity: Hypoechoic  Consistency: mixed  Calcifications: no  Hypervascular: yes  Interval growth (>20%): no    Isthmus: No nodules identified.    Left Lobe:   Nodule 1:  Nodule measurement: 2.6 x 2.2 x 2.0 cm , superior pole, not previously  measured.  Echogenicity: Isoechoic  Consistency: solid  Calcifications: no  Hypervascular: no  Interval growth (>20%): Not previously measured    Nodule 2:  Nodule measurement: 3.4 x 2.7 x 2.8 cm, previously measuring 3.1 x 2.2  x 3.2 cm , mid/inferior pole  Echogenicity: Isoechoic  Consistency: mixed  Calcifications: yes  Hypervascular: yes  Interval growth (>20%): no    Nodule 3:   Nodule  measurement: 2.9 x 1.9 x 3.2 cm, previously measuring , 1.8 x  1.7 x 1.8 cm, superior/midpole  Echogenicity: Isoechoic  Consistency: mixed  Calcifications: no  Hypervascular: yes  Interval growth (>20%): yes    Nodule 4:  Nodule measurement: 4.0 x 3.6 x 3.1 cm , previously measuring 4.7 x  3.2 x 5.8 cm in the inferior pole  Echogenicity: Isoechoic  Consistency: solid  Calcifications: no  Hypervascular: yes  Interval growth (>20%): no      Impression    Impression: Multinodular thyroid as described above, left greater than  right.  1. Left nodule #4 in the present study (previously nodule #3 on  10/13/2015 ultrasound) was previously biopsied with benign results  according to medical record. This is unchanged to slightly decreased  in size since that exam.  2. Left nodule #3 (previously nodule #1 on 10/13/2015 ultrasound)  demonstrates interval growth greater than 20%. This is mixed cystic  and solid, and the cystic component appears larger than 10/13/2015.  Attention on follow-up studies.    I have personally reviewed the examination and initial interpretation  and I agree with the findings.    HUMZA BAKER MA SCREENING DIGITAL BILAT - Future  (s+30)    Narrative    Exam: MA SCREENING DIGITAL BILATERAL with Computer-Aided Detection   4/4/2018 10:47 AM      Comparison: None.    History: No symptoms, routine screening.    BREAST DENSITY: Almost entirely fat.    COMMENTS: No suspicious finding.  Right chest wall Port-A-Cath.      Impression    IMPRESSION: BI-RADS CATEGORY: 1 -  NEGATIVE.    RECOMMENDED FOLLOW-UP: Annual Mammography.      The patient will be notified of the results.     I have personally reviewed the examination and initial interpretation  and I agree with the findings.    KYRA ESCALERA MD   US Renal Complete    Narrative    EXAMINATION: US RENAL COMPLETE, 4/10/2018 10:12 AM     COMPARISON: Renal ultrasound 12/11/2016    HISTORY: Left flank pain with PNT replacement.    FINDINGS: Technically difficult  exam due to over shadowing bowel gas.    Right kidney: Measures 13.1 cm in length. Parenchyma is of normal  thickness and echogenicity. No focal mass. No hydronephrosis.    Left kidney: Measures 7.6 cm in length. Slight thinning of the cortex  and normal echogenicity. No focal mass. No hydronephrosis.  Percutaneous nephrostomy tube is partially visualized within the left  kidney.    Bladder: Unremarkable.      Impression    IMPRESSION:  1.  Atrophic left kidney slightly decreased in size since prior  examination with partially visualized percutaneous nephrostomy tube.  No hydronephrosis.  2.  No hydronephrosis on the right.    I have personally reviewed the examination and initial interpretation  and I agree with the findings.    ANNELIESE HALL MD         Anesthesia Evaluation     . Pt has had prior anesthetic.     No history of anesthetic complications          ROS/MED HX    ENT/Pulmonary:      (-) asthma, COPD and sleep apnea   Neurologic:      (-) CVA and TIA   Cardiovascular:     (+) hypertension----. : . . . :. .       METS/Exercise Tolerance:     Hematologic:     (+) Anemia, -      Musculoskeletal:         GI/Hepatic:        (-) GERD   Renal/Genitourinary:     (+) chronic renal disease (one nonfunctioning kidney), type: CRI,       Endo:     (+) Obesity, .      Psychiatric:         Infectious Disease:         Malignancy:   (+) Malignancy           Other:                     Physical Exam  Normal systems: dental    Airway   Mallampati: II  TM distance: >3 FB  Neck ROM: full    Dental     Cardiovascular   Rhythm and rate: regular      Pulmonary    breath sounds clear to auscultation                    Anesthesia Plan      History & Physical Review  History and physical reviewed and following examination; no interval change.    ASA Status:  2 .    NPO Status:  > 8 hours    Plan for General   PONV prophylaxis:  Ondansetron (or other 5HT-3) and Droperidol or Haldol  Additional equipment: Videolaryngoscope       Postoperative Care      Consents  Anesthetic plan, risks, benefits and alternatives discussed with:  Patient..                          .

## 2018-05-03 NOTE — IP AVS SNAPSHOT
[address]    [place] [address]    Phone:  [phone]                                       After Visit Summary   5/3/2018    Angella St    MRN: [id number]           After Visit Summary Signature Page     I have received my discharge instructions, and my questions have been answered. I have discussed any challenges I see with this plan with the nurse or doctor.    ..........................................................................................................................................  Patient/Patient Representative Signature      ..........................................................................................................................................  Patient Representative Print Name and Relationship to Patient    ..................................................               ................................................  Date                                            Time    ..........................................................................................................................................  Reviewed by Signature/Title    ...................................................              ..............................................  Date                                                            Time

## 2018-05-03 NOTE — H&P (VIEW-ONLY)
[place] EMERGENCY DEPARTMENT ([facility])  4/10/18   History     Chief Complaint   Patient presents with     Flank Pain     HPI  Angella St is a 52 year old female with a medical history significant for cervical cancer s/p JESSICA and chemotherapy c/b left ureteral involvement with hydronephrosis with recurrent pyelonephritis, has left nephrostomy in place, CKD requiring chronic PNT and hypertension who presents to the Emergency Department for evaluation of left flank pain.  Patient states that her pain is not constant, hurts with certain movements including walking and with hitting bumps in the vehicle.  Patient also states that she began noticing blood in her urine.  She states that the blood has been in her nephrostomy.  Patient denies any right-sided flank pain.  Patient reports that her nephrostomy was last changed approximately 4 months ago.  Patient also complains of some subjective fevers.  She denies any nausea, vomiting or breathing difficulties.  She states that she has been eating normal.  She also states that she has been taking her antihypertensives.  Of note, patient is scheduled to have her left kidney removed on 5/3/2018.    I have reviewed the Medications, Allergies, Past Medical and Surgical History, and Social History in the [employer] system.    Past Medical History:   Diagnosis Date     Anemia      Cancer (H)     Metastatic squamous cell cervical carcinoma     Chronic kidney disease      Hypertension 2007     Obesity     BMI >30       Past Surgical History:   Procedure Laterality Date     COMBINED CYSTOSCOPY, INSERT STENT URETER(S) Left 6/13/2016    Procedure: COMBINED CYSTOSCOPY, INSERT STENT URETER(S);  Surgeon: Deja Salinas MD;  [facility]     CYSTOSCOPY, RETROGRADES, COMBINED Left 6/13/2016    Procedure: COMBINED CYSTOSCOPY, RETROGRADES;  Surgeon: Deja Salinas MD;  [facility]     GENITOURINARY SURGERY  5/2015    PNT placement     HYSTERECTOMY  2011     [facility], [place]     PERCUTANEOUS NEPHROSTOMY Left 3/9/2017    Procedure: PERCUTANEOUS NEPHROSTOMY;  Surgeon: Bridger Meyer PA-C;  [facility]     PERCUTANEOUS NEPHROSTOMY Left 6/28/2017    Procedure: PERCUTANEOUS NEPHROSTOMY;  Left Nephrostomy Tube Change;  Surgeon: Bridger Meyer PA-C;  [facility]     PERCUTANEOUS NEPHROSTOMY Left 10/30/2017    Procedure: PERCUTANEOUS NEPHROSTOMY;  Left Nephrostomy Tube Change;  Surgeon: Maryann Chavez PA-C;  [facility]       Family History   Problem Relation Age of Onset     Hypertension Brother        Social History   Substance Use Topics     Smoking status: Never Smoker     Smokeless tobacco: Never Used     Alcohol use No      Comment: Does not drink alcohol       Current Facility-Administered Medications   Medication     0.9% sodium chloride BOLUS     Current Outpatient Prescriptions   Medication     amLODIPine (NORVASC) 10 MG tablet     enalapril (VASOTEC) 20 MG tablet     hydrochlorothiazide (HYDRODIURIL) 25 MG tablet     iohexol (OMNIPAQUE) 140 MG/ML SOLN solution     HYDROmorphone (DILAUDID) 2 MG tablet     iohexol (OMNIPAQUE) 140 MG/ML SOLN solution     iohexol (OMNIPAQUE) 140 MG/ML SOLN solution     COMPRESSION STOCKINGS     Ferrous Sulfate (IRON SUPPLEMENT PO)     Pyridoxine HCl (VITAMIN B6 PO)     lidocaine-prilocaine (EMLA) cream     Facility-Administered Medications Ordered in Other Encounters   Medication     heparin 100 UNIT/ML injection 5 mL     heparin 100 UNIT/ML injection 500 Units      No Known Allergies      Review of Systems   Constitutional: Positive for fever (subjective). Negative for appetite change.   Respiratory: Negative for shortness of breath and wheezing.    Gastrointestinal: Negative for nausea and vomiting.   Genitourinary: Positive for flank pain (left) and hematuria.   All other systems reviewed and are negative.      Physical Exam   BP: (!) 149/98  Pulse: 81  Temp: 97.8  F (36.6  C)  Height: 170.2 cm (5'  "7\")  Weight: 98.4 kg (217 lb)  SpO2: 100 %      Physical Exam   Constitutional: She is oriented to person, place, and time. She appears well-developed and well-nourished.   HENT:   Head: Normocephalic and atraumatic.   Neck: Normal range of motion.   Cardiovascular: Normal rate, regular rhythm and normal heart sounds.    Pulmonary/Chest: Effort normal and breath sounds normal. No respiratory distress.   Abdominal: Soft. She exhibits no distension. There is no tenderness. There is no rebound.   Genitourinary:   Genitourinary Comments: Left sided nephrostomy tube; about 50 ml of of dark orange urine in bad; site clean; no erythema or drainage;    Musculoskeletal: She exhibits no tenderness.   Neurological: She is alert and oriented to person, place, and time.   Skin: Skin is warm and dry.   Psychiatric: She has a normal mood and affect. Her behavior is normal. Thought content normal.       ED Course   9:23 AM  The patient was seen and examined by Lisa Rivera MD in Room ED11.     ED Course     Procedures             Critical Care time:  none         Results for orders placed or performed during the hospital encounter of 04/10/18   US Renal Complete    Narrative    EXAMINATION: US RENAL COMPLETE, 4/10/2018 10:12 AM     COMPARISON: Renal ultrasound 12/11/2016    HISTORY: Left flank pain with PNT replacement.    FINDINGS: Technically difficult exam due to over shadowing bowel gas.    Right kidney: Measures 13.1 cm in length. Parenchyma is of normal  thickness and echogenicity. No focal mass. No hydronephrosis.    Left kidney: Measures 7.6 cm in length. Slight thinning of the cortex  and normal echogenicity. No focal mass. No hydronephrosis.  Percutaneous nephrostomy tube is partially visualized within the left  kidney.    Bladder: Unremarkable.      Impression    IMPRESSION:  1.  Atrophic left kidney slightly decreased in size since prior  examination with partially visualized percutaneous nephrostomy tube.  No " hydronephrosis.  2.  No hydronephrosis on the right.    I have personally reviewed the examination and initial interpretation  and I agree with the findings.    ANNELIESE HALL MD   UA with Microscopic   Result Value Ref Range    Color Urine Yellow     Appearance Urine Clear     Glucose Urine Negative NEG^Negative mg/dL    Bilirubin Urine Negative NEG^Negative    Ketones Urine Negative NEG^Negative mg/dL    Specific Gravity Urine 1.012 1.003 - 1.035    Blood Urine Small (A) NEG^Negative    pH Urine 7.0 5.0 - 7.0 pH    Protein Albumin Urine Negative NEG^Negative mg/dL    Urobilinogen mg/dL Normal 0.0 - 2.0 mg/dL    Nitrite Urine Negative NEG^Negative    Leukocyte Esterase Urine Negative NEG^Negative    Source Urine     WBC Urine 2 0 - 5 /HPF    RBC Urine 12 (H) 0 - 2 /HPF    Bacteria Urine Few (A) NEG^Negative /HPF    Squamous Epithelial /HPF Urine 1 0 - 1 /HPF    Transitional Epi <1 0 - 1 /HPF    Mucous Urine Present (A) NEG^Negative /LPF   CBC with platelets differential   Result Value Ref Range    WBC 7.1 4.0 - 11.0 10e9/L    RBC Count 4.40 3.8 - 5.2 10e12/L    Hemoglobin 11.9 11.7 - 15.7 g/dL    Hematocrit 37.4 35.0 - 47.0 %    MCV 85 78 - 100 fl    MCH 27.0 26.5 - 33.0 pg    MCHC 31.8 31.5 - 36.5 g/dL    RDW 13.6 10.0 - 15.0 %    Platelet Count 209 150 - 450 10e9/L    Diff Method Automated Method     % Neutrophils 51.1 %    % Lymphocytes 38.5 %    % Monocytes 7.3 %    % Eosinophils 2.7 %    % Basophils 0.3 %    % Immature Granulocytes 0.1 %    Nucleated RBCs 0 0 /100    Absolute Neutrophil 3.6 1.6 - 8.3 10e9/L    Absolute Lymphocytes 2.7 0.8 - 5.3 10e9/L    Absolute Monocytes 0.5 0.0 - 1.3 10e9/L    Absolute Eosinophils 0.2 0.0 - 0.7 10e9/L    Absolute Basophils 0.0 0.0 - 0.2 10e9/L    Abs Immature Granulocytes 0.0 0 - 0.4 10e9/L    Absolute Nucleated RBC 0.0    Comprehensive metabolic panel   Result Value Ref Range    Sodium 141 133 - 144 mmol/L    Potassium 3.6 3.4 - 5.3 mmol/L    Chloride 107 94 - 109 mmol/L     Carbon Dioxide 26 20 - 32 mmol/L    Anion Gap 8 3 - 14 mmol/L    Glucose 128 (H) 70 - 99 mg/dL    Urea Nitrogen 10 7 - 30 mg/dL    Creatinine 0.65 0.52 - 1.04 mg/dL    GFR Estimate >90 >60 mL/min/1.7m2    GFR Estimate If Black >90 >60 mL/min/1.7m2    Calcium 8.7 8.5 - 10.1 mg/dL    Bilirubin Total 0.7 0.2 - 1.3 mg/dL    Albumin 3.6 3.4 - 5.0 g/dL    Protein Total 7.8 6.8 - 8.8 g/dL    Alkaline Phosphatase 55 40 - 150 U/L    ALT 19 0 - 50 U/L    AST 14 0 - 45 U/L   UA with Microscopic   Result Value Ref Range    Color Urine Yellow     Appearance Urine Slightly Cloudy     Glucose Urine Negative NEG^Negative mg/dL    Bilirubin Urine Negative NEG^Negative    Ketones Urine Negative NEG^Negative mg/dL    Specific Gravity Urine 1.006 1.003 - 1.035    Blood Urine Moderate (A) NEG^Negative    pH Urine 8.0 (H) 5.0 - 7.0 pH    Protein Albumin Urine 300 (A) NEG^Negative mg/dL    Urobilinogen mg/dL Normal 0.0 - 2.0 mg/dL    Nitrite Urine Negative NEG^Negative    Leukocyte Esterase Urine Large (A) NEG^Negative    Source Left Nephrostomy     WBC Urine 57 (H) 0 - 5 /HPF    RBC Urine 116 (H) 0 - 2 /HPF    Bacteria Urine Few (A) NEG^Negative /HPF    Squamous Epithelial /HPF Urine <1 0 - 1 /HPF    Mucous Urine Present (A) NEG^Negative /LPF    Fatty Casts NEGATIVE NEG^Negative /LPF   Urine Culture   Result Value Ref Range    Specimen Description Unspecified Urine     Special Requests Specimen received in preservative     Culture Micro PENDING    Blood culture   Result Value Ref Range    Specimen Description Blood PTC     Culture Micro No growth after 3 hours    Urine Culture   Result Value Ref Range    Specimen Description Catheterized Urine Left Nephrostomy     Special Requests Specimen received in preservative     Culture Micro PENDING      Medications   0.9% sodium chloride BOLUS (0 mLs Intravenous Stopped 4/10/18 9306)            Labs Ordered and Resulted from Time of ED Arrival Up to the Time of Departure from the ED - No data  to display     No results found for this or any previous visit (from the past 24 hour(s)).           Assessments & Plan (with Medical Decision Making)  Patient is a 52-year-old female with a history of a PNT on her left side who is scheduled to have a left nephrectomy done in about 3 weeks due to decreased function of her left kidney as well as recurrent infections.  Patient presents to the ER here saying that she has been having intermittent pain.  She says that the pain hurts with movement.  She has no tenderness on palpation.  She has no signs of skin infection.  The tube has been draining normally.  Patient here had a UA that shows no signs of infection.  The patient also has stable labs.  Her white count is 7.1.  Patient's creatinine is stable at 0.65.  We did obtain an ultrasound of her kidney that shows an atrophic left kidney which has been decreased in size, but PNT tube in place.  No hydronephrosis.  I discussed the case with Dr. Canales who is on-call for neurology.  He agrees with treating the patient empirically with antibiotics.  Patient instructed to return to the ER if symptoms worsen.  Due to the tube not needing to be replaced, urology does not recommend any further objections.  I discussed the plan of care with the patient who agrees with the plan.       I have reviewed the nursing notes.    I have reviewed the findings, diagnosis, plan and need for follow up with the patient.    New Prescriptions    No medications on file       Final diagnoses:   Acute left flank pain     Ray RICHARDSON, am serving as a trained medical scribe to document services personally performed by Lisa Rivera MD, based on the provider's statements to me.   Lisa RICHARDSON MD, was physically present and have reviewed and verified the accuracy of this note documented by Ray Avila.    4/10/2018   [facility], EMERGENCY DEPARTMENT     Lisa Rivera MD  04/10/18 8959

## 2018-05-03 NOTE — OP NOTE
PREOPERATIVE DIAGNOSIS: Left non-functioning kidney, recurrent infections  POSTOPERATIVE DIAGNOSIS: Same  PROCEDURES PERFORMED: Left Robotic Nephrectomy  STAFF SURGEON: YNES Salinas MD  RESIDENT SURGEON: CLEMENTINA Rivera MD  ANESTHESIA:  General Anesthesia  ESTIMATED BLOOD LOSS: 750 mL.   IV FLUIDS: see dictated anesthesia record  COMPLICATIONS: None.   SPECIMEN:  Left Kidney   DRAINS AND TUBES:     Sullivan catheter    Left percutaneous nephrostomy tube removed prior to case conclusion    19Fr MERY drain  SIGNIFICANT FINDINGS:     Extremely challenging procedure given obliterated surgical planes from recurrent infection. The kidney was adhesed to the aorta and pancreas medially, the psoas posteriorly.     Two renal arteries and one renal vein. Main renal artery and renal vein stapled en bloc. Adrenal gland spared    Significant blood loss ~ 750cc    BRIEF OPERATIVE INDICATIONS: Angella St is a 52 year old year old female with a non-functioning left kidney and recurrent infections.  She has decided to proceed with treatment.  She is aware of the risks and benefits of the procedure as well as options such as observation and alternative surgical treatment. She is also specifically aware of the likelihood that this turns out to be a challenging procedure given the probability of obliterated surgical planes from multiple recurrent infections. This scenario substantially increases the risk of vascular/bowel injury and may necessitate conversion to an open approach.    OPERATIIVE DETAILS: Informed consent was obtained and the patient was brought to the operating room where general anesthesia was induced. She was given appropriate preoperative antibiotics. She was initially positioned modified flank position where She was prepped and draped in the standard sterile fashion.  We were careful to pad all pressure points.  We then performed a timeout, verifying the correct patient's site and procedure to be performed.     We began by  inserting the Veress needle into the abdomen. After confirmatory drop test, the abdomen was insufflated. We then proceeded to place three 8 mm dilating robotic ports to triangulate the kidney. The 12mm airseal port was placed in the left lower quadrant to allow for specimen extraction later.  We mobilized the colon from  the lateral aspect of the abdominal wall and reflected it medially. Gerota's fascia was then opened and the lower pole of the kidney mobilized. The surgical planes were completely obliterated especially medially and at the upper pole to the pancreas. The gonadal vessels were identified and divided. The ureter was also identified after some difficult dissection due to the effects of radiation and infection and divided with Hem-o-lock clips. We then moved cephalad to the renal hilum. We then identified the following vessels:  Renal Vein:  and Renal Artery: 2.  These were carefully dissected and freed from surrounding strctures. We divided the renal artery and vein with a 45 mm Endo-REGI stapler because a good plane could not be found to individually staple them given the degree of adhesions and the arborizing nature of the renal vein vasculature. A second load was used for the accessory vessel. We then continued to march up towards the upper pole where the remaining attachments were divided using cautery.  We were able to spare the adrenal.  We then continued to divide the lateral attachments until the kidney was completely free. In the location that it was adhesed to the pancreas we left some kidney behind to avoid a pancreatic injury. At this point it was placed into a 10 mm EndoCatch bag. Hemostasis was obtained. A MERY drain was placed. The specimen was extracted through a left lower quadrant incision and sent to pathology for permanent pathology.  The abdomen was irrigated. The lower abdominal incision was closed with 0-Vicryl for the fascia. The skin incisions were closed with 4-0 Monocryl. The  wounds were dressed with Dermabond. The patient was then awakened and taken to the PACU in stable condition.    PLAN:   Post operative stay on the hospital flores      Randy Rivera MD  Urology Resident    I was present for the entire procedure on 05/03/2018

## 2018-05-03 NOTE — PROGRESS NOTES
Admission medication history interview status for the 5/3/2018  admission is complete. See EPIC admission navigator for prior to admission medications     Medication history source reliability:Good    Medication history interview source(s):Patient    Medication history resources (including written lists, pill bottles, clinic record):Patient brought all of her medication bottles with her    [facility]    Additional medication history information not noted on PTA med list :None    Time spent in this activity: 40 minutes    Prior to Admission medications    Medication Sig Last Dose Taking? Auth Provider   amLODIPine (NORVASC) 10 MG tablet Take 1 tablet (10 mg) by mouth daily  Patient taking differently: Take 10 mg by mouth every evening  5/2/2018 at PM Yes Shamir Pugh MD   enalapril (VASOTEC) 20 MG tablet Take 1 tablet (20 mg) by mouth daily  Patient taking differently: Take 20 mg by mouth every evening  5/2/2018 at PM Yes Shamir Pugh MD   hydrochlorothiazide (HYDRODIURIL) 25 MG tablet Take 1 tablet (25 mg) by mouth daily  Patient taking differently: Take 25 mg by mouth every evening  5/1/2018 at PM Yes Shamir Pugh MD   COMPRESSION STOCKINGS 1 each daily   Nettie Martin MD

## 2018-05-04 ENCOUNTER — APPOINTMENT (OUTPATIENT)
Dept: OCCUPATIONAL THERAPY | Facility: CLINIC | Age: 53
End: 2018-05-04
Attending: UROLOGY
Payer: MEDICAID

## 2018-05-04 ENCOUNTER — APPOINTMENT (OUTPATIENT)
Dept: PHYSICAL THERAPY | Facility: CLINIC | Age: 53
End: 2018-05-04
Attending: UROLOGY
Payer: MEDICAID

## 2018-05-04 LAB
ANION GAP SERPL CALCULATED.3IONS-SCNC: 8 MMOL/L (ref 3–14)
BUN SERPL-MCNC: 11 MG/DL (ref 7–30)
CALCIUM SERPL-MCNC: 7.7 MG/DL (ref 8.5–10.1)
CHLORIDE SERPL-SCNC: 106 MMOL/L (ref 94–109)
CO2 SERPL-SCNC: 26 MMOL/L (ref 20–32)
CREAT SERPL-MCNC: 0.67 MG/DL (ref 0.52–1.04)
ERYTHROCYTE [DISTWIDTH] IN BLOOD BY AUTOMATED COUNT: 12.9 % (ref 10–15)
GFR SERPL CREATININE-BSD FRML MDRD: >90 ML/MIN/1.7M2
GLUCOSE BLDC GLUCOMTR-MCNC: 111 MG/DL (ref 70–99)
GLUCOSE BLDC GLUCOMTR-MCNC: 119 MG/DL (ref 70–99)
GLUCOSE BLDC GLUCOMTR-MCNC: 119 MG/DL (ref 70–99)
GLUCOSE SERPL-MCNC: 151 MG/DL (ref 70–99)
HCT VFR BLD AUTO: 29.1 % (ref 35–47)
HGB BLD-MCNC: 10 G/DL (ref 11.7–15.7)
HGB BLD-MCNC: 9.8 G/DL (ref 11.7–15.7)
LACTATE BLD-SCNC: 1.7 MMOL/L (ref 0.4–1.9)
MCH RBC QN AUTO: 27.4 PG (ref 26.5–33)
MCHC RBC AUTO-ENTMCNC: 33.7 G/DL (ref 31.5–36.5)
MCV RBC AUTO: 81 FL (ref 78–100)
PLATELET # BLD AUTO: 182 10E9/L (ref 150–450)
POTASSIUM SERPL-SCNC: 3.8 MMOL/L (ref 3.4–5.3)
RBC # BLD AUTO: 3.58 10E12/L (ref 3.8–5.2)
SODIUM SERPL-SCNC: 140 MMOL/L (ref 133–144)
WBC # BLD AUTO: 12.3 10E9/L (ref 4–11)

## 2018-05-04 PROCEDURE — 25000128 H RX IP 250 OP 636: Performed by: UROLOGY

## 2018-05-04 PROCEDURE — 80048 BASIC METABOLIC PNL TOTAL CA: CPT | Performed by: UROLOGY

## 2018-05-04 PROCEDURE — 25000132 ZZH RX MED GY IP 250 OP 250 PS 637: Performed by: INTERNAL MEDICINE

## 2018-05-04 PROCEDURE — 85018 HEMOGLOBIN: CPT | Performed by: UROLOGY

## 2018-05-04 PROCEDURE — 83605 ASSAY OF LACTIC ACID: CPT | Performed by: INTERNAL MEDICINE

## 2018-05-04 PROCEDURE — 99207 ZZC CONSULT E&M CHANGED TO INITIAL LEVEL: CPT | Performed by: INTERNAL MEDICINE

## 2018-05-04 PROCEDURE — 97165 OT EVAL LOW COMPLEX 30 MIN: CPT | Mod: GO

## 2018-05-04 PROCEDURE — 40000193 ZZH STATISTIC PT WARD VISIT

## 2018-05-04 PROCEDURE — 99222 1ST HOSP IP/OBS MODERATE 55: CPT | Performed by: INTERNAL MEDICINE

## 2018-05-04 PROCEDURE — 97535 SELF CARE MNGMENT TRAINING: CPT | Mod: GO

## 2018-05-04 PROCEDURE — 40000886 ZZH STATISTIC STEP DOWN HRS DAY

## 2018-05-04 PROCEDURE — 36415 COLL VENOUS BLD VENIPUNCTURE: CPT | Performed by: UROLOGY

## 2018-05-04 PROCEDURE — 12000000 ZZH R&B MED SURG/OB

## 2018-05-04 PROCEDURE — 36415 COLL VENOUS BLD VENIPUNCTURE: CPT | Performed by: INTERNAL MEDICINE

## 2018-05-04 PROCEDURE — 85027 COMPLETE CBC AUTOMATED: CPT | Performed by: UROLOGY

## 2018-05-04 PROCEDURE — 40000133 ZZH STATISTIC OT WARD VISIT

## 2018-05-04 PROCEDURE — 97116 GAIT TRAINING THERAPY: CPT | Mod: GP

## 2018-05-04 PROCEDURE — 00000146 ZZHCL STATISTIC GLUCOSE BY METER IP

## 2018-05-04 PROCEDURE — 97530 THERAPEUTIC ACTIVITIES: CPT | Mod: GP

## 2018-05-04 PROCEDURE — 25000132 ZZH RX MED GY IP 250 OP 250 PS 637: Performed by: UROLOGY

## 2018-05-04 PROCEDURE — 97161 PT EVAL LOW COMPLEX 20 MIN: CPT | Mod: GP

## 2018-05-04 PROCEDURE — 40000884 ZZH STATISTIC STEP DOWN HRS NIGHT

## 2018-05-04 RX ORDER — POTASSIUM CHLORIDE 1500 MG/1
20-40 TABLET, EXTENDED RELEASE ORAL
Status: DISCONTINUED | OUTPATIENT
Start: 2018-05-04 | End: 2018-05-06 | Stop reason: HOSPADM

## 2018-05-04 RX ORDER — POTASSIUM CL/LIDO/0.9 % NACL 10MEQ/0.1L
10 INTRAVENOUS SOLUTION, PIGGYBACK (ML) INTRAVENOUS
Status: DISCONTINUED | OUTPATIENT
Start: 2018-05-04 | End: 2018-05-06 | Stop reason: HOSPADM

## 2018-05-04 RX ORDER — POTASSIUM CHLORIDE 1.5 G/1.58G
20-40 POWDER, FOR SOLUTION ORAL
Status: DISCONTINUED | OUTPATIENT
Start: 2018-05-04 | End: 2018-05-06 | Stop reason: HOSPADM

## 2018-05-04 RX ORDER — POTASSIUM CHLORIDE 29.8 MG/ML
20 INJECTION INTRAVENOUS
Status: DISCONTINUED | OUTPATIENT
Start: 2018-05-04 | End: 2018-05-06 | Stop reason: HOSPADM

## 2018-05-04 RX ORDER — POTASSIUM CHLORIDE 7.45 MG/ML
10 INJECTION INTRAVENOUS
Status: DISCONTINUED | OUTPATIENT
Start: 2018-05-04 | End: 2018-05-06 | Stop reason: HOSPADM

## 2018-05-04 RX ORDER — LABETALOL HYDROCHLORIDE 5 MG/ML
10 INJECTION, SOLUTION INTRAVENOUS
Status: DISCONTINUED | OUTPATIENT
Start: 2018-05-04 | End: 2018-05-06 | Stop reason: HOSPADM

## 2018-05-04 RX ADMIN — SENNOSIDES AND DOCUSATE SODIUM 2 TABLET: 8.6; 5 TABLET ORAL at 08:13

## 2018-05-04 RX ADMIN — CEFAZOLIN SODIUM 1 G: 1 INJECTION, POWDER, FOR SOLUTION INTRAMUSCULAR; INTRAVENOUS at 04:51

## 2018-05-04 RX ADMIN — AMLODIPINE BESYLATE 10 MG: 10 TABLET ORAL at 20:12

## 2018-05-04 RX ADMIN — CEFAZOLIN SODIUM 1 G: 1 INJECTION, POWDER, FOR SOLUTION INTRAMUSCULAR; INTRAVENOUS at 12:02

## 2018-05-04 RX ADMIN — SODIUM CHLORIDE: 9 INJECTION, SOLUTION INTRAVENOUS at 16:45

## 2018-05-04 RX ADMIN — HEPARIN SODIUM 5000 UNITS: 5000 INJECTION, SOLUTION INTRAVENOUS; SUBCUTANEOUS at 20:12

## 2018-05-04 RX ADMIN — ACETAMINOPHEN 975 MG: 325 TABLET ORAL at 08:15

## 2018-05-04 RX ADMIN — CEFAZOLIN SODIUM 1 G: 1 INJECTION, POWDER, FOR SOLUTION INTRAMUSCULAR; INTRAVENOUS at 20:12

## 2018-05-04 RX ADMIN — RANITIDINE 150 MG: 150 TABLET ORAL at 20:17

## 2018-05-04 RX ADMIN — ACETAMINOPHEN 975 MG: 325 TABLET ORAL at 15:48

## 2018-05-04 RX ADMIN — SODIUM CHLORIDE: 9 INJECTION, SOLUTION INTRAVENOUS at 08:12

## 2018-05-04 RX ADMIN — ACETAMINOPHEN 975 MG: 325 TABLET ORAL at 00:15

## 2018-05-04 RX ADMIN — SODIUM CHLORIDE: 9 INJECTION, SOLUTION INTRAVENOUS at 00:14

## 2018-05-04 RX ADMIN — AMLODIPINE BESYLATE 10 MG: 10 TABLET ORAL at 00:15

## 2018-05-04 RX ADMIN — SENNOSIDES AND DOCUSATE SODIUM 2 TABLET: 8.6; 5 TABLET ORAL at 20:12

## 2018-05-04 RX ADMIN — RANITIDINE 150 MG: 150 TABLET ORAL at 08:13

## 2018-05-04 RX ADMIN — OXYCODONE HYDROCHLORIDE 5 MG: 5 TABLET ORAL at 21:05

## 2018-05-04 RX ADMIN — LIDOCAINE 1 PATCH: 560 PATCH PERCUTANEOUS; TOPICAL; TRANSDERMAL at 08:19

## 2018-05-04 RX ADMIN — SENNOSIDES AND DOCUSATE SODIUM 2 TABLET: 8.6; 5 TABLET ORAL at 00:15

## 2018-05-04 RX ADMIN — HEPARIN SODIUM 5000 UNITS: 5000 INJECTION, SOLUTION INTRAVENOUS; SUBCUTANEOUS at 11:55

## 2018-05-04 ASSESSMENT — ACTIVITIES OF DAILY LIVING (ADL): PREVIOUS_RESPONSIBILITIES: MEAL PREP;HOUSEKEEPING;LAUNDRY;SHOPPING;MEDICATION MANAGEMENT;FINANCES

## 2018-05-04 NOTE — ANESTHESIA CARE TRANSFER NOTE
Patient: [facility]    Procedure(s):  DAVINCI XI LEFT NEPHRECTOMY - Wound Class: II-Clean Contaminated    Diagnosis: left non functioning kidney  Diagnosis Additional Information: No value filed.    Anesthesia Type:   General     Note:  Airway :Face Mask  Patient transferred to:PACU  Comments: Extubation / Transfer of Care Note:    Angella Debrum    Spontaneous respirations. Strong and purposeful movement. Suctioned. Extubated awake. O2 via FM.    In PACU. Monitors on. VSS. Report to RN.    5/3/2018    Nori Saravia CRNA    Handoff Report: Identifed the Patient, Identified the Reponsible Provider, Reviewed the pertinent medical history, Discussed the surgical course, Reviewed Intra-OP anesthesia mangement and issues during anesthesia, Set expectations for post-procedure period and Allowed opportunity for questions and acknowledgement of understanding      Vitals: (Last set prior to Anesthesia Care Transfer)    CRNA VITALS  5/3/2018 2135 - 5/3/2018 2208      5/3/2018             Resp Rate (set): 10                Electronically Signed By: BARBARA Jones CRNA  May 3, 2018  10:08 PM

## 2018-05-04 NOTE — PROGRESS NOTES
05/04/18 1540   Quick Adds   Type of Visit Initial Occupational Therapy Evaluation   Living Environment   Lives With child(erum), adult   Living Arrangements apartment   Home Accessibility bed and bath are not on the first floor  (2nd floor walk up apartment. walk in shower )   Transportation Available family or friend will provide   Self-Care   Usual Activity Tolerance good   Current Activity Tolerance moderate   Regular Exercise yes   Activity/Exercise Type walking   Exercise Amount/Frequency daily;30 mins   Equipment Currently Used at Home none   Functional Level Prior   Ambulation 0-->independent   Transferring 0-->independent   Toileting 0-->independent   Bathing 0-->independent   Dressing 0-->independent   Fall history within last six months no   Which of the above functional risks had a recent onset or change? transferring;toileting;bathing;dressing   General Information   Onset of Illness/Injury or Date of Surgery - Date 05/03/18   Referring Physician Randy Rivera MD   Patient/Family Goals Statement Home   Additional Occupational Profile Info/Pertinent History of Current Problem Per chart: Angella St is a 52 year old female who is status post left robotic nephrostomy of nonfunctioning left kidney, due to chronic left hydronephrosis secondary to cervical cancer involvement of left ureter and recurrent pyelonephritis.   Precautions/Limitations fall precautions;abdominal precautions   Cognitive Status Examination   Orientation orientation to person, place and time   Level of Consciousness alert   Visual Perception   Visual Perception Wears glasses   Sensory Examination   Sensory Quick Adds No deficits were identified   Pain Assessment   Patient Currently in Pain Yes, see Vital Sign flowsheet   Mobility   Bed Mobility Bed mobility skill: Sit to supine;Bed mobility skill: Supine to sit   Bed Mobility Skill: Sit to Supine   Level of [place]: Sit/Supine contact guard   Bed Mobility Skill: Supine to Sit  "  Level of [place]: Supine/Sit contact guard   Transfer Skills   Transfer Transfer Safety Analysis Bed/Chair;Transfer Skill: Stand to Sit;Transfer Safety Analysis Sit/Stand   Transfer Skill: Bed to Chair/Chair to Bed   Level of [place]: Bed to Chair contact guard   Transfer Skill: Sit to Stand   Level of [place]: Sit/Stand contact guard   Instrumental Activities of Daily Living (IADL)   Previous Responsibilities meal prep;housekeeping;laundry;shopping;medication management;finances   General Therapy Interventions   Planned Therapy Interventions ADL retraining;IADL retraining;transfer training   Clinical Impression   Criteria for Skilled Therapeutic Interventions Met yes, treatment indicated   OT Diagnosis Decreased endurance for I/ADLs   Influenced by the following impairments Decreased endurance for I/ADLs, pain   Assessment of Occupational Performance 1-3 Performance Deficits   Identified Performance Deficits Decreased endurance for I/ADLs (dressing, bathing, toileting)   Clinical Decision Making (Complexity) Low complexity   Therapy Frequency daily   Predicted Duration of Therapy Intervention (days/wks) 5 days   Anticipated Discharge Disposition Home with Assist   Risks and Benefits of Treatment have been explained. Yes   Patient, Family & other staff in agreement with plan of care Yes   [facility] TM \"6 Clicks\"   2016, Trustees of [facility], under license to [employer].  All rights reserved.   6 Clicks Short Forms Daily Activity Inpatient Short Form   [facility]  \"6 Clicks\" Daily Activity Inpatient Short Form   1. Putting on and taking off regular lower body clothing? 3 - A Little   2. Bathing (including washing, rinsing, drying)? 3 - A Little   3. Toileting, which includes using toilet, bedpan or urinal? 3 - A Little   4. Putting on and taking off regular upper body clothing? 4 - None   5. Taking care of personal grooming such as brushing teeth? 3 - A Little "   6. Eating meals? 4 - None   Daily Activity Raw Score (Score out of 24.Lower scores equate to lower levels of function) 20   Total Evaluation Time   Total Evaluation Time (Minutes) 8

## 2018-05-04 NOTE — PLAN OF CARE
Problem: Patient Care Overview  Goal: Plan of Care/Patient Progress Review  Outcome: No Change  A & O, VSS RA. LS diminished, IS 1000. BS +, flatus +. Pt denies nausea. MERY to bulb suction, draining dark red blood. Lidocaine patch in place on abdomen. Abdominal incision CDI/SARA. Tolerating full liquid diet. Adequate output via brumfield. Tele- NSR. Pt ambulates SBA. Pain managed w/ scheduled tylenol. Pt to transfer to .

## 2018-05-04 NOTE — PROGRESS NOTES
05/04/18 1517   Quick Adds   Type of Visit Initial PT Evaluation   Living Environment   Lives With child(erum), adult   Living Arrangements apartment   Home Accessibility bed and bath are not on the first floor  (2nd floor walk up apartment)   Number of Stairs to Enter Home 14   Living Environment Comment Pt reports she lives with adult children in second floor walk up apartment   Self-Care   Usual Activity Tolerance moderate   Current Activity Tolerance fair   Activity/Exercise/Self-Care Comment Pt reports she is able to participate in activity when she feels up to it.   Functional Level Prior   Ambulation 0-->independent   Transferring 0-->independent   Toileting 0-->independent   Bathing 0-->independent   Dressing 0-->independent   Fall history within last six months no   Prior Functional Level Comment Pt reports she was independent prior to admission without use of assistive device.   General Information   Onset of Illness/Injury or Date of Surgery - Date 05/03/18   Referring Physician Randy Rivera MD   Patient/Family Goals Statement To go home   Pertinent History of Current Problem (include personal factors and/or comorbidities that impact the POC) Pt is 52 year old female adm on 5/3/18 for scheduled L robotic nephrectomy due to non-functioning L kidney and recurrent infections. Pt's PMH significant for cervical ca, HTN, and neuropathy   Weight-Bearing Status - LUE full weight-bearing   Weight-Bearing Status - RUE full weight-bearing   Weight-Bearing Status - LLE full weight-bearing   Weight-Bearing Status - RLE full weight-bearing   Cognitive Status Examination   Orientation orientation to person, place and time   Level of Consciousness alert   Follows Commands and Answers Questions 100% of the time   Personal Safety and Judgment impaired   Pain Assessment   Patient Currently in Pain No  (pt denies pain though grimmacing and guarded throughout)   Posture    Posture Comments Pt initially with forward  "flexed posture but with cueing able to achieve full upright posture   Range of Motion (ROM)   ROM Comment B LE ROM WFL   Strength   Strength Comments Pt able to perform hip flexion and knee extension against gravity   Bed Mobility   Bed Mobility Comments Pt min assist for supine to sit at EOB, HOB elevated, using rail   Transfer Skills   Transfer Comments Pt sit to stand with supervision, increased time, verbal cues for hand placement.   Gait   Gait Comments Pt amb 50 feet with CGA, using IV pole for support, slow gait, no overt LOB.   Balance   Balance Comments No LOB with sitting or standing statically, no LOB with ambulation.   Sensory Examination   Sensory Perception Comments Pt denies numbness/tingling   General Therapy Interventions   Planned Therapy Interventions bed mobility training;gait training;strengthening   Clinical Impression   Criteria for Skilled Therapeutic Intervention yes, treatment indicated   PT Diagnosis Impaired functional independence   Influenced by the following impairments Decreased strength, increased pain, decreased activity tolerance   Functional limitations due to impairments Decreased independence with functional mobility, especially bed mobility   Clinical Presentation Stable/Uncomplicated   Clinical Decision Making (Complexity) Low complexity   Therapy Frequency` 3 times/week   Predicted Duration of Therapy Intervention (days/wks) 5 days   Anticipated Discharge Disposition Home   Risk & Benefits of therapy have been explained Yes   Patient, Family & other staff in agreement with plan of care Yes   [facility] [employer] TM \"6 Clicks\"   2016, Trustees of [facility], under license to [employer].  All rights reserved.   6 Clicks Short Forms Basic Mobility Inpatient Short Form   [facility] [employer]  \"6 Clicks\" V.2 Basic Mobility Inpatient Short Form   1. Turning from your back to your side while in a flat bed without using bedrails? 3 - A Little   2. Moving from lying " on your back to sitting on the side of a flat bed without using bedrails? 3 - A Little   3. Moving to and from a bed to a chair (including a wheelchair)? 3 - A Little   4. Standing up from a chair using your arms (e.g., wheelchair, or bedside chair)? 3 - A Little   5. To walk in hospital room? 3 - A Little   6. Climbing 3-5 steps with a railing? 3 - A Little   Basic Mobility Raw Score (Score out of 24.Lower scores equate to lower levels of function) 18   Total Evaluation Time   Total Evaluation Time (Minutes) 12

## 2018-05-04 NOTE — PROGRESS NOTES
[facility]  Hospitalist Progress Note  Diana Duval MD    Assessment & Plan   Angella St is a 52 year old female who is status post left robotic nephrostomy of nonfunctioning left kidney, due to chronic left hydronephrosis secondary to cervical cancer involvement of left ureter and recurrent pyelonephritis.  Surgery was under general anesthesia and she had a total of 900 cc blood loss. She received 500 cc albumin with over 5 L LR during surgery and 1 unit pRBC post surgery. She has been hemodynamically stable post-op period.      Status post left robotic nephrectomy of nonfunctioning left kidney on 5/3 -   - Routine post-operative care, including pain mgt and DVT prophylaxis, per surgical team.   - Currently on clear liquids.   - On NS @ 125 cc/h     Acute blood loss anemia   Due to above surgery with EBL of 900 ml.  Post-op hgb of 8.5, s/p 1 unit pRBC on 5/3. Follow up hgb 9.3    Recent Labs  Lab 05/04/18  0615 05/03/18  2215 05/03/18  1957   HGB 9.8* 9.3*  9.3* 8.5*     -Cont to monitor Hgb intermittently     H/o recurrent cervical cancer in 2015 with involvement of left ureter  S/p chemotherapy. Per patient she is in remission.      Steroid-induced Mild hyperglycemia   post-op without history of DM. Received 4 mg decadron in pre-op.   - Check A1c  - Low QID SSI ordered      HTN   BP is currently stable.  - Cont to hold PTA lisinopril and HCTZ following nephrectomy, while monitoring kidney function.   - Cont PTA amlodipine 10 mg po daily liberal hold parameter of , given recent acute blood loss.   - PRN labetalol available       # Pain Assessment:  Current Pain Score 5/4/2018   Patient currently in pain? denies   Pain score (0-10) -   Pain location -   Pain descriptors -   Angella moore pain level was assessed and she currently denies pain.        Active Diet Order      Advance Diet as Tolerated: Clear Liquid Diet; Full Liquid Diet      DVT prophylaxis: Heparin SQ ordered    Code  Status: FULL CODE.    Disposition: Per primary surgical team.     No charge for today's visit.    Interval History   Patient is doing well. Surgical pain is controlled.     Data reviewed today: I reviewed all new labs and imaging over the last 24 hours. I personally reviewed the telemetry monitor showing NSR.    Physical Exam   Temp: 98.9  F (37.2  C) Temp src: Oral BP: 123/59   Heart Rate: 88 Resp: 23 SpO2: 96 % O2 Device: None (Room air) Oxygen Delivery: 1 LPM  Vitals:    05/03/18 1114 05/04/18 0600   Weight: 95.3 kg (210 lb) 99.4 kg (219 lb 2.2 oz)     Vital Signs with Ranges  Temp:  [97.2  F (36.2  C)-99.1  F (37.3  C)] 98.9  F (37.2  C)  Heart Rate:  [84-94] 88  Resp:  [15-24] 23  BP: (110-148)/(57-88) 123/59  SpO2:  [96 %-100 %] 96 %  I/O's Last 24 hours  I/O last 3 completed shifts:  In: 7604 [P.O.:950; I.V.:6154]  Out: 2905 [Urine:1450; Drains:555; Blood:900]    Constitutional: Comfortable, no acute distress  HEENT: +conjunctival pallor.  Neurologic: Awake, alert  Neck: Supple, no elevated JVP  Respiratory: Clear to auscultation  Cardiovascular: Normal S1 and S2. Regular rhythm and rate  GI: Abdomen soft, non distended  Extremities: No calf tenderness, no edema  Skin/integument: No acute rash, no cyanosis    Medications   All medications were reviewed.    sodium chloride 125 mL/hr at 05/04/18 0014       acetaminophen  975 mg Oral Q8H     amLODIPine  10 mg Oral QPM     ceFAZolin  1 g Intravenous Q8H     ranitidine  150 mg Oral Q12H    Or     famotidine  20 mg Intravenous Q12H     heparin  5,000 Units Subcutaneous Q8H     insulin aspart  1-3 Units Subcutaneous TID AC     insulin aspart  1-3 Units Subcutaneous At Bedtime     lidocaine  1 patch Transdermal Q24H     lidocaine   Transdermal Q8H     lidocaine   Transdermal Q24h     senna-docusate  1 tablet Oral BID    Or     senna-docusate  2 tablet Oral BID     sodium chloride (PF)  3 mL Intracatheter Q8H        Data     Recent Labs  Lab 05/04/18  0615  05/03/18 2215 05/03/18 1957   WBC 12.3* 13.4*  --    HGB 9.8* 9.3*  9.3* 8.5*   MCV 81 81  --     218  --     141 139   POTASSIUM 3.8 4.0 4.1   CHLORIDE 106 107  --    CO2 26 25  --    BUN 11 12  --    CR 0.67 0.61  --    ANIONGAP 8 9  --    HERON 7.7* 8.0*  --    * 203*  --        Recent Results (from the past 24 hour(s))   XR Chest 1 View    Narrative    CHEST ONE VIEW   5/3/2018 10:52 PM     HISTORY: Postoperative. Rule out pneumothorax. Status post left  robotic nephrectomy.    COMPARISON: None.    FINDINGS: Discoid atelectasis right base. No pneumothorax. Port-A-Cath  tip in the SVC. Left lung clear.      Impression    IMPRESSION: Right basilar atelectasis. No pneumothorax.

## 2018-05-04 NOTE — ANESTHESIA POSTPROCEDURE EVALUATION
Patient: Angella St    Procedure(s):  DAVINCI XI LEFT NEPHRECTOMY - Wound Class: II-Clean Contaminated    Diagnosis:left non functioning kidney  Diagnosis Additional Information: No value filed.    Anesthesia Type:  General    Note:  Anesthesia Post Evaluation    Patient location during evaluation: Bedside  Patient participation: Able to fully participate in evaluation  Level of consciousness: awake  Pain management: adequate  Airway patency: patent  Cardiovascular status: acceptable  Respiratory status: acceptable  Hydration status: acceptable  PONV: none             Last vitals:  Vitals:    05/03/18 1114 05/03/18 2207   BP: 148/88 110/64   Resp: 16 19   Temp: 36.2  C (97.2  F) 36.9  C (98.4  F)   SpO2: 100% 100%         Electronically Signed By: Maverick Oneill MD  May 3, 2018  10:58 PM

## 2018-05-04 NOTE — OR NURSING
1 unit prbc started transfusing at 2314.  Unable to find this unit in epic in order to chart start time of transfusion, notified blood bank and they advised us to use a downtime form to chart the transfusion.

## 2018-05-04 NOTE — PROGRESS NOTES
Urology Progress Note    No acute overnight events. Tolerating clears. Standing at side of bed without issues. One unit of PRBC in PACU.    AVSS, RA  urine output: 525/1700  Drain: 400/205    NAD  HDS  Breathing non-labored on RA  Abdomen soft, NT/ND. Incisions appear c/d/dermabonded    Labs: hemoglobin-9.8 (9.3), creatinine wnl    A/P:   POD#1 Robotic Nephrectomy. Indication: hx of recurrent UTIs/Non-functional kidney  -Neuro: continue po pain meds, will consider toradol if hemoglobin continues to stable today  -CV: HDS  -Pulm: IS  -GI/FEN: Full liq diet for lunch  -: continue brumfield, drain today. Will d/c brumfield tomorrow AM. Will also check amylase/lipase on drain fluid prior to removing drain tomorrow (dissection in close proximity to pancreas).  -Heme/ID: Ancef for 24 hrs and then discontinue, will recheck hemoglobin at 1pm.   -PPx: SCDs, SQH today  -Dispo: d/c likely tomorrow    Patient seen on rounds. Discussed with Dr Rita Rivera, PGY-5

## 2018-05-04 NOTE — PLAN OF CARE
Problem: Patient Care Overview  Goal: Plan of Care/Patient Progress Review  Outcome: Improving  A/O x4. AVSS on RA. Tele NSR. Ambulating halls SBA. Pain managed w/ scheduled Tylenol. LS diminished, IS encouraged. L abd incision, liquid bandage. MERY, bulb to suction, dark red output.+BS, +flatus. Tolerating full liquid diet. Sullivan in place, patent, adequate output.

## 2018-05-04 NOTE — CONSULTS
Hospitalist Consultation      Angella St MRN# [id number]   YOB: 1965 Age: 52 year old   Date of Admission: 5/3/2018     Reason for consult: I was asked by Dr. Randy Rivera  to evaulate this patient for medical mgt of HTN and hyperglycemia.           Assessment and Plan:   Angella St is a 52 year old female who is status post left robotic nephrostomy of nonfunctioning left kidney, due to chronic left hydronephrosis secondary to cervical cancer involvement of left ureter and recurrent pyelonephritis.  Surgery was under general anesthesia and resection was complicated by obliterated surgical plans from recurrent infection making resection difficult.  She had a total of 900 cc blood loss. She received  500 cc albumin with over 5 L LR during surgery and 1 unit pRBC post surgery. She has been hemodynamically stable post-op period.     Status post left robotic nephrectomy of nonfunctioning left kidney on 5/3 -   - Routine post-operative care, including pain mgt and DVT prophylaxis, per surgical team.   - Currently on clear liquids.   - On NS @ 125 cc/h  - Cr 0.61 pre-op, BMP ordered in AM    Acute blood loss anemia - post-op hgb of 8.5, s/p 1 unit pRBC on 5/3  - f/u hgb 9.3  - CBC ordered in AM.     H/o recurrent cervical cancer in 2015 with involvement of left ureter - S/p chemotherapy. Per patient she is in remission.     Steroid-induced Mild hyperglycemia - 203 post-op without history of DM. Received 4 mg decadron in pre-op.   - Check A1c  - Low QID SSI ordered     HTN -  - Holding PTA lisinopril and HCTZ following nephrectomy, while monitoring kidney function.   - PTA amlodipine ordered with liberal hold parameter of , given recent acute blood loss.     DVT prophylaxis  - Heparin SQ ordered    Dispo  - per primary surgical team.              Chief Complaint:   Consulted from surgery regarding hyperglycemia and HTN mgt.          History of Present Illness:   Angella St is a 52 year old  female who is status post left robotic nephrostomy of nonfunctioning left kidney with history recurrent pyelonephritis.  Surgery was under general anesthesia and complicated by chronic scarring around kidney making resection difficult.  She had a total of 900 cc blood loss. She received  500 cc albumin with over 5 L LR during surgery and 1 unit pRBC post surgery.    She has a history of recurrent cervical cancer with  She was first diagnosed in 2011 and underwent total abdominal hysterectomy.  She had recurrence in 2015 with involvement of the left ureter causing chronic left hydronephrosis, requiring chronic percutaneous nephrostomy tube and has suffered recurrent UTIs.  She has had chronic left flank pain associated with this.     During her post op course, vitals have been stable, no hypotension, afebrile, good oxygenation on 2L. She states her pain is controlled.                 Past Medical History:   H/o cervical cancer as above.   Chronic left hydronephrosis with PNT, recurrent pyelonephritis as above.   Hypertension  H/o drug-induced peripheral nephropathy.   No h/o early diabetes.           Past Surgical History:   I have reviewed this patient's past surgical history.   Past Surgical History:   Procedure Laterality Date     COMBINED CYSTOSCOPY, INSERT STENT URETER(S) Left 6/13/2016    Procedure: COMBINED CYSTOSCOPY, INSERT STENT URETER(S);  Surgeon: Deja Salinas MD;  Location: [facility]     CYSTOSCOPY, RETROGRADES, COMBINED Left 6/13/2016    Procedure: COMBINED CYSTOSCOPY, RETROGRADES;  Surgeon: Deja Salinas MD;  Location: [facility] OR     GENITOURINARY SURGERY  5/2015    PNT placement     HYSTERECTOMY  2011    [facility], [place]     PERCUTANEOUS NEPHROSTOMY Left 3/9/2017    Procedure: PERCUTANEOUS NEPHROSTOMY;  Surgeon: Bridger Meyer PA-C;  [facility]     PERCUTANEOUS NEPHROSTOMY Left 6/28/2017    Procedure: PERCUTANEOUS NEPHROSTOMY;  Left Nephrostomy Tube Change;  Surgeon:  Bridger Meyer PA-C;  [facility]: [facility]     PERCUTANEOUS NEPHROSTOMY Left 10/30/2017    Procedure: PERCUTANEOUS NEPHROSTOMY;  Left Nephrostomy Tube Change;  Surgeon: Maryann Chavez PA-C;  Location:  OR          Social History:   She lives with her daughter. Moved here from [facility] in 2015. Her  is visiting from [place] now.   Social History   Substance Use Topics     Smoking status: Never Smoker     Smokeless tobacco: Never Used     Alcohol use No      Comment: Does not drink alcohol             Family History:   I have reviewed this patient's family history.   Family History   Problem Relation Age of Onset     Hypertension Brother              Allergies:   No Known Allergies          Home Medications:     Prescriptions Prior to Admission   Medication Sig Dispense Refill Last Dose     amLODIPine (NORVASC) 10 MG tablet Take 1 tablet (10 mg) by mouth daily (Patient taking differently: Take 10 mg by mouth every evening ) 100 tablet 3 5/2/2018 at PM     enalapril (VASOTEC) 20 MG tablet Take 1 tablet (20 mg) by mouth daily (Patient taking differently: Take 20 mg by mouth every evening ) 100 tablet 3 5/2/2018 at PM     hydrochlorothiazide (HYDRODIURIL) 25 MG tablet Take 1 tablet (25 mg) by mouth daily (Patient taking differently: Take 25 mg by mouth every evening ) 100 tablet 3 5/1/2018 at PM     COMPRESSION STOCKINGS 1 each daily 2 Units 1 Taking            Current Medications:       acetaminophen  975 mg Oral Q8H     amLODIPine  10 mg Oral QPM     ceFAZolin  1 g Intravenous Q8H     ranitidine  150 mg Oral Q12H    Or     famotidine  20 mg Intravenous Q12H     heparin  5,000 Units Subcutaneous Q8H     lidocaine  1 patch Transdermal Q24H     lidocaine   Transdermal Q8H     lidocaine   Transdermal Q24h     senna-docusate  1 tablet Oral BID    Or     senna-docusate  2 tablet Oral BID     sodium chloride (PF)  3 mL Intracatheter Q8H     [START ON 5/6/2018] acetaminophen, sore throat lozenge, hydrALAZINE,  "HYDROmorphone, labetalol, lidocaine 4%, lidocaine 4%, lidocaine (buffered or not buffered), lidocaine (buffered or not buffered), naloxone, nitroGLYcerin, ondansetron **OR** ondansetron, oxyCODONE IR, sodium chloride (PF), sodium chloride (PF)          Review of Systems:   The 10 point Review of Systems is negative other than noted in the HPI                 Physical Exam:   Blood pressure 123/67, temperature 99.1  F (37.3  C), resp. rate 17, height 1.651 m (5' 5\"), weight 95.3 kg (210 lb), SpO2 100 %, not currently breastfeeding.  Constitutional:   awake, alert, cooperative, no apparent distress, and appears stated age     Eyes:   Lids and lashes normal, extra ocular muscles intact, sclera clear, conjunctiva normal     ENT:   Normocephalic, without obvious abnormality, atramatic, oral pharynx with moist mucus membranes, tonsils without erythema or exudates, gums normal and good dentition.     Neck:   Supple, symmetrical, trachea midline, no adenopathy, thyroid symmetric, not enlarged and no tenderness, skin normal     Lungs:   No increased work of breathing, poor air exchange in the lower lung fields, clear to auscultation bilaterally, no crackles or wheezing     Cardiovascular:   Regular rate and rhythm, normal S1 and S2, no S3 or S4, and no murmur noted. Extremities are warm. No edema.      Abdomen:   bowel sounds present, soft, abdominal binder in place.      Musculoskeletal:   There is no redness, warmth, or swelling of the joints. Normal build.      Neurologic:   Awake, alert, oriented to name, place and time.  Cranial nerves II-XII are grossly intact.  Moving a 4 extremities without gross focal weakness     Neuropsychiatric:   General: normal, calm and normal eye contact. Very pleasant affect.      Skin:   no bruising or bleeding, no redness, warmth, or swelling and no rashes            Data:   All laboratory data reviewed    "

## 2018-05-04 NOTE — PROGRESS NOTES
UROLOGY  Dr. Salinas requests transfer to Urology floor. Station 33 and 88 notified.   Maryann Todd PA-C  [facility]

## 2018-05-04 NOTE — PROGRESS NOTES
SPIRITUAL HEALTH SERVICES Progress Note  [facility]     visited pt and her  Giovanny (?) on request. Pt was sitting up in bed and had her Bible on the tray table. Pt and  reported being very strong in their Orthodoxy queenie and well connected and supported by family and queenie community. Pt reported that she was cured of cervical cancer by God through prayer and her queenie. Although pt had a damaged kidney removed yesterday she says that God has all her spare parts and will give her a new one (not by transplant). Pt's spouse reported 8 hours of prayer by family and community for pt yesterday. Pt and spouse shared that their meaning making is based on their queenie in [place] and their understanding of the Bible, including the suffering caused by the activity of Satan.  provided reflective nonjudgmental listening, support and encouragement. Family welcomed prayer. Pt plans to d/c in a few days and have no other needs from .      Wai Strickland M.Div.  Chaplain Resident  [phone] Pager

## 2018-05-04 NOTE — PLAN OF CARE
Problem: Patient Care Overview  Goal: Plan of Care/Patient Progress Review  OT: Evaluation and treatment initiated. Pt lives with her daughter in a 2 level apartment. Prior pt independent in all ADLs and most IADLs.   Discharge Planner OT   Patient plan for discharge: Home  Current status: Pt attempted lower body dressing after education from OT, however reported increase in pain and declined to trial further. Pt went from sit<>stand with CGA and ambulated within room with CGA. Pt transferred to supine with CGA and learned log roll technique.     Barriers to return to prior living situation: pain, current level of assist, stairs (defer to PT)    Recommendations for discharge: Anticipate home with assist for I/ADLs as needed  Rationale for recommendations: Pt will have assist from her daughter at home. Anticipate pt will progress in established OT goal areas.        Entered by: Emma Mendoza 05/04/2018 4:12 PM

## 2018-05-04 NOTE — PLAN OF CARE
Problem: Patient Care Overview  Goal: Plan of Care/Patient Progress Review  Discharge Planner PT   Patient plan for discharge: Home  Current status: Pt is min assist for bed mobility, supervision/CGA for transfers and ambulation, needs increased time for mobility.  Barriers to return to prior living situation: Pt has a flight of stairs to enter her apartment.  Recommendations for discharge: Home with family to assist if needed.  Rationale for recommendations: Pt to physical therapy session well, able to mobilize on POD #1 without much assistance, steady gait. Anticipate pt will continue to progress well. Recommend pt continue to ambulate throughout the day as per nursing unit mobility protocol to further increase activity tolerance and endurance. Recommend another PT session prior to discharge to ensure safety with bed mobility and stairs.       Entered by: Baylee Phan 05/04/2018 3:54 PM

## 2018-05-05 ENCOUNTER — APPOINTMENT (OUTPATIENT)
Dept: OCCUPATIONAL THERAPY | Facility: CLINIC | Age: 53
End: 2018-05-05
Attending: UROLOGY
Payer: MEDICAID

## 2018-05-05 LAB
AMYLASE FLD-CCNC: 57 U/L
ANION GAP SERPL CALCULATED.3IONS-SCNC: 7 MMOL/L (ref 3–14)
BUN SERPL-MCNC: 8 MG/DL (ref 7–30)
CALCIUM SERPL-MCNC: 7.6 MG/DL (ref 8.5–10.1)
CHLORIDE SERPL-SCNC: 110 MMOL/L (ref 94–109)
CO2 SERPL-SCNC: 27 MMOL/L (ref 20–32)
CREAT SERPL-MCNC: 0.64 MG/DL (ref 0.52–1.04)
ERYTHROCYTE [DISTWIDTH] IN BLOOD BY AUTOMATED COUNT: 13.7 % (ref 10–15)
GFR SERPL CREATININE-BSD FRML MDRD: >90 ML/MIN/1.7M2
GLUCOSE BLDC GLUCOMTR-MCNC: 100 MG/DL (ref 70–99)
GLUCOSE BLDC GLUCOMTR-MCNC: 121 MG/DL (ref 70–99)
GLUCOSE BLDC GLUCOMTR-MCNC: 96 MG/DL (ref 70–99)
GLUCOSE BLDC GLUCOMTR-MCNC: 97 MG/DL (ref 70–99)
GLUCOSE SERPL-MCNC: 103 MG/DL (ref 70–99)
HBA1C MFR BLD: 6.2 % (ref 0–6.4)
HCT VFR BLD AUTO: 26.2 % (ref 35–47)
HGB BLD-MCNC: 8.4 G/DL (ref 11.7–15.7)
HGB BLD-MCNC: 8.7 G/DL (ref 11.7–15.7)
MCH RBC QN AUTO: 27.6 PG (ref 26.5–33)
MCHC RBC AUTO-ENTMCNC: 33.2 G/DL (ref 31.5–36.5)
MCV RBC AUTO: 83 FL (ref 78–100)
PLATELET # BLD AUTO: 147 10E9/L (ref 150–450)
POTASSIUM SERPL-SCNC: 3.3 MMOL/L (ref 3.4–5.3)
POTASSIUM SERPL-SCNC: 3.9 MMOL/L (ref 3.4–5.3)
RBC # BLD AUTO: 3.15 10E12/L (ref 3.8–5.2)
SODIUM SERPL-SCNC: 144 MMOL/L (ref 133–144)
SPECIMEN SOURCE FLD: NORMAL
WBC # BLD AUTO: 11 10E9/L (ref 4–11)

## 2018-05-05 PROCEDURE — 36415 COLL VENOUS BLD VENIPUNCTURE: CPT | Performed by: UROLOGY

## 2018-05-05 PROCEDURE — 12000000 ZZH R&B MED SURG/OB

## 2018-05-05 PROCEDURE — 27210995 ZZH RX 272

## 2018-05-05 PROCEDURE — 25000132 ZZH RX MED GY IP 250 OP 250 PS 637: Performed by: INTERNAL MEDICINE

## 2018-05-05 PROCEDURE — 80048 BASIC METABOLIC PNL TOTAL CA: CPT | Performed by: UROLOGY

## 2018-05-05 PROCEDURE — 97535 SELF CARE MNGMENT TRAINING: CPT | Mod: GO

## 2018-05-05 PROCEDURE — 25000128 H RX IP 250 OP 636: Performed by: INTERNAL MEDICINE

## 2018-05-05 PROCEDURE — 84132 ASSAY OF SERUM POTASSIUM: CPT | Performed by: UROLOGY

## 2018-05-05 PROCEDURE — 85018 HEMOGLOBIN: CPT | Performed by: UROLOGY

## 2018-05-05 PROCEDURE — 25000132 ZZH RX MED GY IP 250 OP 250 PS 637: Performed by: UROLOGY

## 2018-05-05 PROCEDURE — 25000128 H RX IP 250 OP 636: Performed by: UROLOGY

## 2018-05-05 PROCEDURE — 85027 COMPLETE CBC AUTOMATED: CPT | Performed by: UROLOGY

## 2018-05-05 PROCEDURE — 00000146 ZZHCL STATISTIC GLUCOSE BY METER IP

## 2018-05-05 PROCEDURE — 83036 HEMOGLOBIN GLYCOSYLATED A1C: CPT | Performed by: UROLOGY

## 2018-05-05 PROCEDURE — 82150 ASSAY OF AMYLASE: CPT | Performed by: UROLOGY

## 2018-05-05 PROCEDURE — 40000133 ZZH STATISTIC OT WARD VISIT

## 2018-05-05 PROCEDURE — 99232 SBSQ HOSP IP/OBS MODERATE 35: CPT | Performed by: INTERNAL MEDICINE

## 2018-05-05 RX ORDER — KETOROLAC TROMETHAMINE 15 MG/ML
15 INJECTION, SOLUTION INTRAMUSCULAR; INTRAVENOUS EVERY 6 HOURS
Status: DISCONTINUED | OUTPATIENT
Start: 2018-05-05 | End: 2018-05-06 | Stop reason: HOSPADM

## 2018-05-05 RX ORDER — WATER 10 ML/10ML
INJECTION INTRAMUSCULAR; INTRAVENOUS; SUBCUTANEOUS
Status: COMPLETED
Start: 2018-05-05 | End: 2018-05-05

## 2018-05-05 RX ORDER — HEPARIN SODIUM,PORCINE 10 UNIT/ML
5-10 VIAL (ML) INTRAVENOUS
Status: DISCONTINUED | OUTPATIENT
Start: 2018-05-05 | End: 2018-05-06 | Stop reason: HOSPADM

## 2018-05-05 RX ORDER — LIDOCAINE 40 MG/G
CREAM TOPICAL
Status: DISCONTINUED | OUTPATIENT
Start: 2018-05-05 | End: 2018-05-06 | Stop reason: HOSPADM

## 2018-05-05 RX ORDER — HEPARIN SODIUM,PORCINE 10 UNIT/ML
5-10 VIAL (ML) INTRAVENOUS EVERY 24 HOURS
Status: DISCONTINUED | OUTPATIENT
Start: 2018-05-05 | End: 2018-05-06 | Stop reason: HOSPADM

## 2018-05-05 RX ORDER — HEPARIN SODIUM (PORCINE) LOCK FLUSH IV SOLN 100 UNIT/ML 100 UNIT/ML
5 SOLUTION INTRAVENOUS
Status: DISCONTINUED | OUTPATIENT
Start: 2018-05-05 | End: 2018-05-06 | Stop reason: HOSPADM

## 2018-05-05 RX ADMIN — HEPARIN SODIUM 5000 UNITS: 5000 INJECTION, SOLUTION INTRAVENOUS; SUBCUTANEOUS at 13:00

## 2018-05-05 RX ADMIN — HEPARIN, PORCINE (PF) 10 UNIT/ML INTRAVENOUS SYRINGE 5 ML: at 15:47

## 2018-05-05 RX ADMIN — HEPARIN SODIUM 5000 UNITS: 5000 INJECTION, SOLUTION INTRAVENOUS; SUBCUTANEOUS at 21:32

## 2018-05-05 RX ADMIN — HEPARIN SODIUM 5000 UNITS: 5000 INJECTION, SOLUTION INTRAVENOUS; SUBCUTANEOUS at 04:12

## 2018-05-05 RX ADMIN — POTASSIUM CHLORIDE 40 MEQ: 1500 TABLET, EXTENDED RELEASE ORAL at 08:48

## 2018-05-05 RX ADMIN — HEPARIN, PORCINE (PF) 10 UNIT/ML INTRAVENOUS SYRINGE 5 ML: at 11:10

## 2018-05-05 RX ADMIN — RANITIDINE 150 MG: 150 TABLET ORAL at 21:28

## 2018-05-05 RX ADMIN — WATER 20 ML: 1 INJECTION INTRAMUSCULAR; INTRAVENOUS; SUBCUTANEOUS at 20:07

## 2018-05-05 RX ADMIN — ACETAMINOPHEN 975 MG: 325 TABLET ORAL at 00:11

## 2018-05-05 RX ADMIN — SENNOSIDES AND DOCUSATE SODIUM 2 TABLET: 8.6; 5 TABLET ORAL at 21:28

## 2018-05-05 RX ADMIN — HEPARIN, PORCINE (PF) 10 UNIT/ML INTRAVENOUS SYRINGE 5 ML: at 13:30

## 2018-05-05 RX ADMIN — POTASSIUM CHLORIDE 20 MEQ: 1500 TABLET, EXTENDED RELEASE ORAL at 11:22

## 2018-05-05 RX ADMIN — AMLODIPINE BESYLATE 10 MG: 10 TABLET ORAL at 21:28

## 2018-05-05 RX ADMIN — ALTEPLASE 2 MG: 2.2 INJECTION, POWDER, LYOPHILIZED, FOR SOLUTION INTRAVENOUS at 20:07

## 2018-05-05 RX ADMIN — OXYCODONE HYDROCHLORIDE 5 MG: 5 TABLET ORAL at 06:55

## 2018-05-05 RX ADMIN — HEPARIN, PORCINE (PF) 10 UNIT/ML INTRAVENOUS SYRINGE 5 ML: at 21:01

## 2018-05-05 RX ADMIN — SENNOSIDES AND DOCUSATE SODIUM 2 TABLET: 8.6; 5 TABLET ORAL at 08:27

## 2018-05-05 RX ADMIN — OXYCODONE HYDROCHLORIDE 10 MG: 5 TABLET ORAL at 21:28

## 2018-05-05 RX ADMIN — ACETAMINOPHEN 975 MG: 325 TABLET ORAL at 16:01

## 2018-05-05 RX ADMIN — KETOROLAC TROMETHAMINE 15 MG: 15 INJECTION, SOLUTION INTRAMUSCULAR; INTRAVENOUS at 10:20

## 2018-05-05 RX ADMIN — SODIUM CHLORIDE: 9 INJECTION, SOLUTION INTRAVENOUS at 01:14

## 2018-05-05 RX ADMIN — LIDOCAINE 1 PATCH: 560 PATCH PERCUTANEOUS; TOPICAL; TRANSDERMAL at 08:30

## 2018-05-05 RX ADMIN — RANITIDINE 150 MG: 150 TABLET ORAL at 08:27

## 2018-05-05 RX ADMIN — ACETAMINOPHEN 975 MG: 325 TABLET ORAL at 08:27

## 2018-05-05 ASSESSMENT — PAIN DESCRIPTION - DESCRIPTORS
DESCRIPTORS: DISCOMFORT
DESCRIPTORS: SHARP

## 2018-05-05 NOTE — PROVIDER NOTIFICATION
MD Notification:    Person notified: Hospitalist    Person Name:   Rinayadira    Date/Time: 5/5/18 at 1115    Interaction: pager    Purpose of Notification: Lab stated they cannot test for lipase fluids from MERY. The only test they can do is lipase of the blood.     Orders Received: Do not worry about lipase. It is not needed.  Remove brumfield and advance diet to regular. Try to avoid IV pain meds.     Comments: plan for d/c tomorrow.

## 2018-05-05 NOTE — PROVIDER NOTIFICATION
MD Notification:    Person notified: Urology    Person Name:     Date/Time: 5/5/18 at 1558    Interaction: pager    Purpose of Notification:  Hemoglobin of 8.4, trending down.   Standing Blood transfusion protocol?    Orders Received: awaiting call back. Incoming nurse notified about writer notifying the Urology about the hemoglobin trending down .     Comments:

## 2018-05-05 NOTE — PROGRESS NOTES
Amylase fluids sent to lab at about 0855. Attempted to test for amylase as well, however the  stated they are not able to test lipase fluids . The can only test for lipase in blood per lab report. Will update Md.

## 2018-05-05 NOTE — PROGRESS NOTES
[facility]  Hospitalist Progress Note  Diana Duval MD    Assessment & Plan   Angella St is a 52 year old female who is status post left robotic nephrostomy of nonfunctioning left kidney, due to chronic left hydronephrosis secondary to cervical cancer involvement of left ureter and recurrent pyelonephritis.  Surgery was under general anesthesia and she had a total of 900 cc blood loss. She received 500 cc albumin with over 5 L LR during surgery and 1 unit pRBC post surgery. She has been hemodynamically stable post-op period.      Status post left robotic nephrectomy of nonfunctioning left kidney on 5/3, POD# 2.  Stable.  - Continue routine post-operative care, including pain mgt and DVT prophylaxis, per surgical team.      Acute blood loss anemia   Due to above surgery with EBL of 900 ml.  Post-op hgb of 8.5, s/p 1 unit pRBC on 5/3. Hgb 8.7 today.    Recent Labs  Lab 05/05/18  0710 05/04/18  1127 05/04/18  0615 05/03/18  2215 05/03/18  1957   HGB 8.7* 10.0* 9.8* 9.3*  9.3* 8.5*     -Cont to monitor Hgb intermittently    Acute thrombocytopenia:  Partly dilutional.    Recent Labs  Lab 05/05/18  0710 05/04/18  0615 05/03/18  2215   * 182 218     - Cont to monitor PLT count    Hypokalemia:  -Replace per protocol     H/o recurrent cervical cancer in 2015 with involvement of left ureter  S/p chemotherapy. Per patient she is in remission.      Prediabetes/steroid-induced mild hyperglycemia  Non-morbid obesity with BMI of 37   post-op without history of DM. Received 4 mg decadron in pre-op.   Hgb A1c 6.2% on 05/05.  - Cont low QID SSI  - Follow up with PCP for optimal management (discussed non-medical management just as exercise and weight loss)     HTN   BP is currently stable.  - Cont to hold PTA lisinopril and HCTZ following nephrectomy, while monitoring kidney function.   - Cont PTA amlodipine 10 mg po daily with liberal hold parameter of , given recent acute blood loss.    - PRN labetalol available       # Pain Assessment:  Current Pain Score 5/5/2018   Patient currently in pain? -   Pain score (0-10) 4   Pain location -   Pain descriptors -   Angella moore pain level was assessed and she currently denies pain.        Active Diet Order      Regular Diet Adult    DVT prophylaxis: Heparin SQ ordered    Code Status: FULL CODE.    Disposition: Per primary surgical team. Plan for discharge tomorrow.    D/W RN.    Interval History   Patient is doing well. Surgical pain is controlled. BG is 121 this am. No event overnight.    Data reviewed today: I reviewed all new labs and imaging over the last 24 hours. I personally reviewed the telemetry monitor showing NSR.    Physical Exam   Temp: 98.2  F (36.8  C) Temp src: Oral BP: 104/54   Heart Rate: 78 Resp: 16 SpO2: 94 % O2 Device: None (Room air)    Vitals:    05/03/18 1114 05/04/18 0600 05/05/18 0500   Weight: 95.3 kg (210 lb) 99.4 kg (219 lb 2.2 oz) 101 kg (222 lb 11.2 oz)     Vital Signs with Ranges  Temp:  [97.7  F (36.5  C)-99.6  F (37.6  C)] 98.2  F (36.8  C)  Heart Rate:  [] 78  Resp:  [11-22] 16  BP: ()/(44-88) 104/54  SpO2:  [93 %-100 %] 94 %  I/O's Last 24 hours  I/O last 3 completed shifts:  In: 3522 [P.O.:480; I.V.:3042]  Out: 5175 [Urine:4875; Drains:300]    Constitutional: Comfortable, no acute distress  HEENT: +conjunctival pallor.  Neurologic: Awake, alert  Neck: Supple, no elevated JVP  Respiratory: Clear to auscultation  Cardiovascular: Normal S1 and S2. Regular rhythm and rate  GI: Abdomen soft,bowel sounds active  Extremities: No calf tenderness, no significant LE edema  Skin/integument: No acute rash, no cyanosis    Medications   All medications were reviewed.      acetaminophen  975 mg Oral Q8H     amLODIPine  10 mg Oral QPM     ranitidine  150 mg Oral Q12H    Or     famotidine  20 mg Intravenous Q12H     heparin  5 mL Intracatheter Q28 Days     heparin lock flush  5-10 mL Intracatheter Q24H     heparin  5,000 Units  Subcutaneous Q8H     insulin aspart  1-3 Units Subcutaneous TID AC     insulin aspart  1-3 Units Subcutaneous At Bedtime     ketorolac  15 mg Intravenous Q6H     lidocaine  1 patch Transdermal Q24H     lidocaine   Transdermal Q8H     lidocaine   Transdermal Q24h     senna-docusate  1 tablet Oral BID    Or     senna-docusate  2 tablet Oral BID     sodium chloride (PF)  3 mL Intracatheter Q8H        Data     Recent Labs  Lab 05/05/18  0710 05/04/18  1127 05/04/18  0615 05/03/18  2215   WBC 11.0  --  12.3* 13.4*   HGB 8.7* 10.0* 9.8* 9.3*  9.3*   MCV 83  --  81 81   *  --  182 218     --  140 141   POTASSIUM 3.3*  --  3.8 4.0   CHLORIDE 110*  --  106 107   CO2 27  --  26 25   BUN 8  --  11 12   CR 0.64  --  0.67 0.61   ANIONGAP 7  --  8 9   HERON 7.6*  --  7.7* 8.0*   *  --  151* 203*       No results found for this or any previous visit (from the past 24 hour(s)).

## 2018-05-05 NOTE — PLAN OF CARE
Problem: Patient Care Overview  Goal: Plan of Care/Patient Progress Review  A/O x 4. VSS on RA.  Rates pain maximum 6/10 with movement. Oxy given x 1 with good relief. Scheduled tylenol. Infrequent cough. IS at bedside.  Liquid bandage over L. abdominal incision. MERY dressing with sm. Amount of dried drainaige.  MERY patent, draining sanguinous fluid.   R. PIV infusing NS at 100.  SBA, ambulated to bathroom. Likely DC today pending drain and brumfield removal.

## 2018-05-05 NOTE — PROGRESS NOTES
Urology   No acute events overnight  Pain well controlled.  Tolerating full liquids.  No n/v.  Ambulating.    AVSS  UOP 4875  MERY 300    NAD  Abd soft.  NT.  ND.  Inc CDI.  No erythema or drainage.  Sullivan draining clear, yellow urine.  MERY serosang  Ext NT, no edema    A/P 52 year old female POD #2 s/p left robotic nephrectomy. .    - Pain control: prn PO pain medications  - Diet: ADAT  -  drains/tubes: no evidence of pancreatic leak and will remove MERY prior to discharge; remove Sullivan today  - Dispo:d/c home later today or tomorrow    Joshua Minor MD  Urology  [facility]  Pager [phone]

## 2018-05-05 NOTE — PROGRESS NOTES
Report given to incoming nurse that Potassium was not drawn at 1530 due to not being able to get blood return. Md notified and Alteplase requested.  Nurse taking over was notified, and informed about need for potassium recheck, since it was replaced today.

## 2018-05-05 NOTE — PLAN OF CARE
Problem: Patient Care Overview  Goal: Plan of Care/Patient Progress Review  Outcome: Improving  POD2. VSS. A and O X 4. Sullivan patent with good urine output. Sullivan removed at about 1330 per Md order.  Due to void at 1730. Advanced to regular diet. SBA. IV fluids d/c.  IS encouraged. Abdominal incision present: CDI with liquid bandages present. MERY draining small amounts of dark red fluids. Fluids tested for amylase: results wdl.  Port accessed today. Diet was advanced to regular today: tolerating well. Hemoglobin rechecked in afternoon: pending results.  Potassium replaced today. Scheduled for potassium recheck at 1530 and again tomorrow am. Continue to monitor. Plan to discharge tomorrow, pending better pain control and normalization of potassium and hemoglobin. Continue to monitor.

## 2018-05-05 NOTE — PLAN OF CARE
Problem: Patient Care Overview  Goal: Plan of Care/Patient Progress Review  Discharge Planner OT   Patient plan for discharge: home with assist from family prn    Current status: Therapist educated patient on AE for LB dressing including reacher, sock aide. Patient donned/doffed socks SBA with verbal cues for technique. Therapist educated patient on different vendors to purchase and provided written handout- patient stating family will be able to assist with dressing prn. sit-stand SBA with increased time/effort. ambulated in hallway ~ 100 feets with increased time/effort and increased pain observed. Fatigued at end of session. returned to chair SBA. call light within reach.   Barriers to return to prior living situation: increased pain, post surgical precautions   Recommendations for discharge: home with assist from family/friends   Rationale for recommendations: Patient requires increased time/effort for tasks, limited by pain, would benefit from family assist in ADLs/IADLs prn.        Entered by: Marie Shin 05/05/2018 10:07 AM

## 2018-05-05 NOTE — PLAN OF CARE
Problem: Patient Care Overview  Goal: Plan of Care/Patient Progress Review  Outcome: Improving  Pt is a/ox4. VSS on room air. A-1. Sullivan patent, plan to remove in AM. Pt reports numbness and tingling of right foot at baseline. Pain controlled with 5mg at 2100 and denied pain for remainder of night.

## 2018-05-05 NOTE — PROVIDER NOTIFICATION
MD Notification:    Person notified: Hospitalist    Person Name: Dr. Duval    Date/Time: 5/5/18 ar 1731    Interaction: pager    Purpose of Notification: Pt's hemoglobin dropped to 8.4. Also, Alteplase requested as pt's port has no blood return when attempting to check potassium level at 1530.     Orders Received:  Alteplase ordered by MD. Notify Urology about hemoglobin. Will recheck hemoglobin again in am.    Comments: Report given to incoming nurse that Potassium was not drawn due to not being able to get blood return.

## 2018-05-06 VITALS
RESPIRATION RATE: 16 BRPM | OXYGEN SATURATION: 97 % | HEIGHT: 65 IN | SYSTOLIC BLOOD PRESSURE: 122 MMHG | WEIGHT: 226.2 LBS | BODY MASS INDEX: 37.69 KG/M2 | DIASTOLIC BLOOD PRESSURE: 59 MMHG | TEMPERATURE: 98.6 F

## 2018-05-06 LAB
ERYTHROCYTE [DISTWIDTH] IN BLOOD BY AUTOMATED COUNT: 13.6 % (ref 10–15)
GLUCOSE BLDC GLUCOMTR-MCNC: 131 MG/DL (ref 70–99)
GLUCOSE BLDC GLUCOMTR-MCNC: 99 MG/DL (ref 70–99)
HCT VFR BLD AUTO: 24.8 % (ref 35–47)
HGB BLD-MCNC: 8.2 G/DL (ref 11.7–15.7)
MCH RBC QN AUTO: 27.6 PG (ref 26.5–33)
MCHC RBC AUTO-ENTMCNC: 33.1 G/DL (ref 31.5–36.5)
MCV RBC AUTO: 84 FL (ref 78–100)
PLATELET # BLD AUTO: 158 10E9/L (ref 150–450)
POTASSIUM SERPL-SCNC: 3.7 MMOL/L (ref 3.4–5.3)
RBC # BLD AUTO: 2.97 10E12/L (ref 3.8–5.2)
WBC # BLD AUTO: 11 10E9/L (ref 4–11)

## 2018-05-06 PROCEDURE — 25000128 H RX IP 250 OP 636: Performed by: UROLOGY

## 2018-05-06 PROCEDURE — 25000132 ZZH RX MED GY IP 250 OP 250 PS 637: Performed by: UROLOGY

## 2018-05-06 PROCEDURE — 00000146 ZZHCL STATISTIC GLUCOSE BY METER IP

## 2018-05-06 PROCEDURE — 85027 COMPLETE CBC AUTOMATED: CPT | Performed by: UROLOGY

## 2018-05-06 PROCEDURE — 84132 ASSAY OF SERUM POTASSIUM: CPT | Performed by: UROLOGY

## 2018-05-06 PROCEDURE — 99232 SBSQ HOSP IP/OBS MODERATE 35: CPT | Performed by: INTERNAL MEDICINE

## 2018-05-06 RX ORDER — HEPARIN SODIUM,PORCINE 10 UNIT/ML
5-10 VIAL (ML) INTRAVENOUS
Status: DISCONTINUED | OUTPATIENT
Start: 2018-05-06 | End: 2018-05-06 | Stop reason: HOSPADM

## 2018-05-06 RX ORDER — HEPARIN SODIUM (PORCINE) LOCK FLUSH IV SOLN 100 UNIT/ML 100 UNIT/ML
5 SOLUTION INTRAVENOUS
Status: DISCONTINUED | OUTPATIENT
Start: 2018-05-06 | End: 2018-05-06 | Stop reason: HOSPADM

## 2018-05-06 RX ORDER — HEPARIN SODIUM,PORCINE 10 UNIT/ML
5-10 VIAL (ML) INTRAVENOUS EVERY 24 HOURS
Status: DISCONTINUED | OUTPATIENT
Start: 2018-05-06 | End: 2018-05-06 | Stop reason: HOSPADM

## 2018-05-06 RX ORDER — AMOXICILLIN 250 MG
1 CAPSULE ORAL 2 TIMES DAILY PRN
Qty: 20 TABLET | Refills: 1 | Status: SHIPPED | OUTPATIENT
Start: 2018-05-06 | End: 2018-05-25

## 2018-05-06 RX ORDER — OXYCODONE HYDROCHLORIDE 5 MG/1
5-10 TABLET ORAL
Qty: 20 TABLET | Refills: 0 | Status: SHIPPED | OUTPATIENT
Start: 2018-05-06 | End: 2018-05-25

## 2018-05-06 RX ADMIN — LIDOCAINE 1 PATCH: 560 PATCH PERCUTANEOUS; TOPICAL; TRANSDERMAL at 08:37

## 2018-05-06 RX ADMIN — ACETAMINOPHEN 975 MG: 325 TABLET ORAL at 08:36

## 2018-05-06 RX ADMIN — HEPARIN SODIUM 5000 UNITS: 5000 INJECTION, SOLUTION INTRAVENOUS; SUBCUTANEOUS at 05:08

## 2018-05-06 RX ADMIN — HEPARIN, PORCINE (PF) 10 UNIT/ML INTRAVENOUS SYRINGE 5 ML: at 06:43

## 2018-05-06 RX ADMIN — HEPARIN, PORCINE (PF) 10 UNIT/ML INTRAVENOUS SYRINGE 5 ML: at 05:19

## 2018-05-06 RX ADMIN — HEPARIN SODIUM 5000 UNITS: 5000 INJECTION, SOLUTION INTRAVENOUS; SUBCUTANEOUS at 11:41

## 2018-05-06 RX ADMIN — ACETAMINOPHEN 975 MG: 325 TABLET ORAL at 00:32

## 2018-05-06 RX ADMIN — SENNOSIDES AND DOCUSATE SODIUM 2 TABLET: 8.6; 5 TABLET ORAL at 08:37

## 2018-05-06 RX ADMIN — KETOROLAC TROMETHAMINE 15 MG: 15 INJECTION, SOLUTION INTRAMUSCULAR; INTRAVENOUS at 05:13

## 2018-05-06 RX ADMIN — HEPARIN, PORCINE (PF) 10 UNIT/ML INTRAVENOUS SYRINGE 5 ML: at 00:35

## 2018-05-06 RX ADMIN — HEPARIN 5 ML: 100 SYRINGE at 11:59

## 2018-05-06 RX ADMIN — RANITIDINE 150 MG: 150 TABLET ORAL at 08:37

## 2018-05-06 RX ADMIN — OXYCODONE HYDROCHLORIDE 5 MG: 5 TABLET ORAL at 10:51

## 2018-05-06 ASSESSMENT — PAIN DESCRIPTION - DESCRIPTORS: DESCRIPTORS: SHARP

## 2018-05-06 NOTE — PLAN OF CARE
Problem: Patient Care Overview  Goal: Plan of Care/Patient Progress Review  Outcome: Improving  POD-2, A & O times four. VSS, pain well controlled, incision open to air, MERY drain patent. Up with one and ambulated on the [address]illi[address] way. Tele NSR. Tolerating diet and passing gas. After cath was dc'd, she was able to void without difficulty. Potassium after replacement WDL. Also, hgb result was communicated with MD and it is stable. At the start of the shift, unable to draw blood from the port, but after Alteplase was used, it is working ( able to return blood). Continue to monitor.

## 2018-05-06 NOTE — PLAN OF CARE
Problem: Patient Care Overview  Goal: Plan of Care/Patient Progress Review  Outcome: Adequate for Discharge Date Met: 05/06/18  POD3. VSS. A and O X 4.Pt able to void without difficulties this shift.  Tolerating regular diet well: no nausea reported. Bowel sounds active all 4 quadrants. [place] sounds clear.   IS encouraged.  MERY drain removed without difficulties. Area covered with sterile gauze.  Abdominal incision present: CDI with liquid bandages present. Port de-accessed and heparin locked per protocol.   Hemoglobin rechecked this am : 8.2. Urology was notified. Per urology, no new orders are needed as pt is stable.   Potassium wdl this am. Tele: NSR.  No insulin coverage needed this shift. Pt ambulated multiple times today: tolerating activity well. Pt is ready for discharge. All  Discharge educations completed, and all questions answered. Medications refilled and provided to pt at time of discharge. Pt educated on side effects of meds. Pt also educated on need to follow up with Surgery and health care provider.   All belongings with pt. Pt denies pain at time of discharge. Family will provide pt ride to home. Pt to be transported by NA at time of discharge by wheelchair. Pt in stable conditions at time of discharge.

## 2018-05-06 NOTE — PROGRESS NOTES
Urology   No acute events overnight  Pain well controlled.  Tolerating regular diet.  No n/v.  Passing flatus  Ambulating.    AVSS  UOP 2550  MERY 105    NAD  Abd soft.  NT.  ND.  Inc CDI.  No erythema or drainage.  MERY serosang  Ext NT, no edema    A/P 52 year old female POD #3 s/p left robotic nephrectomy.    - Pain control: prn PO pain medications  - Diet: regular diet  -  drains/tubes: remove MERY  - Dispo:d/c home later today      Joshua Minor MD  Urology  [facility]  Pager [phone]

## 2018-05-06 NOTE — PLAN OF CARE
"Problem: Patient Care Overview  Goal: Plan of Care/Patient Progress Review  Outcome: No Change  Patient POD 3 L. Nephrectomy. A&Ox4, pleasant. VSS. C/o sharp pain at a \"4\" in ABD incision area; scheduled Tylenol and Toradol given. Has PRN Oxycodone available, did not give on shift. Tele: NSR. Infrequent productive fair cough; scant sputum clear with red streaks. Used IS on shift. Liquid bandage over incision areas, no drainage. MERY on left side, with small amount of dark red drainage. Dressing c/d/i. Urinating well since brumfield was removed at 1330 on 5/5. Urine clear yellow. BS active, passing flatus. No BM since surgery. No c/o N/V. Port on right upper chest, heparin locked. Blood return. HGB and Potassium to be rechecked this AM. SBA, ambulated to bathroom on shift. Continue to monitor. Plan to discharge today if pain is better controlled and HGB and potassium normal.       "

## 2018-05-06 NOTE — PROGRESS NOTES
[facility]  Hospitalist Progress Note  Diana Duval MD    Assessment & Plan   Angella St is a 52 year old female who is status post left robotic nephrostomy of nonfunctioning left kidney, due to chronic left hydronephrosis secondary to cervical cancer involvement of left ureter and recurrent pyelonephritis.  Surgery was under general anesthesia and she had a total of 900 cc blood loss. She received 500 cc albumin with over 5 L LR during surgery and 1 unit pRBC post surgery. Hospitalist is consulted to assist with medical management.     Status post left robotic nephrectomy of nonfunctioning left kidney on 5/3, POD# 3.  Stable.  - Continue routine post-operative care, including pain mgt and DVT prophylaxis, per surgical team.      Acute blood loss anemia   Due to above surgery with EBL of 900 ml.  Post-op hgb of 8.5, s/p 1 unit pRBC on 5/3. Hgb 8.2 today.    Recent Labs  Lab 05/06/18  0642 05/05/18  1340 05/05/18  0710 05/04/18  1127 05/04/18  0615 05/03/18  2215 05/03/18  1957   HGB 8.2* 8.4* 8.7* 10.0* 9.8* 9.3*  9.3* 8.5*     -Cont to monitor Hgb intermittently    Acute thrombocytopenia:  Partly dilutional. Resolved.    Recent Labs  Lab 05/06/18  0642 05/05/18  0710 05/04/18  0615 05/03/18  2215    147* 182 218       Hypokalemia:  -Replaced per protocol     H/o recurrent cervical cancer in 2015 with involvement of left ureter  S/p chemotherapy. Per patient she is in remission.      Prediabetes/steroid-induced mild hyperglycemia  Non-morbid obesity with BMI of 37   post-op without history of DM. Received 4 mg decadron in pre-op.   Hgb A1c 6.2% on 05/05.  - Cont low QID SSI  - Follow up with PCP for optimal management (discussed non-medical management such as exercise and weight loss)     HTN   BP is currently stable.  - Cont PTA amlodipine 10 mg po daily with liberal hold parameter of , given recent acute blood loss.   - Cont to hold PTA lisinopril and HCTZ as BP has  remained stable. Discussed with the patient and she is to follow up with PCP on 05/22 for re-assessment.  - PRN labetalol available       # Pain Assessment:  Current Pain Score 5/6/2018   Patient currently in pain? -   Pain score (0-10) 0   Pain location -   Pain descriptors -   Angella moore pain level was assessed and she currently denies pain.        Active Diet Order      Regular Diet Adult    DVT prophylaxis: Heparin SQ ordered    Code Status: FULL CODE.    Disposition: Per primary surgical team. Plan for discharge today. Stable from medical standpoint for discharge.    D/W RN.    Interval History   Patient is doing well. Surgical pain is controlled. BG is controlled. No event overnight.    Data reviewed today: I reviewed all new labs and imaging over the last 24 hours. I personally reviewed the telemetry monitor showing NSR.    Physical Exam   Temp: 98.1  F (36.7  C) Temp src: Oral BP: 104/53   Heart Rate: 71 Resp: 16 SpO2: 94 % O2 Device: None (Room air)    Vitals:    05/04/18 0600 05/05/18 0500 05/06/18 0636   Weight: 99.4 kg (219 lb 2.2 oz) 101 kg (222 lb 11.2 oz) 102.6 kg (226 lb 3.2 oz)     Vital Signs with Ranges  Temp:  [96.2  F (35.7  C)-98.3  F (36.8  C)] 98.1  F (36.7  C)  Heart Rate:  [71-85] 71  Resp:  [16-18] 16  BP: (104-121)/(49-70) 104/53  SpO2:  [94 %-96 %] 94 %  I/O's Last 24 hours  I/O last 3 completed shifts:  In: 510 [P.O.:510]  Out: 2655 [Urine:2550; Drains:105]    Constitutional: Comfortable, no acute distress  HEENT: +conjunctival pallor.  Neurologic: Awake, alert, fully oriented  Neck: Supple, no elevated JVP  Respiratory: Clear to auscultation  Cardiovascular: Normal S1 and S2. Regular rhythm and rate  GI: Abdomen soft,bowel sounds active  Extremities: No calf tenderness, no significant LE edema  Skin/integument: No acute rash, no cyanosis    Medications   All medications were reviewed.      acetaminophen  975 mg Oral Q8H     amLODIPine  10 mg Oral QPM     ranitidine  150 mg Oral Q12H    Or      famotidine  20 mg Intravenous Q12H     heparin  5 mL Intracatheter Q28 Days     heparin lock flush  5-10 mL Intracatheter Q24H     heparin  5,000 Units Subcutaneous Q8H     insulin aspart  1-3 Units Subcutaneous TID AC     insulin aspart  1-3 Units Subcutaneous At Bedtime     ketorolac  15 mg Intravenous Q6H     lidocaine  1 patch Transdermal Q24H     lidocaine   Transdermal Q8H     lidocaine   Transdermal Q24h     senna-docusate  1 tablet Oral BID    Or     senna-docusate  2 tablet Oral BID     sodium chloride (PF)  3 mL Intracatheter Q8H        Data     Recent Labs  Lab 05/06/18  0642 05/05/18  1618 05/05/18  1340 05/05/18  0710  05/04/18  0615 05/03/18  2215   WBC 11.0  --   --  11.0  --  12.3* 13.4*   HGB 8.2*  --  8.4* 8.7*  < > 9.8* 9.3*  9.3*   MCV 84  --   --  83  --  81 81     --   --  147*  --  182 218   NA  --   --   --  144  --  140 141   POTASSIUM 3.7 3.9  --  3.3*  --  3.8 4.0   CHLORIDE  --   --   --  110*  --  106 107   CO2  --   --   --  27  --  26 25   BUN  --   --   --  8  --  11 12   CR  --   --   --  0.64  --  0.67 0.61   ANIONGAP  --   --   --  7  --  8 9   HERON  --   --   --  7.6*  --  7.7* 8.0*   GLC  --   --   --  103*  --  151* 203*   < > = values in this interval not displayed.    No results found for this or any previous visit (from the past 24 hour(s)).

## 2018-05-07 LAB
BLD PROD TYP BPU: NORMAL
BLD UNIT ID BPU: 0
BLOOD PRODUCT CODE: NORMAL
BPU ID: NORMAL
COPATH REPORT: NORMAL
TRANSFUSION STATUS PATIENT QL: NORMAL
TRANSFUSION STATUS PATIENT QL: NORMAL

## 2018-05-07 NOTE — PLAN OF CARE
Problem: Patient Care Overview  Goal: Plan of Care/Patient Progress Review  Physical Therapy Discharge Summary    Reason for therapy discharge:    Discharged to home.    Progress towards therapy goal(s). See goals on Care Plan in [facility] electronic health record for goal details.  Goals not met.  Barriers to achieving goals:   discharge from facility.    Therapy recommendation(s):    No further therapy is recommended.

## 2018-05-09 NOTE — DISCHARGE SUMMARY
Surgery Discharge Summary    Angella St MRN# [id number]   Age: 52 year old YOB: 1965     Date of Admission:  5/3/2018  Date of Discharge::  5/6/2018  1:35 PM   Admitting Physician:  Deja Salinas MD  Discharge Physician:  Deja Salinas MD     PRE/POSTOPERATIVE DIAGNOSES:  Atrophic kidney   PROCEDURES PERFORMED: robotic assisted laparoscopic nephrectomy   INTRAOPERATIVE FINDINGS: very challenging due to anatomy and post chemo   POSTOPERATIVE COMPLICATIONS: none         Medications Prior to Admission:     No prescriptions prior to admission.             Discharge Medications:     Discharge Medication List as of 5/6/2018 12:07 PM      START taking these medications    Details   oxyCODONE IR (ROXICODONE) 5 MG tablet Take 1-2 tablets (5-10 mg) by mouth every 3 hours as needed for other (pain control or improvement in physical function. Hold dose for analgesic side effects.), Disp-20 tablet, R-0, Local Print      senna-docusate (SENOKOT-S;PERICOLACE) 8.6-50 MG per tablet Take 1 tablet by mouth 2 times daily as needed for constipation, Disp-20 tablet, R-1, E-Prescribe         CONTINUE these medications which have NOT CHANGED    Details   amLODIPine (NORVASC) 10 MG tablet Take 1 tablet (10 mg) by mouth daily, Disp-100 tablet, R-3, E-Prescribe      COMPRESSION STOCKINGS 1 each daily, Disp-2 Units, R-1, Local Print         STOP taking these medications       enalapril (VASOTEC) 20 MG tablet Comments:   Reason for Stopping:         hydrochlorothiazide (HYDRODIURIL) 25 MG tablet Comments:   Reason for Stopping:                      Brief History of Illness:     Patient with a history of cervical cancer s/p chemotx.. In the course of treatment with gyn onc was found to have left hydronephrosis. Ureteroscopy revealed a dense pinpoint ureteral stricture and renal scan showed little function on the left. Initially she was managed with a PNT. She had capping trial that resulted in  pyelonephritis. Patient also with previous episode of pyelo prior to capping trial as well.   She is currently MATEUSZ from gyn disease and now presents for nephrectomy.            Hospital Course:   The patient's hospital course was unremarkable.  She recovered as anticipated and experienced no post-operative complications.     Other specific systems-based issues were treated as follows:  Neuro/Pain managed with oral pain medications   CV  Pulm  GI  FEN  Endo  Heme  ID     DISCHARGE INSTRUCTION:  DIET: regular diet   Wound care: You may shower and get incision wet 2 days after operation. Do not submerge, soak, or scrub incision or swim until seen in follow-up.   The steri-strips over the wound will fall off on their own in a couple of days. The stitches do not need to be removed, they will be absorbed on their own.  Activity: Restrictions until seen in follow up: No lifting greater than 10 pounds. No repetitive twisting motion of chest/back. No overhead lifting.   Stay hydrated. Narcotics can make you constipated. Take over the counter fiber (metamucil or benefiber) and stool softeners (Miralax, docusate or senna) every day if becoming constipated.   Notification:   Call for fever greater than 101.5, chills, decreased urine output, increased size of incision, red skin around incision, bleeding, shortness of breath or concerns, or other concerns.   Transition to ibuprofen or tylenol/acetaminophen for pain control. Do not take tylenol/acetaminophen and acetaminophen containing narcotic (e.g., percocet or vicodin) at the same time.  In emergencies, call 911   For other Questions or Concerns; [phone]  FOLLOW UP APPOINTMENTS:          Discharge Disposition:   Discharged to home       Attestation:   I have reviewed today's vital signs, notes, medications, labs and imaging.

## 2018-05-14 ENCOUNTER — PRE VISIT (OUTPATIENT)
Dept: UROLOGY | Facility: CLINIC | Age: 53
End: 2018-05-14

## 2018-05-14 NOTE — TELEPHONE ENCOUNTER
Post op.  S/P SON DEAL LEFT NEPHRECTOMY on 5-3-18.  Records available in [facility].

## 2018-05-20 PROCEDURE — 99285 EMERGENCY DEPT VISIT HI MDM: CPT | Mod: 25 | Performed by: EMERGENCY MEDICINE

## 2018-05-20 PROCEDURE — 99285 EMERGENCY DEPT VISIT HI MDM: CPT | Mod: Z6 | Performed by: EMERGENCY MEDICINE

## 2018-05-21 ENCOUNTER — HOSPITAL ENCOUNTER (OUTPATIENT)
Facility: CLINIC | Age: 53
Setting detail: OBSERVATION
Discharge: HOME | End: 2018-05-22
Attending: EMERGENCY MEDICINE | Admitting: UROLOGY
Payer: MEDICAID

## 2018-05-21 ENCOUNTER — APPOINTMENT (OUTPATIENT)
Dept: CT IMAGING | Facility: CLINIC | Age: 53
End: 2018-05-21
Attending: EMERGENCY MEDICINE
Payer: MEDICAID

## 2018-05-21 ENCOUNTER — APPOINTMENT (OUTPATIENT)
Dept: CT IMAGING | Facility: CLINIC | Age: 53
End: 2018-05-21
Attending: NURSE PRACTITIONER
Payer: MEDICAID

## 2018-05-21 DIAGNOSIS — K68.19 ABSCESS, RETROPERITONEAL (H): ICD-10-CM

## 2018-05-21 PROBLEM — T81.49XA ABSCESS AFTER PROCEDURE: Status: ACTIVE | Noted: 2018-05-21

## 2018-05-21 LAB
ALBUMIN SERPL-MCNC: 3.4 G/DL (ref 3.4–5)
ALBUMIN UR-MCNC: NEGATIVE MG/DL
ALP SERPL-CCNC: 58 U/L (ref 40–150)
ALT SERPL W P-5'-P-CCNC: 20 U/L (ref 0–50)
ANION GAP SERPL CALCULATED.3IONS-SCNC: 6 MMOL/L (ref 3–14)
ANION GAP SERPL CALCULATED.3IONS-SCNC: 7 MMOL/L (ref 3–14)
APPEARANCE UR: CLEAR
AST SERPL W P-5'-P-CCNC: 15 U/L (ref 0–45)
BACTERIA #/AREA URNS HPF: ABNORMAL /HPF
BACTERIA SPEC CULT: NORMAL
BACTERIA SPEC CULT: NORMAL
BASOPHILS # BLD AUTO: 0 10E9/L (ref 0–0.2)
BASOPHILS NFR BLD AUTO: 0.3 %
BILIRUB SERPL-MCNC: 0.3 MG/DL (ref 0.2–1.3)
BILIRUB UR QL STRIP: NEGATIVE
BUN SERPL-MCNC: 12 MG/DL (ref 7–30)
BUN SERPL-MCNC: 7 MG/DL (ref 7–30)
CALCIUM SERPL-MCNC: 8.1 MG/DL (ref 8.5–10.1)
CALCIUM SERPL-MCNC: 8.8 MG/DL (ref 8.5–10.1)
CHLORIDE SERPL-SCNC: 106 MMOL/L (ref 94–109)
CHLORIDE SERPL-SCNC: 108 MMOL/L (ref 94–109)
CO2 SERPL-SCNC: 27 MMOL/L (ref 20–32)
CO2 SERPL-SCNC: 28 MMOL/L (ref 20–32)
COLOR UR AUTO: ABNORMAL
CREAT FLD-MCNC: 0.7 MG/DL
CREAT SERPL-MCNC: 0.69 MG/DL (ref 0.52–1.04)
CREAT SERPL-MCNC: 0.77 MG/DL (ref 0.52–1.04)
DIFFERENTIAL METHOD BLD: ABNORMAL
EOSINOPHIL # BLD AUTO: 0.1 10E9/L (ref 0–0.7)
EOSINOPHIL NFR BLD AUTO: 0.9 %
ERYTHROCYTE [DISTWIDTH] IN BLOOD BY AUTOMATED COUNT: 14.3 % (ref 10–15)
ERYTHROCYTE [DISTWIDTH] IN BLOOD BY AUTOMATED COUNT: 14.4 % (ref 10–15)
GFR SERPL CREATININE-BSD FRML MDRD: 77 ML/MIN/1.7M2
GFR SERPL CREATININE-BSD FRML MDRD: 88 ML/MIN/1.7M2
GLUCOSE SERPL-MCNC: 158 MG/DL (ref 70–99)
GLUCOSE SERPL-MCNC: 91 MG/DL (ref 70–99)
GLUCOSE UR STRIP-MCNC: NEGATIVE MG/DL
GRAM STN SPEC: NORMAL
HCT VFR BLD AUTO: 30 % (ref 35–47)
HCT VFR BLD AUTO: 30 % (ref 35–47)
HGB BLD-MCNC: 9.4 G/DL (ref 11.7–15.7)
HGB BLD-MCNC: 9.6 G/DL (ref 11.7–15.7)
HGB UR QL STRIP: ABNORMAL
IMM GRANULOCYTES # BLD: 0 10E9/L (ref 0–0.4)
IMM GRANULOCYTES NFR BLD: 0.2 %
INR PPP: 1.03 (ref 0.86–1.14)
KETONES UR STRIP-MCNC: NEGATIVE MG/DL
LACTATE BLD-SCNC: 1.3 MMOL/L (ref 0.7–2)
LEUKOCYTE ESTERASE UR QL STRIP: NEGATIVE
LYMPHOCYTES # BLD AUTO: 3.7 10E9/L (ref 0.8–5.3)
LYMPHOCYTES NFR BLD AUTO: 40 %
MCH RBC QN AUTO: 27 PG (ref 26.5–33)
MCH RBC QN AUTO: 27.1 PG (ref 26.5–33)
MCHC RBC AUTO-ENTMCNC: 31.3 G/DL (ref 31.5–36.5)
MCHC RBC AUTO-ENTMCNC: 32 G/DL (ref 31.5–36.5)
MCV RBC AUTO: 85 FL (ref 78–100)
MCV RBC AUTO: 86 FL (ref 78–100)
MONOCYTES # BLD AUTO: 0.5 10E9/L (ref 0–1.3)
MONOCYTES NFR BLD AUTO: 5 %
NEUTROPHILS # BLD AUTO: 5 10E9/L (ref 1.6–8.3)
NEUTROPHILS NFR BLD AUTO: 53.6 %
NITRATE UR QL: NEGATIVE
NRBC # BLD AUTO: 0 10*3/UL
NRBC BLD AUTO-RTO: 0 /100
PH UR STRIP: 7.5 PH (ref 5–7)
PLATELET # BLD AUTO: 350 10E9/L (ref 150–450)
PLATELET # BLD AUTO: 360 10E9/L (ref 150–450)
POTASSIUM SERPL-SCNC: 3.5 MMOL/L (ref 3.4–5.3)
POTASSIUM SERPL-SCNC: 3.8 MMOL/L (ref 3.4–5.3)
PROT SERPL-MCNC: 7.8 G/DL (ref 6.8–8.8)
RADIOLOGIST FLAGS: ABNORMAL
RBC # BLD AUTO: 3.48 10E12/L (ref 3.8–5.2)
RBC # BLD AUTO: 3.54 10E12/L (ref 3.8–5.2)
RBC #/AREA URNS AUTO: <1 /HPF (ref 0–2)
SODIUM SERPL-SCNC: 140 MMOL/L (ref 133–144)
SODIUM SERPL-SCNC: 142 MMOL/L (ref 133–144)
SOURCE: ABNORMAL
SP GR UR STRIP: 1 (ref 1–1.03)
SPECIMEN SOURCE FLD: NORMAL
SPECIMEN SOURCE: NORMAL
SPECIMEN SOURCE: NORMAL
SQUAMOUS #/AREA URNS AUTO: <1 /HPF (ref 0–1)
UROBILINOGEN UR STRIP-MCNC: NORMAL MG/DL (ref 0–2)
WBC # BLD AUTO: 5.3 10E9/L (ref 4–11)
WBC # BLD AUTO: 9.3 10E9/L (ref 4–11)
WBC #/AREA URNS AUTO: <1 /HPF (ref 0–5)

## 2018-05-21 PROCEDURE — 81001 URINALYSIS AUTO W/SCOPE: CPT | Performed by: EMERGENCY MEDICINE

## 2018-05-21 PROCEDURE — 87075 CULTR BACTERIA EXCEPT BLOOD: CPT | Performed by: RADIOLOGY

## 2018-05-21 PROCEDURE — 82570 ASSAY OF URINE CREATININE: CPT | Performed by: STUDENT IN AN ORGANIZED HEALTH CARE EDUCATION/TRAINING PROGRAM

## 2018-05-21 PROCEDURE — 25000128 H RX IP 250 OP 636: Performed by: RADIOLOGY

## 2018-05-21 PROCEDURE — 74177 CT ABD & PELVIS W/CONTRAST: CPT

## 2018-05-21 PROCEDURE — 96375 TX/PRO/DX INJ NEW DRUG ADDON: CPT

## 2018-05-21 PROCEDURE — 27211039

## 2018-05-21 PROCEDURE — 87040 BLOOD CULTURE FOR BACTERIA: CPT | Mod: 91 | Performed by: EMERGENCY MEDICINE

## 2018-05-21 PROCEDURE — 27210807 ZZH SHEATH CR6

## 2018-05-21 PROCEDURE — 25000132 ZZH RX MED GY IP 250 OP 250 PS 637: Performed by: STUDENT IN AN ORGANIZED HEALTH CARE EDUCATION/TRAINING PROGRAM

## 2018-05-21 PROCEDURE — 85027 COMPLETE CBC AUTOMATED: CPT | Performed by: STUDENT IN AN ORGANIZED HEALTH CARE EDUCATION/TRAINING PROGRAM

## 2018-05-21 PROCEDURE — 96361 HYDRATE IV INFUSION ADD-ON: CPT | Mod: 59 | Performed by: EMERGENCY MEDICINE

## 2018-05-21 PROCEDURE — 99153 MOD SED SAME PHYS/QHP EA: CPT

## 2018-05-21 PROCEDURE — 25000128 H RX IP 250 OP 636: Performed by: EMERGENCY MEDICINE

## 2018-05-21 PROCEDURE — 87205 SMEAR GRAM STAIN: CPT | Performed by: RADIOLOGY

## 2018-05-21 PROCEDURE — 80053 COMPREHEN METABOLIC PANEL: CPT | Performed by: EMERGENCY MEDICINE

## 2018-05-21 PROCEDURE — G0378 HOSPITAL OBSERVATION PER HR: HCPCS

## 2018-05-21 PROCEDURE — C1729 CATH, DRAINAGE: HCPCS

## 2018-05-21 PROCEDURE — 87070 CULTURE OTHR SPECIMN AEROBIC: CPT | Performed by: RADIOLOGY

## 2018-05-21 PROCEDURE — 85610 PROTHROMBIN TIME: CPT | Performed by: EMERGENCY MEDICINE

## 2018-05-21 PROCEDURE — 27210804 ZZH SHEATH CR3

## 2018-05-21 PROCEDURE — 87077 CULTURE AEROBIC IDENTIFY: CPT | Performed by: UROLOGY

## 2018-05-21 PROCEDURE — 99152 MOD SED SAME PHYS/QHP 5/>YRS: CPT

## 2018-05-21 PROCEDURE — 85025 COMPLETE CBC W/AUTO DIFF WBC: CPT | Performed by: EMERGENCY MEDICINE

## 2018-05-21 PROCEDURE — 80048 BASIC METABOLIC PNL TOTAL CA: CPT | Performed by: STUDENT IN AN ORGANIZED HEALTH CARE EDUCATION/TRAINING PROGRAM

## 2018-05-21 PROCEDURE — 25000128 H RX IP 250 OP 636

## 2018-05-21 PROCEDURE — 87186 SC STD MICRODIL/AGAR DIL: CPT | Performed by: UROLOGY

## 2018-05-21 PROCEDURE — 25000125 ZZHC RX 250: Performed by: EMERGENCY MEDICINE

## 2018-05-21 PROCEDURE — 25000125 ZZHC RX 250: Performed by: RADIOLOGY

## 2018-05-21 PROCEDURE — 96365 THER/PROPH/DIAG IV INF INIT: CPT | Mod: 59 | Performed by: EMERGENCY MEDICINE

## 2018-05-21 PROCEDURE — 49405 IMAGE CATH FLUID COLXN VISC: CPT

## 2018-05-21 PROCEDURE — 87086 URINE CULTURE/COLONY COUNT: CPT | Performed by: EMERGENCY MEDICINE

## 2018-05-21 PROCEDURE — 83605 ASSAY OF LACTIC ACID: CPT | Performed by: EMERGENCY MEDICINE

## 2018-05-21 PROCEDURE — 96376 TX/PRO/DX INJ SAME DRUG ADON: CPT

## 2018-05-21 PROCEDURE — 25000128 H RX IP 250 OP 636: Performed by: STUDENT IN AN ORGANIZED HEALTH CARE EDUCATION/TRAINING PROGRAM

## 2018-05-21 PROCEDURE — 36415 COLL VENOUS BLD VENIPUNCTURE: CPT | Performed by: STUDENT IN AN ORGANIZED HEALTH CARE EDUCATION/TRAINING PROGRAM

## 2018-05-21 PROCEDURE — 87070 CULTURE OTHR SPECIMN AEROBIC: CPT | Performed by: UROLOGY

## 2018-05-21 PROCEDURE — 25000128 H RX IP 250 OP 636: Performed by: NURSE PRACTITIONER

## 2018-05-21 RX ORDER — SODIUM CHLORIDE 9 MG/ML
INJECTION, SOLUTION INTRAVENOUS CONTINUOUS
Status: DISCONTINUED | OUTPATIENT
Start: 2018-05-21 | End: 2018-05-21

## 2018-05-21 RX ORDER — OXYCODONE HYDROCHLORIDE 5 MG/1
5-10 TABLET ORAL
Status: DISCONTINUED | OUTPATIENT
Start: 2018-05-21 | End: 2018-05-22 | Stop reason: HOSPADM

## 2018-05-21 RX ORDER — NALOXONE HYDROCHLORIDE 0.4 MG/ML
.1-.4 INJECTION, SOLUTION INTRAMUSCULAR; INTRAVENOUS; SUBCUTANEOUS
Status: DISCONTINUED | OUTPATIENT
Start: 2018-05-21 | End: 2018-05-22 | Stop reason: HOSPADM

## 2018-05-21 RX ORDER — IOPAMIDOL 755 MG/ML
128 INJECTION, SOLUTION INTRAVASCULAR ONCE
Status: COMPLETED | OUTPATIENT
Start: 2018-05-21 | End: 2018-05-21

## 2018-05-21 RX ORDER — SODIUM CHLORIDE 9 MG/ML
1000 INJECTION, SOLUTION INTRAVENOUS CONTINUOUS
Status: DISCONTINUED | OUTPATIENT
Start: 2018-05-21 | End: 2018-05-21

## 2018-05-21 RX ORDER — CEFAZOLIN SODIUM 2 G/100ML
2 INJECTION, SOLUTION INTRAVENOUS
Status: COMPLETED | OUTPATIENT
Start: 2018-05-21 | End: 2018-05-21

## 2018-05-21 RX ORDER — FENTANYL CITRATE 50 UG/ML
25-50 INJECTION, SOLUTION INTRAMUSCULAR; INTRAVENOUS EVERY 5 MIN PRN
Status: DISCONTINUED | OUTPATIENT
Start: 2018-05-21 | End: 2018-05-21 | Stop reason: HOSPADM

## 2018-05-21 RX ORDER — ONDANSETRON 2 MG/ML
4 INJECTION INTRAMUSCULAR; INTRAVENOUS EVERY 6 HOURS PRN
Status: DISCONTINUED | OUTPATIENT
Start: 2018-05-21 | End: 2018-05-22 | Stop reason: HOSPADM

## 2018-05-21 RX ORDER — PIPERACILLIN SODIUM, TAZOBACTAM SODIUM 3; .375 G/15ML; G/15ML
3.38 INJECTION, POWDER, LYOPHILIZED, FOR SOLUTION INTRAVENOUS ONCE
Status: COMPLETED | OUTPATIENT
Start: 2018-05-21 | End: 2018-05-21

## 2018-05-21 RX ORDER — FLUMAZENIL 0.1 MG/ML
0.2 INJECTION, SOLUTION INTRAVENOUS
Status: DISCONTINUED | OUTPATIENT
Start: 2018-05-21 | End: 2018-05-21 | Stop reason: HOSPADM

## 2018-05-21 RX ORDER — NALOXONE HYDROCHLORIDE 0.4 MG/ML
.1-.4 INJECTION, SOLUTION INTRAMUSCULAR; INTRAVENOUS; SUBCUTANEOUS
Status: DISCONTINUED | OUTPATIENT
Start: 2018-05-21 | End: 2018-05-21 | Stop reason: HOSPADM

## 2018-05-21 RX ORDER — AMOXICILLIN 250 MG
1 CAPSULE ORAL 2 TIMES DAILY
Status: DISCONTINUED | OUTPATIENT
Start: 2018-05-21 | End: 2018-05-22 | Stop reason: HOSPADM

## 2018-05-21 RX ORDER — PIPERACILLIN SODIUM, TAZOBACTAM SODIUM 3; .375 G/15ML; G/15ML
3.38 INJECTION, POWDER, LYOPHILIZED, FOR SOLUTION INTRAVENOUS EVERY 6 HOURS
Status: DISCONTINUED | OUTPATIENT
Start: 2018-05-21 | End: 2018-05-22 | Stop reason: HOSPADM

## 2018-05-21 RX ORDER — AMOXICILLIN 250 MG
2 CAPSULE ORAL 2 TIMES DAILY
Status: DISCONTINUED | OUTPATIENT
Start: 2018-05-21 | End: 2018-05-22 | Stop reason: HOSPADM

## 2018-05-21 RX ORDER — ACETAMINOPHEN 325 MG/1
650 TABLET ORAL EVERY 4 HOURS
Status: DISCONTINUED | OUTPATIENT
Start: 2018-05-21 | End: 2018-05-22 | Stop reason: HOSPADM

## 2018-05-21 RX ORDER — HYDROMORPHONE HCL/0.9% NACL/PF 0.2MG/0.2
0.2 SYRINGE (ML) INTRAVENOUS
Status: DISCONTINUED | OUTPATIENT
Start: 2018-05-21 | End: 2018-05-22 | Stop reason: HOSPADM

## 2018-05-21 RX ORDER — ONDANSETRON 4 MG/1
4 TABLET, ORALLY DISINTEGRATING ORAL EVERY 6 HOURS PRN
Status: DISCONTINUED | OUTPATIENT
Start: 2018-05-21 | End: 2018-05-22 | Stop reason: HOSPADM

## 2018-05-21 RX ADMIN — PIPERACILLIN SODIUM AND TAZOBACTAM SODIUM 3.38 G: 3; .375 INJECTION, POWDER, LYOPHILIZED, FOR SOLUTION INTRAVENOUS at 22:43

## 2018-05-21 RX ADMIN — SENNOSIDES AND DOCUSATE SODIUM 2 TABLET: 8.6; 5 TABLET ORAL at 21:22

## 2018-05-21 RX ADMIN — PIPERACILLIN SODIUM AND TAZOBACTAM SODIUM 3.38 G: 3; .375 INJECTION, POWDER, LYOPHILIZED, FOR SOLUTION INTRAVENOUS at 16:31

## 2018-05-21 RX ADMIN — FENTANYL CITRATE 50 MCG: 50 INJECTION INTRAMUSCULAR; INTRAVENOUS at 10:45

## 2018-05-21 RX ADMIN — LIDOCAINE HYDROCHLORIDE 30 ML: 10 INJECTION, SOLUTION EPIDURAL; INFILTRATION; INTRACAUDAL; PERINEURAL at 10:45

## 2018-05-21 RX ADMIN — SODIUM CHLORIDE: 9 INJECTION, SOLUTION INTRAVENOUS at 05:31

## 2018-05-21 RX ADMIN — OXYCODONE HYDROCHLORIDE 10 MG: 5 TABLET ORAL at 18:26

## 2018-05-21 RX ADMIN — SODIUM CHLORIDE 1000 ML: 9 INJECTION, SOLUTION INTRAVENOUS at 04:55

## 2018-05-21 RX ADMIN — ACETAMINOPHEN 650 MG: 325 TABLET, FILM COATED ORAL at 08:46

## 2018-05-21 RX ADMIN — OXYCODONE HYDROCHLORIDE 10 MG: 5 TABLET ORAL at 08:46

## 2018-05-21 RX ADMIN — SODIUM CHLORIDE 1000 ML: 9 INJECTION, SOLUTION INTRAVENOUS at 03:06

## 2018-05-21 RX ADMIN — PIPERACILLIN SODIUM AND TAZOBACTAM SODIUM 3.38 G: 3; .375 INJECTION, POWDER, LYOPHILIZED, FOR SOLUTION INTRAVENOUS at 11:50

## 2018-05-21 RX ADMIN — FENTANYL CITRATE 50 MCG: 50 INJECTION INTRAMUSCULAR; INTRAVENOUS at 10:57

## 2018-05-21 RX ADMIN — PIPERACILLIN SODIUM AND TAZOBACTAM SODIUM 3.38 G: 3; .375 INJECTION, POWDER, LYOPHILIZED, FOR SOLUTION INTRAVENOUS at 04:59

## 2018-05-21 RX ADMIN — SODIUM CHLORIDE: 9 INJECTION, SOLUTION INTRAVENOUS at 11:43

## 2018-05-21 RX ADMIN — SODIUM CHLORIDE, PRESERVATIVE FREE 83 ML: 5 INJECTION INTRAVENOUS at 02:32

## 2018-05-21 RX ADMIN — SODIUM CHLORIDE 1000 ML: 9 INJECTION, SOLUTION INTRAVENOUS at 00:32

## 2018-05-21 RX ADMIN — OXYCODONE HYDROCHLORIDE 5 MG: 5 TABLET ORAL at 05:35

## 2018-05-21 RX ADMIN — VANCOMYCIN HYDROCHLORIDE 1500 MG: 10 INJECTION, POWDER, LYOPHILIZED, FOR SOLUTION INTRAVENOUS at 18:21

## 2018-05-21 RX ADMIN — ACETAMINOPHEN 650 MG: 325 TABLET, FILM COATED ORAL at 05:35

## 2018-05-21 RX ADMIN — IOPAMIDOL 128 ML: 755 INJECTION, SOLUTION INTRAVENOUS at 02:31

## 2018-05-21 RX ADMIN — CEFAZOLIN SODIUM 2 G: 2 INJECTION, SOLUTION INTRAVENOUS at 10:44

## 2018-05-21 RX ADMIN — OXYCODONE HYDROCHLORIDE 10 MG: 5 TABLET ORAL at 11:50

## 2018-05-21 RX ADMIN — ACETAMINOPHEN 650 MG: 325 TABLET, FILM COATED ORAL at 15:11

## 2018-05-21 RX ADMIN — MIDAZOLAM 2 MG: 1 INJECTION INTRAMUSCULAR; INTRAVENOUS at 10:44

## 2018-05-21 RX ADMIN — ONDANSETRON 4 MG: 2 INJECTION INTRAMUSCULAR; INTRAVENOUS at 19:10

## 2018-05-21 RX ADMIN — MIDAZOLAM 2 MG: 1 INJECTION INTRAMUSCULAR; INTRAVENOUS at 11:01

## 2018-05-21 RX ADMIN — ACETAMINOPHEN 650 MG: 325 TABLET, FILM COATED ORAL at 18:26

## 2018-05-21 RX ADMIN — OXYCODONE HYDROCHLORIDE 10 MG: 5 TABLET ORAL at 15:11

## 2018-05-21 RX ADMIN — VANCOMYCIN HYDROCHLORIDE 1500 MG: 10 INJECTION, POWDER, LYOPHILIZED, FOR SOLUTION INTRAVENOUS at 05:37

## 2018-05-21 ASSESSMENT — ACTIVITIES OF DAILY LIVING (ADL)
FALL_HISTORY_WITHIN_LAST_SIX_MONTHS: NO
TRANSFERRING: 0-->INDEPENDENT
BATHING: 0-->INDEPENDENT
TOILETING: 0-->INDEPENDENT
SWALLOWING: 0-->SWALLOWS FOODS/LIQUIDS WITHOUT DIFFICULTY
RETIRED_COMMUNICATION: 0-->UNDERSTANDS/COMMUNICATES WITHOUT DIFFICULTY
DRESS: 0-->INDEPENDENT
AMBULATION: 0-->INDEPENDENT
COGNITION: 0 - NO COGNITION ISSUES REPORTED
RETIRED_EATING: 0-->INDEPENDENT

## 2018-05-21 NOTE — PROGRESS NOTES
Interventional Radiology Pre-Procedure Sedation Assessment   Time of Assessment: 10:16 AM    Expected Level: Moderate Sedation    Indication: Sedation is required for the following type of Procedure: Drain    Sedation and procedural consent: Risks, benefits and alternatives were discussed with Patient    PO Intake: Appropriately NPO for procedure    ASA Class: Class 2 - MILD SYSTEMIC DISEASE, NO ACUTE PROBLEMS, NO FUNCTIONAL LIMITATIONS.    Mallampati: Grade 3:  Soft palate visible, posterior pharyngeal wall not visible    Lungs: Lungs Clear with good breath sounds bilaterally    Heart: Normal heart sounds and rate    History and physical reviewed and no updates needed. I have reviewed the lab findings, diagnostic data, medications, and the plan for sedation. I have determined this patient to be an appropriate candidate for the planned sedation and procedure and have reassessed the patient IMMEDIATELY PRIOR to sedation and procedure.    Bridger Lockhart MD

## 2018-05-21 NOTE — ED PROVIDER NOTES
History     Chief Complaint   Patient presents with     Abdominal Pain     HPI  Angella St is a 52 year old female with a history of cervical cancer (s/p hysterectomy), CKD  left nephrectomy 5/3/18, hydronephrosis, recurrent pyelonephritis, and HTN who presents to the ED with left flank pain.  2 weeks ago she had a left nephrectomy for chronic pyelonephritis of the largely nonfunctional kidney.  Pain has been associated with chills but no chest pain or shortness of breath.  Positive anorexia one episode of vomiting no diarrhea.  Has never had similar pain before.      Past Medical History:   Diagnosis Date     Anemia      Cancer (H)     Metastatic squamous cell cervical carcinoma     Chronic kidney disease      Hypertension 2007     Obesity     BMI >30       Past Surgical History:   Procedure Laterality Date     COMBINED CYSTOSCOPY, INSERT STENT URETER(S) Left 6/13/2016    Procedure: COMBINED CYSTOSCOPY, INSERT STENT URETER(S);  Surgeon: Deja Salinas MD;  Location: [facility] OR     CYSTOSCOPY, RETROGRADES, COMBINED Left 6/13/2016    Procedure: COMBINED CYSTOSCOPY, RETROGRADES;  Surgeon: Deja Salinas MD;  Location: [facility]     DAVINCI NEPHRECTOMY Left 5/3/2018    Procedure: DAVINCI XI NEPHRECTOMY;  DAVINCI XI LEFT NEPHRECTOMY;  Surgeon: Deja Salinas MD;  Location: [facility] OR     GENITOURINARY SURGERY  5/2015    PNT placement     HYSTERECTOMY  2011    [place], [place]     PERCUTANEOUS NEPHROSTOMY Left 3/9/2017    Procedure: PERCUTANEOUS NEPHROSTOMY;  Surgeon: Bridger Meyer PA-C;  Location: [facility]     PERCUTANEOUS NEPHROSTOMY Left 6/28/2017    Procedure: PERCUTANEOUS NEPHROSTOMY;  Left Nephrostomy Tube Change;  Surgeon: Bridger Meyer PA-C;  [facility]     PERCUTANEOUS NEPHROSTOMY Left 10/30/2017    Procedure: PERCUTANEOUS NEPHROSTOMY;  Left Nephrostomy Tube Change;  Surgeon: Maryann Chavez PA-C;  Location: [facility] OR       Family History   Problem Relation Age of  "Onset     Hypertension Brother        Social History   Substance Use Topics     Smoking status: Never Smoker     Smokeless tobacco: Never Used     Alcohol use No      Comment: Does not drink alcohol       I have reviewed the Medications, Allergies, Past Medical and Surgical History, and Social History in the Epic system.    Review of Systems  ROS: 14 point ROS neg other than the symptoms noted above in the HPI.    Physical Exam   BP: 138/72  Pulse: 82  Heart Rate: 66  Temp: 98.3  F (36.8  C)  Resp: 18  Height: 165.1 cm (5' 5\")  Weight: 95.3 kg (210 lb)  SpO2: 92 %      Physical Exam  GEN:  non toxic, cooperative and conversant.   HEENT: The head is normocephalic and atraumatic. Pupils are equal round and reactive to light. Extraocular motions are intact. There is no facial swelling. The neck is nontender and supple.   CV: Regular rate and rhythm without murmurs rubs or gallops. 2+ radial pulses bilaterally.  PULM: Clear to auscultation bilaterally.  ABD: Soft, left flank significant tenderness to palpation.  Superficial operative and incisional sites are clean dry and intact without evidence of her overlying cellulitis, nondistended.   EXT: Full range of motion.  No edema.  NEURO: Cranial nerves II through XII are intact and symmetric. Bilateral upper and lower extremities grossly show full range of motion without any focal deficits.   SKIN: No rashes, ecchymosis, or lacerations  PSYCH: Calm and cooperative, interactive.     ED Course     ED Course     Procedures        CT a/p: Likely postoperative abscess       Labs Ordered and Resulted from Time of ED Arrival Up to the Time of Departure from the ED   CBC WITH PLATELETS DIFFERENTIAL - Abnormal; Notable for the following:        Result Value    RBC Count 3.48 (*)     Hemoglobin 9.4 (*)     Hematocrit 30.0 (*)     MCHC 31.3 (*)     All other components within normal limits   COMPREHENSIVE METABOLIC PANEL - Abnormal; Notable for the following:     Glucose 158 (*)     " All other components within normal limits   ROUTINE UA WITH MICROSCOPIC REFLEX TO CULTURE - Abnormal; Notable for the following:     Blood Urine Trace (*)     pH Urine 7.5 (*)     Bacteria Urine Few (*)     All other components within normal limits   LACTIC ACID WHOLE BLOOD   URINE CULTURE AEROBIC BACTERIAL            Assessments & Plan (with Medical Decision Making)   52-year-old female status post left nephrectomy presents with chills left leg pain and imaging demonstrating fluid collection likely postsurgical abscess formation  No evidence of active bleed, she is hemodynamically stable  Labs otherwise unrevealing including normal leukocytosis.    Blood culture sent, antibiotics initiated including vancomycin and Zosyn    She was discussed with urology and admitted to their service.    I have reviewed the nursing notes.    I have reviewed the findings, diagnosis, plan and need for follow up with the patient.    Current Discharge Medication List          Final diagnoses:   Abscess, retroperitoneal (H)       5/20/2018   [facility], [place], EMERGENCY DEPARTMENT     Joshua Stern MD  05/22/18 6060

## 2018-05-21 NOTE — PHARMACY-VANCOMYCIN DOSING SERVICE
Pharmacy Vancomycin Initial Note  Date of Service May 21, 2018  Patient's  1965  52 year old, female    Indication: Abscess    Current estimated CrCl = Estimated Creatinine Clearance: 95.3 mL/min (based on Cr of 0.77).    Creatinine for last 3 days  2018: 12:32 AM Creatinine 0.77 mg/dL    Recent Vancomycin Level(s) for last 3 days  No results found for requested labs within last 72 hours.      Vancomycin IV Administrations (past 72 hours)      No vancomycin orders with administrations in past 72 hours.                Nephrotoxins and other renal medications (Future)    Start     Dose/Rate Route Frequency Ordered Stop    18 0430  vancomycin (VANCOCIN) 1,500 mg in sodium chloride 0.9 % 250 mL intermittent infusion      1,500 mg  over 90 Minutes Intravenous EVERY 12 HOURS 18 0413      18 0406  piperacillin-tazobactam (ZOSYN) 3.375 g vial to attach to  mL bag      3.375 g  over 30 Minutes Intravenous ONCE 18 0406            Contrast Orders - past 72 hours (72h ago through future)    Start     Dose/Rate Route Frequency Ordered Stop    18 0219  iopamidol (ISOVUE-370) solution 128 mL      128 mL Intravenous ONCE 18 0219 18 0231                Plan:  1.  Start vancomycin  1500 mg IV q12h.   2.  Goal Trough Level: 10-15 mg/L   3.  Pharmacy will check trough levels as appropriate in 1-3 Days.    4. Serum creatinine levels will be ordered a minimum of twice weekly.    5. [place] method utilized to dose vancomycin therapy: Method 1    Frank Vazquez

## 2018-05-21 NOTE — H&P
Urology Consult    Name: Angella St    MRN: [id number]   YOB: 1965               Chief Complaint:     History is obtained from the patient and chart review          History of Present Illness:   Angella St is a 52 year old female with medical history of CKD, HTN, obesity, and cervical cancer and urologic history of nonfunctioning left kidney and recurrent infections who underwent robotic assisted laparoscopic left nephrectomy on 5/3/18.  Notably, it was a challenging procedure notable for obliterated surgical planes and a kiney that was adhesed to surrounding structures, and 750cc blood loss at procedure end.  She was discharged home on post operative day three and has been doing well post discharge, but presented late last evening to the ED with 1 day history of acute left flank pain and some subjective chills.  She denies any nausea/vomiting, dysuria, or hematuria.           Past Medical History:     Past Medical History:   Diagnosis Date     Anemia      Cancer (H)     Metastatic squamous cell cervical carcinoma     Chronic kidney disease      Hypertension 2007     Obesity     BMI >30            Past Surgical History:     Past Surgical History:   Procedure Laterality Date     COMBINED CYSTOSCOPY, INSERT STENT URETER(S) Left 6/13/2016    Procedure: COMBINED CYSTOSCOPY, INSERT STENT URETER(S);  Surgeon: Deja Salinas MD;  Location:  OR     CYSTOSCOPY, RETROGRADES, COMBINED Left 6/13/2016    Procedure: COMBINED CYSTOSCOPY, RETROGRADES;  Surgeon: Deja Salinas MD;  [facility]     DAVINCI NEPHRECTOMY Left 5/3/2018    Procedure: DAVINCI XI NEPHRECTOMY;  DAVINCI XI LEFT NEPHRECTOMY;  Surgeon: Deja Salinas MD;  Location:  OR     GENITOURINARY SURGERY  5/2015    PNT placement     HYSTERECTOMY  2011    [place], [place]     PERCUTANEOUS NEPHROSTOMY Left 3/9/2017    Procedure: PERCUTANEOUS NEPHROSTOMY;  Surgeon: Bridger Meyer PA-C;  Location:  OR      PERCUTANEOUS NEPHROSTOMY Left 6/28/2017    Procedure: PERCUTANEOUS NEPHROSTOMY;  Left Nephrostomy Tube Change;  Surgeon: Bridger Meyer PA-C;  [facility]     PERCUTANEOUS NEPHROSTOMY Left 10/30/2017    Procedure: PERCUTANEOUS NEPHROSTOMY;  Left Nephrostomy Tube Change;  Surgeon: Maryann Chavez PA-C;  Location: [facility] OR            Social History:     Social History   Substance Use Topics     Smoking status: Never Smoker     Smokeless tobacco: Never Used     Alcohol use No      Comment: Does not drink alcohol            Family History:     Family History   Problem Relation Age of Onset     Hypertension Brother             Allergies:   No Known Allergies         Medications:     Current Facility-Administered Medications   Medication     0.9% sodium chloride BOLUS    Followed by     sodium chloride 0.9% infusion     sodium chloride 0.9% infusion     Current Outpatient Prescriptions   Medication Sig     amLODIPine (NORVASC) 10 MG tablet Take 1 tablet (10 mg) by mouth daily (Patient taking differently: Take 10 mg by mouth every evening )     COMPRESSION STOCKINGS 1 each daily     oxyCODONE IR (ROXICODONE) 5 MG tablet Take 1-2 tablets (5-10 mg) by mouth every 3 hours as needed for other (pain control or improvement in physical function. Hold dose for analgesic side effects.)     senna-docusate (SENOKOT-S;PERICOLACE) 8.6-50 MG per tablet Take 1 tablet by mouth 2 times daily as needed for constipation             Review of Systems:    ROS: 10 point ROS neg other than the symptoms noted above in the HPI           Physical Exam:   VS:  T: 98.3    HR: 82    BP: 113/82    RR: 18   GEN:  AOx3.  Distressed, obese  CV:  RRR  LUNGS: Non-labored breathing.   BACK:  Left flank tenderness  ABD:  Soft.  Tender on left flank and LUQ.  Incisions are c/d/i, no erythema or drainage  :  Female genitalia  EXT:  Warm, well perfused.  No edema.  SKIN:  Warm.  Dry.  No rashes.  NEURO:  CN grossly intact.            Data:    All laboratory data reviewed:      Recent Labs  Lab 05/21/18  0032   WBC 9.3   HGB 9.4*          Recent Labs  Lab 05/21/18  0032      POTASSIUM 3.5   CHLORIDE 106   CO2 27   BUN 12   CR 0.77   *   HERON 8.8       Recent Labs  Lab 05/21/18  0056   COLOR Straw   APPEARANCE Clear   URINEGLC Negative   URINEBILI Negative   URINEKETONE Negative   SG 1.004   URINEPH 7.5*   PROTEIN Negative   NITRITE Negative   LEUKEST Negative   RBCU <1   WBCU <1       All pertinent imaging reviewed:    CT scan of the abdomen:   Impression:   1. Postsurgical changes of left nephrectomy with rim-enhancing and gas  containing fluid collection in the left nephrectomy bed concerning for  abscess. Collection abuts the spleen and left colon without evidence  of fistula. Left ureteral dilation and enhancement likely related to  adjacent inflammatory changes.  2. Small left pleural effusion with associated compressive  atelectasis, new from previous CT.  3. Subcutaneous stranding and subcutaneous emphysema of the anterior  abdomen, presumed postsurgical change.            Impression and Plan:   Impression:   Angella St is a 52 year old female who underwent left robotic nephrectomy 2 weeks ago, who presents with acute left flank pain and finding on abscess on CT imaging.      Plan:     - Admit to Urology, observation status    - Keep NPO, IVF, pain control  - Will initiate empiric Vanc/Zosyn, Blood and urine cultures collected    - Interventional Radiology consultation placed for drainage of abscess for source control         This patient's exam findings, labs, and imaging discussed with urology staff surgeon Dr. Huffman, who developed the treatment plan.    Rohit Villafuerte MD  Urology Resident G2

## 2018-05-21 NOTE — LETTER
Transition Communication Hand-off for Care Transitions to Next Level of Care Provider    Name: Angella St  : 1965  MRN #: [id number]  Primary Care Provider: Shamir Pugh     Primary Clinic: [address] [facility] [address]  [facility] [address]     Reason for Hospitalization:  Abscess, retroperitoneal (H) [K68.19]  Admit Date/Time: 2018 12:02 AM  Discharge Date: 2018  Payor Source: No coverage found.      Reason for Communication Hand-off Referral: Other continuity of care    Discharge Plan: discharged to home with abdominal drain and plan for f/u     Follow-up plan:  Future Appointments  Date Time Provider Department Center   2018 11:15 AM Deja Salinas MD [facility]   2018 10:00 AM Shamir Pugh MD [facility]   2018 9:20 AM [facility]   2018 11:00 AM Lam Moran MD [facility]     Monik Conner, RNCC  [phone]    AVS/Discharge Summary is the source of truth; this is a helpful guide for improved communication of patient story

## 2018-05-21 NOTE — PLAN OF CARE
Problem: Patient Care Overview  Goal: Plan of Care/Patient Progress Review  Drain placed in IR. Pt returned to floor at 1200. AVSS. Oxycodone given. Will continue to monitor.

## 2018-05-21 NOTE — PROGRESS NOTES
Patient Name: Angella St  Medical Record Number: [id number]  Today's Date: 5/21/2018    Procedure: left kidney abscess drain   Proceduralist: Dr.Talaie Adams     Sedation start time: 1040  Sedation end time: 1110  Sedation medications administered: 4mg versed 100mcg fentanyl       Procedure start time: 1053  Procedure end time: 1115    Report given to: RN      Pt arrived to IR room CT-2  from . Consent obtained, Pt denies any questions or concerns regarding procedure. Pt positioned right side laying and monitored per protocol. Pt tolerated procedure without any noted complications. Pt transferred back to .

## 2018-05-21 NOTE — IP AVS SNAPSHOT
[address] [address]    Phone:  [phone]                                       After Visit Summary   5/21/2018    Angella St    MRN: [id number]           After Visit Summary Signature Page     I have received my discharge instructions, and my questions have been answered. I have discussed any challenges I see with this plan with the nurse or doctor.    ..........................................................................................................................................  Patient/Patient Representative Signature      ..........................................................................................................................................  Patient Representative Print Name and Relationship to Patient    ..................................................               ................................................  Date                                            Time    ..........................................................................................................................................  Reviewed by Signature/Title    ...................................................              ..............................................  Date                                                            Time

## 2018-05-21 NOTE — ED TRIAGE NOTES
Pt BIBA for concerns of left sided pain since 1700 today. Pt had a kidney removed two weeks prior. VSS. Hx of cervical cancer and is remission.

## 2018-05-21 NOTE — ED NOTES
[facility], [place]   ED Nurse to Floor Handoff     Angella St is a 52 year old female who speaks English and lives with a spouse,  in a home  They arrived in the ED by ambulance from home    ED Chief Complaint: Abdominal Pain    ED Dx;   Final diagnoses:   Abscess, retroperitoneal (H)         Needed?: No    Allergies: No Known Allergies.  Past Medical Hx:   Past Medical History:   Diagnosis Date     Anemia      Cancer (H)     Metastatic squamous cell cervical carcinoma     Chronic kidney disease      Hypertension 2007     Obesity     BMI >30      Baseline Mental status: WDL  Current Mental Status changes: at basesline    Infection present or suspected this encounter: yes other abcsess  Sepsis suspected: No  Isolation type: No active isolations     Activity level - Baseline/Home:  independent  Activity Level - Current:   Stand with Assist    Bariatric equipment needed?: No    In the ED these meds were given:   Medications   0.9% sodium chloride BOLUS (0 mLs Intravenous Stopped 5/21/18 0130)     Followed by   sodium chloride 0.9% infusion (not administered)   0.9% sodium chloride BOLUS (1,000 mLs Intravenous New Bag 5/21/18 0306)     Followed by   sodium chloride 0.9% infusion (not administered)   piperacillin-tazobactam (ZOSYN) 3.375 g vial to attach to  mL bag (not administered)   vancomycin (VANCOCIN) 1,500 mg in sodium chloride 0.9 % 250 mL intermittent infusion (not administered)   iopamidol (ISOVUE-370) solution 128 mL (128 mLs Intravenous Given 5/21/18 0231)   sodium chloride 0.9 % bag 500mL for CT scan flush use (83 mLs Intravenous Given 5/21/18 0232)       Drips running?  No    Home pump  No    Current LDAs  Port A Cath Single 06/13/15 Right Chest wall (Active)   Number of days:1073       Port A Cath Double Right Chest wall (Active)   Number of days:       Closed/Suction Drain Left Other (Comment) 19 [place]ian (Active)   Number of days:18       Nephrostomy 1 Left  LOT [id number] (Active)   Number of days:654       Nephrostomy 1 Left LOT [id number]. (Active)   Number of days:563       Nephrostomy 1 Left LOT [id number] (Active)   Number of days:327       Incision/Surgical Site 03/09/17 Left Flank (Active)   Number of days:438       Incision/Surgical Site 06/28/17 Left Back (Active)   Number of days:327       Incision/Surgical Site 10/30/17 Left  (Active)   Number of days:203       Incision/Surgical Site 05/03/18 Left Abdomen (Active)   Number of days:18       Labs results:   Labs Ordered and Resulted from Time of ED Arrival Up to the Time of Departure from the ED   CBC WITH PLATELETS DIFFERENTIAL - Abnormal; Notable for the following:        Result Value    RBC Count 3.48 (*)     Hemoglobin 9.4 (*)     Hematocrit 30.0 (*)     MCHC 31.3 (*)     All other components within normal limits   COMPREHENSIVE METABOLIC PANEL - Abnormal; Notable for the following:     Glucose 158 (*)     All other components within normal limits   ROUTINE UA WITH MICROSCOPIC REFLEX TO CULTURE - Abnormal; Notable for the following:     Blood Urine Trace (*)     pH Urine 7.5 (*)     Bacteria Urine Few (*)     All other components within normal limits   LACTIC ACID WHOLE BLOOD   INR   URINE CULTURE AEROBIC BACTERIAL   BLOOD CULTURE   BLOOD CULTURE       Imaging Studies:   Recent Results (from the past 24 hour(s))   CT Abdomen Pelvis w Contrast   Result Value    Radiologist flags Intra-abdominal abscess (Urgent)    Narrative    Examination:  CT ABDOMEN PELVIS W CONTRAST 5/21/2018 2:34 AM     History: Status post left nephrectomy 2 weeks ago, now with severe  left flank pain    Comparison: CT of the chest, abdomen, pelvis 1/8/2018    Technique: CT of the abdomen and pelvis were obtained with IV  contrast. Sagittal and coronal reconstructions created and reviewed.    Contrast: 128 mL Isovue-370 IV    Findings:   The liver and adrenal glands are unremarkable. Gallbladder is  unremarkable. The pancreas is  "unremarkable. Normal enhancement of the  right kidney. The urinary bladder is unremarkable.    In the left nephrectomy resection bed there is a rim-enhancing fluid  collection which contains gas. Collection is estimated to measure  approximately 6.2 x 3.5 x 9.2 cm. It abuts the inferior aspect of the  otherwise normal-appearing spleen. Collection also abuts the left  colon without obvious fistula. The left ureter appears dilated with  rim enhancement.    The small and large bowel are normal in caliber. Small volume low  pelvic free fluid. Scattered prominent, but not pathologically  enlarged retroperitoneal lymph nodes near the left nephrectomy bed. No  mesenteric or inguinal lymphadenopathy. The major abdominal  vasculature is patent.    No acute osseous abnormalities. Subcutaneous stranding and  subcutaneous gas, primarily in the left anterior abdomen.    Small left pleural effusion with associated compressive atelectasis.      Impression    Impression:   1. Postsurgical changes of left nephrectomy with rim-enhancing and gas  containing fluid collection in the left nephrectomy bed concerning for  abscess. Collection abuts the spleen and left colon without evidence  of fistula. Left ureteral dilation and enhancement likely related to  adjacent inflammatory changes.  2. Small left pleural effusion with associated compressive  atelectasis, new from previous CT.  3. Subcutaneous stranding and subcutaneous emphysema of the anterior  abdomen, presumed postsurgical change.    [Urgent Result: Intra-abdominal abscess]    Finding was identified on 5/21/2018 2:39 AM.     Dr. Stern was contacted by Dr. Wright at 5/21/2018 2:41 AM and  verbalized understanding of the urgent finding.        Recent vital signs:   /82  Pulse 82  Temp 98.3  F (36.8  C) (Oral)  Resp 18  Ht 1.651 m (5' 5\")  Wt 95.3 kg (210 lb)  SpO2 100%  BMI 34.95 kg/m2    Cardiac Rhythm: Normal Sinus  Pt needs tele? No  Skin/wound Issues: surgical " wound. No issues    Code Status: Full Code    Pain control: good    Nausea control: good    Abnormal labs/tests/findings requiring intervention: NA    Family present during ED course? Yes   Family Comments/Social Situation comments: NA    Tasks needing completion    Jazzy Cordero RN  [facility]-- [phone] [facility]  [phone] [facility]

## 2018-05-21 NOTE — PLAN OF CARE
Problem: Patient Care Overview  Goal: Plan of Care/Patient Progress Review   Observation goals EFFECTIVE NOW     Comments:   1) AVSS: Yes   2) Pain managed with po meds: Oxycodone given at 1200

## 2018-05-21 NOTE — IP AVS SNAPSHOT
MRN:[id number]                      After Visit Summary   5/21/2018    [address]ella [address]    MRN: [id number]           Thank you!     Thank you for choosing [facility] for your care. Our goal is always to provide you with excellent care. Hearing back from our patients is one way we can continue to improve our services. Please take a few minutes to complete the written survey that you may receive in the mail after you visit with us. Thank you!        Patient Information     Date Of Birth          1965        Designated Caregiver       Most Recent Value    Caregiver    Will someone help with your care after discharge? yes    Name of designated caregiver Greyson Sherman    Phone number of caregiver [phone]    Caregiver address [address] [facility] [address]      About your hospital stay     You were admitted on:  May 21, 2018 You last received care in the:  [address] [facility]    You were discharged on:  May 22, 2018        Reason for your hospital stay       Hematoma after nephrectomy, requiring drain placement                  Who to Call     For medical emergencies, please call 911.  For non-urgent questions about your medical care, please call your primary care provider or clinic, [phone]          Attending Provider     Provider Specialty    Joshua Stern MD Emergency Medicine    Nathanael, Brooke Santos MD Urology       Primary Care Provider Office Phone # Fax #    Shamir Pugh [phone]      After Care Instructions     Discharge Instructions       Activity  - No strenuous exercise for 6 weeks.  - No lifting, pushing, pulling more than 10 pounds for 6 weeks.   - Do not strain with bowel movements.  - Do not drive until you can press the brake pedal quickly and fully without pain.   - Do not operate a motor vehicle while taking narcotic pain medications.     Medications  - Transition from narcotic pain medications to tylenol (acetaminophen)  "as you are able.  Wean yourself off all pain medications as you are able.  - Some pain medications contain both tylenol (acetaminophen) and a narcotic (Norco, vicodin, percocet), do not take more than 4,000mg of Tylenol (acetaminophen) from all sources in any 24 hour period.  - Narcotics can make you constipated.  Take over the counter fiber (metamucil or benefiber) and stool softeners (miralax, docusate or senna) while taking narcotic pain medications, but stop if you develop diarrhea.  - No driving or operating machinery while taking narcotic pain medications     Drain Care  - You will be discharge to home with your drain in place  - Continue to record daily outputs  - The drain is secured to your skin and has overlying dressings.  The dressing need not be changed unless it is saturated.  We will plan to evaluate the dressing next week in clinic.     Bathing  -No baths while the drain remains in place  -Avoid getting the dressing wet while showering    Follow-Up:  - In 1 week with Dr. Salinas  - Call your primary care provider to touch base regarding your recent admission.    - Call or return sooner than your regularly scheduled visit if you develop any of the following: fever (greater than 101.5), uncontrolled pain, uncontrolled nausea or vomiting, as well as increased redness, swelling, or drainage from your wound.     Phone numbers:   - Monday through Friday 8am to 4:30pm: Call [phone] with questions or to schedule or confirm appointment.    - Nights or weekends: call the after hours emergency pager - [phone] and tell the  \"I would like to page the Urology Resident on call.\"  - For emergencies, call [phone]            Supplies       List the supplies the pt needs to go home  - drain care teaching, recording, supplies  Please provide instructions to patient to irrigate tube with 10 cc of saline once a day (and supplies)                  Your next 10 appointments already scheduled     May 25, 2018 " 11:15 AM CDT   (Arrive by 11:00 AM)   Cystoscopy with Deja Salinas MD   [facility] ([facility])    [address] [place] [address]  [address]th [facility] [address]   [phone]            May 29, 2018 10:00 AM CDT   (Arrive by 9:45 AM)   Return Visit with Shamir Pugh MD   [facility] ([facility])    [address] [place] [address]  [address]th [facility] [address] [phone]            Jul 12, 2018  9:20 AM CDT   CT CHEST ABDOMEN PELVIS W/O & W CONTRAST with UCCT2   [facility] [place] [facility] ([facility])    [address] [place] [id number] [phone]           Please bring any scans or X-rays taken at other hospitals, if similar tests were done. Also bring a list of your medicines, including vitamins, minerals and over-the-counter drugs. It is safest to leave personal items at home.  Be sure to tell your doctor:   If you have any allergies.   If there s any chance you are pregnant.   If you are breastfeeding.  How to prepare:   Do not eat or drink for 2 hours before your exam. If you need to take medicine, you may take it with small sips of water. (We may ask you to take liquid medicine as well.)   Please wear loose clothing, such as a sweat suit or jogging clothes. Avoid snaps, zippers and other metal. We may ask you to undress and put on a hospital gown.  Please arrive 30 minutes early for your CT. Once in the department you might be asked to drink water 15-20 minutes prior to your exam.  If indicated you may be asked to drink an oral contrast in advance of your CT.  If this is the case, the imaging team will let you know or be in contact with you prior to your appointment  Patients over 70 or patients with diabetes or kidney problems:   If you haven t had a blood test (creatinine test) within the last 30 days, the  "Cardiologist/Radiologist may require you to get this test prior to your exam.  If you have diabetes:   Continue to take your metformin medication on the day of your exam  If you have any questions, please call the Imaging Department where you will have your exam.            Jul 16, 2018 11:00 AM CDT   (Arrive by 10:45 AM)   Return Visit with Lam Morna MD   [facility] ([facility])    [address] [place] [address]  [address]  [facility] [address] [phone]              Pending Results     Date and Time Order Name Status Description    5/21/2018 1104 Anaerobic bacterial culture Preliminary     5/21/2018 1100 Abscess Culture Aerobic Bacterial Preliminary     5/21/2018 0457 Blood culture Preliminary     5/21/2018 0457 Blood culture Preliminary             Statement of Approval     Ordered          05/22/18 1003  I have reviewed and agree with all the recommendations and orders detailed in this document.  EFFECTIVE NOW     Approved and electronically signed by:  Louis Hameed MD             Admission Information     Date & Time Provider Department Dept. Phone    5/21/2018 Brooke Huffman MD [address] [facility] [phone]      Your Vitals Were     Blood Pressure Pulse Temperature Respirations Height Weight    111/67 (BP Location: Left arm) 65 98.5  F (36.9  C) (Oral) 12 1.651 m (5' 5\") 95.3 kg (210 lb)    Pulse Oximetry BMI (Body Mass Index)                94% 34.95 kg/m2          MyChart Information     [facility] gives you secure access to your electronic health record. If you see a primary care provider, you can also send messages to your care team and make appointments. If you have questions, please call your primary care clinic.  If you do not have a primary care provider, please call [phone] and they will assist you.        Care EveryWhere ID     This is your Care EveryWhere ID. This could be used by other organizations to access your [facility] " medical records  [id number]        Equal Access to Services     DORIS RODRIGUES : Hadii aad ku hadkianasisi Jo, waregisda lumaximinoadaha, qaybta rosalindmajudah decker, jin yusuf. So [facility] [phone].    ATENCIÓN: Si habla español, tiene a andrew disposición servicios gratuitos de asistencia lingüística. Llame al [phone].    We comply with applicable federal civil rights laws and [place] laws. We do not discriminate on the basis of race, color, national origin, age, disability, sex, sexual orientation, or gender identity.               Review of your medicines      START taking        Dose / Directions    levofloxacin 500 MG tablet   Commonly known as:  LEVAQUIN   Used for:  Abscess, retroperitoneal (H)        Dose:  500 mg   Take 1 tablet (500 mg) by mouth daily   Quantity:  7 tablet   Refills:  0         CONTINUE these medicines which may have CHANGED, or have new prescriptions. If we are uncertain of the size of tablets/capsules you have at home, strength may be listed as something that might have changed.        Dose / Directions    amLODIPine 10 MG tablet   Commonly known as:  NORVASC   This may have changed:  when to take this   Used for:  Hypertension, unspecified type        Dose:  10 mg   Take 1 tablet (10 mg) by mouth daily   Quantity:  100 tablet   Refills:  3         CONTINUE these medicines which have NOT CHANGED        Dose / Directions    COMPRESSION STOCKINGS   Used for:  Leg swelling        Dose:  1 each   1 each daily   Quantity:  2 Units   Refills:  1       oxyCODONE IR 5 MG tablet   Commonly known as:  ROXICODONE   Used for:  Recurrent pyelonephritis, Hydronephrosis, unspecified hydronephrosis type        Dose:  5-10 mg   Take 1-2 tablets (5-10 mg) by mouth every 3 hours as needed for other (pain control or improvement in physical function. Hold dose for analgesic side effects.)   Quantity:  20 tablet   Refills:  0       senna-docusate 8.6-50 MG per tablet   Commonly known  as:  SENOKOT-S;PERICOLACE   Used for:  Recurrent pyelonephritis, Hydronephrosis, unspecified hydronephrosis type        Dose:  1 tablet   Take 1 tablet by mouth 2 times daily as needed for constipation   Quantity:  20 tablet   Refills:  1            Where to get your medicines      These medications were sent to [facility] - [place], [place] - [address]  [address] [address]     Phone:  [phone]     levofloxacin 500 MG tablet                Protect others around you: Learn how to safely use, store and throw away your medicines at www.disposemymeds.org.        ANTIBIOTIC INSTRUCTION     You've Been Prescribed an Antibiotic - Now What?  Your healthcare team thinks that you or your loved one might have an infection. Some infections can be treated with antibiotics, which are powerful, life-saving drugs. Like all medications, antibiotics have side effects and should only be used when necessary. There are some important things you should know about your antibiotic treatment.      Your healthcare team may run tests before you start taking an antibiotic.    Your team may take samples (e.g., from your blood, urine or other areas) to run tests to look for bacteria. These test can be important to determine if you need an antibiotic at all and, if you do, which antibiotic will work best.      Within a few days, your healthcare team might change or even stop your antibiotic.    Your team may start you on an antibiotic while they are working to find out what is making you sick.    Your team might change your antibiotic because test results show that a different antibiotic would be better to treat your infection.    In some cases, once your team has more information, they learn that you do not need an antibiotic at all. They may find out that you don't have an infection, or that the antibiotic you're taking won't work against your infection. For example, an infection caused by a  virus can't be treated with antibiotics. Staying on an antibiotic when you don't need it is more likely to be harmful than helpful.      You may experience side effects from your antibiotic.    Like all medications, antibiotics have side effects. Some of these can be serious.    Let you healthcare team know if you have any known allergies when you are admitted to the hospital.    One significant side effect of nearly all antibiotics is the risk of severe and sometimes deadly diarrhea caused by Clostridium difficile (C. Difficile). This occurs when a person takes antibiotics because some good germs are destroyed. Antibiotic use allows C. diificile to take over, putting patients at high risk for this serious infection.    As a patient or caregiver, it is important to understand your or your loved one's antibiotic treatment. It is especially important for caregivers to speak up when patients can't speak for themselves. Here are some important questions to ask your healthcare team.    What infection is this antibiotic treating and how do you know I have that infection?    What side effects might occur from this antibiotic?    How long will I need to take this antibiotic?    Is it safe to take this antibiotic with other medications or supplements (e.g., vitamins) that I am taking?     Are there any special directions I need to know about taking this antibiotic? For example, should I take it with food?    How will I be monitored to know whether my infection is responding to the antibiotic?    What tests may help to make sure the right antibiotic is prescribed for me?      Information provided by:  www.cdc.gov/getsmart  U.S. Department of Health and Human Services  Centers for disease Control and Prevention  National Center for Emerging and Zoonotic Infectious Diseases  Division of Healthcare Quality Promotion             Medication List: This is a list of all your medications and when to take them. Check marks below  indicate your daily home schedule. Keep this list as a reference.      Medications           Morning Afternoon Evening Bedtime As Needed    amLODIPine 10 MG tablet   Commonly known as:  NORVASC   Take 1 tablet (10 mg) by mouth daily                                COMPRESSION STOCKINGS   1 each daily                                levofloxacin 500 MG tablet   Commonly known as:  LEVAQUIN   Take 1 tablet (500 mg) by mouth daily                                oxyCODONE IR 5 MG tablet   Commonly known as:  ROXICODONE   Take 1-2 tablets (5-10 mg) by mouth every 3 hours as needed for other (pain control or improvement in physical function. Hold dose for analgesic side effects.)   Last time this was given:  10 mg on 5/21/2018  6:26 PM                                senna-docusate 8.6-50 MG per tablet   Commonly known as:  SENOKOT-S;PERICOLACE   Take 1 tablet by mouth 2 times daily as needed for constipation   Last time this was given:  2 tablets on 5/22/2018  9:25 AM

## 2018-05-21 NOTE — PLAN OF CARE
Problem: Patient Care Overview  Goal: Plan of Care/Patient Progress Review    Observation goals EFFECTIVE NOW     Comments:   1) AVSS: Yes   2) Pain managed with po meds: Oxycodone given at 1200           Pt had LLQ drain placed this am. Pt tolerated well. No output recorded yet. Site sore. Oxycodone given every 3 hours. Diet advanced to regular. Pt tolerating well. Voiding spont.  ml. Up with sba. Will stay the night for observation and dc tomorrow. Continue with poc. SW consult placed for medical assistance.

## 2018-05-21 NOTE — PROGRESS NOTES
"Urology  Progress Note    No acute events since admission  Pain well controlled  NPO in anticipation of drain placement    Exam  /77  Pulse 80  Temp 96.5  F (35.8  C) (Oral)  Resp 18  Ht 1.651 m (5' 5\")  Wt 95.3 kg (210 lb)  SpO2 100%  BMI 34.95 kg/m2  No acute distress  Unlabored breathing  Abdomen soft, nt/nd, incisions well healed  left CVAT - old MERY site with bandage  Lower extremities non-edematous bilaterally    UOP - / 700    Labs 5/21  WBC 9.3  Hgb 9.4  Cr 0.77    AM labs pending    Assessment/Plan  52 year old y/o female admitted with left flank pain and organized collection in surgical bed following recent nephrectomy.  Awaiting possible drain placement     Neuro: Scheduled acetaminophen and PRN narcotics for pain  CV: HDS  Pulm: incentive spirometry while awake  FEN/GI: NPO pending possible drain placement, MIVF @ 100/hr  Endo: No acute issues  : No acute issues  Heme/ID: Continue vanc/zosyn; Send drain cultures  Activity: As tolerated  PPx: SCDs    Seen and examined with the chief resident. Will discuss with Dr. Huffman.    Natanael Camacho MD   Urology Resident     Contacting the Urology Team     Please use the following job codes to reach the Urology Team. Note that you must use an in house phone and that job codes cannot receive text pages.     On weekdays, dial [phone] (or star-star-star 777 on the new zoidu telephones) then [phone] to reach the Adult Urology resident or PA on call    On weekdays, dial [phone] (or star-star-star 777 on the new zoidu telephones) then [phone] to reach the Pediatric Urology resident    On weeknights and weekends, dial [phone] (or star-star-star 777 on the new zoidu telephones) then [phone] to reach the Urology resident on call (for both Adult and Pediatrics)          "

## 2018-05-21 NOTE — BRIEF OP NOTE
Interventional Radiology Brief Post Procedure Note    Procedure: CT RENAL KIDNEY ABSCESS DRAIN W CATH PLACE    Proceduralist: Broderick Titus MD    Assistant: Bridger Lockhart MD    Time Out: Prior to the start of the procedure and with procedural staff participation, I verbally confirmed the patient s identity using two indicators, relevant allergies, that the procedure was appropriate and matched the consent or emergent situation, and that the correct equipment/implants were available. Immediately prior to starting the procedure I conducted the Time Out with the procedural staff and re-confirmed the patient s name, procedure, and site/side. (The Joint Commission universal protocol was followed.)  Yes    Medications   Medication Event Details Admin User Admin Time       Sedation: IR Nurse Monitored Care   Post Procedure Summary:  Prior to the start of the procedure and with procedural staff participation, I verbally confirmed the patient s identity using two indicators, relevant allergies, that the procedure was appropriate and matched the consent or emergent situation, and that the correct equipment/implants were available. Immediately prior to starting the procedure I conducted the Time Out with the procedural staff and re-confirmed the patient s name, procedure, and site/side. (The Joint Commission universal protocol was followed.)  Yes       Sedatives: Fentanyl and Midazolam (Versed)    Vital signs, airway and pulse oximetry were monitored and remained stable throughout the procedure and sedation was maintained until the procedure was complete.  The patient was monitored by staff until sedation discharge criteria were met.    Patient tolerance: Patient tolerated the procedure well with no immediate complications.    Time of sedation in minutes: 30 Minutes minutes from beginning to end of physician one to one monitoring.          Findings: 12 fr drain placed into a left nephrectomy bed fluid collection.  Brownish serous fluid most suspicious for a degenerating hematoma was sent for lab analysis.    Estimated Blood Loss: Minimal    Fluoroscopy Time:  minute(s)    SPECIMENS: Fluid and/or tissue for Gram stain and culture    Complications: 1. None     Condition: Stable    Plan:   1 hr bedrest after sedation.    Comments: See dictated procedure note for full details.    Bridger Lockhart MD

## 2018-05-21 NOTE — CONSULTS
Patient is on IR schedule 5/21/2018 for a Left RP drain placement.   Labs WNL for procedure.   Orders for NPO and antibiotics have been entered.   Consent will be done prior to procedure.     Please contact the IR charge RN at [phone] for estimated time of procedure.     Case discussed with Dr. Morales from IR and Dr. Villafuerte.    Marie Heath, CARMELA, APRN  Interventional Radiology  Phone: [phone]  Pager: [phone]

## 2018-05-21 NOTE — PLAN OF CARE
Problem: Patient Care Overview  Goal: Plan of Care/Patient Progress Review  Outcome: No Change  Vital signs:  Temp: 96.5  F (35.8  C) Temp src: Oral BP: 113/77 Pulse: 80   Resp: 18 SpO2: 100 % O2 Device: None (Room air)    Admitted from ED to . VSS. Previous abdominal incisions SARA, C/D/I. Pt C/O left flank pain, oxycodone and tylenol given x 1. Zosyn completed, vancomycin started, infusing via chest port. Up with SBA to bathroom. Voiding adequate amounts. NPO for possible IR procedure. Family at bedside. Resting between cares.

## 2018-05-21 NOTE — PROGRESS NOTES
"SPIRITUAL HEALTH SERVICES  SPIRITUAL ASSESSMENT Progress Note  [facility] ([place]) 7B     REFERRAL SOURCE: Hospital  Request    Met with Angella to offer spiritual and emotional support.  Angella told me she \"arrived here yesterday\" and has already had \"lots of support from my , family, children, grandchildren.\"  Angella identifies as \"Anglican\" and said \"My body might say otherwise but my spirit is healed by the power of God.\"  Angella said she \"gives god praise every day\" and \"prayer\" is very important to her.  We prayed \"prayers of thanksgiving\" and for her \"healing.\"     PLAN: will continue to visit with Angella during her stay.   remains available for more immediate requests.    Praveena Eid  Chaplain Resident  Pager [phone]    "

## 2018-05-22 ENCOUNTER — CARE COORDINATION (OUTPATIENT)
Dept: CARE COORDINATION | Facility: CLINIC | Age: 53
End: 2018-05-22

## 2018-05-22 VITALS
OXYGEN SATURATION: 94 % | RESPIRATION RATE: 12 BRPM | TEMPERATURE: 98.5 F | BODY MASS INDEX: 34.99 KG/M2 | SYSTOLIC BLOOD PRESSURE: 111 MMHG | HEIGHT: 65 IN | DIASTOLIC BLOOD PRESSURE: 67 MMHG | WEIGHT: 210 LBS | HEART RATE: 65 BPM

## 2018-05-22 DIAGNOSIS — T81.49XA ABSCESS AFTER PROCEDURE: Primary | ICD-10-CM

## 2018-05-22 LAB
ANION GAP SERPL CALCULATED.3IONS-SCNC: 8 MMOL/L (ref 3–14)
BACTERIA SPEC CULT: NORMAL
BUN SERPL-MCNC: 11 MG/DL (ref 7–30)
CALCIUM SERPL-MCNC: 8.6 MG/DL (ref 8.5–10.1)
CHLORIDE SERPL-SCNC: 105 MMOL/L (ref 94–109)
CO2 SERPL-SCNC: 26 MMOL/L (ref 20–32)
CREAT SERPL-MCNC: 0.74 MG/DL (ref 0.52–1.04)
ERYTHROCYTE [DISTWIDTH] IN BLOOD BY AUTOMATED COUNT: 14.4 % (ref 10–15)
GFR SERPL CREATININE-BSD FRML MDRD: 82 ML/MIN/1.7M2
GLUCOSE SERPL-MCNC: 92 MG/DL (ref 70–99)
HCT VFR BLD AUTO: 31.5 % (ref 35–47)
HGB BLD-MCNC: 9.9 G/DL (ref 11.7–15.7)
Lab: NORMAL
MCH RBC QN AUTO: 26.8 PG (ref 26.5–33)
MCHC RBC AUTO-ENTMCNC: 31.4 G/DL (ref 31.5–36.5)
MCV RBC AUTO: 85 FL (ref 78–100)
PLATELET # BLD AUTO: 370 10E9/L (ref 150–450)
POTASSIUM SERPL-SCNC: 3.8 MMOL/L (ref 3.4–5.3)
RBC # BLD AUTO: 3.7 10E12/L (ref 3.8–5.2)
SODIUM SERPL-SCNC: 138 MMOL/L (ref 133–144)
SPECIMEN SOURCE: NORMAL
WBC # BLD AUTO: 6.6 10E9/L (ref 4–11)

## 2018-05-22 PROCEDURE — 25000128 H RX IP 250 OP 636: Performed by: STUDENT IN AN ORGANIZED HEALTH CARE EDUCATION/TRAINING PROGRAM

## 2018-05-22 PROCEDURE — 25000132 ZZH RX MED GY IP 250 OP 250 PS 637: Performed by: STUDENT IN AN ORGANIZED HEALTH CARE EDUCATION/TRAINING PROGRAM

## 2018-05-22 PROCEDURE — 25000132 ZZH RX MED GY IP 250 OP 250 PS 637: Performed by: UROLOGY

## 2018-05-22 PROCEDURE — 36415 COLL VENOUS BLD VENIPUNCTURE: CPT | Performed by: STUDENT IN AN ORGANIZED HEALTH CARE EDUCATION/TRAINING PROGRAM

## 2018-05-22 PROCEDURE — 80048 BASIC METABOLIC PNL TOTAL CA: CPT | Performed by: STUDENT IN AN ORGANIZED HEALTH CARE EDUCATION/TRAINING PROGRAM

## 2018-05-22 PROCEDURE — 85027 COMPLETE CBC AUTOMATED: CPT | Performed by: STUDENT IN AN ORGANIZED HEALTH CARE EDUCATION/TRAINING PROGRAM

## 2018-05-22 PROCEDURE — G0378 HOSPITAL OBSERVATION PER HR: HCPCS

## 2018-05-22 PROCEDURE — 25000128 H RX IP 250 OP 636: Performed by: UROLOGY

## 2018-05-22 PROCEDURE — 25000128 H RX IP 250 OP 636

## 2018-05-22 RX ORDER — LEVOFLOXACIN 500 MG/1
500 TABLET, FILM COATED ORAL DAILY
Qty: 7 TABLET | Refills: 0 | Status: ON HOLD | OUTPATIENT
Start: 2018-05-22 | End: 2018-06-19

## 2018-05-22 RX ORDER — HEPARIN SODIUM (PORCINE) LOCK FLUSH IV SOLN 100 UNIT/ML 100 UNIT/ML
5 SOLUTION INTRAVENOUS
Status: DISCONTINUED | OUTPATIENT
Start: 2018-05-22 | End: 2018-05-22 | Stop reason: HOSPADM

## 2018-05-22 RX ORDER — HEPARIN SODIUM,PORCINE 10 UNIT/ML
5-10 VIAL (ML) INTRAVENOUS
Status: DISCONTINUED | OUTPATIENT
Start: 2018-05-22 | End: 2018-05-22 | Stop reason: HOSPADM

## 2018-05-22 RX ORDER — HEPARIN SODIUM,PORCINE 10 UNIT/ML
5-10 VIAL (ML) INTRAVENOUS EVERY 24 HOURS
Status: DISCONTINUED | OUTPATIENT
Start: 2018-05-22 | End: 2018-05-22 | Stop reason: HOSPADM

## 2018-05-22 RX ADMIN — PIPERACILLIN SODIUM AND TAZOBACTAM SODIUM 3.38 G: 3; .375 INJECTION, POWDER, LYOPHILIZED, FOR SOLUTION INTRAVENOUS at 09:26

## 2018-05-22 RX ADMIN — ACETAMINOPHEN 650 MG: 325 TABLET, FILM COATED ORAL at 03:44

## 2018-05-22 RX ADMIN — VANCOMYCIN HYDROCHLORIDE 1500 MG: 10 INJECTION, POWDER, LYOPHILIZED, FOR SOLUTION INTRAVENOUS at 05:37

## 2018-05-22 RX ADMIN — MAGNESIUM HYDROXIDE 30 ML: 400 SUSPENSION ORAL at 09:24

## 2018-05-22 RX ADMIN — ACETAMINOPHEN 650 MG: 325 TABLET, FILM COATED ORAL at 13:11

## 2018-05-22 RX ADMIN — PIPERACILLIN SODIUM AND TAZOBACTAM SODIUM 3.38 G: 3; .375 INJECTION, POWDER, LYOPHILIZED, FOR SOLUTION INTRAVENOUS at 03:44

## 2018-05-22 RX ADMIN — HEPARIN 5 ML: 100 SYRINGE at 13:11

## 2018-05-22 RX ADMIN — ACETAMINOPHEN 650 MG: 325 TABLET, FILM COATED ORAL at 09:25

## 2018-05-22 RX ADMIN — SENNOSIDES AND DOCUSATE SODIUM 2 TABLET: 8.6; 5 TABLET ORAL at 09:25

## 2018-05-22 NOTE — PLAN OF CARE
Problem: Patient Care Overview  Goal: Plan of Care/Patient Progress Review  Outcome: No Change  Vitals stable. LLQ drain intact. No irrigation orders. Paged MD, he said will let team in morning assess need for irrigation orders. Pt reports pain around drain, oxycodone given as needed. 10 mg of oxycodone made pt feel nauseated. Pt would like to try 5 mg next time. Iv zofran given. Regular diet, tolerating well. Voiding spont. Up with sba. IV abx (zosyn and vancomycin) infused via right chest port. Possible D/C tomorrow.

## 2018-05-22 NOTE — PLAN OF CARE
Observation Goals  1) AVSS: Yes   2) Pain managed with po meds: Pt reports slight nausea, so declined oral meds currently.

## 2018-05-22 NOTE — PROGRESS NOTES
"Urology  Progress Note    IR drain placement yesterday  No acute events overnight  Pain well controlled  Mild nausea noted yesterday, has not had a BM since Sunday    Exam  Vital signs:  Temp: 96.9  F (36.1  C) Temp src: Oral BP: 110/58 Pulse: 65 Heart Rate: 70 Resp: 16 SpO2: 96 % O2 Device: None (Room air) Oxygen Delivery: 1 LPM Height: 165.1 cm (5' 5\") Weight: 95.3 kg (210 lb)  Estimated body mass index is 34.95 kg/(m^2) as calculated from the following:    Height as of this encounter: 1.651 m (5' 5\").    Weight as of this encounter: 95.3 kg (210 lb).    No acute distress  Unlabored breathing  Abdomen soft, nt/nd, incisions well healed  IR drain in L flank with serous output  Lower extremities non-edematous bilaterally    UOP 2350/375  Drain -- / 20     Labs 5/21  WBC 5.3  Hgb 9.6  Cr 0.69    AM labs pending    Assessment/Plan  52 year old y/o female admitted with left flank pain and organized collection in surgical bed following recent nephrectomy.  Now s/p IR drain placement.      Neuro: Scheduled acetaminophen and PRN narcotics for pain  CV: HDS  Pulm: incentive spirometry while awake  FEN/GI: Regular diet  Endo: No acute issues  : No acute issues  Heme/ID: Continue vanc/zosyn - will transition to oral meds while awaiting culture results  Activity: As tolerated  PPx: SCDs    Seen and examined with the chief resident. Will discuss with Dr. Huffman.    Natanael Camacho MD   Urology Resident     Contacting the Urology Team     Please use the following job codes to reach the Urology Team. Note that you must use an in house phone and that job codes cannot receive text pages.     On weekdays, dial [phone] (or star-star-star [phone] on the new [employer] telephones) then [phone] to reach the Adult Urology resident or PA on call    On weekdays, dial [phone] (or star-star-star [phone] on the new [place] telephones) then [phone] to reach the Pediatric Urology resident    On weeknights and weekends, dial [phone] (or star-star-star [phone] on the new [employer] " telephones) then [phone] to reach the Urology resident on call (for both Adult and Pediatrics)

## 2018-05-22 NOTE — CONSULTS
Brief Social Work Note:    Spoke with Kevan in Financial Counseling (*[id number]) and she reported that per her documentation pt has EMA pending, application was completed with [facility] and pt has been called (on 5/4/18 and 5/9/18) by an EMA securing agency ([facility]) and they left a vm for pt. [facility] will continue to work on the case. Kevan reported that per her documentation pt is not eligible for any other type of insurance at this time.    Andie Simmons, ANDRAE, MSW  7B   [phone] (pager) [phone]  5/22/2018

## 2018-05-22 NOTE — PLAN OF CARE
Problem: Patient Care Overview  Goal: Individualization & Mutuality  Outcome: Improving  1) AVSS: Yes   2) Pain managed with po meds: Scheduled tylenol given.

## 2018-05-22 NOTE — PLAN OF CARE
Problem: Patient Care Overview  Goal: Plan of Care/Patient Progress Review  Outcome: Adequate for Discharge Date Met: 05/22/18  Pt discharged to home, discharge orders reviewed with patient, teaching done on drain. Prior to discharge patient afebrile, vss,sats 95-98%RA, pain controlled with tylenol. Voiding adequately, passing gas and tolerating diet. Cont with poc

## 2018-05-22 NOTE — PLAN OF CARE
Problem: Patient Care Overview  Goal: Plan of Care/Patient Progress Review  Outcome: No Change  Observation Goals  1) AVSS: Yes   2) Pain managed with po meds: Oxycodone given as needed. Scheduled tylenol given.

## 2018-05-22 NOTE — PLAN OF CARE
Problem: Patient Care Overview  Goal: Plan of Care/Patient Progress Review  Outcome: No Change  Afeb, vitals stable, 02 sats 95-96% on room air, states pain is manageable, declined need for pain meds,took scheduled tylenol with relief,  belly soft with positive bowel sounds, lung clear, drain site dry and intact, drian to gravity with small amts of green drng, iv at tko with iv abx given as ordered, up to bathroom with SBA, voiding in good amts, offers no further c/o, appeared to rest/sleep between cares, possible dc later today

## 2018-05-23 ENCOUNTER — TELEPHONE (OUTPATIENT)
Dept: ENDOCRINOLOGY | Facility: CLINIC | Age: 53
End: 2018-05-23

## 2018-05-23 LAB
BACTERIA SPEC CULT: ABNORMAL
SPECIMEN SOURCE: ABNORMAL

## 2018-05-23 NOTE — DISCHARGE SUMMARY
Discharge Summary     Angella St MRN# [id number]   YOB: 1965 Age: 52 year old     Date of Admission:  5/21/2018  Date of Discharge::  5/22/2018  2:50 PM  Admitting Physician:  Brooke Huffman MD  Discharge Physician:  Rohit Villafuerte MD  Primary Care Physician:         Shamir Pugh          Admission Diagnoses:   Abscess, retroperitoneal (H) [K68.19]            Discharge Diagnosis:   Same as above         Procedures:   : * No surgery found *        Non-operative procedures:   Interventional radiology drain placement          Consultations:   INTERVENTIONAL RADIOLOGY ADULT/PEDS IP CONSULT  SOCIAL WORK IP CONSULT           Imaging Studies:     Results for orders placed or performed during the hospital encounter of 05/21/18   CT Abdomen Pelvis w Contrast     Value    Radiologist flags Intra-abdominal abscess (Urgent)    Narrative    Examination:  CT ABDOMEN PELVIS W CONTRAST 5/21/2018 2:34 AM     History: Status post left nephrectomy 2 weeks ago, now with severe  left flank pain    Comparison: CT of the chest, abdomen, pelvis 1/8/2018    Technique: CT of the abdomen and pelvis were obtained with IV  contrast. Sagittal and coronal reconstructions created and reviewed.    Contrast: 128 mL Isovue-370 IV    Findings:   The liver and adrenal glands are unremarkable. Gallbladder is  unremarkable. The pancreas is unremarkable. Normal enhancement of the  right kidney. The urinary bladder is unremarkable.    In the left nephrectomy resection bed there is a rim-enhancing fluid  collection which contains gas. Collection is estimated to measure  approximately 6.2 x 3.5 x 9.2 cm. It abuts the inferior aspect of the  otherwise normal-appearing spleen. Collection also abuts the left  colon without obvious fistula. A rim-enhancing structure extending  from the left nephrectomy bed through the low pelvis measures  approximately 13 mm in diameter, but narrows in the low  pelvis.  Structure may either represent distended ureter or thrombosed left  ovarian vein. Ureter is not prominent at the vesicoureteral junction.    The small and large bowel are normal in caliber. Small volume low  pelvic free fluid. Scattered prominent, but not pathologically  enlarged retroperitoneal lymph nodes near the left nephrectomy bed. No  mesenteric or inguinal lymphadenopathy. The major abdominal  vasculature is patent.    No acute osseous abnormalities. Subcutaneous stranding and  subcutaneous gas, primarily in the left anterior abdomen.    Small left pleural effusion with associated compressive atelectasis.      Impression    Impression:   1. Postsurgical changes of left nephrectomy with rim-enhancing and gas  containing fluid collection in the left nephrectomy bed concerning for  abscess. Collection abuts the spleen and left colon without evidence  of fistula. A structure extending from the left nephrectomy fat into  the low pelvis is favored to represent infectious extent into the left  ureter. However, thrombosed left ovarian vein could have a similar  appearance.  2. Small left pleural effusion with associated compressive  atelectasis, new from previous CT.  3. Subcutaneous stranding and subcutaneous emphysema of the anterior  abdomen, presumed postsurgical change.    [Urgent Result: Intra-abdominal abscess]    Finding was identified on 5/21/2018 2:39 AM.     Dr. Stern was contacted by Dr. Wright at 5/21/2018 2:41 AM and  verbalized understanding of the urgent finding.     I have personally reviewed the examination and initial interpretation  and I agree with the findings.    MANUEL CHILEL MD   CT Renal Kidney Abscess Drainage    Narrative    Procedures:  1. Limited abdominal ultrasound.  2. CT-guided left renal fossa drain placement.    History: Left nephrectomy bed fluid collection present after removing  MERY drain.    Comparison: 5/21/2018, 1/8/2018    Staff: Richie WU I, RICHIE WEINBERG  MD, attest that I was present for all critical  portions of the procedure and was immediately available to provide  guidance and assistance during the remainder of the procedure.  Fellow: Bridger Lockhart MD    Medications:   1. 100 mcg Fentanyl  2. For mg Versed    Moderate sedation administered by the IR nurse at the supervision of  the attending. Vital signs and oxygenation continuously monitored. The  patient remained stable throughout the procedure.    Sedation time: 30 minutes     Total DLP: 802 mg centimeters    Findings/procedure:    Prior to the procedure, both verbal and written informed consent  obtained from the patient. Limited ultrasound was performed of the  renal fossa demonstrating a very heterogeneous, mixed hyperechoic and  anechoic collection that was immediately adjacent to the descending  colon. Due to gas within the collection, ultrasound visualization was  suboptimal, and patient was taken to CT. A preprocedure scan showed  safe approach for drain placement through the retroperitoneum. Patient  prepped and draped in the usual sterile fashion. Timeout performed. A  5 Stateless Yueh needle was advanced into the collection under  intermittent CT fluoroscopy after the skin and underlying soft tissues  were anesthetized with 1% lidocaine. A 0.035 Bentson wire was advanced  through the needle and looped within the cavity up to the inferior  aspect of the spleen. Tract was sequentially dilated to 12 Stateless. A  12 Stateless locking pigtail skater catheter was formed within the  collection and approximately 5 cc of brown serous type fluid aspirated  and sent for laboratory analysis.      Impression    Impression:  Placement of a 12 Stateless locking pigtail skater catheter into the  right nephrectomy bed fluid collection. Return of only 5 cc of what  appeared to be degenerated hematoma.    Plan:   Monitor outputs. Flush with 10 cc of normal saline 3 times a day.    I have personally reviewed the examination  and initial interpretation  and I agree with the findings.    RICHIE WEINBERG MD            Medications Prior to Admission:     No prescriptions prior to admission.            Discharge Medications:     Discharge Medication List as of 5/22/2018  1:23 PM      START taking these medications    Details   levofloxacin (LEVAQUIN) 500 MG tablet Take 1 tablet (500 mg) by mouth daily, Disp-7 tablet, R-0, E-Prescribe         CONTINUE these medications which have NOT CHANGED    Details   amLODIPine (NORVASC) 10 MG tablet Take 1 tablet (10 mg) by mouth daily, Disp-100 tablet, R-3, E-Prescribe      COMPRESSION STOCKINGS 1 each daily, Disp-2 Units, R-1, Local Print      oxyCODONE IR (ROXICODONE) 5 MG tablet Take 1-2 tablets (5-10 mg) by mouth every 3 hours as needed for other (pain control or improvement in physical function. Hold dose for analgesic side effects.), Disp-20 tablet, R-0, Local Print      senna-docusate (SENOKOT-S;PERICOLACE) 8.6-50 MG per tablet Take 1 tablet by mouth 2 times daily as needed for constipation, Disp-20 tablet, R-1, E-Prescribe                    Brief History of Illness:   Reason for admission requiring a surgical or invasive procedure:   * No surgery found *   The patient underwent the following procedure(s):   See above   There were no immediate complications during this procedure.    Please refer to the full operative summary for details.    From 5/21/18 H&P    Angella St is a 52 year old female with medical history of CKD, HTN, obesity, and cervical cancer and urologic history of nonfunctioning left kidney and recurrent infections who underwent robotic assisted laparoscopic left nephrectomy on 5/3/18.  Notably, it was a challenging procedure notable for obliterated surgical planes and a kiney that was adhesed to surrounding structures, and 750cc blood loss at procedure end.  She was discharged home on post operative day three and has been doing well post discharge, but presented late last evening  to the ED with 1 day history of acute left flank pain and some subjective chills.  She denies any nausea/vomiting, dysuria, or hematuria.           Hospital Course:   The patient was admitted to observation status and an IR consultation was placed for drainage of her fluid collection, which occurred without issue.  On the following day, the patient was ambulating without assitance, tolerating the discharge diet, had pain controlled with PO medications to go home with, and requiring no IV medications or fluids. Patient was discharged home with appropriate contact information, follow-up and instructions as seen below in the discharge paperwork.         Final Pathology Result:   Pending at time of discharge         Discharge Instructions and Follow-Up:     Discharge Procedure Orders  Reason for your hospital stay   Order Comments: Hematoma after nephrectomy, requiring drain placement     Discharge Instructions   Order Comments: Activity  - No strenuous exercise for 6 weeks.  - No lifting, pushing, pulling more than 10 pounds for 6 weeks.   - Do not strain with bowel movements.  - Do not drive until you can press the brake pedal quickly and fully without pain.   - Do not operate a motor vehicle while taking narcotic pain medications.     Medications  - Transition from narcotic pain medications to tylenol (acetaminophen) as you are able.  Wean yourself off all pain medications as you are able.  - Some pain medications contain both tylenol (acetaminophen) and a narcotic (Norco, vicodin, percocet), do not take more than 4,000mg of Tylenol (acetaminophen) from all sources in any 24 hour period.  - Narcotics can make you constipated.  Take over the counter fiber (metamucil or benefiber) and stool softeners (miralax, docusate or senna) while taking narcotic pain medications, but stop if you develop diarrhea.  - No driving or operating machinery while taking narcotic pain medications     Drain Care  - You will be discharge to  "home with your drain in place  - Continue to record daily outputs  - The drain is secured to your skin and has overlying dressings.  The dressing need not be changed unless it is saturated.  We will plan to evaluate the dressing next week in clinic.     Bathing  -No baths while the drain remains in place  -Avoid getting the dressing wet while showering    Follow-Up:  - In 1 week with Dr. Salinas  - Call your primary care provider to touch base regarding your recent admission.    - Call or return sooner than your regularly scheduled visit if you develop any of the following: fever (greater than 101.5), uncontrolled pain, uncontrolled nausea or vomiting, as well as increased redness, swelling, or drainage from your wound.     Phone numbers:   - Monday through Friday 8am to 4:30pm: Call [phone] with questions or to schedule or confirm appointment.    - Nights or weekends: call the after hours emergency pager - [phone] and tell the  \"I would like to page the Urology Resident on call.\"  - For emergencies, call [phone]     Supplies   Order Comments: List the supplies the pt needs to go home  - drain care teaching, recording, supplies  Please provide instructions to patient to irrigate tube with 10 cc of saline once a day (and supplies)     Full Code              Discharge Disposition:   Discharged to Home      Condition at discharge: Good    --    Rohit Villafuerte MD  Urology Resident    9:11 AM, 5/23/2018    "

## 2018-05-23 NOTE — TELEPHONE ENCOUNTER
To schedulers: Please schedule  for thyroid biopsy clinic today if there is space (including 1 or 2 PM time options) . If not space today, try for next Friday thyroid biopsy clinic space .     Celine Ritter MD  Endocrine triage      [facility] Call Center    Phone Message    May a detailed message be left on voicemail: no    Reason for Call: Other: Pt has an endo referral for dx of Non-toxic multinodular goiter. Please review and contact pt for scheduling.     Action Taken: Sent Endo Clinic Coordinator

## 2018-05-25 ENCOUNTER — OFFICE VISIT (OUTPATIENT)
Dept: UROLOGY | Facility: CLINIC | Age: 53
End: 2018-05-25
Payer: MEDICAID

## 2018-05-25 VITALS
HEIGHT: 65 IN | HEART RATE: 74 BPM | BODY MASS INDEX: 34.85 KG/M2 | SYSTOLIC BLOOD PRESSURE: 123 MMHG | DIASTOLIC BLOOD PRESSURE: 76 MMHG | WEIGHT: 209.2 LBS

## 2018-05-25 DIAGNOSIS — K68.19 RETROPERITONEAL ABSCESS (H): ICD-10-CM

## 2018-05-25 DIAGNOSIS — N13.30 HYDRONEPHROSIS, UNSPECIFIED HYDRONEPHROSIS TYPE: ICD-10-CM

## 2018-05-25 DIAGNOSIS — K68.19 RETROPERITONEAL ABSCESS (H): Primary | ICD-10-CM

## 2018-05-25 DIAGNOSIS — N12 RECURRENT PYELONEPHRITIS: ICD-10-CM

## 2018-05-25 LAB
ALBUMIN SERPL-MCNC: 3.8 G/DL (ref 3.4–5)
ALP SERPL-CCNC: 53 U/L (ref 40–150)
ALT SERPL W P-5'-P-CCNC: 21 U/L (ref 0–50)
AMYLASE SERPL-CCNC: 33 U/L (ref 30–110)
ANION GAP SERPL CALCULATED.3IONS-SCNC: 9 MMOL/L (ref 3–14)
AST SERPL W P-5'-P-CCNC: 15 U/L (ref 0–45)
BASOPHILS # BLD AUTO: 0 10E9/L (ref 0–0.2)
BASOPHILS NFR BLD AUTO: 0.2 %
BILIRUB SERPL-MCNC: 0.3 MG/DL (ref 0.2–1.3)
BUN SERPL-MCNC: 9 MG/DL (ref 7–30)
CALCIUM SERPL-MCNC: 9.5 MG/DL (ref 8.5–10.1)
CHLORIDE SERPL-SCNC: 106 MMOL/L (ref 94–109)
CO2 SERPL-SCNC: 24 MMOL/L (ref 20–32)
CREAT SERPL-MCNC: 0.65 MG/DL (ref 0.52–1.04)
DIFFERENTIAL METHOD BLD: ABNORMAL
EOSINOPHIL # BLD AUTO: 0.1 10E9/L (ref 0–0.7)
EOSINOPHIL NFR BLD AUTO: 1.8 %
ERYTHROCYTE [DISTWIDTH] IN BLOOD BY AUTOMATED COUNT: 14.3 % (ref 10–15)
GFR SERPL CREATININE-BSD FRML MDRD: >90 ML/MIN/1.7M2
GLUCOSE SERPL-MCNC: 99 MG/DL (ref 70–99)
HCT VFR BLD AUTO: 36.5 % (ref 35–47)
HGB BLD-MCNC: 11.4 G/DL (ref 11.7–15.7)
IMM GRANULOCYTES # BLD: 0 10E9/L (ref 0–0.4)
IMM GRANULOCYTES NFR BLD: 0.3 %
LIPASE SERPL-CCNC: 94 U/L (ref 73–393)
LYMPHOCYTES # BLD AUTO: 2.7 10E9/L (ref 0.8–5.3)
LYMPHOCYTES NFR BLD AUTO: 41.9 %
MCH RBC QN AUTO: 27.1 PG (ref 26.5–33)
MCHC RBC AUTO-ENTMCNC: 31.2 G/DL (ref 31.5–36.5)
MCV RBC AUTO: 87 FL (ref 78–100)
MONOCYTES # BLD AUTO: 0.4 10E9/L (ref 0–1.3)
MONOCYTES NFR BLD AUTO: 5.7 %
NEUTROPHILS # BLD AUTO: 3.3 10E9/L (ref 1.6–8.3)
NEUTROPHILS NFR BLD AUTO: 50.1 %
NRBC # BLD AUTO: 0 10*3/UL
NRBC BLD AUTO-RTO: 0 /100
PLATELET # BLD AUTO: 378 10E9/L (ref 150–450)
POTASSIUM SERPL-SCNC: 3.8 MMOL/L (ref 3.4–5.3)
PROT SERPL-MCNC: 8.4 G/DL (ref 6.8–8.8)
RBC # BLD AUTO: 4.2 10E12/L (ref 3.8–5.2)
SODIUM SERPL-SCNC: 139 MMOL/L (ref 133–144)
WBC # BLD AUTO: 6.5 10E9/L (ref 4–11)

## 2018-05-25 RX ORDER — OXYCODONE HYDROCHLORIDE 5 MG/1
5-10 TABLET ORAL
Qty: 20 TABLET | Refills: 0 | Status: SHIPPED | OUTPATIENT
Start: 2018-05-25 | End: 2018-06-01

## 2018-05-25 RX ORDER — AMLODIPINE BESYLATE 5 MG/1
5 TABLET ORAL DAILY
COMMUNITY
End: 2018-05-29

## 2018-05-25 ASSESSMENT — PAIN SCALES - GENERAL: PAINLEVEL: MODERATE PAIN (5)

## 2018-05-25 NOTE — LETTER
5/25/2018     RE: Angella St  C/o Greyson Sherman  [address]  [facility] [address]     Dear Colleague,    Thank you for referring your patient, Angella St, to the [facility] FOR PROSTATE AND UROLOGIC CANCERS at [facility]. Please see a copy of my visit note below.    UROLOGIC DIAGNOSES:        CURRENT INTERVENTIONS:        History:       Angella St is a 52 year old female with history of cervical cancer with involvement of the left ureter.  She has undergoing unsuccessful attempt at ureteroscopy but subsequently required PNT placement.  She has been managed with chronic PNT and q3-4 monthly exchanges since 2015.  A renogram performed 2016 showed minimal L kidney function.  She states that she produces about 50cc of urine per day in the L nephrostomy.     From an oncology standpoint, she has been stable and without recurrence.  Per the oncology note, remains disease free and presents today for consideration of left nephrectomy due to non-functional left kidney.  Prior attempts at capping the nephrostomy tube have resulted in infection.    Patient is s/p left nephrectomy. This was complicated due to above history. Patient then admitted for pain and found to have a fluid collection/abscess in the retroperitoneum. This was drained by IR and currently has drain in place.   Patient states she is feeling better since IR drainage though sometimes feels a little weak.     We reviewed the above together. Also reviewed pathology (negative for carcinoma).       Imaging:      Labs:      MEDICATIONS:    Current Outpatient Prescriptions:      Acetaminophen (TYLENOL PO), , Disp: , Rfl:      amoxicillin-clavulanate (AUGMENTIN) 875-125 MG per tablet, Take 1 tablet by mouth 2 times daily, Disp: 28 tablet, Rfl: 0     oxyCODONE IR (ROXICODONE) 5 MG tablet, Take 1-2 tablets (5-10 mg) by mouth every 3 hours as needed for other (pain control or improvement in physical  "function. Hold dose for analgesic side effects.), Disp: 20 tablet, Rfl: 0     amLODIPine (NORVASC) 10 MG tablet, Take 0.5 tablets (5 mg) by mouth daily, Disp: 100 tablet, Rfl: 3     COMPRESSION STOCKINGS, 1 each daily, Disp: 2 Units, Rfl: 1     levofloxacin (LEVAQUIN) 500 MG tablet, Take 1 tablet (500 mg) by mouth daily, Disp: 7 tablet, Rfl: 0  No current facility-administered medications for this visit.     ALLERGIES:   No Known Allergies    REVIEW OF SYSTEMS: Ten point review of systems without change as outlined in HPI    SURGICAL HISTORY:    Past Surgical History:   Procedure Laterality Date     COMBINED CYSTOSCOPY, INSERT STENT URETER(S) Left 6/13/2016    Procedure: COMBINED CYSTOSCOPY, INSERT STENT URETER(S);  Surgeon: Deja Salinas MD;  Location: [facility] OR     CYSTOSCOPY, RETROGRADES, COMBINED Left 6/13/2016    Procedure: COMBINED CYSTOSCOPY, RETROGRADES;  Surgeon: Deja Salinas MD;  Location: [facility] OR     DAVINCI NEPHRECTOMY Left 5/3/2018    Procedure: DAVINCI XI NEPHRECTOMY;  DAVINCI XI LEFT NEPHRECTOMY;  Surgeon: Deja Salinas MD;  Location:  OR     GENITOURINARY SURGERY  5/2015    PNT placement     HYSTERECTOMY  2011    [place], [place]     PERCUTANEOUS NEPHROSTOMY Left 3/9/2017    Procedure: PERCUTANEOUS NEPHROSTOMY;  Surgeon: Bridger Meyer PA-C;  Location: [facility] OR     PERCUTANEOUS NEPHROSTOMY Left 6/28/2017    Procedure: PERCUTANEOUS NEPHROSTOMY;  Left Nephrostomy Tube Change;  Surgeon: Bridger Meyer PA-C;  [facility]: [facility]     PERCUTANEOUS NEPHROSTOMY Left 10/30/2017    Procedure: PERCUTANEOUS NEPHROSTOMY;  Left Nephrostomy Tube Change;  Surgeon: Maryann Chavez PA-C;  Location: [facility] OR         PHYSICAL EXAM:    /76  Pulse 74  Ht 1.651 m (5' 5\")  Wt 94.9 kg (209 lb 3.2 oz)  BMI 34.81 kg/m2    HEENT: Normocephalic and atraumatic    Cardiac: Not done    Back/Flank: Not done    CNS/PNS: Alert and oriented    Respiratory: Normal nonlabored " breathing    Abdomen: Soft nontender and nondistended    Peripheral Vascular: No peripheral edema    Mental Status: Normal    External Genitalia: Not done    Bladder: Not done    Urethra: Not done     Vagina: Not done    Cystoscopy:  Not Done      Urinalysis:  UA RESULTS:  Recent Labs   Lab Test  05/21/18   0056   COLOR  Straw   APPEARANCE  Clear   URINEGLC  Negative   URINEBILI  Negative   URINEKETONE  Negative   SG  1.004   UBLD  Trace*   URINEPH  7.5*   PROTEIN  Negative   NITRITE  Negative   LEUKEST  Negative   RBCU  <1   WBCU  <1         IMPRESSION:    51 y/o F with non-functioning left kidney s/p left nephrectomy     PLAN:    Will obtain labs today   Follow up in one week   Keep IR tube check appointment     Total Time:  15 minutes   Time in Consultation: greater than 50%     Again, thank you for allowing me to participate in the care of your patient.      Sincerely,    Deja Salinas MD

## 2018-05-25 NOTE — PATIENT INSTRUCTIONS
Please follow up in a week   Please go get labs today     It was a pleasure meeting with you today.  Thank you for allowing me and my team the privilege of caring for you today.  YOU are the reason we are here, and I truly hope we provided you with the excellent service you deserve.  Please let us know if there is anything else we can do for you so that we can be sure you are leaving completely satisfied with your care experience.

## 2018-05-25 NOTE — NURSING NOTE
"Chief Complaint   Patient presents with     RECHECK     Post op       Blood pressure 123/76, pulse 74, height 1.651 m (5' 5\"), weight 94.9 kg (209 lb 3.2 oz), not currently breastfeeding. Body mass index is 34.81 kg/(m^2).    Patient Active Problem List   Diagnosis     Cervical cancer (H)     Encounter for long-term current use of medication     Abdominal pain     Hypertension     Drug-induced peripheral neuropathy (H)     Hydronephrosis     Recurrent pyelonephritis     Abscess after procedure       No Known Allergies    Current Outpatient Prescriptions   Medication Sig Dispense Refill     Acetaminophen (TYLENOL PO)        amLODIPine (NORVASC) 5 MG tablet Take 5 mg by mouth daily       COMPRESSION STOCKINGS 1 each daily 2 Units 1     levofloxacin (LEVAQUIN) 500 MG tablet Take 1 tablet (500 mg) by mouth daily 7 tablet 0     oxyCODONE IR (ROXICODONE) 5 MG tablet Take 1-2 tablets (5-10 mg) by mouth every 3 hours as needed for other (pain control or improvement in physical function. Hold dose for analgesic side effects.) 20 tablet 0       Social History   Substance Use Topics     Smoking status: Never Smoker     Smokeless tobacco: Never Used     Alcohol use No      Comment: Does not drink alcohol       DERECK Levi  5/25/2018  11:08 AM       "

## 2018-05-27 LAB
BACTERIA SPEC CULT: NO GROWTH
BACTERIA SPEC CULT: NO GROWTH
Lab: NORMAL
Lab: NORMAL
SPECIMEN SOURCE: NORMAL
SPECIMEN SOURCE: NORMAL

## 2018-05-28 LAB
BACTERIA SPEC CULT: NORMAL
Lab: NORMAL
SPECIMEN SOURCE: NORMAL

## 2018-05-29 ENCOUNTER — HOSPITAL ENCOUNTER (EMERGENCY)
Facility: CLINIC | Age: 53
Discharge: HOME | End: 2018-05-30
Attending: EMERGENCY MEDICINE | Admitting: EMERGENCY MEDICINE
Payer: MEDICAID

## 2018-05-29 ENCOUNTER — APPOINTMENT (OUTPATIENT)
Dept: CT IMAGING | Facility: CLINIC | Age: 53
End: 2018-05-29
Attending: EMERGENCY MEDICINE
Payer: MEDICAID

## 2018-05-29 ENCOUNTER — PRE VISIT (OUTPATIENT)
Dept: UROLOGY | Facility: CLINIC | Age: 53
End: 2018-05-29

## 2018-05-29 ENCOUNTER — OFFICE VISIT (OUTPATIENT)
Dept: INTERNAL MEDICINE | Facility: CLINIC | Age: 53
End: 2018-05-29
Payer: MEDICAID

## 2018-05-29 VITALS
RESPIRATION RATE: 16 BRPM | HEART RATE: 78 BPM | BODY MASS INDEX: 35 KG/M2 | SYSTOLIC BLOOD PRESSURE: 118 MMHG | DIASTOLIC BLOOD PRESSURE: 76 MMHG | WEIGHT: 210.3 LBS

## 2018-05-29 DIAGNOSIS — R10.9 FLANK PAIN: ICD-10-CM

## 2018-05-29 DIAGNOSIS — C53.9 MALIGNANT NEOPLASM OF CERVIX, UNSPECIFIED SITE (H): ICD-10-CM

## 2018-05-29 DIAGNOSIS — I10 BENIGN ESSENTIAL HYPERTENSION: Primary | ICD-10-CM

## 2018-05-29 LAB
ALBUMIN SERPL-MCNC: 3.8 G/DL (ref 3.4–5)
ALP SERPL-CCNC: 60 U/L (ref 40–150)
ALT SERPL W P-5'-P-CCNC: 21 U/L (ref 0–50)
ANION GAP SERPL CALCULATED.3IONS-SCNC: 6 MMOL/L (ref 3–14)
AST SERPL W P-5'-P-CCNC: 24 U/L (ref 0–45)
BASOPHILS # BLD AUTO: 0 10E9/L (ref 0–0.2)
BASOPHILS NFR BLD AUTO: 0.4 %
BILIRUB SERPL-MCNC: 0.3 MG/DL (ref 0.2–1.3)
BUN SERPL-MCNC: 15 MG/DL (ref 7–30)
CALCIUM SERPL-MCNC: 9.1 MG/DL (ref 8.5–10.1)
CHLORIDE SERPL-SCNC: 106 MMOL/L (ref 94–109)
CO2 SERPL-SCNC: 27 MMOL/L (ref 20–32)
CREAT SERPL-MCNC: 0.74 MG/DL (ref 0.52–1.04)
CRP SERPL-MCNC: 13 MG/L (ref 0–8)
DIFFERENTIAL METHOD BLD: ABNORMAL
EOSINOPHIL # BLD AUTO: 0.2 10E9/L (ref 0–0.7)
EOSINOPHIL NFR BLD AUTO: 1.9 %
ERYTHROCYTE [DISTWIDTH] IN BLOOD BY AUTOMATED COUNT: 14.5 % (ref 10–15)
ERYTHROCYTE [SEDIMENTATION RATE] IN BLOOD BY WESTERGREN METHOD: 41 MM/H (ref 0–30)
GFR SERPL CREATININE-BSD FRML MDRD: 81 ML/MIN/1.7M2
GLUCOSE SERPL-MCNC: 112 MG/DL (ref 70–99)
HCT VFR BLD AUTO: 32.6 % (ref 35–47)
HGB BLD-MCNC: 10.2 G/DL (ref 11.7–15.7)
IMM GRANULOCYTES # BLD: 0 10E9/L (ref 0–0.4)
IMM GRANULOCYTES NFR BLD: 0.1 %
LACTATE BLD-SCNC: 1.3 MMOL/L (ref 0.7–2)
LYMPHOCYTES # BLD AUTO: 4.2 10E9/L (ref 0.8–5.3)
LYMPHOCYTES NFR BLD AUTO: 54.2 %
MCH RBC QN AUTO: 27 PG (ref 26.5–33)
MCHC RBC AUTO-ENTMCNC: 31.3 G/DL (ref 31.5–36.5)
MCV RBC AUTO: 86 FL (ref 78–100)
MONOCYTES # BLD AUTO: 0.4 10E9/L (ref 0–1.3)
MONOCYTES NFR BLD AUTO: 5.6 %
NEUTROPHILS # BLD AUTO: 3 10E9/L (ref 1.6–8.3)
NEUTROPHILS NFR BLD AUTO: 37.8 %
NRBC # BLD AUTO: 0 10*3/UL
NRBC BLD AUTO-RTO: 0 /100
PLATELET # BLD AUTO: 290 10E9/L (ref 150–450)
POTASSIUM SERPL-SCNC: 3.7 MMOL/L (ref 3.4–5.3)
PROT SERPL-MCNC: 8 G/DL (ref 6.8–8.8)
RBC # BLD AUTO: 3.78 10E12/L (ref 3.8–5.2)
SODIUM SERPL-SCNC: 140 MMOL/L (ref 133–144)
WBC # BLD AUTO: 7.8 10E9/L (ref 4–11)

## 2018-05-29 PROCEDURE — 74160 CT ABDOMEN W/CONTRAST: CPT

## 2018-05-29 PROCEDURE — 99285 EMERGENCY DEPT VISIT HI MDM: CPT | Mod: 25 | Performed by: EMERGENCY MEDICINE

## 2018-05-29 PROCEDURE — 99285 EMERGENCY DEPT VISIT HI MDM: CPT | Mod: Z6 | Performed by: EMERGENCY MEDICINE

## 2018-05-29 PROCEDURE — 85025 COMPLETE CBC W/AUTO DIFF WBC: CPT | Performed by: EMERGENCY MEDICINE

## 2018-05-29 PROCEDURE — 86140 C-REACTIVE PROTEIN: CPT | Performed by: EMERGENCY MEDICINE

## 2018-05-29 PROCEDURE — 83605 ASSAY OF LACTIC ACID: CPT | Performed by: EMERGENCY MEDICINE

## 2018-05-29 PROCEDURE — 80053 COMPREHEN METABOLIC PANEL: CPT | Performed by: EMERGENCY MEDICINE

## 2018-05-29 PROCEDURE — 96374 THER/PROPH/DIAG INJ IV PUSH: CPT | Performed by: EMERGENCY MEDICINE

## 2018-05-29 PROCEDURE — 25000128 H RX IP 250 OP 636: Performed by: EMERGENCY MEDICINE

## 2018-05-29 PROCEDURE — 85652 RBC SED RATE AUTOMATED: CPT | Performed by: EMERGENCY MEDICINE

## 2018-05-29 PROCEDURE — 87040 BLOOD CULTURE FOR BACTERIA: CPT | Performed by: EMERGENCY MEDICINE

## 2018-05-29 RX ORDER — HYDROMORPHONE HYDROCHLORIDE 1 MG/ML
0.5 INJECTION, SOLUTION INTRAMUSCULAR; INTRAVENOUS; SUBCUTANEOUS
Status: DISCONTINUED | OUTPATIENT
Start: 2018-05-29 | End: 2018-05-30 | Stop reason: HOSPADM

## 2018-05-29 RX ORDER — AMLODIPINE BESYLATE 10 MG/1
5 TABLET ORAL DAILY
Qty: 100 TABLET | Refills: 3 | Status: SHIPPED | OUTPATIENT
Start: 2018-05-29 | End: 2019-06-14

## 2018-05-29 RX ADMIN — HYDROMORPHONE HYDROCHLORIDE 0.5 MG: 1 INJECTION, SOLUTION INTRAMUSCULAR; INTRAVENOUS; SUBCUTANEOUS at 23:52

## 2018-05-29 ASSESSMENT — PAIN SCALES - GENERAL: PAINLEVEL: NO PAIN (1)

## 2018-05-29 ASSESSMENT — ENCOUNTER SYMPTOMS
HEADACHES: 0
FEVER: 0
CONFUSION: 0
SHORTNESS OF BREATH: 0
EYE REDNESS: 0
FLANK PAIN: 1
ABDOMINAL PAIN: 0
COLOR CHANGE: 0
ARTHRALGIAS: 0
DIFFICULTY URINATING: 0
NECK STIFFNESS: 0

## 2018-05-29 NOTE — NURSING NOTE
Chief Complaint   Patient presents with     Recheck Medication     pt here to discuss medications     Sendy Da Silva CMA at 8:29 AM on 5/29/2018.

## 2018-05-29 NOTE — PROGRESS NOTES
[facility] [place]  Primary [facility] Center   Shamir Pugh MD  05/29/2018      Chief Complaint:   Recheck Medication       History of Present Illness:   Angella St is a 52 year old female with a history of hypertension, cervical cancer s/p JESSICA and chemotherapy c/b left ureteral involvement with hydronephrosis with recurrent pyelonephritis and CKD requiring chronic PNT s/p laparoscopic left nephrectomy on 5/3/18 c/b retroperitoneal abscess on 5/21/18 requiring left renal fossa drain placement, presenting for follow up. We discussed the following concerns:    The patient has been doing well since her laparoscopic left nephrectomy on 5/3/18 and left renal fossa drain placement on 5/21/18 for retroperitoneal abscess. Follow up with urology this Friday 6/1/18.    Initially she was draining 50 CC daily, but now it is very little (<5 CC daily). The fluid is non bloody and serous. No problems with the procedure site. She is on oral antibiotics, completed levofloxacin this morning (7 days) and has 1 more week of the Augmentin (will be 14 days total). Feels a little dizzy. No fevers or chills.     Walks 15-20 minutes every day for exercise, she is building up back up to 30 minutes following the procedures.    BP here today is 118/76. Has not checked BP at home. Taking amlodipine 10 mg every night. On discharge from the hospital, her enalapril and HCTZ were discontinued so she has not been taking these.    Is meeting with endocrine (thyroid biopsy clinic) this Friday 6/1 for thyroid nodules. TSH was normal on 4/4/18.    Review of Systems:   Pertinent positives and negatives are noted in HPI.      Active Medications:     Current Outpatient Prescriptions:      Acetaminophen (TYLENOL PO), , Disp: , Rfl:      amLODIPine (NORVASC) 10 MG tablet, Take 0.5 tablets (5 mg) by mouth daily, Disp: 100 tablet, Rfl: 3     amoxicillin-clavulanate (AUGMENTIN) 875-125 MG per tablet, Take 1 tablet by mouth 2 times daily, Disp: 28 tablet, Rfl:  0     COMPRESSION STOCKINGS, 1 each daily, Disp: 2 Units, Rfl: 1     levofloxacin (LEVAQUIN) 500 MG tablet, Take 1 tablet (500 mg) by mouth daily, Disp: 7 tablet, Rfl: 0     oxyCODONE IR (ROXICODONE) 5 MG tablet, Take 1-2 tablets (5-10 mg) by mouth every 3 hours as needed for other (pain control or improvement in physical function. Hold dose for analgesic side effects.), Disp: 20 tablet, Rfl: 0     [DISCONTINUED] amLODIPine (NORVASC) 5 MG tablet, Take 5 mg by mouth daily, Disp: , Rfl:       Allergies:   Review of patient's allergies indicates no known allergies.      Past Medical History:  Past Medical History:   Diagnosis Date     Anemia      Cancer (H)     Metastatic squamous cell cervical carcinoma     Chronic kidney disease      Hypertension 2007     Obesity     BMI >30     Patient Active Problem List   Diagnosis     Cervical cancer (H)     Encounter for long-term current use of medication     Abdominal pain     Hypertension     Drug-induced peripheral neuropathy (H)     Hydronephrosis     Recurrent pyelonephritis     Abscess after procedure        Past Surgical History:  Past Surgical History:   Procedure Laterality Date     COMBINED CYSTOSCOPY, INSERT STENT URETER(S) Left 6/13/2016    Procedure: COMBINED CYSTOSCOPY, INSERT STENT URETER(S);  Surgeon: Deja Salinas MD;  Location: [facility] OR     CYSTOSCOPY, RETROGRADES, COMBINED Left 6/13/2016    Procedure: COMBINED CYSTOSCOPY, RETROGRADES;  Surgeon: Deja Salinas MD;  Location: [facility] OR     DAVINCI NEPHRECTOMY Left 5/3/2018    Procedure: DAVINCI XI NEPHRECTOMY;  DAVINCI XI LEFT NEPHRECTOMY;  Surgeon: Deja Salinas MD;  Location:  OR     GENITOURINARY SURGERY  5/2015    PNT placement     HYSTERECTOMY  2011    [place]     PERCUTANEOUS NEPHROSTOMY Left 3/9/2017    Procedure: PERCUTANEOUS NEPHROSTOMY;  Surgeon: Bridger Meyer PA-C;  Location: [facility] OR     PERCUTANEOUS NEPHROSTOMY Left 6/28/2017    Procedure: PERCUTANEOUS  NEPHROSTOMY;  Left Nephrostomy Tube Change;  Surgeon: Bridger Meyer PA-C;  Location: [facility] OR     PERCUTANEOUS NEPHROSTOMY Left 10/30/2017    Procedure: PERCUTANEOUS NEPHROSTOMY;  Left Nephrostomy Tube Change;  Surgeon: Maryann Chavez PA-C;  Location: [facility] OR       Family History:   Family History   Problem Relation Age of Onset     Hypertension Brother          Social History:   Social History   Substance Use Topics     Smoking status: Never Smoker     Smokeless tobacco: Never Used     Alcohol use No      Comment: Does not drink alcohol        Physical Exam:   /76  Pulse 78  Resp 16  Wt 95.4 kg (210 lb 4.8 oz)  BMI 35 kg/m2   Constitutional: no distress, comfortable, pleasant   Head: no signs of trauma  Eyes: anicteric, pupils equal, normal extra-ocular movements  Cardiovascular: regular rate and rhythm, normal S1 and S2, no murmurs, rubs or gallops  Respiratory: clear to auscultation, no wheezes or crackles, normal breath sounds   Gastrointestinal: nontender, no hepatosplenomegaly, no masses   Musculoskeletal: moves all extremities, no deformities noted  Skin: no concerning lesions, no jaundice  Neurological: normal speech, normal gait, no tremor   Psychological: appropriate mood and affect    Assessment and Plan:  Benign essential hypertension  Refill of amlodipine provided. Her BP has been well controlled following her nephrectomy. Discussed that may switch to amlodipine 5 mg in the future if her BP continues to be well controlled.   - amLODIPine (NORVASC) 10 MG tablet  Dispense: 100 tablet; Refill: 3    The patient is doing well status post nephrectomy and drain placement for retroperitoneal abscess. She has appointments with urology and thyroid biopsy clinic on 6/1/18, and is due to get her drain out on 6/5/18.    Follow-up: 6 months to 1 year         Ammy Chance MS4, serving as a scribe for Shamir Pugh MD  The medical student acted as scribe and the encounter documented above  was completely performed by myself.  Supervising Doctor Shamir Pugh

## 2018-05-29 NOTE — MR AVS SNAPSHOT
After Visit Summary   5/29/2018    Angella St    MRN: [id number]           Patient Information     Date Of Birth          1965        Visit Information        Provider Department      5/29/2018 10:00 AM Shamir Pugh MD [facility]        Today's Diagnoses     Benign essential hypertension    -  1      Care Instructions    Primary Care Center: [phone]     Primary Care Center Medication Refill Request Information:  * Please contact your pharmacy regarding ANY request for medication refills.  ** [facility] Prescription Fax = [phone]  * Please allow 3 business days for routine medication refills.  * Please allow 5 business days for controlled substance medication refills.     Primary Care Center Test Result notification information:  *You will be notified with in 7-10 days of your appointment day regarding the results of your test.  If you are on MyChart you will be notified as soon as the provider has reviewed the results and signed off on them.            Follow-ups after your visit        Your next 10 appointments already scheduled     May 29, 2018 10:00 AM CDT   (Arrive by 9:45 AM)   Return Visit with Shamir Pugh MD   [facility] ([facility])    [address] [place] [address]  4th [facility] [id number] [phone]            Jun 01, 2018  2:00 PM CDT   (Arrive by 1:45 PM)   Return Visit with Deja Salinas MD   [facility] ([facility])    [address] [place] [address]  4th [facility] [id number] [phone]            Jun 01, 2018  3:15 PM CDT   (Arrive by 3:00 PM)   THYROID NODULE with [facility] DIABETES BIOPSY PROVIDER   [facility] ([facility])    [address] [place] [address]  3rd [facility] [address] [phone]           What is a thyroid nodule? The thyroid gland is located  "in the lower front of the neck below the larynx (\"Natanael's apple\") and above the collarbone. A thyroid nodule is a lump in or on the thyroid gland. Thyroid nodules are common and detected in about 6.4% of women and 1.5% of men; they are less common in younger patients and occur I0 times as often in older individuals, but are usually not diagnosed. Sometimes several nodules will develop in the same person. Anytime a lump is discovered in thyroid tissue, the possibility of malignancy (cancer) must be considered. Fortunately, the vast majority of thyroid nodules are benign (not cancerous).  Many patients with thyroid nodules have no symptoms whatsoever, and are found by chance to have a lump in the thyroid gland on a routine physical exam or an imaging study of the neck done for unrelated reasons (CT or MRI scan of spine or chest, carotid ultrasound, etc.) However, a minority of patients may become aware of a gradually enlarging lump in the front portion of the neck, and/or may experience a vague pressure sensation or discomfort when swallowing. Obviously, finding a lump in the neck should be brought to the attention of your physician, even in the absence of symptoms.  It is not usually possible for a physician to determine whether a thyroid nodule is cancerous on the basis of a physical examination or blood tests. Endocrinologists rely heavily on three specialized tests for help in deciding which nodules should be treated surgically: -Thyroid fine needle biopsy -Thyroid scan -Thyroid ultrasonography  What is a thyroid needle biopsy? A thyroid fine need le biopsy is a simple procedure that can be performed in the physician's office. Many physicians numb the skin over the nodule prior to the biopsy, but it is not necessary to be put to sleep, and patients can usually return to work or home afterward with no ill effects. Although the test is not perfect, a thyroid needle biopsy will provide sufficient information on which " "to base a treatment decision more than 75% of the time.  Use of fine needle biopsy has drastically reduced the number of patients who have undergone unnecessary operations for benign nodules. However, about 10-20% of biopsy specimens are interpreted as inconclusive or inadequate, that is, the pathologist cannot be certain whether the nodule is cancerous or benign. This situation is particularly common with cystic (fluid-filled) nodules, which contain very few thyroid cells to examine, and with those nodules composed of a particular cell type called follicular. In such cases, a physician who is experienced with thyroid disease can use other criteria to make a decision about whether or not to operate. The fine needle biopsy can be repeated in those patients whose initial attempt failed to yield enough material to make a diagnosis. Many physicians use thyroid ultrasonography to guide the needle's placement.  What is thyroid ultrasonography? Thyroid ultrasonography is a procedure for obtaining pictures of the thyroid gland by using high frequency sound waves that pass through the skin, bounce off the in nerve structures of the neck, and are converted into a \"live\" image by a computer. It can visualize nodules as small as 2 to 3 mm. Ultrasound studies were first used to distinguish thyroid cysts (fluid-filled nodules) from solid nodules. Cysts are usually benign, and solid nodules are potentially cancerous. Most nodules, however, have both solid and cystic components, and very few purely cystic nodules occur. Therefore, ultrasonography a lone is rarely able to distinguish between a benign (non-cancerous) nodule and a malignant (cancerous) one.  A more important use of thyroid ultrasonography is in guiding the placement of a biopsy needle to decrease the frequency of inadequate specimens. Such guidance allows the biopsy sample to be obtained from the solid portion of those nodles that are both solid and cystic, and it " "avoids getting a specimen from the surrounding normal thyroid tissue if the nodule is small.  Even when a thyroid biopsy sample is reported as benign, the size of the nodule should be monitored. A thyroid ultrasound examination provides an objective and precise method for detection of a change in the size of the nodule. A nodule with a benign biopsy that is stable or decreasing in size is unlikely to be malignant or require surgical treatment.  What is a thyroid scan? A thyroid scan is a picture of the thyroid gland taken after a small dose of a radioactive isotope, normally concentrated by thyroid cells, has been injected or swallowed. The scan tells whether the nodule is hyper functioning (a \"hot\" nodule), or taking up more radioactivity than normal thyroid tissue does, taking up the same amount as normal tissue (a \"warm\" nodule), or taking up less (a \"cold\" nodule). Because cancer is rarely found in hot nodules, a scan showing a hot nodule eliminates the need for fine needle biopsy. If a hot nodule causes hyper thyroidism, it can be treated with radioiodine or surgery. Fortunately, the vast majority (90-95%) of thyroid nodules are benign. Unfortunately, thyroid scans show that most thyroid nodules, both benign and malignant, are cold or non-functioning. Therefore, although almost all thyroid cancers are nonfunctional on scan, the majority of non-functional nodules are benign. For this reason, thyroid scans are of relatively little value in most patients unless hyperthyroidism exists along with the nodule.  How are thyroid nodules treated? Your endocrinologist will use the above mentioned tests to arrive at a recommendation for optimal management of your nodule. Most patients who appear to have benign nodules require no specific treatment, and can simply be followed expectantly. Potentially, radioiodine may be used to treat hot nodules.  If cancer is suspected, surgical treatment will be recommended. The primary " goal of therapy is to remove all thyroid nodules that are cancerous (and, if malignancy is confirmed, remove the rest of the thyroid gland along with any abnormal lymph glands). If surgery is not recommended, it is important to have regular follow-up of the nodule by a physician experienced in such an evaluation.  For more information please visit [email or web address]            Jun 05, 2018 10:40 AM CDT   CT ABDOMEN PELVIS W CONTRAST with UUCT1   [facility], [place] ([facility], [facility])    [address] [facility] [address] [phone]           Please bring any scans or X-rays taken at other hospitals, if similar tests were done. Also bring a list of your medicines, including vitamins, minerals and over-the-counter drugs. It is safest to leave personal items at home.  Be sure to tell your doctor:   If you have any allergies.   If there s any chance you are pregnant.   If you are breastfeeding.  How to prepare:   Do not eat or drink for 2 hours before your exam. If you need to take medicine, you may take it with small sips of water. (We may ask you to take liquid medicine as well.)   Please wear loose clothing, such as a sweat suit or jogging clothes. Avoid snaps, zippers and other metal. We may ask you to undress and put on a hospital gown.  Please arrive 30 minutes early for your CT. Once in the department you might be asked to drink water 15-20 minutes prior to your exam.  If indicated you may be asked to drink an oral contrast in advance of your CT.  If this is the case, the imaging team will let you know or be in contact with you prior to your appointment  Patients over 70 or patients with diabetes or kidney problems:   If you haven t had a blood test (creatinine test) within the last 30 days, the Cardiologist/Radiologist may require you to get this test prior to your exam.  If you have diabetes:   Continue to take your metformin medication on the day of  your exam  If you have any questions, please call the Imaging Department where you will have your exam.            Jun 05, 2018 12:00 PM CDT   IR SINOGRAM INJECTION DIAGNOSTIC with UUIR2   [facility], [place], Interventional Radiology ([facility], [facility])    [address] [facility] [id number] [phone]           1. You will need to have had a history and physical exam within 7 days of the procedure. 2. Laboratory test are to be obtained by your doctor prior to the exam (CBCP, INR and PTT) 3. Someone will need to drive you to and from the hospital. 4. If you are or may be pregnant, contact your doctor or a Radiology nurse prior to the day of the exam. 5. If you have diabetes, check with your doctor or a Radiology nurse to see if your insulin needs to be adjusted for the exam. 6. If you are taking Coumadin (to thin you blood) please contact your doctor or a Radiology nurse at least 3 days before the exam for special instructions. 7. The day before your exam you may eat your regular diet and are encouraged to drink at least 2 quarts of clear liquids. Drink no alcoholic beverages for 24 hours prior to the exam. 8. Do not eat any solid food or milk products for 6 hours prior to the exam. You may drink clear liquids until 2 hours prior to the exam. Clear liquids include the following: water, Jell-O, clear broth, apple juice or any noncarbonated drink that you can see through (no pop!) 9. The morning of the exam you may brush your teeth and take medications as directed with a sip of water. 10. Tell the Radiology nurse if you have any allergies.            Jul 12, 2018  9:20 AM CDT   CT CHEST ABDOMEN PELVIS W/O & W CONTRAST with UCCT2   [facility] ([facility])    [address]  1st Floor  [facility] [address] [phone]           Please bring any scans or X-rays taken at other hospitals, if similar tests were  done. Also bring a list of your medicines, including vitamins, minerals and over-the-counter drugs. It is safest to leave personal items at home.  Be sure to tell your doctor:   If you have any allergies.   If there s any chance you are pregnant.   If you are breastfeeding.  How to prepare:   Do not eat or drink for 2 hours before your exam. If you need to take medicine, you may take it with small sips of water. (We may ask you to take liquid medicine as well.)   Please wear loose clothing, such as a sweat suit or jogging clothes. Avoid snaps, zippers and other metal. We may ask you to undress and put on a hospital gown.  Please arrive 30 minutes early for your CT. Once in the department you might be asked to drink water 15-20 minutes prior to your exam.  If indicated you may be asked to drink an oral contrast in advance of your CT.  If this is the case, the imaging team will let you know or be in contact with you prior to your appointment  Patients over 70 or patients with diabetes or kidney problems:   If you haven t had a blood test (creatinine test) within the last 30 days, the Cardiologist/Radiologist may require you to get this test prior to your exam.  If you have diabetes:   Continue to take your metformin medication on the day of your exam  If you have any questions, please call the Imaging Department where you will have your exam.            Jul 16, 2018 11:00 AM CDT   (Arrive by 10:45 AM)   Return Visit with Lam Moran MD   [facility] ([facility])    [address] [place] [address]  [address]  [facility] [address] [phone]              Who to contact     Please call your clinic at [phone] to:    Ask questions about your health    Make or cancel appointments    Discuss your medicines    Learn about your test results    Speak to your doctor            Additional Information About Your Visit        [employer] Information     [employer] gives you secure  access to your electronic health record. If you see a primary care provider, you can also send messages to your care team and make appointments. If you have questions, please call your primary care clinic.  If you do not have a primary care provider, please call [phone] and they will assist you.      [employer] is an electronic gateway that provides easy, online access to your medical records. With [employer], you can request a clinic appointment, read your test results, renew a prescription or communicate with your care team.     To access your existing account, please contact your [facility] or call [phone] for assistance.        Care EveryWhere ID     This is your Care EveryWhere ID. This could be used by other organizations to access your [place] medical records  [id number]        Your Vitals Were     Pulse Respirations BMI (Body Mass Index)             78 16 35 kg/m2          Blood Pressure from Last 3 Encounters:   05/29/18 118/76   05/25/18 123/76   05/22/18 111/67    Weight from Last 3 Encounters:   05/29/18 95.4 kg (210 lb 4.8 oz)   05/25/18 94.9 kg (209 lb 3.2 oz)   05/21/18 95.3 kg (210 lb)              Today, you had the following     No orders found for display         Today's Medication Changes          These changes are accurate as of 5/29/18  9:16 AM.  If you have any questions, ask your nurse or doctor.               These medicines have changed or have updated prescriptions.        Dose/Directions    amLODIPine 10 MG tablet   Commonly known as:  NORVASC   This may have changed:  medication strength   Used for:  Benign essential hypertension   Changed by:  Shamir Pugh MD        Dose:  5 mg   Take 0.5 tablets (5 mg) by mouth daily   Quantity:  100 tablet   Refills:  3            Where to get your medicines      These medications were sent to [place], [place] - [address] [place] [address] [phone]  [address] [place] [address]  [address], [facility] [address]    Hours:  TRANSPLANT PHONE NUMBER [phone] Phone:  [phone]     amLODIPine 10 MG tablet                Primary Care Provider Office Phone # Fax #    Shamir Pugh [phone]       [address] [place] [address]  [facility] [address]        Equal Access to Services     [facility]IS [facility] : Hadii aad ku hadasho Soomaali, waaxda luqadaha, qaybta kaalmada adeegyada, waxay idiin hayaan adeeg kharash la'aan . So [facility] [phone].    ATENCIÓN: Si habla español, tiene a andrew disposición servicios gratuitos de asistencia lingüística. Shemar al [phone].    We comply with applicable federal civil rights laws and [place] laws. We do not discriminate on the basis of race, color, national origin, age, disability, sex, sexual orientation, or gender identity.            Thank you!     Thank you for choosing [facility]  for your care. Our goal is always to provide you with excellent care. Hearing back from our patients is one way we can continue to improve our services. Please take a few minutes to complete the written survey that you may receive in the mail after your visit with us. Thank you!             Your Updated Medication List - Protect others around you: Learn how to safely use, store and throw away your medicines at www.disposemymeds.org.          This list is accurate as of 5/29/18  9:16 AM.  Always use your most recent med list.                   Brand Name Dispense Instructions for use Diagnosis    amLODIPine 10 MG tablet    NORVASC    100 tablet    Take 0.5 tablets (5 mg) by mouth daily    Benign essential hypertension       amoxicillin-clavulanate 875-125 MG per tablet    AUGMENTIN    28 tablet    Take 1 tablet by mouth 2 times daily    Retroperitoneal abscess (H)       COMPRESSION STOCKINGS     2 Units    1 each daily    Leg swelling       levofloxacin 500 MG tablet    LEVAQUIN    7 tablet    Take 1 tablet (500 mg) by mouth daily    Abscess,  retroperitoneal (H)       oxyCODONE IR 5 MG tablet    ROXICODONE    20 tablet    Take 1-2 tablets (5-10 mg) by mouth every 3 hours as needed for other (pain control or improvement in physical function. Hold dose for analgesic side effects.)    Recurrent pyelonephritis, Hydronephrosis, unspecified hydronephrosis type       TYLENOL PO

## 2018-05-29 NOTE — ED AVS SNAPSHOT
[facility], [place], Emergency Department    [address] [place] [address]    Phone:  [phone]                                       [address]   MRN: [id number]    Department:  [facility], Emergency Department   Date of Visit:  5/29/2018           After Visit Summary Signature Page     I have received my discharge instructions, and my questions have been answered. I have discussed any challenges I see with this plan with the nurse or doctor.    ..........................................................................................................................................  Patient/Patient Representative Signature      ..........................................................................................................................................  Patient Representative Print Name and Relationship to Patient    ..................................................               ................................................  Date                                            Time    ..........................................................................................................................................  Reviewed by Signature/Title    ...................................................              ..............................................  Date                                                            Time

## 2018-05-29 NOTE — ED AVS SNAPSHOT
" [facility], Emergency Department    [address], [place] [address]    Phone:  [phone]                                       [address]   MRN: [id number]    Department:  [facility], Emergency Department   Date of Visit:  5/29/2018           Patient Information     Date Of Birth          1965        Your diagnoses for this visit were:     Flank pain        You were seen by Cecilia Juarez MD.        Discharge Instructions       Thank you for coming to the [facility] Emergency Department.     Continue your medications as prescribed.     Follow up for drain recheck next week as scheduled.     Return to the ER if you develop increasing pain in the left side, or fever >100.4F    Continue to flush the catheter as directed, but flush slowly. Change the dressing daily.     Your next 10 appointments already scheduled     Jun 01, 2018  2:00 PM CDT   (Arrive by 1:45 PM)   Return Visit with Deja Salinas MD   [facility]rology and [facility] ([facility])    [address] [place] [address]  [address] [facility] [address] [phone]            Jun 01, 2018  3:15 PM CDT   (Arrive by 3:00 PM)   THYROID NODULE with [facility] DIABETES BIOPSY PROVIDER   [facility] Endocrinology ([facility])    [address] [place] [address]  3rd Floor  [facility] [address] [phone]           What is a thyroid nodule? The thyroid gland is located in the lower front of the neck below the larynx (\"Natanael's apple\") and above the collarbone. A thyroid nodule is a lump in or on the thyroid gland. Thyroid nodules are common and detected in about 6.4% of women and 1.5% of men; they are less common in younger patients and occur I0 times as often in older individuals, but are usually not diagnosed. Sometimes several nodules will develop in the same person. Anytime a lump is discovered in thyroid tissue, the possibility of " malignancy (cancer) must be considered. Fortunately, the vast majority of thyroid nodules are benign (not cancerous).  Many patients with thyroid nodules have no symptoms whatsoever, and are found by chance to have a lump in the thyroid gland on a routine physical exam or an imaging study of the neck done for unrelated reasons (CT or MRI scan of spine or chest, carotid ultrasound, etc.) However, a minority of patients may become aware of a gradually enlarging lump in the front portion of the neck, and/or may experience a vague pressure sensation or discomfort when swallowing. Obviously, finding a lump in the neck should be brought to the attention of your physician, even in the absence of symptoms.  It is not usually possible for a physician to determine whether a thyroid nodule is cancerous on the basis of a physical examination or blood tests. Endocrinologists rely heavily on three specialized tests for help in deciding which nodules should be treated surgically: -Thyroid fine needle biopsy -Thyroid scan -Thyroid ultrasonography  What is a thyroid needle biopsy? A thyroid fine need le biopsy is a simple procedure that can be performed in the physician's office. Many physicians numb the skin over the nodule prior to the biopsy, but it is not necessary to be put to sleep, and patients can usually return to work or home afterward with no ill effects. Although the test is not perfect, a thyroid needle biopsy will provide sufficient information on which to base a treatment decision more than 75% of the time.  Use of fine needle biopsy has drastically reduced the number of patients who have undergone unnecessary operations for benign nodules. However, about 10-20% of biopsy specimens are interpreted as inconclusive or inadequate, that is, the pathologist cannot be certain whether the nodule is cancerous or benign. This situation is particularly common with cystic (fluid-filled) nodules, which contain very few thyroid  "cells to examine, and with those nodules composed of a particular cell type called follicular. In such cases, a physician who is experienced with thyroid disease can use other criteria to make a decision about whether or not to operate. The fine needle biopsy can be repeated in those patients whose initial attempt failed to yield enough material to make a diagnosis. Many physicians use thyroid ultrasonography to guide the needle's placement.  What is thyroid ultrasonography? Thyroid ultrasonography is a procedure for obtaining pictures of the thyroid gland by using high frequency sound waves that pass through the skin, bounce off the in nerve structures of the neck, and are converted into a \"live\" image by a computer. It can visualize nodules as small as 2 to 3 mm. Ultrasound studies were first used to distinguish thyroid cysts (fluid-filled nodules) from solid nodules. Cysts are usually benign, and solid nodules are potentially cancerous. Most nodules, however, have both solid and cystic components, and very few purely cystic nodules occur. Therefore, ultrasonography a lone is rarely able to distinguish between a benign (non-cancerous) nodule and a malignant (cancerous) one.  A more important use of thyroid ultrasonography is in guiding the placement of a biopsy needle to decrease the frequency of inadequate specimens. Such guidance allows the biopsy sample to be obtained from the solid portion of those nodles that are both solid and cystic, and it avoids getting a specimen from the surrounding normal thyroid tissue if the nodule is small.  Even when a thyroid biopsy sample is reported as benign, the size of the nodule should be monitored. A thyroid ultrasound examination provides an objective and precise method for detection of a change in the size of the nodule. A nodule with a benign biopsy that is stable or decreasing in size is unlikely to be malignant or require surgical treatment.  What is a thyroid scan? A " "thyroid scan is a picture of the thyroid gland taken after a small dose of a radioactive isotope, normally concentrated by thyroid cells, has been injected or swallowed. The scan tells whether the nodule is hyper functioning (a \"hot\" nodule), or taking up more radioactivity than normal thyroid tissue does, taking up the same amount as normal tissue (a \"warm\" nodule), or taking up less (a \"cold\" nodule). Because cancer is rarely found in hot nodules, a scan showing a hot nodule eliminates the need for fine needle biopsy. If a hot nodule causes hyper thyroidism, it can be treated with radioiodine or surgery. Fortunately, the vast majority (90-95%) of thyroid nodules are benign. Unfortunately, thyroid scans show that most thyroid nodules, both benign and malignant, are cold or non-functioning. Therefore, although almost all thyroid cancers are nonfunctional on scan, the majority of non-functional nodules are benign. For this reason, thyroid scans are of relatively little value in most patients unless hyperthyroidism exists along with the nodule.  How are thyroid nodules treated? Your endocrinologist will use the above mentioned tests to arrive at a recommendation for optimal management of your nodule. Most patients who appear to have benign nodules require no specific treatment, and can simply be followed expectantly. Potentially, radioiodine may be used to treat hot nodules.  If cancer is suspected, surgical treatment will be recommended. The primary goal of therapy is to remove all thyroid nodules that are cancerous (and, if malignancy is confirmed, remove the rest of the thyroid gland along with any abnormal lymph glands). If surgery is not recommended, it is important to have regular follow-up of the nodule by a physician experienced in such an evaluation.  For more information please visit [email or web address]            Jun 05, 2018 10:40 AM CDT   CT ABDOMEN PELVIS W CONTRAST with UUCT1   [facility], [place], [place] " ([facility], [facility])    [address] [address]   [phone]           Please bring any scans or X-rays taken at other hospitals, if similar tests were done. Also bring a list of your medicines, including vitamins, minerals and over-the-counter drugs. It is safest to leave personal items at home.  Be sure to tell your doctor:   If you have any allergies.   If there s any chance you are pregnant.   If you are breastfeeding.  How to prepare:   Do not eat or drink for 2 hours before your exam. If you need to take medicine, you may take it with small sips of water. (We may ask you to take liquid medicine as well.)   Please wear loose clothing, such as a sweat suit or jogging clothes. Avoid snaps, zippers and other metal. We may ask you to undress and put on a hospital gown.  Please arrive 30 minutes early for your CT. Once in the department you might be asked to drink water 15-20 minutes prior to your exam.  If indicated you may be asked to drink an oral contrast in advance of your CT.  If this is the case, the imaging team will let you know or be in contact with you prior to your appointment  Patients over 70 or patients with diabetes or kidney problems:   If you haven t had a blood test (creatinine test) within the last 30 days, the Cardiologist/Radiologist may require you to get this test prior to your exam.  If you have diabetes:   Continue to take your metformin medication on the day of your exam  If you have any questions, please call the Imaging Department where you will have your exam.            Jun 05, 2018 12:00 PM CDT   IR SINOGRAM INJECTION DIAGNOSTIC with UUIR2   [facility], [place], Interventional Radiology ([facility], [facility])    [address] [facility] [address]   [phone]           1. You will need to have had a history and physical exam within 7 days of the procedure. 2. Laboratory  test are to be obtained by your doctor prior to the exam (CBCP, INR and PTT) 3. Someone will need to drive you to and from the hospital. 4. If you are or may be pregnant, contact your doctor or a Radiology nurse prior to the day of the exam. 5. If you have diabetes, check with your doctor or a Radiology nurse to see if your insulin needs to be adjusted for the exam. 6. If you are taking Coumadin (to thin you blood) please contact your doctor or a Radiology nurse at least 3 days before the exam for special instructions. 7. The day before your exam you may eat your regular diet and are encouraged to drink at least 2 quarts of clear liquids. Drink no alcoholic beverages for 24 hours prior to the exam. 8. Do not eat any solid food or milk products for 6 hours prior to the exam. You may drink clear liquids until 2 hours prior to the exam. Clear liquids include the following: water, Jell-O, clear broth, apple juice or any noncarbonated drink that you can see through (no pop!) 9. The morning of the exam you may brush your teeth and take medications as directed with a sip of water. 10. Tell the Radiology nurse if you have any allergies.            Jul 12, 2018  9:20 AM CDT   CT CHEST ABDOMEN PELVIS W/O & W CONTRAST with UCCT2   [facility] ([facility])    [address] [address] [phone]           Please bring any scans or X-rays taken at other hospitals, if similar tests were done. Also bring a list of your medicines, including vitamins, minerals and over-the-counter drugs. It is safest to leave personal items at home.  Be sure to tell your doctor:   If you have any allergies.   If there s any chance you are pregnant.   If you are breastfeeding.  How to prepare:   Do not eat or drink for 2 hours before your exam. If you need to take medicine, you may take it with small sips of water. (We may ask you to take liquid medicine as well.)   Please  wear loose clothing, such as a sweat suit or jogging clothes. Avoid snaps, zippers and other metal. We may ask you to undress and put on a hospital gown.  Please arrive 30 minutes early for your CT. Once in the department you might be asked to drink water 15-20 minutes prior to your exam.  If indicated you may be asked to drink an oral contrast in advance of your CT.  If this is the case, the imaging team will let you know or be in contact with you prior to your appointment  Patients over 70 or patients with diabetes or kidney problems:   If you haven t had a blood test (creatinine test) within the last 30 days, the Cardiologist/Radiologist may require you to get this test prior to your exam.  If you have diabetes:   Continue to take your metformin medication on the day of your exam  If you have any questions, please call the Imaging Department where you will have your exam.            Jul 16, 2018 11:00 AM CDT   (Arrive by 10:45 AM)   Return Visit with Lam Moran MD   [facility] ([facility])    [address] [place] [address]  [address] [place] [address] [phone]              24 Hour Appointment Hotline       To make an appointment at any [facility], call [phone] ([phone]). If you don't have a family doctor or clinic, we will help you find one. [facility] are conveniently located to serve the needs of you and your family.             Review of your medicines      Our records show that you are taking the medicines listed below. If these are incorrect, please call your family doctor or clinic.        Dose / Directions Last dose taken    amLODIPine 10 MG tablet   Commonly known as:  NORVASC   Dose:  5 mg   Quantity:  100 tablet        Take 0.5 tablets (5 mg) by mouth daily   Refills:  3        amoxicillin-clavulanate 875-125 MG per tablet   Commonly known as:  AUGMENTIN   Dose:  1 tablet   Quantity:  28 tablet        Take 1 tablet by  mouth 2 times daily   Refills:  0        COMPRESSION STOCKINGS   Dose:  1 each   Quantity:  2 Units        1 each daily   Refills:  1        levofloxacin 500 MG tablet   Commonly known as:  LEVAQUIN   Dose:  500 mg   Quantity:  7 tablet        Take 1 tablet (500 mg) by mouth daily   Refills:  0        oxyCODONE IR 5 MG tablet   Commonly known as:  ROXICODONE   Dose:  5-10 mg   Quantity:  20 tablet        Take 1-2 tablets (5-10 mg) by mouth every 3 hours as needed for other (pain control or improvement in physical function. Hold dose for analgesic side effects.)   Refills:  0        TYLENOL PO        Refills:  0                Procedures and tests performed during your visit     Procedure/Test Number of Times Performed    Blood culture 2    CBC with platelets differential 1    CRP inflammation 1    CT Abdomen w Contrast 1    Comprehensive metabolic panel 1    Erythrocyte sedimentation rate auto 1    Lactic acid 1    Peripheral IV catheter 1      Orders Needing Specimen Collection     Ordered          05/29/18 2232  UA with Microscopic reflex to Culture - STAT, [place]: STAT, Needs to be Collected     Scheduled Task Status   05/29/18 2221 Collect UA with Microscopic reflex to Culture Open   Order Class:  PCU Collect                  Pending Results     Date and Time Order Name Status Description    5/29/2018 2232 CT Abdomen w Contrast In process     5/29/2018 2232 Blood culture Preliminary     5/29/2018 2232 Blood culture Preliminary             Pending Culture Results     Date and Time Order Name Status Description    5/29/2018 2232 Blood culture Preliminary     5/29/2018 2232 Blood culture Preliminary             Pending Results Instructions     If you had any lab results that were not finalized at the time of your Discharge, you can call the ED Lab Result RN at [phone]. You will be contacted by this team for any positive Lab results or changes in treatment. The nurses are available 7 days a week from 10A to  6:30P.  You can leave a message 24 hours per day and they will return your call.        Thank you for choosing [facility]       Thank you for choosing [facility] for your care. Our goal is always to provide you with excellent care. Hearing back from our patients is one way we can continue to improve our services. Please take a few minutes to complete the written survey that you may receive in the mail after you visit with us. Thank you!        [employer] Information     [employer] gives you secure access to your electronic health record. If you see a primary care provider, you can also send messages to your care team and make appointments. If you have questions, please call your primary care clinic.  If you do not have a primary care provider, please call [phone] and they will assist you.        Care EveryWhere ID     This is your Care EveryWhere ID. This could be used by other organizations to access your [facility] medical records  [id number]        Equal Access to Services     DORIS RODRIGUES : Sabiha Jo, saleem dela cruz, jin vasquez . So [facility] [phone].    ATENCIÓN: Si habla español, tiene a andrew disposición servicios gratuitos de asistencia lingüística. Llame al [phone].    We comply with applicable federal civil rights laws and [place] laws. We do not discriminate on the basis of race, color, national origin, age, disability, sex, sexual orientation, or gender identity.            After Visit Summary       This is your record. Keep this with you and show to your community pharmacist(s) and doctor(s) at your next visit.

## 2018-05-29 NOTE — PATIENT INSTRUCTIONS
[facility]: [phone]     [facility] Medication Refill Request Information:  * Please contact your pharmacy regarding ANY request for medication refills.  ** [facility] Prescription Fax = [phone]  * Please allow 3 business days for routine medication refills.  * Please allow 5 business days for controlled substance medication refills.     [facility] Test Result notification information:  *You will be notified with in 7-10 days of your appointment day regarding the results of your test.  If you are on MyChart you will be notified as soon as the provider has reviewed the results and signed off on them.

## 2018-05-30 VITALS
SYSTOLIC BLOOD PRESSURE: 123 MMHG | TEMPERATURE: 97.7 F | HEIGHT: 67 IN | DIASTOLIC BLOOD PRESSURE: 74 MMHG | BODY MASS INDEX: 32.96 KG/M2 | RESPIRATION RATE: 20 BRPM | HEART RATE: 76 BPM | WEIGHT: 210 LBS | OXYGEN SATURATION: 98 %

## 2018-05-30 PROCEDURE — 25000128 H RX IP 250 OP 636: Performed by: EMERGENCY MEDICINE

## 2018-05-30 PROCEDURE — 25000125 ZZHC RX 250: Performed by: EMERGENCY MEDICINE

## 2018-05-30 PROCEDURE — 74160 CT ABDOMEN W/CONTRAST: CPT

## 2018-05-30 RX ORDER — HEPARIN SODIUM,PORCINE 10 UNIT/ML
5 VIAL (ML) INTRAVENOUS ONCE
Status: DISCONTINUED | OUTPATIENT
Start: 2018-05-30 | End: 2018-05-30 | Stop reason: DRUGHIGH

## 2018-05-30 RX ORDER — IOPAMIDOL 755 MG/ML
128 INJECTION, SOLUTION INTRAVASCULAR ONCE
Status: COMPLETED | OUTPATIENT
Start: 2018-05-30 | End: 2018-05-30

## 2018-05-30 RX ORDER — HEPARIN SODIUM (PORCINE) LOCK FLUSH IV SOLN 100 UNIT/ML 100 UNIT/ML
5 SOLUTION INTRAVENOUS
Status: DISCONTINUED | OUTPATIENT
Start: 2018-05-30 | End: 2018-05-30 | Stop reason: HOSPADM

## 2018-05-30 RX ADMIN — SODIUM CHLORIDE, PRESERVATIVE FREE 5 ML: 5 INJECTION INTRAVENOUS at 01:34

## 2018-05-30 RX ADMIN — SODIUM CHLORIDE, PRESERVATIVE FREE 83 ML: 5 INJECTION INTRAVENOUS at 00:14

## 2018-05-30 RX ADMIN — IOPAMIDOL 128 ML: 755 INJECTION, SOLUTION INTRAVENOUS at 00:14

## 2018-05-30 NOTE — DISCHARGE INSTRUCTIONS
Thank you for coming to the [facility] Emergency Department.     Continue your medications as prescribed.     Follow up for drain recheck next week as scheduled.     Return to the ER if you develop increasing pain in the left side, or fever >100.4F    Continue to flush the catheter as directed, but flush slowly. Change the dressing daily.

## 2018-05-30 NOTE — PROGRESS NOTES
UROLOGIC DIAGNOSES:        CURRENT INTERVENTIONS:        History:       Angella St is a 52 year old female with history of cervical cancer with involvement of the left ureter.  She has undergoing unsuccessful attempt at ureteroscopy but subsequently required PNT placement.  She has been managed with chronic PNT and q3-4 monthly exchanges since 2015.  A renogram performed 2016 showed minimal L kidney function.  She states that she produces about 50cc of urine per day in the L nephrostomy.     From an oncology standpoint, she has been stable and without recurrence.  Per the oncology note, remains disease free and presents today for consideration of left nephrectomy due to non-functional left kidney.  Prior attempts at capping the nephrostomy tube have resulted in infection.    Patient is s/p left nephrectomy. This was complicated due to above history. Patient then admitted for pain and found to have a fluid collection/abscess in the retroperitoneum. This was drained by IR and currently has drain in place.   Patient states she is feeling better since IR drainage though sometimes feels a little weak.     We reviewed the above together. Also reviewed pathology (negative for carcinoma).       Imaging:      Labs:      MEDICATIONS:    Current Outpatient Prescriptions:      Acetaminophen (TYLENOL PO), , Disp: , Rfl:      amoxicillin-clavulanate (AUGMENTIN) 875-125 MG per tablet, Take 1 tablet by mouth 2 times daily, Disp: 28 tablet, Rfl: 0     oxyCODONE IR (ROXICODONE) 5 MG tablet, Take 1-2 tablets (5-10 mg) by mouth every 3 hours as needed for other (pain control or improvement in physical function. Hold dose for analgesic side effects.), Disp: 20 tablet, Rfl: 0     amLODIPine (NORVASC) 10 MG tablet, Take 0.5 tablets (5 mg) by mouth daily, Disp: 100 tablet, Rfl: 3     COMPRESSION STOCKINGS, 1 each daily, Disp: 2 Units, Rfl: 1     levofloxacin (LEVAQUIN) 500 MG tablet, Take 1 tablet (500 mg) by mouth daily, Disp: 7 tablet,  "Rfl: 0  No current facility-administered medications for this visit.     ALLERGIES:   No Known Allergies    REVIEW OF SYSTEMS: Ten point review of systems without change as outlined in HPI    SURGICAL HISTORY:    Past Surgical History:   Procedure Laterality Date     COMBINED CYSTOSCOPY, INSERT STENT URETER(S) Left 6/13/2016    Procedure: COMBINED CYSTOSCOPY, INSERT STENT URETER(S);  Surgeon: Deja Salinas MD;  [facility]: [facility]     CYSTOSCOPY, RETROGRADES, COMBINED Left 6/13/2016    Procedure: COMBINED CYSTOSCOPY, RETROGRADES;  Surgeon: Deja Salinas MD;  Location[facility] OR     DAVINCI NEPHRECTOMY Left 5/3/2018    Procedure: DAVINCI XI NEPHRECTOMY;  DAVINCI XI LEFT NEPHRECTOMY;  Surgeon: Deja Salinas MD;  Location: [facility] OR     GENITOURINARY SURGERY  5/2015    PNT placement     HYSTERECTOMY  2011    [facility] [place]     PERCUTANEOUS NEPHROSTOMY Left 3/9/2017    Procedure: PERCUTANEOUS NEPHROSTOMY;  Surgeon: Bridger Meyer PA-C;  Location: [facility] OR     PERCUTANEOUS NEPHROSTOMY Left 6/28/2017    Procedure: PERCUTANEOUS NEPHROSTOMY;  Left Nephrostomy Tube Change;  Surgeon: Bridger Meyer PA-C;  [facility] OR     PERCUTANEOUS NEPHROSTOMY Left 10/30/2017    Procedure: PERCUTANEOUS NEPHROSTOMY;  Left Nephrostomy Tube Change;  Surgeon: Maryann Chavez PA-C;  Location: [facility] OR         PHYSICAL EXAM:    /76  Pulse 74  Ht 1.651 m (5' 5\")  Wt 94.9 kg (209 lb 3.2 oz)  BMI 34.81 kg/m2    HEENT: Normocephalic and atraumatic    Cardiac: Not done    Back/Flank: Not done    CNS/PNS: Alert and oriented    Respiratory: Normal nonlabored breathing    Abdomen: Soft nontender and nondistended    Peripheral Vascular: No peripheral edema    Mental Status: Normal    External Genitalia: Not done    Bladder: Not done    Urethra: Not done     Vagina: Not done    Cystoscopy:  Not Done      Urinalysis:  UA RESULTS:  Recent Labs   Lab Test  05/21/18   0056   COLOR  Straw   APPEARANCE  " Clear   URINEGLC  Negative   URINEBILI  Negative   URINEKETONE  Negative   SG  1.004   UBLD  Trace*   URINEPH  7.5*   PROTEIN  Negative   NITRITE  Negative   LEUKEST  Negative   RBCU  <1   WBCU  <1         IMPRESSION:    51 y/o F with non-functioning left kidney s/p left nephrectomy     PLAN:    Will obtain labs today   Follow up in one week   Keep IR tube check appointment     Total Time:  15 minutes   Time in Consultation: greater than 50%

## 2018-05-30 NOTE — ED PROVIDER NOTES
History     Chief Complaint   Patient presents with     Post-op Problem     Flank Pain     HPI  Angella St is a 52 year old female with history of hypertension, cervical cancer s/p JESSICA and chemotherapy, complicated by left ureteral involvement with hydronephrosis with recurrent pyelonephritis and CKD requiring chronic pain PNT s/p DaVinci nephrectomy of the left kidney on 5/3/18, complicated by retroperitoneal abscess s/p IR drain placement 5/21/18, who presents to the ER today for evaluation of left flank pain.  As above, the patient underwent nephrectomy 2 weeks ago of the left kidney.  She was doing well initially as an outpatient but then developed left flank pain and chills, was found with retroperitoneal fluid collection and underwent IR drain placement on 5/21, 8 days ago.  She was discharged on Augmentin and Levaquin.      The patient states that she was doing well since her recent discharge 7 days ago and even followed up in clinic this morning.  However, she states that this evening around 7 PM, 2 hours prior to arrival, she acutely developed pain in the left flank.  She was resting at home when the pain came about.  She states she has experienced similar flank pain in the past with kidney infections.  The patient states that she took a tablet of prescribed oxycodone, but after an hour the pain had only worsened, prompting arrival to the ER.  The patient states that the drainage from her IR drain has decreased significantly, with little to no drainage in the past 36 hours.  She also states that the color of the drainage has becomes much lighter/clearer.  The patient states that she has no other symptoms associated with her flank pain.  In particular, she denies any fevers, chills, abdominal pain.  No other concerns or complaints.    No current facility-administered medications for this encounter.      Current Outpatient Prescriptions   Medication     Acetaminophen (TYLENOL PO)     amLODIPine (NORVASC)  10 MG tablet     amoxicillin-clavulanate (AUGMENTIN) 875-125 MG per tablet     levofloxacin (LEVAQUIN) 500 MG tablet     oxyCODONE IR (ROXICODONE) 5 MG tablet     COMPRESSION STOCKINGS     Past Medical History:   Diagnosis Date     Anemia      Cancer (H)     Metastatic squamous cell cervical carcinoma     Chronic kidney disease      Hypertension 2007     Obesity     BMI >30       Past Surgical History:   Procedure Laterality Date     COMBINED CYSTOSCOPY, INSERT STENT URETER(S) Left 6/13/2016    Procedure: COMBINED CYSTOSCOPY, INSERT STENT URETER(S);  Surgeon: Deja Salinas MD;  [facility]     CYSTOSCOPY, RETROGRADES, COMBINED Left 6/13/2016    Procedure: COMBINED CYSTOSCOPY, RETROGRADES;  Surgeon: Deja Salinas MD;  [facility]     DAVINCI NEPHRECTOMY Left 5/3/2018    Procedure: DAVINCI XI NEPHRECTOMY;  DAVINCI XI LEFT NEPHRECTOMY;  Surgeon: Deja Salinas MD;  Location: [facility]     GENITOURINARY SURGERY  5/2015    PNT placement     HYSTERECTOMY  2011    [place]     PERCUTANEOUS NEPHROSTOMY Left 3/9/2017    Procedure: PERCUTANEOUS NEPHROSTOMY;  Surgeon: Bridger Meyer PA-C;  [facility]     PERCUTANEOUS NEPHROSTOMY Left 6/28/2017    Procedure: PERCUTANEOUS NEPHROSTOMY;  Left Nephrostomy Tube Change;  Surgeon: Bridger Meyer PA-C;  [facility]     PERCUTANEOUS NEPHROSTOMY Left 10/30/2017    Procedure: PERCUTANEOUS NEPHROSTOMY;  Left Nephrostomy Tube Change;  Surgeon: Maryann Chavez PA-C;  [facility]       Family History   Problem Relation Age of Onset     Hypertension Brother        Social History   Substance Use Topics     Smoking status: Never Smoker     Smokeless tobacco: Never Used     Alcohol use No      Comment: Does not drink alcohol     No Known Allergies      I have reviewed the Medications, Allergies, Past Medical and Surgical History, and Social History in the Epic system.    Review of Systems   Constitutional: Negative  "for fever.   HENT: Negative for congestion.    Eyes: Negative for redness.   Respiratory: Negative for shortness of breath.    Cardiovascular: Negative for chest pain.   Gastrointestinal: Negative for abdominal pain.   Genitourinary: Positive for flank pain (left). Negative for difficulty urinating.   Musculoskeletal: Negative for arthralgias and neck stiffness.   Skin: Negative for color change.   Neurological: Negative for headaches.   Psychiatric/Behavioral: Negative for confusion.       Physical Exam   BP: 116/48  Pulse: 76  Temp: 97.7  F (36.5  C)  Resp: 20  Height: 170.2 cm (5' 7\")  Weight: 95.3 kg (210 lb)  SpO2: 100 %      Physical Exam  Gen:A&Ox3, no acute distress  HEENT:PERRL, no facial tenderness or wounds, head atraumatic, oropharynx clear, mucous membranes moist, TMs clear bilaterally  Neck:no bony tenderness or step offs, no JVD, trachea midline  CV:RRR without murmurs  PULM:Clear to auscultation bilaterally  Abd: soft, non-tender anteriorly. Left flank percutaneous drain in place without skin changes or soft tissue swelling. Mild tenderness deep to the drain.  UE:No traumatic injuries, skin normal  LE:no traumatic injuries, skin normal, no LE edema.   Neuro:CN II-XII intact, strength 5/5 throughout, gait stable.   Skin: no rashes or ecchymoses      ED Course     ED Course     Procedures          Critical Care time:  none          Labs Ordered and Resulted from Time of ED Arrival Up to the Time of Departure from the ED   CBC WITH PLATELETS DIFFERENTIAL - Abnormal; Notable for the following:        Result Value    RBC Count 3.78 (*)     Hemoglobin 10.2 (*)     Hematocrit 32.6 (*)     MCHC 31.3 (*)     All other components within normal limits   COMPREHENSIVE METABOLIC PANEL - Abnormal; Notable for the following:     Glucose 112 (*)     All other components within normal limits   CRP INFLAMMATION - Abnormal; Notable for the following:     CRP Inflammation 13.0 (*)     All other components within normal " limits   ERYTHROCYTE SEDIMENTATION RATE AUTO - Abnormal; Notable for the following:     Sed Rate 41 (*)     All other components within normal limits   LACTIC ACID WHOLE BLOOD   PERIPHERAL IV CATHETER       Assessments & Plan (with Medical Decision Making)     53 y/o female hx cervical ca c/b L ureteral obstruction status post nephrectomy.    Postop course c/b retroperitoneal abscess, she currently has a percutaneous IR drain in L flank placed on 5/21.    Here w/ an increase in L flank pain after flushing her catheter today with decreased output from drain over the last 2 days.    No fever, vitals stable.  No skin or soft tissue changes visible.    Labs notable for WBC 7.8.  Stable hemoglobin of 10.2, plts 290.  CMP unremarkable lactate 1.3.  CRP and sed rate slightly increased at 13 and 41 respectively.  Blood cultures ×2 sent.    CT abdomen and pelvis performed to assess for signs of drain dislodgment or worsening abscess despite drainage.    This part of the document was transcribed by Lucy Hudson Medical Scribe.      CT showing improved size of abscess cavity without new causes for pain.   Pt's symptoms improved with treatment. Discharged home. Follow up with IR as scheduled.     I have reviewed the nursing notes.    I have reviewed the findings, diagnosis, plan and need for follow up with the patient.    Discharge Medication List as of 5/30/2018  1:10 AM          Final diagnoses:   Flank pain     Aldo RICHARDSON, am serving as a trained medical scribe to document services personally performed by Cecilia Juarez MD, based on the provider's statements to me.      Cecilia RICHARDSON MD, was physically present and have reviewed and verified the accuracy of this note documented by Aldo Arias.    5/29/2018   [facility], [place], EMERGENCY DEPARTMENT    MD CHRISTEN Barnett Katrina Anne, MD  05/31/18 0146

## 2018-05-30 NOTE — ED TRIAGE NOTES
Pt s/p nephrectomy about 2 weeks ago presents with severe left side pain where the drain was left into place. Pt states she continues to have drainage but the amount was less today. The pain is intermittent and started this evening around 1900.

## 2018-05-31 ENCOUNTER — TELEPHONE (OUTPATIENT)
Dept: INTERVENTIONAL RADIOLOGY/VASCULAR | Facility: CLINIC | Age: 53
End: 2018-05-31

## 2018-06-01 ENCOUNTER — OFFICE VISIT (OUTPATIENT)
Dept: UROLOGY | Facility: CLINIC | Age: 53
End: 2018-06-01
Payer: MEDICAID

## 2018-06-01 ENCOUNTER — OFFICE VISIT (OUTPATIENT)
Dept: ENDOCRINOLOGY | Facility: CLINIC | Age: 53
End: 2018-06-01
Payer: MEDICAID

## 2018-06-01 VITALS
SYSTOLIC BLOOD PRESSURE: 124 MMHG | DIASTOLIC BLOOD PRESSURE: 75 MMHG | BODY MASS INDEX: 33.2 KG/M2 | WEIGHT: 212 LBS | HEART RATE: 76 BPM | RESPIRATION RATE: 16 BRPM

## 2018-06-01 VITALS
SYSTOLIC BLOOD PRESSURE: 129 MMHG | BODY MASS INDEX: 33.42 KG/M2 | DIASTOLIC BLOOD PRESSURE: 74 MMHG | HEART RATE: 69 BPM | HEIGHT: 67 IN | WEIGHT: 212.9 LBS

## 2018-06-01 DIAGNOSIS — N13.1 HYDRONEPHROSIS WITH URETERAL STRICTURE, NOT ELSEWHERE CLASSIFIED: Primary | ICD-10-CM

## 2018-06-01 DIAGNOSIS — E04.2 NONTOXIC MULTINODULAR GOITER: Primary | ICD-10-CM

## 2018-06-01 DIAGNOSIS — N12 RECURRENT PYELONEPHRITIS: ICD-10-CM

## 2018-06-01 DIAGNOSIS — N13.30 HYDRONEPHROSIS, UNSPECIFIED HYDRONEPHROSIS TYPE: ICD-10-CM

## 2018-06-01 RX ORDER — LORAZEPAM 1 MG/1
1 TABLET ORAL
Qty: 1 TABLET | Refills: 0 | Status: ON HOLD | OUTPATIENT
Start: 2018-06-01 | End: 2018-06-19

## 2018-06-01 RX ORDER — OXYCODONE HYDROCHLORIDE 5 MG/1
5-10 TABLET ORAL
Qty: 20 TABLET | Refills: 0 | Status: ON HOLD | OUTPATIENT
Start: 2018-06-01 | End: 2018-06-19

## 2018-06-01 ASSESSMENT — PAIN SCALES - GENERAL
PAINLEVEL: MILD PAIN (2)
PAINLEVEL: NO PAIN (1)

## 2018-06-01 NOTE — MR AVS SNAPSHOT
After Visit Summary   6/1/2018    Angella St    MRN: [id number]           Patient Information     Date Of Birth          1965        Visit Information        Provider Department      6/1/2018 3:15 PM [facility] DIABETES BIOPSY PROVIDER [facility] Endocrinology        Today's Diagnoses     Nontoxic multinodular goiter    -  1      Care Instructions        To expedite your medication refill(s), please contact your pharmacy and have them fax a refill request to: [phone].  *Please allow 3 business days for routine medication refills.  *Please allow 5 business days for controlled substance medication refills.  --------------------  For scheduling appointments (including lab work), please request an appointment through [email or web address], or call: [phone].    For questions for your provider or the endocrine nurse, please send a [email or web address] message.  For after-hours urgent issues, please dial [phone], and ask to speak with the Endocrinologist On-Call.  --------------------  Please Note: If you are active on [email or web address], all future test results will be sent by [email or web address] message only and will no longer be sent by mail. You may also receive communication directly from your physician.            Follow-ups after your visit        Follow-up notes from your care team     Return in about 3 months (around 9/1/2018).      Your next 10 appointments already scheduled     Jun 05, 2018 10:40 AM CDT   CT ABDOMEN PELVIS W CONTRAST with UUCT1   [facility], [place], CT ([facility], [facility])    [address] [address] [phone]           Please bring any scans or X-rays taken at other hospitals, if similar tests were done. Also bring a list of your medicines, including vitamins, minerals and over-the-counter drugs. It is safest to leave personal items at home.  Be sure to tell your doctor:   If you have any allergies.   If there s any chance you are pregnant.   If you  are breastfeeding.  How to prepare:   Do not eat or drink for 2 hours before your exam. If you need to take medicine, you may take it with small sips of water. (We may ask you to take liquid medicine as well.)   Please wear loose clothing, such as a sweat suit or jogging clothes. Avoid snaps, zippers and other metal. We may ask you to undress and put on a hospital gown.  Please arrive 30 minutes early for your CT. Once in the department you might be asked to drink water 15-20 minutes prior to your exam.  If indicated you may be asked to drink an oral contrast in advance of your CT.  If this is the case, the imaging team will let you know or be in contact with you prior to your appointment  Patients over 70 or patients with diabetes or kidney problems:   If you haven t had a blood test (creatinine test) within the last 30 days, the Cardiologist/Radiologist may require you to get this test prior to your exam.  If you have diabetes:   Continue to take your metformin medication on the day of your exam  If you have any questions, please call the Imaging Department where you will have your exam.            Jun 05, 2018 12:00 PM CDT   IR SINOGRAM INJECTION DIAGNOSTIC with UUIR2   [facility], [place], Interventional Radiology ([facility], [facility])    [address] [address] [phone]           1. You will need to have had a history and physical exam within 7 days of the procedure. 2. Laboratory test are to be obtained by your doctor prior to the exam (CBCP, INR and PTT) 3. Someone will need to drive you to and from the hospital. 4. If you are or may be pregnant, contact your doctor or a Radiology nurse prior to the day of the exam. 5. If you have diabetes, check with your doctor or a Radiology nurse to see if your insulin needs to be adjusted for the exam. 6. If you are taking Coumadin (to thin you blood) please contact your doctor or a Radiology nurse at  least 3 days before the exam for special instructions. 7. The day before your exam you may eat your regular diet and are encouraged to drink at least 2 quarts of clear liquids. Drink no alcoholic beverages for 24 hours prior to the exam. 8. Do not eat any solid food or milk products for 6 hours prior to the exam. You may drink clear liquids until 2 hours prior to the exam. Clear liquids include the following: water, Jell-O, clear broth, apple juice or any noncarbonated drink that you can see through (no pop!) 9. The morning of the exam you may brush your teeth and take medications as directed with a sip of water. 10. Tell the Radiology nurse if you have any allergies.            Jean Claude 15, 2018  1:00 PM CDT   (Arrive by 12:45 PM)   Return Visit with Deja Salinas MD   [facility] ([facility])    [address]  4th Floor  [facility] [address] [phone]            Jul 12, 2018  9:20 AM CDT   CT CHEST ABDOMEN PELVIS W/O & W CONTRAST with UCCT2   [facility] Imaging [place] ([facility])    [address]  1st [address]  [facility] [address] [phone]           Please bring any scans or X-rays taken at other hospitals, if similar tests were done. Also bring a list of your medicines, including vitamins, minerals and over-the-counter drugs. It is safest to leave personal items at home.  Be sure to tell your doctor:   If you have any allergies.   If there s any chance you are pregnant.   If you are breastfeeding.  How to prepare:   Do not eat or drink for 2 hours before your exam. If you need to take medicine, you may take it with small sips of water. (We may ask you to take liquid medicine as well.)   Please wear loose clothing, such as a sweat suit or jogging clothes. Avoid snaps, zippers and other metal. We may ask you to undress and put on a hospital gown.  Please arrive 30 minutes early  for your CT. Once in the department you might be asked to drink water 15-20 minutes prior to your exam.  If indicated you may be asked to drink an oral contrast in advance of your CT.  If this is the case, the imaging team will let you know or be in contact with you prior to your appointment  Patients over 70 or patients with diabetes or kidney problems:   If you haven t had a blood test (creatinine test) within the last 30 days, the Cardiologist/Radiologist may require you to get this test prior to your exam.  If you have diabetes:   Continue to take your metformin medication on the day of your exam  If you have any questions, please call the Imaging Department where you will have your exam.            Jul 16, 2018 11:00 AM CDT   (Arrive by 10:45 AM)   Return Visit with Lam Moran MD   [facility] ([facility])    [address]  [address]  [facility] [id number] [phone]            Sep 14, 2018 10:00 AM CDT   US BIOPSY THYROID FINE NEEDLE ASPIRATION with UCUS3,  IMAGING NURSE, UC PROCEDURAL RAD   [facility] US ([facility])    [address]  1st Floor  [facility] [id number] [phone]           Bring a list of your medicines to the exam. Include vitamins, minerals and over-the-counter drugs.  Tell your doctor in advance:   If you are or may be pregnant.   If you are taking Coumadin (or any other blood thinners) 5 days prior to the exam for any special instructions.  IF YOUR DOCTOR HAS TOLD YOU THAT YOU WILL BE RECEIVING SEDATION (medicine to help you relax): (Typically sedation is only for liver exams at [facility] and Renal Biopsy exams in Pediatrics)   See your family doctor for an exam within 30 days of treatment.   Plan for an adult to drive you home and stay with you for at least 24 hours.   No eating or drinking for 4 hours before your test. You may take medicine with small  "sips of water.   If you have diabetes:If you take insulin, call your diabetes care team for any special instructions for this exam.  Please call the Imaging Department at your exam site with any questions.             Sep 21, 2018  2:30 PM CDT   (Arrive by 2:15 PM)   RETURN ENDOCRINE with Amee Zee MD   [facility] Endocrinology ([facility] [place])    [address] [address] [phone]              Future tests that were ordered for you today     Open Future Orders        Priority Expected Expires Ordered    US guided thyroid FNA Routine 9/7/2018 6/1/2019 6/1/2018            Who to contact     Please call your clinic at [phone] to:    Ask questions about your health    Make or cancel appointments    Discuss your medicines    Learn about your test results    Speak to your doctor            Additional Information About Your Visit        [employer] Information     [employer] gives you secure access to your electronic health record. If you see a primary care provider, you can also send messages to your care team and make appointments. If you have questions, please call your primary care clinic.  If you do not have a primary care provider, please call [phone] and they will assist you.      [employer] is an electronic gateway that provides easy, online access to your medical records. With [employer], you can request a clinic appointment, read your test results, renew a prescription or communicate with your care team.     To access your existing account, please contact your [facility] or call [phone] for assistance.        Care EveryWhere ID     This is your Care EveryWhere ID. This could be used by other organizations to access your [place] medical records  [id number]        Your Vitals Were     Pulse Height BMI (Body Mass Index)             69 1.702 m (5' 7.01\") 33.34 kg/m2          Blood Pressure from Last 3 Encounters: "   06/01/18 129/74   06/01/18 124/75   05/30/18 123/74    Weight from Last 3 Encounters:   06/01/18 96.6 kg (212 lb 14.4 oz)   06/01/18 96.2 kg (212 lb)   05/29/18 95.3 kg (210 lb)                 Today's Medication Changes          These changes are accurate as of 6/1/18  3:31 PM.  If you have any questions, ask your nurse or doctor.               Start taking these medicines.        Dose/Directions    LORazepam 1 MG tablet   Commonly known as:  ATIVAN   Used for:  Nontoxic multinodular goiter        Dose:  1 mg   Take 1 tablet (1 mg) by mouth once as needed for anxiety (take 30 mins before thyroid nodule biopsy procedure)   Quantity:  1 tablet   Refills:  0            Where to get your medicines      Some of these will need a paper prescription and others can be bought over the counter.  Ask your nurse if you have questions.     Bring a paper prescription for each of these medications     LORazepam 1 MG tablet    oxyCODONE IR 5 MG tablet                Primary Care Provider Office Phone # Fax #    Shamir Pugh [phone]       [address] [place] [address]        Equal Access to Services     [facility] AH: Hadii cindy jain Sozi, waaxda lugold, qaybta kaalmada adejacqueline, jin yusuf. So [facility] [phone].    ATENCIÓN: Si habla español, tiene a andrew disposición servicios gratuitos de asistencia lingüística. Llame al [phone].    We comply with applicable federal civil rights laws and [place] laws. We do not discriminate on the basis of race, color, national origin, age, disability, sex, sexual orientation, or gender identity.            Thank you!     Thank you for choosing [facility] ENDOCRINOLOGY  for your care. Our goal is always to provide you with excellent care. Hearing back from our patients is one way we can continue to improve our services. Please take a few minutes to complete the written survey that you may receive in  the mail after your visit with us. Thank you!             Your Updated Medication List - Protect others around you: Learn how to safely use, store and throw away your medicines at www.disposemymeds.org.          This list is accurate as of 6/1/18  3:31 PM.  Always use your most recent med list.                   Brand Name Dispense Instructions for use Diagnosis    amLODIPine 10 MG tablet    NORVASC    100 tablet    Take 0.5 tablets (5 mg) by mouth daily    Benign essential hypertension       amoxicillin-clavulanate 875-125 MG per tablet    AUGMENTIN    28 tablet    Take 1 tablet by mouth 2 times daily    Retroperitoneal abscess (H)       COMPRESSION STOCKINGS     2 Units    1 each daily    Leg swelling       levofloxacin 500 MG tablet    LEVAQUIN    7 tablet    Take 1 tablet (500 mg) by mouth daily    Abscess, retroperitoneal (H)       LORazepam 1 MG tablet    ATIVAN    1 tablet    Take 1 tablet (1 mg) by mouth once as needed for anxiety (take 30 mins before thyroid nodule biopsy procedure)    Nontoxic multinodular goiter       oxyCODONE IR 5 MG tablet    ROXICODONE    20 tablet    Take 1-2 tablets (5-10 mg) by mouth every 3 hours as needed for other (pain control or improvement in physical function. Hold dose for analgesic side effects.)    Recurrent pyelonephritis, Hydronephrosis, unspecified hydronephrosis type       TYLENOL PO

## 2018-06-01 NOTE — LETTER
6/1/2018     RE: Angella St  C/o Greyson Sherman  [address]  [facility] [address]     Dear Colleague,    Thank you for referring your patient, Angella St, to the [facility] UROLOGY AND INST FOR PROSTATE AND UROLOGIC CANCERS at [facility]. Please see a copy of my visit note below.    June 1, 2018    HPI:  Angella St is a 52 year old female with history of hypertension, cervical cancer s/p JESSICA and chemotherapy, complicated by left ureteral involvement with hydronephrosis with recurrent pyelonephritis and CKD requiring chronic pain PNT s/p DaVinci nephrectomy of the left kidney on 5/3/18, complicated by retroperitoneal abscess s/p IR drain placement 5/21/18    Patient noticed pain from her drain site on Tuesday (5/29) and went to the emergency room. CT showed decrease fluid collection and patient had no obvious signs of worsening infection. Patient continues to have intermittent sharp stabbing pain that will last a second and is much worse when she does not take her pain medication. For the last week she has had less than 10cc of clear fluid in her drain. She continues to care for it by flushing it and emptying bag every night.  Denies fevers, chills, fatigue, dysuria, urinary freqeuncy, pelvic or back pain. She feel like she is able to go throughout her normal activity until the pain starts and she has to rest. She is scheduled to meet with IR on 6/5/18.      Past Medical History:   Diagnosis Date     Anemia      Cancer (H)     Metastatic squamous cell cervical carcinoma     Chronic kidney disease      Hypertension 2007     Obesity     BMI >30       Past Surgical History:   Procedure Laterality Date     COMBINED CYSTOSCOPY, INSERT STENT URETER(S) Left 6/13/2016    Procedure: COMBINED CYSTOSCOPY, INSERT STENT URETER(S);  Surgeon: Deja Salinas MD;  Location: [facility] OR     CYSTOSCOPY, RETROGRADES, COMBINED Left 6/13/2016    Procedure: COMBINED CYSTOSCOPY,  RETROGRADES;  Surgeon: Deja Salinas MD;  Location: [facility] OR     DAVINCI NEPHRECTOMY Left 5/3/2018    Procedure: DAVINCI XI NEPHRECTOMY;  DAVINCI XI LEFT NEPHRECTOMY;  Surgeon: Deja Salinas MD;  Location: [facility] OR     GENITOURINARY SURGERY  2015    PNT placement     HYSTERECTOMY      On license of [facility] [place]     PERCUTANEOUS NEPHROSTOMY Left 3/9/2017    Procedure: PERCUTANEOUS NEPHROSTOMY;  Surgeon: Bridger Meyer PA-C;  Location: [facility] OR     PERCUTANEOUS NEPHROSTOMY Left 2017    Procedure: PERCUTANEOUS NEPHROSTOMY;  Left Nephrostomy Tube Change;  Surgeon: Bridger Meyer PA-C;  Location: [facility]     PERCUTANEOUS NEPHROSTOMY Left 10/30/2017    Procedure: PERCUTANEOUS NEPHROSTOMY;  Left Nephrostomy Tube Change;  Surgeon: Maryann Chavez PA-C;  Location: [facility] OR       Social History     Social History     Marital status:      Spouse name: N/A     Number of children: 3     Years of education: N/A     Occupational History     no working      Social History Main Topics     Smoking status: Never Smoker     Smokeless tobacco: Never Used     Alcohol use No      Comment: Does not drink alcohol     Drug use: No      Comment: No drug use     Sexual activity: Not Currently     Other Topics Concern     Not on file     Social History Narrative    Mom  from [place] in her 40's    Dad  in his 60's presumed to be from hunger as a result of the on going war in [facility] at the time.    Patient has a brother and a sister who are alive and well.      Patient has three children:  Female age 37 alive and well; Female age 35 alive and well; Female age 33 alive and well    Patient reports  is in [facility].       Family History   Problem Relation Age of Onset     Hypertension Brother        ROS: 10 point ROS neg other than the symptoms noted above in the HPI.    No Known Allergies    Current Outpatient Prescriptions   Medication     Acetaminophen (TYLENOL PO)     amLODIPine (NORVASC) 10  MG tablet     amoxicillin-clavulanate (AUGMENTIN) 875-125 MG per tablet     COMPRESSION STOCKINGS     levofloxacin (LEVAQUIN) 500 MG tablet     oxyCODONE IR (ROXICODONE) 5 MG tablet     No current facility-administered medications for this visit.        /75 (BP Location: Right arm, Patient Position: Sitting, Cuff Size: Adult Large)  Pulse 76  Resp 16  Wt 96.2 kg (212 lb)  Breastfeeding? No  BMI 33.2 kg/m2 No LMP recorded. Patient has had a hysterectomy. Body mass index is 33.2 kg/(m^2).  Abdomen is soft, non-tender, non-distended, no organomegaly, no costovertebral tenderness. Incisions are well approximated and scarred without drainage.   Drain: Skin site of drain is tender without overlying skin changes, no erythema, swelling or drainage from site. Drainage is scant serous fluid.     Imaging 5/29/18      1. Decreased size of the left nephrectomy bed abscess which contains  a percutaneous drain in appropriate position. Significant decrease in  the fluid component, with persistent small amount of fluid in gas  which measures 3.8 x 2.1 x 5.3 cm, previously 6.1 x 4.0 x 7.3 cm.  Unchanged tubular structure extending inferiorly from the fluid  collection/nephrectomy bed which favors the left ureter.    A/P: Angella St is a 52 year old female s/p nephrectomy (5/3/18), complicated by retroperitoneal abscess s/p IR drain placement 5/21/18 who is experiencing sharp pain at drainage site on the skin but is otherwise doing very well    - Refill of oxycodone  - Follow up with IR 6/5/18  - Follow up in 2 weeks with urology after drain removal    I, Shavonne Gordon MS3, am scribing for Dr. Salinas as they perform this visit  Scribed by Shavonne Levy MS3 for Dr. Salinas    Physician Attestation   I, Deja Salinas, was present with the medical student who participated in the service and in the documentation of the note.  I have verified the history and personally performed the physical exam and medical decision  making.  I agree with the assessment and plan of care as documented in the note.      Items personally reviewed: imaging and agree with the interpretation documented in the note.    Deja Salinas MD     I spent over 15 minutes with the patient.  Over half this time was spent on counseling for post op nephrectomy.

## 2018-06-01 NOTE — PATIENT INSTRUCTIONS
To expedite your medication refill(s), please contact your pharmacy and have them fax a refill request to: [phone].  *Please allow 3 business days for routine medication refills.  *Please allow 5 business days for controlled substance medication refills.  --------------------  For scheduling appointments (including lab work), please request an appointment through [email or web address], or call: [phone].    For questions for your provider or the endocrine nurse, please send a [email or web address] message.  For after-hours urgent issues, please dial [phone], and ask to speak with the Endocrinologist On-Call.  --------------------  Please Note: If you are active on [email or web address], all future test results will be sent by [email or web address] message only and will no longer be sent by mail. You may also receive communication directly from your physician.

## 2018-06-01 NOTE — MR AVS SNAPSHOT
"              After Visit Summary   6/1/2018    [address]ella [address]    MRN: [id number]           Patient Information     Date Of Birth          1965        Visit Information        Provider Department      6/1/2018 2:00 PM Deja Salinas MD [facility] Urology and Inst for Prostate and Urologic Cancers        Today's Diagnoses     Hydronephrosis with ureteral stricture, not elsewhere classified    -  1    Recurrent pyelonephritis        Hydronephrosis, unspecified hydronephrosis type           Follow-ups after your visit        Your next 10 appointments already scheduled     Jun 01, 2018  3:15 PM CDT   (Arrive by 3:00 PM)   THYROID NODULE with [facility] DIABETES BIOPSY PROVIDER   [facility] Endocrinology ([facility])    [address]  [address] [facility] [address] [phone]           What is a thyroid nodule? The thyroid gland is located in the lower front of the neck below the larynx (\"Natanael's apple\") and above the collarbone. A thyroid nodule is a lump in or on the thyroid gland. Thyroid nodules are common and detected in about 6.4% of women and 1.5% of men; they are less common in younger patients and occur I0 times as often in older individuals, but are usually not diagnosed. Sometimes several nodules will develop in the same person. Anytime a lump is discovered in thyroid tissue, the possibility of malignancy (cancer) must be considered. Fortunately, the vast majority of thyroid nodules are benign (not cancerous).  Many patients with thyroid nodules have no symptoms whatsoever, and are found by chance to have a lump in the thyroid gland on a routine physical exam or an imaging study of the neck done for unrelated reasons (CT or MRI scan of spine or chest, carotid ultrasound, etc.) However, a minority of patients may become aware of a gradually enlarging lump in the front portion of the neck, and/or may experience a vague pressure sensation or discomfort when " swallowing. Obviously, finding a lump in the neck should be brought to the attention of your physician, even in the absence of symptoms.  It is not usually possible for a physician to determine whether a thyroid nodule is cancerous on the basis of a physical examination or blood tests. Endocrinologists rely heavily on three specialized tests for help in deciding which nodules should be treated surgically: -Thyroid fine needle biopsy -Thyroid scan -Thyroid ultrasonography  What is a thyroid needle biopsy? A thyroid fine need le biopsy is a simple procedure that can be performed in the physician's office. Many physicians numb the skin over the nodule prior to the biopsy, but it is not necessary to be put to sleep, and patients can usually return to work or home afterward with no ill effects. Although the test is not perfect, a thyroid needle biopsy will provide sufficient information on which to base a treatment decision more than 75% of the time.  Use of fine needle biopsy has drastically reduced the number of patients who have undergone unnecessary operations for benign nodules. However, about 10-20% of biopsy specimens are interpreted as inconclusive or inadequate, that is, the pathologist cannot be certain whether the nodule is cancerous or benign. This situation is particularly common with cystic (fluid-filled) nodules, which contain very few thyroid cells to examine, and with those nodules composed of a particular cell type called follicular. In such cases, a physician who is experienced with thyroid disease can use other criteria to make a decision about whether or not to operate. The fine needle biopsy can be repeated in those patients whose initial attempt failed to yield enough material to make a diagnosis. Many physicians use thyroid ultrasonography to guide the needle's placement.  What is thyroid ultrasonography? Thyroid ultrasonography is a procedure for obtaining pictures of the thyroid gland by using  "high frequency sound waves that pass through the skin, bounce off the in nerve structures of the neck, and are converted into a \"live\" image by a computer. It can visualize nodules as small as 2 to 3 mm. Ultrasound studies were first used to distinguish thyroid cysts (fluid-filled nodules) from solid nodules. Cysts are usually benign, and solid nodules are potentially cancerous. Most nodules, however, have both solid and cystic components, and very few purely cystic nodules occur. Therefore, ultrasonography a lone is rarely able to distinguish between a benign (non-cancerous) nodule and a malignant (cancerous) one.  A more important use of thyroid ultrasonography is in guiding the placement of a biopsy needle to decrease the frequency of inadequate specimens. Such guidance allows the biopsy sample to be obtained from the solid portion of those nodles that are both solid and cystic, and it avoids getting a specimen from the surrounding normal thyroid tissue if the nodule is small.  Even when a thyroid biopsy sample is reported as benign, the size of the nodule should be monitored. A thyroid ultrasound examination provides an objective and precise method for detection of a change in the size of the nodule. A nodule with a benign biopsy that is stable or decreasing in size is unlikely to be malignant or require surgical treatment.  What is a thyroid scan? A thyroid scan is a picture of the thyroid gland taken after a small dose of a radioactive isotope, normally concentrated by thyroid cells, has been injected or swallowed. The scan tells whether the nodule is hyper functioning (a \"hot\" nodule), or taking up more radioactivity than normal thyroid tissue does, taking up the same amount as normal tissue (a \"warm\" nodule), or taking up less (a \"cold\" nodule). Because cancer is rarely found in hot nodules, a scan showing a hot nodule eliminates the need for fine needle biopsy. If a hot nodule causes hyper thyroidism, it " can be treated with radioiodine or surgery. Fortunately, the vast majority (90-95%) of thyroid nodules are benign. Unfortunately, thyroid scans show that most thyroid nodules, both benign and malignant, are cold or non-functioning. Therefore, although almost all thyroid cancers are nonfunctional on scan, the majority of non-functional nodules are benign. For this reason, thyroid scans are of relatively little value in most patients unless hyperthyroidism exists along with the nodule.  How are thyroid nodules treated? Your endocrinologist will use the above mentioned tests to arrive at a recommendation for optimal management of your nodule. Most patients who appear to have benign nodules require no specific treatment, and can simply be followed expectantly. Potentially, radioiodine may be used to treat hot nodules.  If cancer is suspected, surgical treatment will be recommended. The primary goal of therapy is to remove all thyroid nodules that are cancerous (and, if malignancy is confirmed, remove the rest of the thyroid gland along with any abnormal lymph glands). If surgery is not recommended, it is important to have regular follow-up of the nodule by a physician experienced in such an evaluation.  For more information please visit [email or web address]            Jun 05, 2018 10:40 AM CDT   CT ABDOMEN PELVIS W CONTRAST with UUCT1   [facility], [place], [place] ([facility])    [address] [address] [phone]           Please bring any scans or X-rays taken at other hospitals, if similar tests were done. Also bring a list of your medicines, including vitamins, minerals and over-the-counter drugs. It is safest to leave personal items at home.  Be sure to tell your doctor:   If you have any allergies.   If there s any chance you are pregnant.   If you are breastfeeding.  How to prepare:   Do not eat or drink for 2 hours before your exam. If you need  to take medicine, you may take it with small sips of water. (We may ask you to take liquid medicine as well.)   Please wear loose clothing, such as a sweat suit or jogging clothes. Avoid snaps, zippers and other metal. We may ask you to undress and put on a hospital gown.  Please arrive 30 minutes early for your CT. Once in the department you might be asked to drink water 15-20 minutes prior to your exam.  If indicated you may be asked to drink an oral contrast in advance of your CT.  If this is the case, the imaging team will let you know or be in contact with you prior to your appointment  Patients over 70 or patients with diabetes or kidney problems:   If you haven t had a blood test (creatinine test) within the last 30 days, the Cardiologist/Radiologist may require you to get this test prior to your exam.  If you have diabetes:   Continue to take your metformin medication on the day of your exam  If you have any questions, please call the Imaging Department where you will have your exam.            Jun 05, 2018 12:00 PM CDT   IR SINOGRAM INJECTION DIAGNOSTIC with UUIR2   [facility], [place], Interventional Radiology ([facility])    [address] [address] [phone]           1. You will need to have had a history and physical exam within 7 days of the procedure. 2. Laboratory test are to be obtained by your doctor prior to the exam (CBCP, INR and PTT) 3. Someone will need to drive you to and from the hospital. 4. If you are or may be pregnant, contact your doctor or a Radiology nurse prior to the day of the exam. 5. If you have diabetes, check with your doctor or a Radiology nurse to see if your insulin needs to be adjusted for the exam. 6. If you are taking Coumadin (to thin you blood) please contact your doctor or a Radiology nurse at least 3 days before the exam for special instructions. 7. The day before your exam you may eat your  regular diet and are encouraged to drink at least 2 quarts of clear liquids. Drink no alcoholic beverages for 24 hours prior to the exam. 8. Do not eat any solid food or milk products for 6 hours prior to the exam. You may drink clear liquids until 2 hours prior to the exam. Clear liquids include the following: water, Jell-O, clear broth, apple juice or any noncarbonated drink that you can see through (no pop!) 9. The morning of the exam you may brush your teeth and take medications as directed with a sip of water. 10. Tell the Radiology nurse if you have any allergies.            Jean Claude 15, 2018  1:00 PM CDT   (Arrive by 12:45 PM)   Return Visit with Deja Salinas MD   [facility] Urology and [facility] for Prostate and Urologic Cancers ([facility])    [address]  4th [address]  [place] [address] [phone]            Jul 12, 2018  9:20 AM CDT   CT CHEST ABDOMEN PELVIS W/O & W CONTRAST with UCCT2   [facility] Imaging [place] ([facility])    [address]  1st Floor  [place] [address] [phone]           Please bring any scans or X-rays taken at other hospitals, if similar tests were done. Also bring a list of your medicines, including vitamins, minerals and over-the-counter drugs. It is safest to leave personal items at home.  Be sure to tell your doctor:   If you have any allergies.   If there s any chance you are pregnant.   If you are breastfeeding.  How to prepare:   Do not eat or drink for 2 hours before your exam. If you need to take medicine, you may take it with small sips of water. (We may ask you to take liquid medicine as well.)   Please wear loose clothing, such as a sweat suit or jogging clothes. Avoid snaps, zippers and other metal. We may ask you to undress and put on a hospital gown.  Please arrive 30 minutes early for your CT. Once in the department you might be asked to drink water 15-20 minutes prior to your  exam.  If indicated you may be asked to drink an oral contrast in advance of your CT.  If this is the case, the imaging team will let you know or be in contact with you prior to your appointment  Patients over 70 or patients with diabetes or kidney problems:   If you haven t had a blood test (creatinine test) within the last 30 days, the Cardiologist/Radiologist may require you to get this test prior to your exam.  If you have diabetes:   Continue to take your metformin medication on the day of your exam  If you have any questions, please call the Imaging Department where you will have your exam.            Jul 16, 2018 11:00 AM CDT   (Arrive by 10:45 AM)   Return Visit with Lam Moran MD   [facility] ([facility])    [address]  [address]  [facility] [facility] [address] [phone]              Who to contact     Please call your clinic at [phone] to:    Ask questions about your health    Make or cancel appointments    Discuss your medicines    Learn about your test results    Speak to your doctor            Additional Information About Your Visit        [email or web address] Information     [employer] gives you secure access to your electronic health record. If you see a primary care provider, you can also send messages to your care team and make appointments. If you have questions, please call your primary care clinic.  If you do not have a primary care provider, please call [phone] and they will assist you.      [employer] is an electronic gateway that provides easy, online access to your medical records. With [employer], you can request a clinic appointment, read your test results, renew a prescription or communicate with your care team.     To access your existing account, please contact your [facility] or call [phone] for assistance.        Care EveryWhere ID     This is your Care EveryWhere ID. This could be used by other  organizations to access your [place] medical records  [id number]        Your Vitals Were     Pulse Respirations Breastfeeding? BMI (Body Mass Index)          76 16 No 33.2 kg/m2         Blood Pressure from Last 3 Encounters:   06/01/18 124/75   05/30/18 123/74   05/29/18 118/76    Weight from Last 3 Encounters:   06/01/18 96.2 kg (212 lb)   05/29/18 95.3 kg (210 lb)   05/29/18 95.4 kg (210 lb 4.8 oz)              Today, you had the following     No orders found for display         Where to get your medicines      Some of these will need a paper prescription and others can be bought over the counter.  Ask your nurse if you have questions.     Bring a paper prescription for each of these medications     oxyCODONE IR 5 MG tablet         Information about OPIOIDS     PRESCRIPTION OPIOIDS: WHAT YOU NEED TO KNOW   You have a prescription for an opioid (narcotic) pain medicine. Opioids can cause addiction. If you have a history of chemical dependency of any type, you are at a higher risk of becoming addicted to opioids. Only take this medicine after all other options have been tried. Take it for as short a time and as few doses as possible.     Do not:    Drive. If you drive while taking these medicines, you could be arrested for driving under the influence (DUI).    Operate heavy machinery    Do any other dangerous activities while taking these medicines.     Drink any alcohol while taking these medicines.      Take with any other medicines that contain acetaminophen. Read all labels carefully. Look for the word  acetaminophen  or  Tylenol.  Ask your pharmacist if you have questions or are unsure.    Store your pills in a secure place, locked if possible. We will not replace any lost or stolen medicine. If you don t finish your medicine, please throw away (dispose) as directed by your pharmacist. The [place] [employer]ution [employer] has more information about safe disposal:  https://www.pca.[facility].[place]/living-green/managing-unwanted-medications    All opioids tend to cause constipation. Drink plenty of water and eat foods that have a lot of fiber, such as fruits, vegetables, prune juice, apple juice and high-fiber cereal. Take a laxative (Miralax, milk of magnesia, Colace, Senna) if you don t move your bowels at least every other day.          Primary Care Provider Office Phone # Fax #    Shamir Pugh [phone]       [address] [place] [address]        Equal Access to Services     DORIS RODRIGUES : Hadii aad ku hadashsisi Sozi, waaxda luqadaha, qaybta kaalmada adejacqueline, jin connolly . So [facility] [phone].    ATENCIÓN: Si habla español, tiene a andrew disposición servicios gratuitos de asistencia lingüística. LlSelect [facility] Specialty [facility] - [facility] [phone].    We comply with applicable federal civil rights laws and [place] laws. We do not discriminate on the basis of race, color, national origin, age, disability, sex, sexual orientation, or gender identity.            Thank you!     Thank you for choosing [facility] UROLOGY AND [facility] FOR PROSTATE AND UROLOGIC CANCERS  for your care. Our goal is always to provide you with excellent care. Hearing back from our patients is one way we can continue to improve our services. Please take a few minutes to complete the written survey that you may receive in the mail after your visit with us. Thank you!             Your Updated Medication List - Protect others around you: Learn how to safely use, store and throw away your medicines at www.disposemymeds.org.          This list is accurate as of 6/1/18  2:11 PM.  Always use your most recent med list.                   Brand Name Dispense Instructions for use Diagnosis    amLODIPine 10 MG tablet    NORVASC    100 tablet    Take 0.5 tablets (5 mg) by mouth daily    Benign essential hypertension       amoxicillin-clavulanate 875-125 MG per tablet    AUGMENTIN     28 tablet    Take 1 tablet by mouth 2 times daily    Retroperitoneal abscess (H)       COMPRESSION STOCKINGS     2 Units    1 each daily    Leg swelling       levofloxacin 500 MG tablet    LEVAQUIN    7 tablet    Take 1 tablet (500 mg) by mouth daily    Abscess, retroperitoneal (H)       oxyCODONE IR 5 MG tablet    ROXICODONE    20 tablet    Take 1-2 tablets (5-10 mg) by mouth every 3 hours as needed for other (pain control or improvement in physical function. Hold dose for analgesic side effects.)    Recurrent pyelonephritis, Hydronephrosis, unspecified hydronephrosis type       TYLENOL PO

## 2018-06-01 NOTE — NURSING NOTE
Chief Complaint   Patient presents with     Pain     Patient is here to follow up with ongoing pain     Shazia Malone CMA 1:24 PM on 6/1/2018.

## 2018-06-01 NOTE — PROGRESS NOTES
June 1, 2018    HPI:  Angella St is a 52 year old female with history of hypertension, cervical cancer s/p JESSICA and chemotherapy, complicated by left ureteral involvement with hydronephrosis with recurrent pyelonephritis and CKD requiring chronic pain PNT s/p DaVinci nephrectomy of the left kidney on 5/3/18, complicated by retroperitoneal abscess s/p IR drain placement 5/21/18    Patient noticed pain from her drain site on Tuesday (5/29) and went to the emergency room. CT showed decrease fluid collection and patient had no obvious signs of worsening infection. Patient continues to have intermittent sharp stabbing pain that will last a second and is much worse when she does not take her pain medication. For the last week she has had less than 10cc of clear fluid in her drain. She continues to care for it by flushing it and emptying bag every night.  Denies fevers, chills, fatigue, dysuria, urinary freqeuncy, pelvic or back pain. She feel like she is able to go throughout her normal activity until the pain starts and she has to rest. She is scheduled to meet with IR on 6/5/18.      Past Medical History:   Diagnosis Date     Anemia      Cancer (H)     Metastatic squamous cell cervical carcinoma     Chronic kidney disease      Hypertension 2007     Obesity     BMI >30       Past Surgical History:   Procedure Laterality Date     COMBINED CYSTOSCOPY, INSERT STENT URETER(S) Left 6/13/2016    Procedure: COMBINED CYSTOSCOPY, INSERT STENT URETER(S);  Surgeon: Deja Salinas MD;  Location: [facility]     CYSTOSCOPY, RETROGRADES, COMBINED Left 6/13/2016    Procedure: COMBINED CYSTOSCOPY, RETROGRADES;  Surgeon: Deja Salinas MD;  [facility]: [facility]     DAVINCI NEPHRECTOMY Left 5/3/2018    Procedure: DAVINCI XI NEPHRECTOMY;  DAVINCI XI LEFT NEPHRECTOMY;  Surgeon: Deja Salinas MD;  Location:  OR     GENITOURINARY SURGERY  5/2015    PNT placement     [place]STERECTOMY  2011    [place]      PERCUTANEOUS NEPHROSTOMY Left 3/9/2017    Procedure: PERCUTANEOUS NEPHROSTOMY;  Surgeon: Bridger Meyer PA-C;  [facility]: [facility]     PERCUTANEOUS NEPHROSTOMY Left 2017    Procedure: PERCUTANEOUS NEPHROSTOMY;  Left Nephrostomy Tube Change;  Surgeon: Bridger Meyer PA-C;  [facility]: [facility]     PERCUTANEOUS NEPHROSTOMY Left 10/30/2017    Procedure: PERCUTANEOUS NEPHROSTOMY;  Left Nephrostomy Tube Change;  Surgeon: Maryann Chavez PA-C;  Location: [facility]       Social History     Social History     Marital status:      Spouse name: N/A     Number of children: 3     Years of education: N/A     Occupational History     no working      Social History Main Topics     Smoking status: Never Smoker     Smokeless tobacco: Never Used     Alcohol use No      Comment: Does not drink alcohol     Drug use: No      Comment: No drug use     Sexual activity: Not Currently     Other Topics Concern     Not on file     Social History Narrative    Mom  from [place] in her 40's    Dad  in his 60's presumed to be from hunger as a result of the on going war in [facility] at the time.    Patient has a brother and a sister who are alive and well.      Patient has three children:  Female age 37 alive and well; Female age 35 alive and well; Female age 33 alive and well    Patient reports  is in [facility].       Family History   Problem Relation Age of Onset     Hypertension Brother        ROS: 10 point ROS neg other than the symptoms noted above in the HPI.    No Known Allergies    Current Outpatient Prescriptions   Medication     Acetaminophen (TYLENOL PO)     amLODIPine (NORVASC) 10 MG tablet     amoxicillin-clavulanate (AUGMENTIN) 875-125 MG per tablet     COMPRESSION STOCKINGS     levofloxacin (LEVAQUIN) 500 MG tablet     oxyCODONE IR (ROXICODONE) 5 MG tablet     No current facility-administered medications for this visit.        /75 (BP Location: Right arm, Patient Position: Sitting, Cuff Size:  Adult Large)  Pulse 76  Resp 16  Wt 96.2 kg (212 lb)  Breastfeeding? No  BMI 33.2 kg/m2 No LMP recorded. Patient has had a hysterectomy. Body mass index is 33.2 kg/(m^2).  Abdomen is soft, non-tender, non-distended, no organomegaly, no costovertebral tenderness. Incisions are well approximated and scarred without drainage.   Drain: Skin site of drain is tender without overlying skin changes, no erythema, swelling or drainage from site. Drainage is scant serous fluid.     Imaging 5/29/18      1. Decreased size of the left nephrectomy bed abscess which contains  a percutaneous drain in appropriate position. Significant decrease in  the fluid component, with persistent small amount of fluid in gas  which measures 3.8 x 2.1 x 5.3 cm, previously 6.1 x 4.0 x 7.3 cm.  Unchanged tubular structure extending inferiorly from the fluid  collection/nephrectomy bed which favors the left ureter.    A/P: Angella St is a 52 year old female s/p nephrectomy (5/3/18), complicated by retroperitoneal abscess s/p IR drain placement 5/21/18 who is experiencing sharp pain at drainage site on the skin but is otherwise doing very well    - Refill of oxycodone  - Follow up with IR 6/5/18  - Follow up in 2 weeks with urology after drain removal    I, Shavonne Levy MS3, am scribing for Dr. Salinas as they perform this visit  Scribed by Shavonne Levy MS3 for Dr. Salinas    Physician Attestation   I, Deja Salinas, was present with the medical student who participated in the service and in the documentation of the note.  I have verified the history and personally performed the physical exam and medical decision making.  I agree with the assessment and plan of care as documented in the note.      Items personally reviewed: imaging and agree with the interpretation documented in the note.    Deja Salinas MD     I spent over 15 minutes with the patient.  Over half this time was spent on counseling for post op nephrectomy.

## 2018-06-01 NOTE — PROCEDURES
Reason for visit/consult: Thyroid nodules    Primary care provider: Shamir Pugh    HPI:  Angella St is a 52 year old female with history of cervical cancer with involvement of the left ureter. She has history of cervical cancer s/p JESSICA and chemotherapy, complicated by left ureteral involvement with hydronephrosis with recurrent pyelonephritis and CKD s/p DaVinci nephrectomy of the left kidney on 5/3/18, complicated by retroperitoneal abscess s/p IR drain placement 5/21/18.    She was discovered to have thyroid goiter back in 2015 during her cervical cancer evaluation. She underwent PET/CT scan which showed heterogenous thyroid nodules with increased FDG uptake.   The patient had a following thyroid US and US guided biopsy from the right and left dominant thyroid nodules in 11/2015. Cytology was benign. Repeated FNA biopsy was obtained from the left dominant thyroid nodule which again turned back to be benign.   The patient had a repeated thyroid US in 4/2018 which showed some changes in the right thyroid nodule, and mild enlargement in some of the nodules on the left.    She denies heart palpitation, tremor, swallowing or breathing problem. No change in bowel movent. No temperature intolerance.     Past Medical/Surgical History:  Past Medical History:   Diagnosis Date     Anemia      Cancer (H)     Metastatic squamous cell cervical carcinoma     Chronic kidney disease      Hypertension 2007     Obesity     BMI >30     Past Surgical History:   Procedure Laterality Date     COMBINED CYSTOSCOPY, INSERT STENT URETER(S) Left 6/13/2016    Procedure: COMBINED CYSTOSCOPY, INSERT STENT URETER(S);  Surgeon: Deja Salinas MD;  [facility]     CYSTOSCOPY, RETROGRADES, COMBINED Left 6/13/2016    Procedure: COMBINED CYSTOSCOPY, RETROGRADES;  Surgeon: Deja Salinas MD;  [facility]     DAVINCI NEPHRECTOMY Left 5/3/2018    Procedure: DAVINCI XI NEPHRECTOMY;  DAVINCI XI LEFT  "NEPHRECTOMY;  Surgeon: Deja Salinas MD;  Location:  OR     GENITOURINARY SURGERY  5/2015    PNT placement     HYSTERECTOMY  2011    [place], [place]     PERCUTANEOUS NEPHROSTOMY Left 3/9/2017    Procedure: PERCUTANEOUS NEPHROSTOMY;  Surgeon: Bridger Meyer PA-C;  [facility]     PERCUTANEOUS NEPHROSTOMY Left 6/28/2017    Procedure: PERCUTANEOUS NEPHROSTOMY;  Left Nephrostomy Tube Change;  Surgeon: Bridger Meyer PA-C;  [facility]     PERCUTANEOUS NEPHROSTOMY Left 10/30/2017    Procedure: PERCUTANEOUS NEPHROSTOMY;  Left Nephrostomy Tube Change;  Surgeon: Maryann Chavez PA-C;  [facility]       Allergies:  No Known Allergies    Current Medications   Current Outpatient Prescriptions   Medication     Acetaminophen (TYLENOL PO)     amLODIPine (NORVASC) 10 MG tablet     amoxicillin-clavulanate (AUGMENTIN) 875-125 MG per tablet     COMPRESSION STOCKINGS     LORazepam (ATIVAN) 1 MG tablet     oxyCODONE IR (ROXICODONE) 5 MG tablet     levofloxacin (LEVAQUIN) 500 MG tablet     No current facility-administered medications for this visit.        Family History:  Family History   Problem Relation Age of Onset     Hypertension Brother        Social History:  Social History   Substance Use Topics     Smoking status: Never Smoker     Smokeless tobacco: Never Used     Alcohol use No      Comment: Does not drink alcohol       ROS:  Complaining of pain in the left flank consistent with the location of recent L nephrectomy   Otherwise, 10 point negative except as mentioned in HPI    Exam  Blood pressure 129/74, pulse 69, height 1.702 m (5' 7.01\"), weight 96.6 kg (212 lb 14.4 oz), not currently breastfeeding.  Gen: well appearing, nad, pleasant and conversant  HEENT: anicteric, EOMI, no proptosis or lid lag  Thyroid: visible thyroid goiter.  Left thyroid lobe is larger than the right and contains multiple thyroid nodules. Largest of which is about 3 cm by physical examination.  Annmarie " sign: negative  Cardio: RRR, no m/r/g  Resp: CTAB. No wheezing or crackles  Abd: soft, NT/ND. Normal BS  Skin: Warm and dry. No rash or lesions.   Ext: no swelling or edema  Feet: no deformities or ulcers, 2+ DP pulses  Neuro: A&Ox3, relaxation phase of reflexes normal, no tremor on outstretched arms  Psych: Normal affect and mood.    Labs/Imaging  TSH   Date Value Ref Range Status   04/04/2018 1.81 0.40 - 4.00 mU/L Final   12/28/2015 2.84 0.40 - 4.00 mU/L Final     No results found for: T4]  Lab Results   Component Value Date    A1C 6.2 05/05/2018    A1C 5.6 01/29/2016     EXAMINATION: Thyroid ultrasound, 4/4/2018 10:10 AM      COMPARISON: 10/13/2015.     HISTORY: Nontoxic multinodular goiter.     Technique: Grayscale and color ultrasound imaging of the thyroid was  performed.     Findings:    Thyroid parenchyma: Heterogeneous with multiple nodules.     The right lobe of the thyroid measures: 1.3 x 1.8 x 5.9 cm      The thyroid isthmus measures: 0.7 cm      The left lobe of the thyroid measures: 4.1 x 3.1 x 6.8 cm      Right lobe:  There are 2 spongiform nodules with internal vascularity in the  superior and midpole of the right lobe measuring 0.4 x 0.4 x 0.7 cm  and 0.4 x 0.2 x 0.4 cm, respectively, stable from the prior study.     Dominant nodule 1:  Nodule measurement: 1.1 x 1.0 x 2.0 cm , inferior pole previously  measuring 2.0 x 1.3 x 2.1 cm  Echogenicity: Hypoechoic  Consistency: mixed  Calcifications: no  Hypervascular: yes  Interval growth (>20%): no     Isthmus: No nodules identified.     Left Lobe:   Nodule 1:  Nodule measurement: 2.6 x 2.2 x 2.0 cm , superior pole, not previously  measured.  Echogenicity: Isoechoic  Consistency: solid  Calcifications: no  Hypervascular: no  Interval growth (>20%): Not previously measured     Nodule 2:  Nodule measurement: 3.4 x 2.7 x 2.8 cm, previously measuring 3.1 x 2.2  x 3.2 cm , mid/inferior pole  Echogenicity: Isoechoic  Consistency: mixed  Calcifications:  yes  Hypervascular: yes  Interval growth (>20%): no     Nodule 3:   Nodule measurement: 2.9 x 1.9 x 3.2 cm, previously measuring , 1.8 x  1.7 x 1.8 cm, superior/midpole  Echogenicity: Isoechoic  Consistency: mixed  Calcifications: no  Hypervascular: yes  Interval growth (>20%): yes     Nodule 4:  Nodule measurement: 4.0 x 3.6 x 3.1 cm , previously measuring 4.7 x  3.2 x 5.8 cm in the inferior pole  Echogenicity: Isoechoic  Consistency: solid  Calcifications: no  Hypervascular: yes  Interval growth (>20%): no         Impression: Multinodular thyroid as described above, left greater than  right.  1. Left nodule #4 in the present study (previously nodule #3 on  10/13/2015 ultrasound) was previously biopsied with benign results  according to medical record. This is unchanged to slightly decreased  in size since that exam.  2. Left nodule #3 (previously nodule #1 on 10/13/2015 ultrasound)  demonstrates interval growth greater than 20%. This is mixed cystic  and solid, and the cystic component appears larger than 10/13/2015.  Attention on follow-up studies.      11/2015  SPECIMEN/STAIN PROCESS:   A: FNA-thyroid- Left #3        Pap-Cyto x 3, Diff Quick Stain-cyto x 3   B: FNA-thyroid- Right #3        Pap-Cyto x 3, Diff Quick Stain-cyto x 3     ----------------------------------------------------------------     CYTOLOGIC INTERPRETATION:     A.  Thyroid, left #3, ultrasound-guided fine needle aspiration:   Benign   Consistent with a benign nodule (includes adenomatoid nodule, colloid   nodule, etc.)   Specimen Adequacy: Satisfactory for evaluation.     The [place] Implied Risk of Malignancy and Recommended Clinical   Management:   Benign has a 0-3% risk of malignancy, recommended management is clinical   follow-up     B.  Thyroid, right #3, ultrasound-guided fine needle aspiration:   Benign   Consistent with a benign nodule (includes adenomatoid nodule, colloid   nodule, etc.)   Specimen Adequacy: Satisfactory for  evaluation.     1/2016  CYTOLOGIC INTERPRETATION:      Thyroid, Left #3, Ultrasound-guided fine needle aspiration:   Benign   Consistent with a benign nodule (includes adenomatoid nodule, colloid   nodule, etc.)     Specimen Adequacy: Satisfactory for evaluation. CYTOLOGIC INTERPRETATION:      Thyroid, Left #3, Ultrasound-guided fine needle aspiration:   Benign   Consistent with a benign nodule (includes adenomatoid nodule, colloid   nodule, etc.)     Specimen Adequacy: Satisfactory for evaluation.     Assessment and Plan  52 year old female with history of non toxic multinodular thyroid goiter discovered initially during a PET/CT scan evaluation in 11/2015 s/p benign cytology of the dominant right and left thyroid nodule. The thyroid US done in 2018 showed changes in the US features of the right thyroid nodule and the nodules on the left.     -We offered the patient options of FNA biopsy vs referral for surgery for consideration of left hemithyroidectomy. However, the pt indicated she wanted to proceed with FNA biopsies before entertaining the surgical option.     -We discussed obtaining FNA biopsy today at the clinic, but the pt opted for another appointment given recent nephrectomy and persistent pain.     -Will schedule the pt for US guided FNA at the radiology department in 3 months. Orders placed for FNA biopsy of the right nodule and 4 of the left thyroid nodules.    -Return to clinic in 3 months after the FNA biopsy is done.      Patient seen and examined with staff endocrinologist Dr Amee Frey MD  Diabetes, Metabolism and Endocrinology Fellow  Pager: [phone]    I have seen and examined the patient and agree with the fellow's plan of care as noted.  Agree with need for rebiopsy.  Patient is not interested at this time.  We will plan for biopsy under ultrasound guidance with a return visit in about 3 months.  Dr. Amee Zee [phone]

## 2018-06-04 LAB
BACTERIA SPEC CULT: NO GROWTH
BACTERIA SPEC CULT: NO GROWTH
SPECIMEN SOURCE: NORMAL
SPECIMEN SOURCE: NORMAL

## 2018-06-04 NOTE — PROGRESS NOTES
Reason for visit/consult: Thyroid nodules    Primary care provider: Shamir Pugh    HPI:  Angella St is a 52 year old female with history of cervical cancer with involvement of the left ureter. She has history of cervical cancer s/p JESSICA and chemotherapy, complicated by left ureteral involvement with hydronephrosis with recurrent pyelonephritis and CKD s/p DaVinci nephrectomy of the left kidney on 5/3/18, complicated by retroperitoneal abscess s/p IR drain placement 5/21/18.    She was discovered to have thyroid goiter back in 2015 during her cervical cancer evaluation. She underwent PET/CT scan which showed heterogenous thyroid nodules with increased FDG uptake.   The patient had a following thyroid US and US guided biopsy from the right and left dominant thyroid nodules in 11/2015. Cytology was benign. Repeated FNA biopsy was obtained from the left dominant thyroid nodule which again turned back to be benign.   The patient had a repeated thyroid US in 4/2018 which showed some changes in the right thyroid nodule, and mild enlargement in some of the nodules on the left.    She denies heart palpitation, tremor, swallowing or breathing problem. No change in bowel movent. No temperature intolerance.     Past Medical/Surgical History:  Past Medical History:   Diagnosis Date     Anemia      Cancer (H)     Metastatic squamous cell cervical carcinoma     Chronic kidney disease      Hypertension 2007     Obesity     BMI >30     Allergies:  No Known Allergies    Current Medications   Current Outpatient Prescriptions   Medication     Acetaminophen (TYLENOL PO)     amLODIPine (NORVASC) 10 MG tablet     amoxicillin-clavulanate (AUGMENTIN) 875-125 MG per tablet     COMPRESSION STOCKINGS     LORazepam (ATIVAN) 1 MG tablet     oxyCODONE IR (ROXICODONE) 5 MG tablet     levofloxacin (LEVAQUIN) 500 MG tablet     No current facility-administered medications for this visit.      Family History:  Family History   Problem  "Relation Age of Onset     Hypertension Brother        Social History:  Social History     Social History     Marital status:      Spouse name: N/A     Number of children: 3     Years of education: N/A     Occupational History     no working      Social History Main Topics     Smoking status: Never Smoker     Smokeless tobacco: Never Used     Alcohol use No      Comment: Does not drink alcohol     Drug use: No      Comment: No drug use     Sexual activity: Not Currently     Other Topics Concern     Not on file     Social History Narrative    Mom  from [place] in her 40's    Dad  in his 60's presumed to be from hunger as a result of the on going war in [facility] at the time.    Patient has a brother and a sister who are alive and well.      Patient has three children:  Female age 37 alive and well; Female age 35 alive and well; Female age 33 alive and well    Patient reports  is in [facility].     ROS:  Complaining of pain in the left flank consistent with the location of recent L nephrectomy   Otherwise, 10 point negative except as mentioned in HPI    Exam  Blood pressure 129/74, pulse 69, height 1.702 m (5' 7.01\"), weight 96.6 kg (212 lb 14.4 oz), not currently breastfeeding.    Gen: well appearing, nad, pleasant and conversant  HEENT: anicteric, EOMI, no proptosis or lid lag  Thyroid: visible thyroid goiter.  Left thyroid lobe is larger than the right and contains multiple thyroid nodules. Largest of which is about 3 cm by physical examination.  [place] sign: negative  Cardio: RRR, no m/r/g  Resp: CTAB. No wheezing or crackles  Abd: soft, NT/ND. Normal BS  Skin: Warm and dry. No rash or lesions.   Ext: no swelling or edema  Feet: no deformities or ulcers, 2+ DP pulses  Neuro: A&Ox3, relaxation phase of reflexes normal, no tremor on outstretched arms  Psych: Normal affect and mood.    Labs/Imaging  TSH   Date Value Ref Range Status   2018 1.81 0.40 - 4.00 mU/L Final   2015 2.84 0.40 - " 4.00 mU/L Final     No results found for: T4]  Lab Results   Component Value Date    A1C 6.2 05/05/2018    A1C 5.6 01/29/2016     EXAMINATION: Thyroid ultrasound, 4/4/2018 10:10 AM      COMPARISON: 10/13/2015.     HISTORY: Nontoxic multinodular goiter.     Technique: Grayscale and color ultrasound imaging of the thyroid was  performed.     Findings:    Thyroid parenchyma: Heterogeneous with multiple nodules.     The right lobe of the thyroid measures: 1.3 x 1.8 x 5.9 cm      The thyroid isthmus measures: 0.7 cm      The left lobe of the thyroid measures: 4.1 x 3.1 x 6.8 cm      Right lobe:  There are 2 spongiform nodules with internal vascularity in the  superior and midpole of the right lobe measuring 0.4 x 0.4 x 0.7 cm  and 0.4 x 0.2 x 0.4 cm, respectively, stable from the prior study.     Dominant nodule 1:  Nodule measurement: 1.1 x 1.0 x 2.0 cm , inferior pole previously  measuring 2.0 x 1.3 x 2.1 cm  Echogenicity: Hypoechoic  Consistency: mixed  Calcifications: no  Hypervascular: yes  Interval growth (>20%): no     Isthmus: No nodules identified.     Left Lobe:   Nodule 1:  Nodule measurement: 2.6 x 2.2 x 2.0 cm , superior pole, not previously  measured.  Echogenicity: Isoechoic  Consistency: solid  Calcifications: no  Hypervascular: no  Interval growth (>20%): Not previously measured     Nodule 2:  Nodule measurement: 3.4 x 2.7 x 2.8 cm, previously measuring 3.1 x 2.2  x 3.2 cm , mid/inferior pole  Echogenicity: Isoechoic  Consistency: mixed  Calcifications: yes  Hypervascular: yes  Interval growth (>20%): no     Nodule 3:   Nodule measurement: 2.9 x 1.9 x 3.2 cm, previously measuring , 1.8 x  1.7 x 1.8 cm, superior/midpole  Echogenicity: Isoechoic  Consistency: mixed  Calcifications: no  Hypervascular: yes  Interval growth (>20%): yes     Nodule 4:  Nodule measurement: 4.0 x 3.6 x 3.1 cm , previously measuring 4.7 x  3.2 x 5.8 cm in the inferior pole  Echogenicity: Isoechoic  Consistency:  solid  Calcifications: no  Hypervascular: yes  Interval growth (>20%): no         Impression: Multinodular thyroid as described above, left greater than  right.  1. Left nodule #4 in the present study (previously nodule #3 on  10/13/2015 ultrasound) was previously biopsied with benign results  according to medical record. This is unchanged to slightly decreased  in size since that exam.  2. Left nodule #3 (previously nodule #1 on 10/13/2015 ultrasound)  demonstrates interval growth greater than 20%. This is mixed cystic  and solid, and the cystic component appears larger than 10/13/2015.  Attention on follow-up studies.      11/2015  SPECIMEN/STAIN PROCESS:   A: FNA-thyroid- Left #3        Pap-Cyto x 3, Diff Quick Stain-cyto x 3   B: FNA-thyroid- Right #3        Pap-Cyto x 3, Diff Quick Stain-cyto x 3     ----------------------------------------------------------------     CYTOLOGIC INTERPRETATION:     A.  Thyroid, left #3, ultrasound-guided fine needle aspiration:   Benign   Consistent with a benign nodule (includes adenomatoid nodule, colloid   nodule, etc.)   Specimen Adequacy: Satisfactory for evaluation.     The [place] Implied Risk of Malignancy and Recommended Clinical   Management:   Benign has a 0-3% risk of malignancy, recommended management is clinical   follow-up     B.  Thyroid, right #3, ultrasound-guided fine needle aspiration:   Benign   Consistent with a benign nodule (includes adenomatoid nodule, colloid   nodule, etc.)   Specimen Adequacy: Satisfactory for evaluation.     1/2016  CYTOLOGIC INTERPRETATION:      Thyroid, Left #3, Ultrasound-guided fine needle aspiration:   Benign   Consistent with a benign nodule (includes adenomatoid nodule, colloid   nodule, etc.)     Specimen Adequacy: Satisfactory for evaluation. CYTOLOGIC INTERPRETATION:      Thyroid, Left #3, Ultrasound-guided fine needle aspiration:   Benign   Consistent with a benign nodule (includes adenomatoid nodule, colloid   nodule,  etc.)     Specimen Adequacy: Satisfactory for evaluation.     Assessment and Plan  52 year old female with history of non toxic multinodular thyroid goiter discovered initially during a PET/CT scan evaluation in 11/2015 s/p benign cytology of the dominant right and left thyroid nodule. The thyroid US done in 2018 showed changes in the US features of the right thyroid nodule and the nodules on the left.     -We offered the patient options of FNA biopsy vs referral for surgery for consideration of left hemithyroidectomy. However, the pt indicated she wanted to proceed with FNA biopsies before entertaining the surgical option.     -We discussed obtaining FNA biopsy today at the clinic, but the pt opted for another appointment given recent nephrectomy and persistent pain.     -Will schedule the pt for US guided FNA at the radiology department in 3 months. Orders placed for FNA biopsy of the right nodule and 4 of the left thyroid nodules.    -Return to clinic in 3 months after the FNA biopsy is done.      Patient seen and examined with staff endocrinologist Dr Amee Frey MD  Diabetes, Metabolism and Endocrinology Fellow  Pager: [phone]    I have seen and examined the patient and agree with the fellow's plan of care as noted.  Agree with need for rebiopsy.  Patient is not interested at this time.  We will plan for biopsy under ultrasound guidance with a return visit in about 3 months.  Dr. Amee Zee [phone]

## 2018-06-05 ENCOUNTER — HOSPITAL ENCOUNTER (OUTPATIENT)
Facility: CLINIC | Age: 53
Discharge: HOME | End: 2018-06-05
Attending: RADIOLOGY | Admitting: RADIOLOGY
Payer: MEDICAID

## 2018-06-05 ENCOUNTER — APPOINTMENT (OUTPATIENT)
Dept: INTERVENTIONAL RADIOLOGY/VASCULAR | Facility: CLINIC | Age: 53
End: 2018-06-05
Attending: RADIOLOGY PRACTITIONER ASSISTANT
Payer: MEDICAID

## 2018-06-05 ENCOUNTER — HOSPITAL ENCOUNTER (OUTPATIENT)
Dept: CT IMAGING | Facility: CLINIC | Age: 53
End: 2018-06-05
Attending: RADIOLOGY PRACTITIONER ASSISTANT
Payer: MEDICAID

## 2018-06-05 ENCOUNTER — PRE VISIT (OUTPATIENT)
Dept: UROLOGY | Facility: CLINIC | Age: 53
End: 2018-06-05

## 2018-06-05 VITALS
OXYGEN SATURATION: 97 % | DIASTOLIC BLOOD PRESSURE: 72 MMHG | SYSTOLIC BLOOD PRESSURE: 122 MMHG | RESPIRATION RATE: 12 BRPM | HEART RATE: 64 BPM

## 2018-06-05 DIAGNOSIS — T81.49XA ABSCESS AFTER PROCEDURE: ICD-10-CM

## 2018-06-05 PROCEDURE — 20501 NJX SINUS TRACT DIAGNOSTIC: CPT

## 2018-06-05 PROCEDURE — 74176 CT ABD & PELVIS W/O CONTRAST: CPT

## 2018-06-05 NOTE — PROGRESS NOTES
Patient Name: Angella St  Medical Record Number: [id number]  Today's Date: 6/5/2018    Procedure: Sinogram with drain removal  Proceduralist: Dr. Arturo Carrero    Procedure start time: 1140  Procedure end time: 1150    Other Notes: Pt arrived to IR room 2 from [facility] waiting room. Pt denies any questions or concerns regarding procedure. Pt positioned on her right side and monitored per protocol. Pt tolerated procedure without any noted complications. Pt escorted back to [facility] waiting room.    Noelle CASTANEDA

## 2018-06-15 ENCOUNTER — OFFICE VISIT (OUTPATIENT)
Dept: UROLOGY | Facility: CLINIC | Age: 53
End: 2018-06-15
Payer: MEDICAID

## 2018-06-15 VITALS
HEART RATE: 80 BPM | WEIGHT: 211.2 LBS | SYSTOLIC BLOOD PRESSURE: 108 MMHG | DIASTOLIC BLOOD PRESSURE: 66 MMHG | BODY MASS INDEX: 33.07 KG/M2

## 2018-06-15 DIAGNOSIS — N13.30 HYDRONEPHROSIS, LEFT: Primary | ICD-10-CM

## 2018-06-15 ASSESSMENT — PAIN SCALES - GENERAL: PAINLEVEL: NO PAIN (0)

## 2018-06-15 NOTE — MR AVS SNAPSHOT
After Visit Summary   6/15/2018    Angella St    MRN: [id number]           Patient Information     Date Of Birth          1965        Visit Information        Provider Department      6/15/2018 1:45 PM Deja Salinas MD [facility] and [facility] for Prostate and Urologic Cancers         Follow-ups after your visit        Your next 10 appointments already scheduled     Jul 12, 2018  9:20 AM CDT   CT CHEST ABDOMEN PELVIS W/O & W CONTRAST with UCCT2   [facility] CT ([facility])    [address] [address] [phone]           Please bring any scans or X-rays taken at other hospitals, if similar tests were done. Also bring a list of your medicines, including vitamins, minerals and over-the-counter drugs. It is safest to leave personal items at home.  Be sure to tell your doctor:   If you have any allergies.   If there s any chance you are pregnant.   If you are breastfeeding.  How to prepare:   Do not eat or drink for 2 hours before your exam. If you need to take medicine, you may take it with small sips of water. (We may ask you to take liquid medicine as well.)   Please wear loose clothing, such as a sweat suit or jogging clothes. Avoid snaps, zippers and other metal. We may ask you to undress and put on a hospital gown.  Please arrive 30 minutes early for your CT. Once in the department you might be asked to drink water 15-20 minutes prior to your exam.  If indicated you may be asked to drink an oral contrast in advance of your CT.  If this is the case, the imaging team will let you know or be in contact with you prior to your appointment  Patients over 70 or patients with diabetes or kidney problems:   If you haven t had a blood test (creatinine test) within the last 30 days, the Cardiologist/Radiologist may require you to get this test prior to your exam.  If you have diabetes:   Continue to take your  metformin medication on the day of your exam  If you have any questions, please call the Imaging Department where you will have your exam.            Jul 16, 2018 11:00 AM CDT   (Arrive by 10:45 AM)   Return Visit with Lam Moran MD   [facility] ([facility])    [address]  [address]  [place] [address]   [phone]            Aug 10, 2018  3:45 PM CDT   (Arrive by 3:30 PM)   Return Visit with Deja Salinas MD   [facility] and [facility] for Prostate and Urologic Cancers ([facility])    [address]  4th [address]  [place] [id number] [phone]            Sep 14, 2018 10:00 AM CDT   US BIOPSY THYROID FINE NEEDLE ASPIRATION with UCUS3,  IMAGING NURSE, UC PROCEDURAL RAD   [facility] US ([facility])    [address]  1st [address]  [place] [address] [phone]           Bring a list of your medicines to the exam. Include vitamins, minerals and over-the-counter drugs.  Tell your doctor in advance:   If you are or may be pregnant.   If you are taking Coumadin (or any other blood thinners) 5 days prior to the exam for any special instructions.  IF YOUR DOCTOR HAS TOLD YOU THAT YOU WILL BE RECEIVING SEDATION (medicine to help you relax): (Typically sedation is only for liver exams at [facility] and Renal Biopsy exams in Pediatrics)   See your family doctor for an exam within 30 days of treatment.   Plan for an adult to drive you home and stay with you for at least 24 hours.   No eating or drinking for 4 hours before your test. You may take medicine with small sips of water.   If you have diabetes:If you take insulin, call your diabetes care team for any special instructions for this exam.  Please call the Imaging Department at your exam site with any questions.             Sep 21, 2018  2:30 PM CDT   (Arrive by 2:15 PM)   RETURN  ENDOCRINE with Amee Zee MD   [facility] ([facility])    [address]  3rd Floor  [facility] [address] [phone]              Who to contact     Please call your clinic at [phone] to:    Ask questions about your health    Make or cancel appointments    Discuss your medicines    Learn about your test results    Speak to your doctor            Additional Information About Your Visit        [employer] Information     [employer] gives you secure access to your electronic health record. If you see a primary care provider, you can also send messages to your care team and make appointments. If you have questions, please call your primary care clinic.  If you do not have a primary care provider, please call [phone] and they will assist you.      [employer] is an electronic gateway that provides easy, online access to your medical records. With [employer], you can request a clinic appointment, read your test results, renew a prescription or communicate with your care team.     To access your existing account, please contact your [facility] or call [phone] for assistance.        Care EveryWhere ID     This is your Care EveryWhere ID. This could be used by other organizations to access your [facility] medical records  [id number]        Your Vitals Were     Pulse Breastfeeding? BMI (Body Mass Index)             80 No 33.07 kg/m2          Blood Pressure from Last 3 Encounters:   06/15/18 108/66   06/05/18 122/72   06/01/18 129/74    Weight from Last 3 Encounters:   06/15/18 95.8 kg (211 lb 3.2 oz)   06/01/18 96.6 kg (212 lb 14.4 oz)   06/01/18 96.2 kg (212 lb)              Today, you had the following     No orders found for display       Primary Care Provider Office Phone # Fax #    Shamir Pugh [phone]       [address]  [place] [address]        Equal Access to Services     DORIS RODRIGUES  AH: Ibrahimaii cindy castellanokianasisi Deysi, waaxda luqadaha, qaybta kabrenton decker, jin renaein hayaalyla tiwari dorothea cathleen yusuf. So [facility] [phone].    ATENCIÓN: Si robinla oskar, tiene a andrew disposición servicios gratuitos de asistencia lingüística. Llame al [phone].    We comply with applicable federal civil rights laws and [place] laws. We do not discriminate on the basis of race, color, national origin, age, disability, sex, sexual orientation, or gender identity.            Thank you!     Thank you for choosing [facility]OLOGY AND [facility] FOR PROSTATE AND UROLOGIC CANCERS  for your care. Our goal is always to provide you with excellent care. Hearing back from our patients is one way we can continue to improve our services. Please take a few minutes to complete the written survey that you may receive in the mail after your visit with us. Thank you!             Your Updated Medication List - Protect others around you: Learn how to safely use, store and throw away your medicines at www.disposemymeds.org.          This list is accurate as of 6/15/18  2:25 PM.  Always use your most recent med list.                   Brand Name Dispense Instructions for use Diagnosis    amLODIPine 10 MG tablet    NORVASC    100 tablet    Take 0.5 tablets (5 mg) by mouth daily    Benign essential hypertension       amoxicillin-clavulanate 875-125 MG per tablet    AUGMENTIN    28 tablet    Take 1 tablet by mouth 2 times daily    Retroperitoneal abscess (H)       COMPRESSION STOCKINGS     2 Units    1 each daily    Leg swelling       levofloxacin 500 MG tablet    LEVAQUIN    7 tablet    Take 1 tablet (500 mg) by mouth daily    Abscess, retroperitoneal (H)       LORazepam 1 MG tablet    ATIVAN    1 tablet    Take 1 tablet (1 mg) by mouth once as needed for anxiety (take 30 mins before thyroid nodule biopsy procedure)    Nontoxic multinodular goiter       oxyCODONE IR 5 MG tablet    ROXICODONE    20 tablet    Take 1-2 tablets (5-10 mg) by  mouth every 3 hours as needed for other (pain control or improvement in physical function. Hold dose for analgesic side effects.)    Recurrent pyelonephritis, Hydronephrosis, unspecified hydronephrosis type       TYLENOL PO

## 2018-06-15 NOTE — LETTER
6/15/2018       RE: Angella St  [address]  [address]  [place] [address]     Dear Colleague,    Thank you for referring your patient, Angella St, to the [facility] UROLOGY AND INST FOR PROSTATE AND UROLOGIC CANCERS at [facility]. Please see a copy of my visit note below.    UROLOGIC DIAGNOSES:      CURRENT INTERVENTIONS:      History:     Angella St is a 52 year old female with history of hypertension, cervical cancer s/p JESSICA and chemotherapy, complicated by left ureteral involvement with hydronephrosis with recurrent pyelonephritis and CKD requiring chronic pain PNT s/p DaVinci nephrectomy of the left kidney on 5/3/18, complicated by retroperitoneal abscess s/p IR drain placement 5/21/18    Patient noticed pain from her drain site on Tuesday (5/29) and went to the emergency room. CT showed decrease fluid collection and patient had no obvious signs of worsening infection. Patient continues to have intermittent sharp stabbing pain that will last a second and is much worse when she does not take her pain medication. For the last week she has had less than 10cc of clear fluid in her drain. She continues to care for it by flushing it and emptying bag every night.  Denies fevers, chills, fatigue, dysuria, urinary freqeuncy, pelvic or back pain. She feel like she is able to go throughout her normal activity until the pain starts and she has to rest.     Patient is s/p removal of IR drain with resolution of fluid collection post op.   She states she is feeling much better with improved activity and appetite.      Imaging:      Labs:      MEDICATIONS:    Current Outpatient Prescriptions:      Acetaminophen (TYLENOL PO), , Disp: , Rfl:      amLODIPine (NORVASC) 10 MG tablet, Take 0.5 tablets (5 mg) by mouth daily, Disp: 100 tablet, Rfl: 3     oxyCODONE IR (ROXICODONE) 5 MG tablet, Take 1-2 tablets (5-10 mg) by mouth every 3 hours as needed for other (pain  control or improvement in physical function. Hold dose for analgesic side effects.), Disp: 20 tablet, Rfl: 0     amoxicillin-clavulanate (AUGMENTIN) 875-125 MG per tablet, Take 1 tablet by mouth 2 times daily (Patient not taking: Reported on 6/15/2018), Disp: 28 tablet, Rfl: 0     COMPRESSION STOCKINGS, 1 each daily (Patient not taking: Reported on 6/15/2018), Disp: 2 Units, Rfl: 1     levofloxacin (LEVAQUIN) 500 MG tablet, Take 1 tablet (500 mg) by mouth daily (Patient not taking: Reported on 6/15/2018), Disp: 7 tablet, Rfl: 0     LORazepam (ATIVAN) 1 MG tablet, Take 1 tablet (1 mg) by mouth once as needed for anxiety (take 30 mins before thyroid nodule biopsy procedure) (Patient not taking: Reported on 6/15/2018), Disp: 1 tablet, Rfl: 0    ALLERGIES:   No Known Allergies    REVIEW OF SYSTEMS: Ten point review of systems without change as outlined in HPI    SURGICAL HISTORY:    Past Surgical History:   Procedure Laterality Date     COMBINED CYSTOSCOPY, INSERT STENT URETER(S) Left 6/13/2016    Procedure: COMBINED CYSTOSCOPY, INSERT STENT URETER(S);  Surgeon: Deja Salinas MD;  [facility]     CYSTOSCOPY, RETROGRADES, COMBINED Left 6/13/2016    Procedure: COMBINED CYSTOSCOPY, RETROGRADES;  Surgeon: Deja Salinas MD;  [facility]     DAVINCI NEPHRECTOMY Left 5/3/2018    Procedure: DAVINCI XI NEPHRECTOMY;  DAVINCI XI LEFT NEPHRECTOMY;  Surgeon: Deja Salinas MD;  Location:  OR     GENITOURINARY SURGERY  5/2015    PNT placement     HYSTERECTOMY  2011    [place], [place]     PERCUTANEOUS NEPHROSTOMY Left 3/9/2017    Procedure: PERCUTANEOUS NEPHROSTOMY;  Surgeon: Bridger Meyer PA-C;  [facility]     PERCUTANEOUS NEPHROSTOMY Left 6/28/2017    Procedure: PERCUTANEOUS NEPHROSTOMY;  Left Nephrostomy Tube Change;  Surgeon: Bridger Meyer PA-C;  [facility]     PERCUTANEOUS NEPHROSTOMY Left 10/30/2017    Procedure: PERCUTANEOUS NEPHROSTOMY;  Left  Nephrostomy Tube Change;  Surgeon: Maryann Chavez PA-C;  [facility]     PHYSICAL EXAM:  /66 (BP Location: Right arm, Patient Position: Sitting, Cuff Size: Adult Large)  Pulse 80  Wt 95.8 kg (211 lb 3.2 oz)  Breastfeeding? No  BMI 33.07 kg/m2    HEENT: Normocephalic and atraumatic    Cardiac: Not done    Back/Flank: Not done    CNS/PNS: Alert and oriented    Respiratory: Normal nonlabored breathing    Abdomen: Soft nontender and nondistended, incisions well healed     Peripheral Vascular: No peripheral edema    Mental Status: Normal    External Genitalia: Not done    Bladder: Not done    Urethra: Not done     Vagina: Not done    Cystoscopy:  Not Done    Urinalysis:  UA RESULTS:  Recent Labs   Lab Test  05/21/18   0056   COLOR  Straw   APPEARANCE  Clear   URINEGLC  Negative   URINEBILI  Negative   URINEKETONE  Negative   SG  1.004   UBLD  Trace*   URINEPH  7.5*   PROTEIN  Negative   NITRITE  Negative   LEUKEST  Negative   RBCU  <1   WBCU  <1     IMPRESSION:    51 y/o F s/p left nephrectomy for non-functioning kidney     PLAN:    Follow up in six weeks for final post op visit     Total Time:  15 minutes   Time in Consultation: greater than 50%     Again, thank you for allowing me to participate in the care of your patient.      Sincerely,    Deja Salinas MD

## 2018-06-18 ENCOUNTER — HOSPITAL ENCOUNTER (INPATIENT)
Facility: CLINIC | Age: 53
LOS: 1 days | Discharge: HOME | End: 2018-06-20
Attending: EMERGENCY MEDICINE | Admitting: UROLOGY
Payer: MEDICAID

## 2018-06-18 ENCOUNTER — APPOINTMENT (OUTPATIENT)
Dept: CT IMAGING | Facility: CLINIC | Age: 53
End: 2018-06-18
Attending: EMERGENCY MEDICINE
Payer: MEDICAID

## 2018-06-18 DIAGNOSIS — T81.49XA ABSCESS AFTER PROCEDURE: ICD-10-CM

## 2018-06-18 LAB
ALBUMIN SERPL-MCNC: 3.5 G/DL (ref 3.4–5)
ALBUMIN UR-MCNC: NEGATIVE MG/DL
ALP SERPL-CCNC: 52 U/L (ref 40–150)
ALT SERPL W P-5'-P-CCNC: 21 U/L (ref 0–50)
ANION GAP SERPL CALCULATED.3IONS-SCNC: 8 MMOL/L (ref 3–14)
APPEARANCE UR: CLEAR
AST SERPL W P-5'-P-CCNC: 17 U/L (ref 0–45)
BACTERIA #/AREA URNS HPF: ABNORMAL /HPF
BASOPHILS # BLD AUTO: 0 10E9/L (ref 0–0.2)
BASOPHILS NFR BLD AUTO: 0.1 %
BILIRUB SERPL-MCNC: 0.7 MG/DL (ref 0.2–1.3)
BILIRUB UR QL STRIP: NEGATIVE
BUN SERPL-MCNC: 6 MG/DL (ref 7–30)
CALCIUM SERPL-MCNC: 9.1 MG/DL (ref 8.5–10.1)
CHLORIDE SERPL-SCNC: 105 MMOL/L (ref 94–109)
CO2 SERPL-SCNC: 24 MMOL/L (ref 20–32)
COLOR UR AUTO: ABNORMAL
CREAT BLD-MCNC: 0.6 MG/DL (ref 0.52–1.04)
CREAT SERPL-MCNC: 0.68 MG/DL (ref 0.52–1.04)
DIFFERENTIAL METHOD BLD: ABNORMAL
EOSINOPHIL # BLD AUTO: 0.1 10E9/L (ref 0–0.7)
EOSINOPHIL NFR BLD AUTO: 0.5 %
ERYTHROCYTE [DISTWIDTH] IN BLOOD BY AUTOMATED COUNT: 14.1 % (ref 10–15)
GFR SERPL CREATININE-BSD FRML MDRD: 90 ML/MIN/1.7M2
GFR SERPL CREATININE-BSD FRML MDRD: >90 ML/MIN/1.7M2
GLUCOSE SERPL-MCNC: 122 MG/DL (ref 70–99)
GLUCOSE UR STRIP-MCNC: NEGATIVE MG/DL
HCT VFR BLD AUTO: 33.7 % (ref 35–47)
HGB BLD-MCNC: 10.8 G/DL (ref 11.7–15.7)
HGB UR QL STRIP: ABNORMAL
IMM GRANULOCYTES # BLD: 0 10E9/L (ref 0–0.4)
IMM GRANULOCYTES NFR BLD: 0.4 %
KETONES UR STRIP-MCNC: NEGATIVE MG/DL
LACTATE BLD-SCNC: 1.1 MMOL/L (ref 0.7–2)
LEUKOCYTE ESTERASE UR QL STRIP: NEGATIVE
LIPASE SERPL-CCNC: 58 U/L (ref 73–393)
LYMPHOCYTES # BLD AUTO: 3 10E9/L (ref 0.8–5.3)
LYMPHOCYTES NFR BLD AUTO: 27.2 %
MCH RBC QN AUTO: 27.1 PG (ref 26.5–33)
MCHC RBC AUTO-ENTMCNC: 32 G/DL (ref 31.5–36.5)
MCV RBC AUTO: 85 FL (ref 78–100)
MONOCYTES # BLD AUTO: 0.7 10E9/L (ref 0–1.3)
MONOCYTES NFR BLD AUTO: 6.3 %
NEUTROPHILS # BLD AUTO: 7.3 10E9/L (ref 1.6–8.3)
NEUTROPHILS NFR BLD AUTO: 65.5 %
NITRATE UR QL: NEGATIVE
NRBC # BLD AUTO: 0 10*3/UL
NRBC BLD AUTO-RTO: 0 /100
PH UR STRIP: 7 PH (ref 5–7)
PLATELET # BLD AUTO: 252 10E9/L (ref 150–450)
POTASSIUM SERPL-SCNC: 3.9 MMOL/L (ref 3.4–5.3)
PROT SERPL-MCNC: 8.1 G/DL (ref 6.8–8.8)
RBC # BLD AUTO: 3.99 10E12/L (ref 3.8–5.2)
RBC #/AREA URNS AUTO: 0 /HPF (ref 0–2)
SODIUM SERPL-SCNC: 137 MMOL/L (ref 133–144)
SOURCE: ABNORMAL
SP GR UR STRIP: 1 (ref 1–1.03)
SQUAMOUS #/AREA URNS AUTO: <1 /HPF (ref 0–1)
UROBILINOGEN UR STRIP-MCNC: NORMAL MG/DL (ref 0–2)
WBC # BLD AUTO: 11 10E9/L (ref 4–11)
WBC #/AREA URNS AUTO: <1 /HPF (ref 0–5)

## 2018-06-18 PROCEDURE — 82565 ASSAY OF CREATININE: CPT

## 2018-06-18 PROCEDURE — 81001 URINALYSIS AUTO W/SCOPE: CPT | Performed by: EMERGENCY MEDICINE

## 2018-06-18 PROCEDURE — 99285 EMERGENCY DEPT VISIT HI MDM: CPT | Mod: 25 | Performed by: EMERGENCY MEDICINE

## 2018-06-18 PROCEDURE — 99285 EMERGENCY DEPT VISIT HI MDM: CPT | Mod: Z6 | Performed by: EMERGENCY MEDICINE

## 2018-06-18 PROCEDURE — 96361 HYDRATE IV INFUSION ADD-ON: CPT | Performed by: EMERGENCY MEDICINE

## 2018-06-18 PROCEDURE — 80053 COMPREHEN METABOLIC PANEL: CPT | Performed by: EMERGENCY MEDICINE

## 2018-06-18 PROCEDURE — 25000128 H RX IP 250 OP 636: Performed by: EMERGENCY MEDICINE

## 2018-06-18 PROCEDURE — 83690 ASSAY OF LIPASE: CPT | Performed by: EMERGENCY MEDICINE

## 2018-06-18 PROCEDURE — 83605 ASSAY OF LACTIC ACID: CPT | Performed by: EMERGENCY MEDICINE

## 2018-06-18 PROCEDURE — 74177 CT ABD & PELVIS W/CONTRAST: CPT

## 2018-06-18 PROCEDURE — 96365 THER/PROPH/DIAG IV INF INIT: CPT | Performed by: EMERGENCY MEDICINE

## 2018-06-18 PROCEDURE — 85025 COMPLETE CBC W/AUTO DIFF WBC: CPT | Performed by: EMERGENCY MEDICINE

## 2018-06-18 RX ORDER — HYDROMORPHONE HYDROCHLORIDE 1 MG/ML
0.5 INJECTION, SOLUTION INTRAMUSCULAR; INTRAVENOUS; SUBCUTANEOUS ONCE
Status: DISCONTINUED | OUTPATIENT
Start: 2018-06-18 | End: 2018-06-18

## 2018-06-18 RX ORDER — IOPAMIDOL 755 MG/ML
128 INJECTION, SOLUTION INTRAVASCULAR ONCE
Status: COMPLETED | OUTPATIENT
Start: 2018-06-18 | End: 2018-06-18

## 2018-06-18 RX ORDER — AMPICILLIN AND SULBACTAM 2; 1 G/1; G/1
3 INJECTION, POWDER, FOR SOLUTION INTRAMUSCULAR; INTRAVENOUS ONCE
Status: COMPLETED | OUTPATIENT
Start: 2018-06-18 | End: 2018-06-18

## 2018-06-18 RX ADMIN — SODIUM CHLORIDE 1000 ML: 9 INJECTION, SOLUTION INTRAVENOUS at 19:02

## 2018-06-18 RX ADMIN — IOPAMIDOL 128 ML: 755 INJECTION, SOLUTION INTRAVENOUS at 20:04

## 2018-06-18 RX ADMIN — AMPICILLIN SODIUM AND SULBACTAM SODIUM 3 G: 2; 1 INJECTION, POWDER, FOR SOLUTION INTRAMUSCULAR; INTRAVENOUS at 22:36

## 2018-06-18 ASSESSMENT — ENCOUNTER SYMPTOMS
CONFUSION: 0
NECK STIFFNESS: 0
VOMITING: 0
NAUSEA: 1
DIARRHEA: 0
HEADACHES: 0
SHORTNESS OF BREATH: 0
DIFFICULTY URINATING: 0
BLOOD IN STOOL: 0
ABDOMINAL PAIN: 1
ARTHRALGIAS: 0
FEVER: 1
COLOR CHANGE: 0
COUGH: 0
CONSTIPATION: 0
EYE REDNESS: 0
CHILLS: 1

## 2018-06-18 NOTE — ED TRIAGE NOTES
"Triage Assessment & Note:    /83  Pulse 85  Temp 98.3  F (36.8  C) (Oral)  Resp 18  Ht 1.702 m (5' 7\")  Wt 95.3 kg (210 lb)  SpO2 99%  BMI 32.89 kg/m2    Patient presents with: c/o left flan/abdominal pain. PT reports she had her kidney removed aprox 5 weeks ago and since yesterday she has been having increased pain. PT denies difficulty voiding.     Home Treatments/Remedies: Tylenol     Febrile / Afebrile? Afebrile    Duration of C/o:  1 day    Natanael Sher  June 18, 2018      "

## 2018-06-18 NOTE — ED PROVIDER NOTES
History     Chief Complaint   Patient presents with     Abdominal Pain     HPI  Angella St is a 52 year old female with an extensive history including hypertension, cervical cancer s/p JESSICA and chemotherapy, complicated by left ureteral involvement with hydronephrosis with recurrent pyelonephritis and CKD s/p DaVinci nephrectomy of the left kidney (5/3/18), c/b retroperitoneal abscess s/p IR drain placement (5/21/18). Patient presents to the Emergency Department due to abdominal pain. She reports that she began having tenderness on the left lower quadrant of her abdomen on Friday (6/15). She was seen by Urology on Friday and told that this was normal following the procedure. On Sunday evening (6/17), she began having sharp lower abdominal pain that radiates to her lower back. This morning, she began having a nausea, subjective fever and chills. Patient took Tylenol for symptom management without relief. She denies symptoms including difficulty urinating, vomiting, cough or shortness of breath. She also denies any bowel problems and reports one normal bowel movement earlier this morning. Patient's drain was removed approximately one week ago.     No current facility-administered medications for this encounter.      Current Outpatient Prescriptions   Medication     Acetaminophen (TYLENOL PO)     amLODIPine (NORVASC) 10 MG tablet     amoxicillin-clavulanate (AUGMENTIN) 875-125 MG per tablet     COMPRESSION STOCKINGS     levofloxacin (LEVAQUIN) 500 MG tablet     LORazepam (ATIVAN) 1 MG tablet     oxyCODONE IR (ROXICODONE) 5 MG tablet     Past Medical History:   Diagnosis Date     Anemia      Cancer (H)     Metastatic squamous cell cervical carcinoma     Chronic kidney disease      Hypertension 2007     Obesity     BMI >30       Past Surgical History:   Procedure Laterality Date     COMBINED CYSTOSCOPY, INSERT STENT URETER(S) Left 6/13/2016    Procedure: COMBINED CYSTOSCOPY, INSERT STENT URETER(S);  Surgeon: Rita  Deja Horn MD;  Location: [facility]     CYSTOSCOPY, RETROGRADES, COMBINED Left 6/13/2016    Procedure: COMBINED CYSTOSCOPY, RETROGRADES;  Surgeon: Deja Salinas MD;  Location: [facility]     DAVINCI NEPHRECTOMY Left 5/3/2018    Procedure: DAVINCI XI NEPHRECTOMY;  DAVINCI XI LEFT NEPHRECTOMY;  Surgeon: Deja Salinas MD;  Location: [facility]     GENITOURINARY SURGERY  5/2015    PNT placement     HYSTERECTOMY  2011    [place]     PERCUTANEOUS NEPHROSTOMY Left 3/9/2017    Procedure: PERCUTANEOUS NEPHROSTOMY;  Surgeon: Bridger Meyer PA-C;  Location: [facility]     PERCUTANEOUS NEPHROSTOMY Left 6/28/2017    Procedure: PERCUTANEOUS NEPHROSTOMY;  Left Nephrostomy Tube Change;  Surgeon: Bridger Meyer PA-C;  Location: [facility]     PERCUTANEOUS NEPHROSTOMY Left 10/30/2017    Procedure: PERCUTANEOUS NEPHROSTOMY;  Left Nephrostomy Tube Change;  Surgeon: Maryann Chavez PA-C;  Location: [facility]       Family History   Problem Relation Age of Onset     Hypertension Brother        Social History   Substance Use Topics     Smoking status: Never Smoker     Smokeless tobacco: Never Used     Alcohol use No      Comment: Does not drink alcohol     No Known Allergies    I have reviewed the Medications, Allergies, Past Medical and Surgical History, and Social History in the Epic system.    Review of Systems   Constitutional: Positive for chills and fever (subjective).   HENT: Negative for congestion.    Eyes: Negative for redness.   Respiratory: Negative for cough and shortness of breath.    Cardiovascular: Negative for chest pain.   Gastrointestinal: Positive for abdominal pain (LLQ radiating to lower back) and nausea. Negative for blood in stool, constipation, diarrhea and vomiting.   Genitourinary: Negative for difficulty urinating.   Musculoskeletal: Negative for arthralgias and neck stiffness.   Skin: Negative for color change.   Neurological: Negative for headaches.   Psychiatric/Behavioral:  "Negative for confusion.   All other systems reviewed and are negative.      Physical Exam   BP: 135/83  Pulse: 85  Temp: 98.3  F (36.8  C)  Resp: 18  Height: 170.2 cm (5' 7\")  Weight: 95.3 kg (210 lb)  SpO2: 99 %      Physical Exam   Constitutional: She appears well-developed and well-nourished. No distress.   HENT:   Head: Normocephalic and atraumatic.   Mouth/Throat: No oropharyngeal exudate.   Eyes: Pupils are equal, round, and reactive to light. No scleral icterus.   Cardiovascular: Normal rate, regular rhythm, normal heart sounds and intact distal pulses.    Pulmonary/Chest: Effort normal and breath sounds normal. No respiratory distress.   Abdominal: Soft. Bowel sounds are normal. There is tenderness in the left lower quadrant. There is no rebound and no guarding.   Musculoskeletal: Normal range of motion. She exhibits no edema or tenderness.   Neurological: She is alert. She has normal strength. Coordination and gait normal.   Skin: Skin is warm and dry. No rash noted. She is not diaphoretic.   Nursing note and vitals reviewed.      ED Course   6:49 PM  The patient was seen and examined by Glen Nayak MD in Room 11.   ED Course     Procedures            Critical Care time:    Results for orders placed or performed during the hospital encounter of 06/18/18   CT Abdomen Pelvis w Contrast    Narrative    Examination:  CT ABDOMEN PELVIS W CONTRAST 6/18/2018 8:15 PM     History: Lower abdominal pain, recent left nephrectomy complicated by  abscess    Comparison: Fistulogram 6/5/2018, CT abdomen and pelvis 6/5/2018    Technique: CT of the abdomen and pelvis were obtained with IV  contrast. Sagittal and coronal reconstructions created and reviewed.    Contrast: 128 mL isovue 370    Findings:   The liver, spleen, gallbladder, and pancreas are unremarkable. No  right hydronephrosis or renal calculi. The right ureter is  unremarkable as is the urinary bladder.     There are postsurgical changes of left nephrectomy. In " the left  nephrectomy bed there is a rim-enhancing fluid collection (series 5  image 126) measuring 2.0 x 3.1 cm. This collection effaces the fat  plane of the spleen and also the colon at the splenic flexure.  Definite fistula is not visualized. Previously, there was a left  percutaneous nephrostomy tube centered within this collection.  Extending inferiorly from this collection there is a dilated tubular  structure with peripheral enhancement. Structure may represent a  thrombosed ovarian vein or ureter. This structure is stable in size  measuring up to 14 mm at the level of the iliac crests.    The small and large bowel are normal in caliber. The appendix is  slightly enlarged in caliber, but otherwise unremarkable, similar to  previous exams. Small volume low pelvic free fluid, similar to  previous exams. No abdominal free air. No retroperitoneal, mesenteric,  or inguinal lymphadenopathy. The major abdominal vasculature is  patent.    No acute or suspicious osseous abnormalities. Linear subcutaneous  stranding in the left abdomen is unchanged from previous, likely  postsurgical.    Bibasilar atelectasis. The lung bases are otherwise clear. Trace  bilateral pleural effusions, decreased on the left than previous.      Impression    Impression:   1. Rim enhancing fluid collection in the left nephrectomy bed  concerning for abscess. This collection effaces the adjacent fat plane  of the spleen and the colon at the splenic flexure. No definite  fistula is identified.  2. Peripherally enhancing tubular structure extending from the left  nephrectomy bed to the low pelvis may represent thrombosed ovarian  vein or hydroureter.  3. Trace bilateral pleural effusions, improved on the left from  previous.   CBC with platelets differential   Result Value Ref Range    WBC 11.0 4.0 - 11.0 10e9/L    RBC Count 3.99 3.8 - 5.2 10e12/L    Hemoglobin 10.8 (L) 11.7 - 15.7 g/dL    Hematocrit 33.7 (L) 35.0 - 47.0 %    MCV 85 78 - 100 fl     MCH 27.1 26.5 - 33.0 pg    MCHC 32.0 31.5 - 36.5 g/dL    RDW 14.1 10.0 - 15.0 %    Platelet Count 252 150 - 450 10e9/L    Diff Method Automated Method     % Neutrophils 65.5 %    % Lymphocytes 27.2 %    % Monocytes 6.3 %    % Eosinophils 0.5 %    % Basophils 0.1 %    % Immature Granulocytes 0.4 %    Nucleated RBCs 0 0 /100    Absolute Neutrophil 7.3 1.6 - 8.3 10e9/L    Absolute Lymphocytes 3.0 0.8 - 5.3 10e9/L    Absolute Monocytes 0.7 0.0 - 1.3 10e9/L    Absolute Eosinophils 0.1 0.0 - 0.7 10e9/L    Absolute Basophils 0.0 0.0 - 0.2 10e9/L    Abs Immature Granulocytes 0.0 0 - 0.4 10e9/L    Absolute Nucleated RBC 0.0    Comprehensive metabolic panel   Result Value Ref Range    Sodium 137 133 - 144 mmol/L    Potassium 3.9 3.4 - 5.3 mmol/L    Chloride 105 94 - 109 mmol/L    Carbon Dioxide 24 20 - 32 mmol/L    Anion Gap 8 3 - 14 mmol/L    Glucose 122 (H) 70 - 99 mg/dL    Urea Nitrogen 6 (L) 7 - 30 mg/dL    Creatinine 0.68 0.52 - 1.04 mg/dL    GFR Estimate 90 >60 mL/min/1.7m2    GFR Estimate If Black >90 >60 mL/min/1.7m2    Calcium 9.1 8.5 - 10.1 mg/dL    Bilirubin Total 0.7 0.2 - 1.3 mg/dL    Albumin 3.5 3.4 - 5.0 g/dL    Protein Total 8.1 6.8 - 8.8 g/dL    Alkaline Phosphatase 52 40 - 150 U/L    ALT 21 0 - 50 U/L    AST 17 0 - 45 U/L   Lipase   Result Value Ref Range    Lipase 58 (L) 73 - 393 U/L   Lactic acid   Result Value Ref Range    Lactic Acid 1.1 0.7 - 2.0 mmol/L   UA with Microscopic reflex to Culture   Result Value Ref Range    Color Urine Straw     Appearance Urine Clear     Glucose Urine Negative NEG^Negative mg/dL    Bilirubin Urine Negative NEG^Negative    Ketones Urine Negative NEG^Negative mg/dL    Specific Gravity Urine 1.001 (L) 1.003 - 1.035    Blood Urine Small (A) NEG^Negative    pH Urine 7.0 5.0 - 7.0 pH    Protein Albumin Urine Negative NEG^Negative mg/dL    Urobilinogen mg/dL Normal 0.0 - 2.0 mg/dL    Nitrite Urine Negative NEG^Negative    Leukocyte Esterase Urine Negative NEG^Negative     Source Midstream Urine     WBC Urine <1 0 - 5 /HPF    RBC Urine 0 0 - 2 /HPF    Bacteria Urine Few (A) NEG^Negative /HPF    Squamous Epithelial /HPF Urine <1 0 - 1 /HPF   Creatinine POCT   Result Value Ref Range    Creatinine 0.6 0.52 - 1.04 mg/dL    GFR Estimate >90 >60 mL/min/1.7m2    GFR Estimate If Black >90 >60 mL/min/1.7m2      Medications   0.9% sodium chloride BOLUS (0 mLs Intravenous Stopped 6/18/18 2137)   iopamidol (ISOVUE-370) solution 128 mL (128 mLs Intravenous Given 6/18/18 2004)   sodium chloride (PF) 0.9% PF flush 82 mL (82 mLs Intravenous Given 6/18/18 2004)   ampicillin-sulbactam (UNASYN) 3 g vial to attach to  mL bag (0 g Intravenous Stopped 6/18/18 2320)      9:13 PM Urology consulted.         Assessments & Plan (with Medical Decision Making)   52 year old female status post left nephrectomy in early May complicated by left nephrectomy bed abscess treated with interventional radiology drain which was removed last week here with recurrent left-sided abdominal pain as well as left flank pain.  She had some Reiger's last night as well.  In the emergency department, the patient is afebrile and has normal vital signs.  She has some left-sided abdominal tenderness but no peritonitis.  Patient's labs are normal.  Her urinalysis does not appear infected.  CT of the abdomen/pelvis reveals recurrent rim-enhancing fluid collection in the left nephrectomy bed although the size is smaller than the original abscess but no perceivable residual fluid collection was observed prior to drain removal.  Urology was consulted.  Patient was given Unasyn.  She will be admitted to the urology service for further evaluation and management.    I have reviewed the nursing notes.    I have reviewed the findings, diagnosis, plan and need for follow up with the patient.    Current Discharge Medication List          Final diagnoses:   Abscess after procedure   I, Lulu Forbes, am serving as a trained medical scribe to  document services personally performed by Glen Nayak MD, based on the provider's statements to me.   I, Glen Nayak MD, was physically present and have reviewed and verified the accuracy of this note documented by Lulu Forbes.    6/18/2018   [facility], [place], EMERGENCY DEPARTMENT     Glen Nayak MD  06/18/18 [id number]

## 2018-06-18 NOTE — IP AVS SNAPSHOT
MRN:[id number]                      After Visit Summary   6/18/2018    Angella St    MRN: [id number]           Thank you!     Thank you for choosing [facility] for your care. Our goal is always to provide you with excellent care. Hearing back from our patients is one way we can continue to improve our services. Please take a few minutes to complete the written survey that you may receive in the mail after you visit with us. Thank you!        Patient Information     Date Of Birth          1965        Designated Caregiver       Most Recent Value    Caregiver    Will someone help with your care after discharge? yes    Name of designated caregiver Greyson - daughter    Phone number of caregiver [phone]    Caregiver address same address      About your hospital stay     You were admitted on:  June 19, 2018 You last received care in the:  [address] [facility]    You were discharged on:  June 20, 2018        Reason for your hospital stay       Pain/Abscess                  Who to Call     For medical emergencies, please call 911.  For non-urgent questions about your medical care, please call your primary care provider or clinic, [phone]          Attending Provider     Provider Specialty    Glen Nayak MD Emergency Medicine    Rita, Deja Horn MD Urology       Primary Care Provider Office Phone # Fax #    Shamir Pugh [phone]      After Care Instructions     Activity       Your activity upon discharge: Regular activity            Diet       Follow this diet upon discharge: Regular            Discharge Instructions       Activity  - Resume normal activity     Medications  - Over the counter acetaminophen for discomfort  - 2 week course of oral antibiotics (vantin).  We will follow up on your final cultures and alert you if the antibiotics need to be changed based on final culture results    Follow-Up:  - Follow up with Dr Salinas in clinic in 2 weeks  "   - Call or return sooner than your regularly scheduled visit if you develop any of the following: fever (greater than 101.5), uncontrolled pain, uncontrolled nausea or vomiting, as well as increased redness, swelling, or drainage from your wounds.     Phone numbers:   - Monday through Friday 8am to 4:30pm: Call [phone] with questions or to schedule or confirm appointment.    - Nights or weekends: call the after hours emergency pager - [phone] and tell the  \"I would like to page the Urology Resident on call.\"  - For emergencies, call [phone]                  Follow-up Appointments     Follow Up and recommended labs and tests       Follow up with Dr Salinas in 2 weeks                  Your next 10 appointments already scheduled     Jul 12, 2018  9:20 AM CDT   CT CHEST ABDOMEN PELVIS W/O & W CONTRAST with UCCT2   [facility] CT ([facility])    [address] [address] [phone]           Please bring any scans or X-rays taken at other hospitals, if similar tests were done. Also bring a list of your medicines, including vitamins, minerals and over-the-counter drugs. It is safest to leave personal items at home.  Be sure to tell your doctor:   If you have any allergies.   If there s any chance you are pregnant.   If you are breastfeeding.  How to prepare:   Do not eat or drink for 2 hours before your exam. If you need to take medicine, you may take it with small sips of water. (We may ask you to take liquid medicine as well.)   Please wear loose clothing, such as a sweat suit or jogging clothes. Avoid snaps, zippers and other metal. We may ask you to undress and put on a hospital gown.  Please arrive 30 minutes early for your CT. Once in the department you might be asked to drink water 15-20 minutes prior to your exam.  If indicated you may be asked to drink an oral contrast in advance of your CT.  If this is the case, the imaging " team will let you know or be in contact with you prior to your appointment  Patients over 70 or patients with diabetes or kidney problems:   If you haven t had a blood test (creatinine test) within the last 30 days, the Cardiologist/Radiologist may require you to get this test prior to your exam.  If you have diabetes:   Continue to take your metformin medication on the day of your exam  If you have any questions, please call the Imaging Department where you will have your exam.            Jul 16, 2018 11:00 AM CDT   (Arrive by 10:45 AM)   Return Visit with Lam Moran MD   [facility] ([facility])    [address]  [address]  [place] [address]   [phone]            Aug 10, 2018  3:45 PM CDT   (Arrive by 3:30 PM)   Return Visit with Deja Slainas MD   [facility] ([facility])    [address]  4th Floor  [place] [address] [phone]            Sep 14, 2018 10:00 AM CDT   US BIOPSY THYROID FINE NEEDLE ASPIRATION with UCUS3, UC IMAGING NURSE, UC PROCEDURAL RAD   [facility] [place] [facility] ([facility])    [address]  1st Floor  [place] [address] [phone]           Bring a list of your medicines to the exam. Include vitamins, minerals and over-the-counter drugs.  Tell your doctor in advance:   If you are or may be pregnant.   If you are taking Coumadin (or any other blood thinners) 5 days prior to the exam for any special instructions.  IF YOUR DOCTOR HAS TOLD YOU THAT YOU WILL BE RECEIVING SEDATION (medicine to help you relax): (Typically sedation is only for liver exams at [facility] and Renal Biopsy exams in Pediatrics)   See your family doctor for an exam within 30 days of treatment.   Plan for an adult to drive you home and stay with you for at least 24 hours.   No eating or drinking  "for 4 hours before your test. You may take medicine with small sips of water.   If you have diabetes:If you take insulin, call your diabetes care team for any special instructions for this exam.  Please call the Imaging Department at your exam site with any questions.             Sep 21, 2018  2:30 PM CDT   (Arrive by 2:15 PM)   RETURN ENDOCRINE with Amee Zee MD   [facility] ([facility])    [address]  3rd Floor  [facility] [place] [facility] [address] [phone]              Pending Results     Date and Time Order Name Status Description    6/19/2018 0916 Abscess Culture Aerobic Bacterial Preliminary             Statement of Approval     Ordered          06/20/18 1116  I have reviewed and agree with all the recommendations and orders detailed in this document.  EFFECTIVE NOW     Approved and electronically signed by:  Deja Salinas MD             Admission Information     Date & Time Provider Department Dept. Phone    6/18/2018 Deja Salinas MD [address] [facility] [phone]      Your Vitals Were     Blood Pressure Pulse Temperature Respirations Height Weight    149/75 (BP Location: Left arm) 74 98.9  F (37.2  C) (Oral) 16 1.702 m (5' 7\") 93 kg (205 lb)    Pulse Oximetry BMI (Body Mass Index)                97% 32.11 kg/m2          MyChart Information     [facility] gives you secure access to your electronic health record. If you see a primary care provider, you can also send messages to your care team and make appointments. If you have questions, please call your primary care clinic.  If you do not have a primary care provider, please call [phone] and they will assist you.        Care EveryWhere ID     This is your Care EveryWhere ID. This could be used by other organizations to access your [facility] medical records  [id number]        Equal Access to Services     DORIS RODRIGUES AH: saleem Burton qaybta " jin marcumcorina connolly ah. [facility] [phone].    ATENCIÓN: Si edy schmitt, tiene a andrew disposición servicios gratuitos de asistencia lingüística. Shemar al [phone].    We comply with applicable federal civil rights laws and [place] laws. We do not discriminate on the basis of race, color, national origin, age, disability, sex, sexual orientation, or gender identity.               Review of your medicines      START taking        Dose / Directions    cefpodoxime 200 MG tablet   Commonly known as:  VANTIN   Used for:  Abscess after procedure        Dose:  400 mg   Take 2 tablets (400 mg) by mouth 2 times daily for 14 days   Quantity:  56 tablet   Refills:  0         CONTINUE these medicines which have NOT CHANGED        Dose / Directions    amLODIPine 10 MG tablet   Commonly known as:  NORVASC   Used for:  Benign essential hypertension        Dose:  5 mg   Take 0.5 tablets (5 mg) by mouth daily   Quantity:  100 tablet   Refills:  3       COMPRESSION STOCKINGS   Used for:  Leg swelling        Dose:  1 each   1 each daily   Quantity:  2 Units   Refills:  1       senna-docusate 8.6-50 MG per tablet   Commonly known as:  SENOKOT-S;PERICOLACE        Dose:  1 tablet   Take 1 tablet by mouth daily as needed for constipation   Refills:  0       TYLENOL PO        Dose:  500 mg   Take 500 mg by mouth every 6 hours as needed   Refills:  0            Where to get your medicines      These medications were sent to [place] [address] [facility] [address]  [address] [place] [address] [address] [place] [address]    Hours:  TRANSPLANT PHONE NUMBER [phone] Phone:  [phone]     cefpodoxime 200 MG tablet                Protect others around you: Learn how to safely use, store and throw away your medicines at www.disposemymeds.org.        ANTIBIOTIC INSTRUCTION     You've Been Prescribed an Antibiotic - Now What?  Your healthcare team thinks that  you or your loved one might have an infection. Some infections can be treated with antibiotics, which are powerful, life-saving drugs. Like all medications, antibiotics have side effects and should only be used when necessary. There are some important things you should know about your antibiotic treatment.      Your healthcare team may run tests before you start taking an antibiotic.    Your team may take samples (e.g., from your blood, urine or other areas) to run tests to look for bacteria. These test can be important to determine if you need an antibiotic at all and, if you do, which antibiotic will work best.      Within a few days, your healthcare team might change or even stop your antibiotic.    Your team may start you on an antibiotic while they are working to find out what is making you sick.    Your team might change your antibiotic because test results show that a different antibiotic would be better to treat your infection.    In some cases, once your team has more information, they learn that you do not need an antibiotic at all. They may find out that you don't have an infection, or that the antibiotic you're taking won't work against your infection. For example, an infection caused by a virus can't be treated with antibiotics. Staying on an antibiotic when you don't need it is more likely to be harmful than helpful.      You may experience side effects from your antibiotic.    Like all medications, antibiotics have side effects. Some of these can be serious.    Let you healthcare team know if you have any known allergies when you are admitted to the hospital.    One significant side effect of nearly all antibiotics is the risk of severe and sometimes deadly diarrhea caused by Clostridium difficile (C. Difficile). This occurs when a person takes antibiotics because some good germs are destroyed. Antibiotic use allows C. diificile to take over, putting patients at high risk for this serious  infection.    As a patient or caregiver, it is important to understand your or your loved one's antibiotic treatment. It is especially important for caregivers to speak up when patients can't speak for themselves. Here are some important questions to ask your healthcare team.    What infection is this antibiotic treating and how do you know I have that infection?    What side effects might occur from this antibiotic?    How long will I need to take this antibiotic?    Is it safe to take this antibiotic with other medications or supplements (e.g., vitamins) that I am taking?     Are there any special directions I need to know about taking this antibiotic? For example, should I take it with food?    How will I be monitored to know whether my infection is responding to the antibiotic?    What tests may help to make sure the right antibiotic is prescribed for me?      Information provided by:  www.cdc.gov/getsmart  U.S. Department of Health and Human Services  Centers for disease Control and Prevention  National Center for Emerging and Zoonotic Infectious Diseases  Division of Healthcare Quality Promotion             Medication List: This is a list of all your medications and when to take them. Check marks below indicate your daily home schedule. Keep this list as a reference.      Medications           Morning Afternoon Evening Bedtime As Needed    amLODIPine 10 MG tablet   Commonly known as:  NORVASC   Take 0.5 tablets (5 mg) by mouth daily   Last time this was given:  5 mg on 6/20/2018  8:02 AM                                cefpodoxime 200 MG tablet   Commonly known as:  VANTIN   Take 2 tablets (400 mg) by mouth 2 times daily for 14 days                                COMPRESSION STOCKINGS   1 each daily                                senna-docusate 8.6-50 MG per tablet   Commonly known as:  SENOKOT-S;PERICOLACE   Take 1 tablet by mouth daily as needed for constipation                                TYLENOL PO    Take 500 mg by mouth every 6 hours as needed   Last time this was given:  650 mg on 6/20/2018  8:02 AM

## 2018-06-18 NOTE — IP AVS SNAPSHOT
[address] [address]    Phone:  [phone]                                       After Visit Summary   6/18/2018    Angella St    MRN: [id number]           After Visit Summary Signature Page     I have received my discharge instructions, and my questions have been answered. I have discussed any challenges I see with this plan with the nurse or doctor.    ..........................................................................................................................................  Patient/Patient Representative Signature      ..........................................................................................................................................  Patient Representative Print Name and Relationship to Patient    ..................................................               ................................................  Date                                            Time    ..........................................................................................................................................  Reviewed by Signature/Title    ...................................................              ..............................................  Date                                                            Time

## 2018-06-18 NOTE — PROGRESS NOTES
UROLOGIC DIAGNOSES:        CURRENT INTERVENTIONS:        History:       Angella St is a 52 year old female with history of hypertension, cervical cancer s/p JESSICA and chemotherapy, complicated by left ureteral involvement with hydronephrosis with recurrent pyelonephritis and CKD requiring chronic pain PNT s/p DaVinci nephrectomy of the left kidney on 5/3/18, complicated by retroperitoneal abscess s/p IR drain placement 5/21/18    Patient noticed pain from her drain site on Tuesday (5/29) and went to the emergency room. CT showed decrease fluid collection and patient had no obvious signs of worsening infection. Patient continues to have intermittent sharp stabbing pain that will last a second and is much worse when she does not take her pain medication. For the last week she has had less than 10cc of clear fluid in her drain. She continues to care for it by flushing it and emptying bag every night.  Denies fevers, chills, fatigue, dysuria, urinary freqeuncy, pelvic or back pain. She feel like she is able to go throughout her normal activity until the pain starts and she has to rest.     Patient is s/p removal of IR drain with resolution of fluid collection post op.   She states she is feeling much better with improved activity and appetite.          Imaging:      Labs:      MEDICATIONS:    Current Outpatient Prescriptions:      Acetaminophen (TYLENOL PO), , Disp: , Rfl:      amLODIPine (NORVASC) 10 MG tablet, Take 0.5 tablets (5 mg) by mouth daily, Disp: 100 tablet, Rfl: 3     oxyCODONE IR (ROXICODONE) 5 MG tablet, Take 1-2 tablets (5-10 mg) by mouth every 3 hours as needed for other (pain control or improvement in physical function. Hold dose for analgesic side effects.), Disp: 20 tablet, Rfl: 0     amoxicillin-clavulanate (AUGMENTIN) 875-125 MG per tablet, Take 1 tablet by mouth 2 times daily (Patient not taking: Reported on 6/15/2018), Disp: 28 tablet, Rfl: 0     COMPRESSION STOCKINGS, 1 each daily (Patient not  taking: Reported on 6/15/2018), Disp: 2 Units, Rfl: 1     levofloxacin (LEVAQUIN) 500 MG tablet, Take 1 tablet (500 mg) by mouth daily (Patient not taking: Reported on 6/15/2018), Disp: 7 tablet, Rfl: 0     LORazepam (ATIVAN) 1 MG tablet, Take 1 tablet (1 mg) by mouth once as needed for anxiety (take 30 mins before thyroid nodule biopsy procedure) (Patient not taking: Reported on 6/15/2018), Disp: 1 tablet, Rfl: 0    ALLERGIES:   No Known Allergies    REVIEW OF SYSTEMS: Ten point review of systems without change as outlined in HPI    SURGICAL HISTORY:    Past Surgical History:   Procedure Laterality Date     COMBINED CYSTOSCOPY, INSERT STENT URETER(S) Left 6/13/2016    Procedure: COMBINED CYSTOSCOPY, INSERT STENT URETER(S);  Surgeon: Deja Salinas MD;  [facility]     CYSTOSCOPY, RETROGRADES, COMBINED Left 6/13/2016    Procedure: COMBINED CYSTOSCOPY, RETROGRADES;  Surgeon: Deja Salinas MD;  [facility]     DAVINCI NEPHRECTOMY Left 5/3/2018    Procedure: DAVINCI XI NEPHRECTOMY;  DAVINCI XI LEFT NEPHRECTOMY;  Surgeon: Deja Salinas MD;  Location: [facility] OR     GENITOURINARY SURGERY  5/2015    PNT placement     HYSTERECTOMY  2011    [place], [place]     PERCUTANEOUS NEPHROSTOMY Left 3/9/2017    Procedure: PERCUTANEOUS NEPHROSTOMY;  Surgeon: Bridger Meyer PA-C;  [facility]     PERCUTANEOUS NEPHROSTOMY Left 6/28/2017    Procedure: PERCUTANEOUS NEPHROSTOMY;  Left Nephrostomy Tube Change;  Surgeon: Bridger Meyer PA-C;  [facility]     PERCUTANEOUS NEPHROSTOMY Left 10/30/2017    Procedure: PERCUTANEOUS NEPHROSTOMY;  Left Nephrostomy Tube Change;  Surgeon: Maryann Chavez PA-C;  [facility]         PHYSICAL EXAM:    /66 (BP Location: Right arm, Patient Position: Sitting, Cuff Size: Adult Large)  Pulse 80  Wt 95.8 kg (211 lb 3.2 oz)  Breastfeeding? No  BMI 33.07 kg/m2    HEENT: Normocephalic and atraumatic    Cardiac: Not  done    Back/Flank: Not done    CNS/PNS: Alert and oriented    Respiratory: Normal nonlabored breathing    Abdomen: Soft nontender and nondistended, incisions well healed     Peripheral Vascular: No peripheral edema    Mental Status: Normal    External Genitalia: Not done    Bladder: Not done    Urethra: Not done     Vagina: Not done    Cystoscopy:  Not Done      Urinalysis:  UA RESULTS:  Recent Labs   Lab Test  05/21/18   0056   COLOR  Straw   APPEARANCE  Clear   URINEGLC  Negative   URINEBILI  Negative   URINEKETONE  Negative   SG  1.004   UBLD  Trace*   URINEPH  7.5*   PROTEIN  Negative   NITRITE  Negative   LEUKEST  Negative   RBCU  <1   WBCU  <1         IMPRESSION:    53 y/o F s/p left nephrectomy for non-functioning kidney     PLAN:    Follow up in six weeks for final post op visit     Total Time:  15 minutes   Time in Consultation: greater than 50%

## 2018-06-19 ENCOUNTER — APPOINTMENT (OUTPATIENT)
Dept: CT IMAGING | Facility: CLINIC | Age: 53
End: 2018-06-19
Attending: NURSE PRACTITIONER
Payer: MEDICAID

## 2018-06-19 LAB
ANION GAP SERPL CALCULATED.3IONS-SCNC: 8 MMOL/L (ref 3–14)
BUN SERPL-MCNC: 7 MG/DL (ref 7–30)
CALCIUM SERPL-MCNC: 8.8 MG/DL (ref 8.5–10.1)
CHLORIDE SERPL-SCNC: 104 MMOL/L (ref 94–109)
CO2 SERPL-SCNC: 26 MMOL/L (ref 20–32)
CREAT SERPL-MCNC: 0.69 MG/DL (ref 0.52–1.04)
ERYTHROCYTE [DISTWIDTH] IN BLOOD BY AUTOMATED COUNT: 14 % (ref 10–15)
GFR SERPL CREATININE-BSD FRML MDRD: 89 ML/MIN/1.7M2
GLUCOSE SERPL-MCNC: 99 MG/DL (ref 70–99)
GRAM STN SPEC: NORMAL
GRAM STN SPEC: NORMAL
HCT VFR BLD AUTO: 33.5 % (ref 35–47)
HGB BLD-MCNC: 10.8 G/DL (ref 11.7–15.7)
INR PPP: 1.1 (ref 0.86–1.14)
MCH RBC QN AUTO: 27.1 PG (ref 26.5–33)
MCHC RBC AUTO-ENTMCNC: 32.2 G/DL (ref 31.5–36.5)
MCV RBC AUTO: 84 FL (ref 78–100)
PLATELET # BLD AUTO: 252 10E9/L (ref 150–450)
POTASSIUM SERPL-SCNC: 4 MMOL/L (ref 3.4–5.3)
RBC # BLD AUTO: 3.99 10E12/L (ref 3.8–5.2)
SODIUM SERPL-SCNC: 138 MMOL/L (ref 133–144)
SPECIMEN SOURCE: NORMAL
WBC # BLD AUTO: 10.7 10E9/L (ref 4–11)

## 2018-06-19 PROCEDURE — 87070 CULTURE OTHR SPECIMN AEROBIC: CPT | Performed by: PHYSICIAN ASSISTANT

## 2018-06-19 PROCEDURE — 0T913ZZ DRAINAGE OF LEFT KIDNEY, PERCUTANEOUS APPROACH: ICD-10-PCS | Performed by: RADIOLOGY

## 2018-06-19 PROCEDURE — 10160 PNXR ASPIR ABSC HMTMA BULLA: CPT

## 2018-06-19 PROCEDURE — 87186 SC STD MICRODIL/AGAR DIL: CPT | Performed by: PHYSICIAN ASSISTANT

## 2018-06-19 PROCEDURE — 80048 BASIC METABOLIC PNL TOTAL CA: CPT | Performed by: PHYSICIAN ASSISTANT

## 2018-06-19 PROCEDURE — 27211108

## 2018-06-19 PROCEDURE — 25000128 H RX IP 250 OP 636: Performed by: RADIOLOGY

## 2018-06-19 PROCEDURE — 87205 SMEAR GRAM STAIN: CPT | Performed by: PHYSICIAN ASSISTANT

## 2018-06-19 PROCEDURE — 99152 MOD SED SAME PHYS/QHP 5/>YRS: CPT

## 2018-06-19 PROCEDURE — 99153 MOD SED SAME PHYS/QHP EA: CPT

## 2018-06-19 PROCEDURE — 85610 PROTHROMBIN TIME: CPT | Performed by: STUDENT IN AN ORGANIZED HEALTH CARE EDUCATION/TRAINING PROGRAM

## 2018-06-19 PROCEDURE — 27211039 ZZH NEEDLE CR2

## 2018-06-19 PROCEDURE — 85027 COMPLETE CBC AUTOMATED: CPT | Performed by: PHYSICIAN ASSISTANT

## 2018-06-19 PROCEDURE — 25000125 ZZHC RX 250: Performed by: RADIOLOGY

## 2018-06-19 PROCEDURE — 25000128 H RX IP 250 OP 636: Performed by: STUDENT IN AN ORGANIZED HEALTH CARE EDUCATION/TRAINING PROGRAM

## 2018-06-19 PROCEDURE — 77012 CT SCAN FOR NEEDLE BIOPSY: CPT

## 2018-06-19 PROCEDURE — 36415 COLL VENOUS BLD VENIPUNCTURE: CPT | Performed by: PHYSICIAN ASSISTANT

## 2018-06-19 PROCEDURE — 87077 CULTURE AEROBIC IDENTIFY: CPT | Performed by: PHYSICIAN ASSISTANT

## 2018-06-19 PROCEDURE — 25000132 ZZH RX MED GY IP 250 OP 250 PS 637: Performed by: STUDENT IN AN ORGANIZED HEALTH CARE EDUCATION/TRAINING PROGRAM

## 2018-06-19 PROCEDURE — 12000008 ZZH R&B INTERMEDIATE UMMC

## 2018-06-19 PROCEDURE — 27210903 ZZH KIT CR5

## 2018-06-19 PROCEDURE — 36415 COLL VENOUS BLD VENIPUNCTURE: CPT | Performed by: STUDENT IN AN ORGANIZED HEALTH CARE EDUCATION/TRAINING PROGRAM

## 2018-06-19 RX ORDER — NALOXONE HYDROCHLORIDE 0.4 MG/ML
.1-.4 INJECTION, SOLUTION INTRAMUSCULAR; INTRAVENOUS; SUBCUTANEOUS
Status: DISCONTINUED | OUTPATIENT
Start: 2018-06-19 | End: 2018-06-19 | Stop reason: HOSPADM

## 2018-06-19 RX ORDER — DOCUSATE SODIUM 100 MG/1
100 CAPSULE, LIQUID FILLED ORAL 2 TIMES DAILY
Status: DISCONTINUED | OUTPATIENT
Start: 2018-06-19 | End: 2018-06-20 | Stop reason: HOSPADM

## 2018-06-19 RX ORDER — FENTANYL CITRATE 50 UG/ML
25-50 INJECTION, SOLUTION INTRAMUSCULAR; INTRAVENOUS EVERY 5 MIN PRN
Status: DISCONTINUED | OUTPATIENT
Start: 2018-06-19 | End: 2018-06-19 | Stop reason: HOSPADM

## 2018-06-19 RX ORDER — NALOXONE HYDROCHLORIDE 0.4 MG/ML
.1-.4 INJECTION, SOLUTION INTRAMUSCULAR; INTRAVENOUS; SUBCUTANEOUS
Status: DISCONTINUED | OUTPATIENT
Start: 2018-06-19 | End: 2018-06-20 | Stop reason: HOSPADM

## 2018-06-19 RX ORDER — ONDANSETRON 4 MG/1
4 TABLET, ORALLY DISINTEGRATING ORAL EVERY 6 HOURS PRN
Status: DISCONTINUED | OUTPATIENT
Start: 2018-06-19 | End: 2018-06-20 | Stop reason: HOSPADM

## 2018-06-19 RX ORDER — HYDROMORPHONE HCL/0.9% NACL/PF 0.2MG/0.2
0.2 SYRINGE (ML) INTRAVENOUS
Status: DISCONTINUED | OUTPATIENT
Start: 2018-06-19 | End: 2018-06-20 | Stop reason: HOSPADM

## 2018-06-19 RX ORDER — ACETAMINOPHEN 325 MG/1
650 TABLET ORAL EVERY 4 HOURS PRN
Status: DISCONTINUED | OUTPATIENT
Start: 2018-06-19 | End: 2018-06-20 | Stop reason: HOSPADM

## 2018-06-19 RX ORDER — SODIUM CHLORIDE 9 MG/ML
INJECTION, SOLUTION INTRAVENOUS CONTINUOUS
Status: DISCONTINUED | OUTPATIENT
Start: 2018-06-19 | End: 2018-06-20

## 2018-06-19 RX ORDER — AMLODIPINE BESYLATE 5 MG/1
5 TABLET ORAL DAILY
Status: DISCONTINUED | OUTPATIENT
Start: 2018-06-19 | End: 2018-06-20 | Stop reason: HOSPADM

## 2018-06-19 RX ORDER — ONDANSETRON 2 MG/ML
4 INJECTION INTRAMUSCULAR; INTRAVENOUS EVERY 6 HOURS PRN
Status: DISCONTINUED | OUTPATIENT
Start: 2018-06-19 | End: 2018-06-20 | Stop reason: HOSPADM

## 2018-06-19 RX ORDER — FLUMAZENIL 0.1 MG/ML
0.2 INJECTION, SOLUTION INTRAVENOUS
Status: DISCONTINUED | OUTPATIENT
Start: 2018-06-19 | End: 2018-06-19 | Stop reason: HOSPADM

## 2018-06-19 RX ORDER — AMOXICILLIN 250 MG
1 CAPSULE ORAL DAILY PRN
COMMUNITY
End: 2022-11-15

## 2018-06-19 RX ORDER — OXYCODONE HYDROCHLORIDE 5 MG/1
5-10 TABLET ORAL
Status: DISCONTINUED | OUTPATIENT
Start: 2018-06-19 | End: 2018-06-20 | Stop reason: HOSPADM

## 2018-06-19 RX ORDER — AMPICILLIN AND SULBACTAM 2; 1 G/1; G/1
3 INJECTION, POWDER, FOR SOLUTION INTRAMUSCULAR; INTRAVENOUS EVERY 6 HOURS
Status: DISCONTINUED | OUTPATIENT
Start: 2018-06-19 | End: 2018-06-20 | Stop reason: HOSPADM

## 2018-06-19 RX ADMIN — SODIUM CHLORIDE: 9 INJECTION, SOLUTION INTRAVENOUS at 13:36

## 2018-06-19 RX ADMIN — DOCUSATE SODIUM 100 MG: 100 CAPSULE, LIQUID FILLED ORAL at 19:57

## 2018-06-19 RX ADMIN — FENTANYL CITRATE 75 MCG: 50 INJECTION INTRAMUSCULAR; INTRAVENOUS at 14:51

## 2018-06-19 RX ADMIN — SODIUM CHLORIDE: 9 INJECTION, SOLUTION INTRAVENOUS at 02:32

## 2018-06-19 RX ADMIN — ACETAMINOPHEN 650 MG: 325 TABLET, FILM COATED ORAL at 19:55

## 2018-06-19 RX ADMIN — AMPICILLIN SODIUM AND SULBACTAM SODIUM 3 G: 2; 1 INJECTION, POWDER, FOR SOLUTION INTRAMUSCULAR; INTRAVENOUS at 15:40

## 2018-06-19 RX ADMIN — DOCUSATE SODIUM 100 MG: 100 CAPSULE, LIQUID FILLED ORAL at 09:09

## 2018-06-19 RX ADMIN — AMPICILLIN SODIUM AND SULBACTAM SODIUM 3 G: 2; 1 INJECTION, POWDER, FOR SOLUTION INTRAMUSCULAR; INTRAVENOUS at 09:09

## 2018-06-19 RX ADMIN — OXYCODONE HYDROCHLORIDE 5 MG: 5 TABLET ORAL at 04:00

## 2018-06-19 RX ADMIN — MIDAZOLAM 1.5 MG: 1 INJECTION INTRAMUSCULAR; INTRAVENOUS at 14:51

## 2018-06-19 RX ADMIN — AMPICILLIN SODIUM AND SULBACTAM SODIUM 3 G: 2; 1 INJECTION, POWDER, FOR SOLUTION INTRAMUSCULAR; INTRAVENOUS at 03:56

## 2018-06-19 RX ADMIN — OXYCODONE HYDROCHLORIDE 5 MG: 5 TABLET ORAL at 09:09

## 2018-06-19 RX ADMIN — LIDOCAINE HYDROCHLORIDE 20 ML: 10 INJECTION, SOLUTION EPIDURAL; INFILTRATION; INTRACAUDAL; PERINEURAL at 14:52

## 2018-06-19 RX ADMIN — AMLODIPINE BESYLATE 5 MG: 5 TABLET ORAL at 09:09

## 2018-06-19 RX ADMIN — ACETAMINOPHEN 650 MG: 325 TABLET, FILM COATED ORAL at 15:40

## 2018-06-19 RX ADMIN — Medication 0.2 MG: at 01:42

## 2018-06-19 RX ADMIN — AMPICILLIN SODIUM AND SULBACTAM SODIUM 3 G: 2; 1 INJECTION, POWDER, FOR SOLUTION INTRAMUSCULAR; INTRAVENOUS at 21:59

## 2018-06-19 RX ADMIN — Medication 0.2 MG: at 10:58

## 2018-06-19 RX ADMIN — OXYCODONE HYDROCHLORIDE 5 MG: 5 TABLET ORAL at 16:03

## 2018-06-19 RX ADMIN — OXYCODONE HYDROCHLORIDE 5 MG: 5 TABLET ORAL at 19:55

## 2018-06-19 ASSESSMENT — ACTIVITIES OF DAILY LIVING (ADL)
FALL_HISTORY_WITHIN_LAST_SIX_MONTHS: NO
BATHING: 0-->INDEPENDENT
TRANSFERRING: 0-->INDEPENDENT
RETIRED_EATING: 0-->INDEPENDENT
DRESS: 0-->INDEPENDENT
TOILETING: 0-->INDEPENDENT
COGNITION: 0 - NO COGNITION ISSUES REPORTED
RETIRED_COMMUNICATION: 0-->UNDERSTANDS/COMMUNICATES WITHOUT DIFFICULTY
SWALLOWING: 0-->SWALLOWS FOODS/LIQUIDS WITHOUT DIFFICULTY
AMBULATION: 0-->INDEPENDENT

## 2018-06-19 NOTE — PROGRESS NOTES
Interventional Radiology Intra-procedural Nursing Note    Patient Name: Angella St  Medical Record Number: [id number]  Today's Date: June 19, 2018         Start Time: 1430  End of procedure time: 1445  Procedure: Image guided left upper quadrant aspiration of fluid, possible drain placement.  Fire Safety Score: 1    Consent Review/Timeout Performed by: Dr. Dinesh Boyle/Dr. Colt Hendrickson  Procedure Performed By: Dr. Dinesh Boyle    Fentanyl administered: 75 mcg  Versed administered: 1.5 mg    Start of Sedation Time: 1420  End of Sedation Time: 1445  Total Sedation Time: 25 minutes    Procedure start time: 1430  Puncture time: 1431  Procedure end time: 1445    Report given to: Melida CASTANEDA  Time pt departs:  1450    Other Notes:  Alert female transported via cart from inpatient room to IR Procedure Room CT2 for planned intervention.  ID band confirmed and patient acknowledges understanding of planned procedure. Patient repositioned to procedure table via hover-mat and positioned supine.  Patient prepped and draped per policy see VS flowsheet, MAR for further information.       Left flank area visualized using imaging.  1.5 cc of fluid aspirated from site.  Fluid is pink in color and cloudy.  Collected per lab orders    Patient condition post procedure is stable.   Patient returned to inpatient room for post-procedure monitoring.    Brisa Hansen RN

## 2018-06-19 NOTE — PROGRESS NOTES
Interventional Radiology Pre-Procedure Sedation Assessment   Time of Assessment: 2:05 PM    Expected Level: Moderate Sedation    Indication: Sedation is required for the following type of Procedure: Drain    Sedation and procedural consent: Risks, benefits and alternatives were discussed with Patient    PO Intake: Appropriately NPO for procedure    ASA Class: Class 2 - MILD SYSTEMIC DISEASE, NO ACUTE PROBLEMS, NO FUNCTIONAL LIMITATIONS.    Mallampati: Grade 2:  Soft palate, base of uvula, tonsillar pillars, and portion of posterior pharyngeal wall visible    Lungs: Lungs Clear with good breath sounds bilaterally    Heart: Normal heart sounds and rate    History and physical reviewed and no updates needed. I have reviewed the lab findings, diagnostic data, medications, and the plan for sedation. I have determined this patient to be an appropriate candidate for the planned sedation and procedure and have reassessed the patient IMMEDIATELY PRIOR to sedation and procedure.    Dinesh Boyle MD

## 2018-06-19 NOTE — PROGRESS NOTES
"Urology  Progress Note    No acute events overnight  Pain well controlled  NPO overnight     Exam  /74 (BP Location: Left arm)  Pulse 82  Temp 99.4  F (37.4  C) (Oral)  Resp 20  Ht 1.702 m (5' 7\")  Wt 93 kg (205 lb)  SpO2 98%  BMI 32.11 kg/m2  No acute distress  Unlabored breathing  Abdomen soft, non-distended, non-tender.    Minimal left flank tenderness  Lower extremities non-edematous bilaterally    Labs 6/18  WBC 11.0  Hgb 10.8  Cr 0.6    AM labs pending    Assessment/Plan  52 year old y/o female who underwent left radical nephrectomy in May with a postoperative course complicated by an abscess in the nephrectomy bed now admitted with a recurrent collection.      Neuro: PRN acetaminophen and narcotics  CV: HDS - home amlodipine ordered  Pulm: incentive spirometry while awake  FEN/GI: NPO pending possible drain placement; NS @ 100  Endo:No acute issues  : No acute issues  Heme/ID: Continue unasyn  Activity: As tolerated  PPx: SCDs    Seen and examined with the chief resident. Will discuss with Dr. Salinas.    Natanael Camacho, PGY-3  Urology Resident     Contacting the Urology Team     Please use the following job codes to reach the Urology Team. Note that you must use an in house phone and that job codes cannot receive text pages.     On weekdays, dial [phone] (or star-star-star 777 on the new [employer] telephones) then [phone] to reach the Adult Urology resident or PA on call    On weekdays, dial [phone] (or star-star-star 777 on the new [employer] telephones) then [phone] to reach the Pediatric Urology resident    On weeknights and weekends, dial [phone] (or star-star-star 777 on the new [employer] telephones) then [phone] to reach the Urology resident on call (for both Adult and Pediatrics)          "

## 2018-06-19 NOTE — PLAN OF CARE
"Problem: Patient Care Overview  Goal: Plan of Care/Patient Progress Review  Outcome: No Change  /65 (BP Location: Left arm)  Pulse 85  Temp 100.5  F (38.1  C) (Oral)  Resp 18  Ht 1.702 m (5' 7\")  Wt 93 kg (205 lb)  SpO2 99%  BMI 32.11 kg/m2     Neuro: A&O X4  Cardiac: VSS  Respiratory: lung sounds clear bilaterally  GI/: c/o abdominal pain, AUOP this shift, no bm  Diet/Appetite: NPO prior to procedure  Skin: drain site primapore CDI  LDA: peripheral IV  Activity: encouraged  Pain: oxy 5mg X 1 given with slight relief, 0.2 dilaudid given with adequate relief, heat also applied  Pt went down to IR to intermittently drain abscess    Plan: pain control, progress towards baseline, abscess fluid culture pending      "
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
## 2018-06-19 NOTE — ED NOTES
Accessed Port-A-Cath with 20g/.75. Tolerated well. Port did not draw back blood until Pt's HOB was lowered.

## 2018-06-19 NOTE — PROCEDURES
Interventional Radiology Brief Post Procedure Note    Procedure: CT guided LUQ fluid collection/phlegmon aspiration     Proceduralist: Colt Hendrickson MD    Assistant: Dinesh Boyle MD    Time Out: Prior to the start of the procedure and with procedural staff participation, I verbally confirmed the patient s identity using two indicators, relevant allergies, that the procedure was appropriate and matched the consent or emergent situation, and that the correct equipment/implants were available. Immediately prior to starting the procedure I conducted the Time Out with the procedural staff and re-confirmed the patient s name, procedure, and site/side. (The Joint Commission universal protocol was followed.)  Yes        Sedation: IR Nurse Monitored Care   Post Procedure Summary:  Prior to the start of the procedure and with procedural staff participation, I verbally confirmed the patient s identity using two indicators, relevant allergies, that the procedure was appropriate and matched the consent or emergent situation, and that the correct equipment/implants were available. Immediately prior to starting the procedure I conducted the Time Out with the procedural staff and re-confirmed the patient s name, procedure, and site/side. (The Joint Commission universal protocol was followed.)  Yes       Sedatives: Fentanyl and Midazolam (Versed)    Vital signs, airway and pulse oximetry were monitored and remained stable throughout the procedure and sedation was maintained until the procedure was complete.  The patient was monitored by staff until sedation discharge criteria were met.    Patient tolerance: Patient tolerated the procedure well with no immediate complications.    Time of sedation in minutes: 30 Minutes minutes from beginning to end of physician one to one monitoring.          Findings: 2 ml fluid aspirated from collection in LUQ.  No drain placed due to no significant drainage.     Estimated Blood Loss:  Minimal    Fluoroscopy Time:  minute(s)    SPECIMENS: Fluid and/or tissue for laboratory analysis and culture    Complications: 1. None     Condition: Stable    Plan: Lab results pending.    Comments: See dictated procedure note for full details.    Dinesh Boyle MD

## 2018-06-19 NOTE — PHARMACY-ADMISSION MEDICATION HISTORY
Admission medication history interview status for the 6/18/2018 admission is complete. See Epic admission navigator for allergy information, pharmacy, prior to admission medications and immunization status.     Medication history interview sources:  patient     Changes made to PTA medication list (reason)  Added: stool softener prn per pt  Deleted:    - Augmentin 875-125 mg, levofloxacin 500 mg - prescriptions completed.    - lorazepam 1 mg prn for anxiety and oxycodone ir 5 mg prn for pain - patient denied taking.   Changed: None    Additional medication history information (including reliability of information, actions taken by pharmacist):   - Pt is a reliable historian and was able to verify her medications.    - pt confirmed allergies as documented.    Prior to Admission medications    Medication Sig Last Dose Taking? Auth Provider   Acetaminophen (TYLENOL PO) Take 500 mg by mouth every 6 hours as needed  6/18/2018 at Unknown time Yes Reported, Patient   amLODIPine (NORVASC) 10 MG tablet Take 0.5 tablets (5 mg) by mouth daily 6/18/2018 at Unknown time Yes Shamir Pugh MD   senna-docusate (SENOKOT-S;PERICOLACE) 8.6-50 MG per tablet Take 1 tablet by mouth daily as needed for constipation Past Week at PRN Yes Unknown, Entered By History   COMPRESSION STOCKINGS 1 each daily  Patient not taking: Reported on 6/15/2018 Unknown at Unknown time  Nettie Martin MD       Medication history completed by: Em Kennedy, PharmD IV Student  on 6/19/2018 at 10:43 AM

## 2018-06-19 NOTE — CONSULTS
Patient is on IR schedule 6/19/2018 for a CT guided left nephrectomy bed fluid collection aspiration with possible drain placement.   Labs WNL for procedure.  Orders for NPO have been entered.   Consent will be done prior to procedure.     Please contact the IR charge RN at [phone] for estimated time of procedure.     Case discussed with Dr. Morris from IR and Angelika Garcia PA-C.    Marie Heath, CARMELA, APRN  Interventional Radiology  Phone: [phone]  Pager: [phone]

## 2018-06-19 NOTE — PLAN OF CARE
"Problem: Patient Care Overview  Goal: Plan of Care/Patient Progress Review  Outcome: No Change  Vitals:    06/18/18 2330 06/19/18 0000 06/19/18 0121 06/19/18 0424   BP: 139/80 132/76 133/84 148/74   BP Location:    Left arm   Pulse:   86 82   Resp:   22 20   Temp:   98.2  F (36.8  C) 99.4  F (37.4  C)   TempSrc:   Oral Oral   SpO2:   100% 98%   Weight:   93 kg (205 lb)    Height:   1.702 m (5' 7\")      ED admit, arrived to unit about 0130. Oriented to room. Admission completed. A&Ox4. Tmax 99.4, OVSS. O2 sats % on RA. LS clear. Up w/ SBA. Reports pain in abd, given IV Dilaudid x1 & Oxy x1 w/ relief. Pt voided prior to arriving to unit. + BS, + gas, no BM overnight. NPO, denies N/V. R chest port w/ NS @ 100 mL/h - IV ABX given per orders. Possible IR drain placement today. Medications sent to security. Able to make needs known. Continue w/ POC.       "

## 2018-06-19 NOTE — ED NOTES
[facility], [place]   ED Nurse to Floor Handoff     Angella St is a 52 year old female who speaks English and lives with family members,  in a home  They arrived in the ED by car from home    ED Chief Complaint: Abdominal Pain    ED Dx;   Final diagnoses:   Abscess after procedure         Needed?: No    Allergies: No Known Allergies.  Past Medical Hx:   Past Medical History:   Diagnosis Date     Anemia      Cancer (H)     Metastatic squamous cell cervical carcinoma     Chronic kidney disease      Hypertension 2007     Obesity     BMI >30      Baseline Mental status: WDL  Current Mental Status changes: at basesline    Infection present or suspected this encounter: yes other Abd  Sepsis suspected: No  Isolation type: No active isolations     Activity level - Baseline/Home:  Independent  Activity Level - Current:   Stand with Assist    Bariatric equipment needed?: No    In the ED these meds were given:   Medications   0.9% sodium chloride BOLUS (0 mLs Intravenous Stopped 6/18/18 2137)   iopamidol (ISOVUE-370) solution 128 mL (128 mLs Intravenous Given 6/18/18 2004)   sodium chloride (PF) 0.9% PF flush 82 mL (82 mLs Intravenous Given 6/18/18 2004)   ampicillin-sulbactam (UNASYN) 3 g vial to attach to  mL bag (0 g Intravenous Stopped 6/18/18 2320)       Drips running?  No    Home pump  No    Current LDAs  Peripheral IV 06/18/18 Right (Active)   Number of days:1       Port A Cath Double Right Chest wall (Active)   Number of days:       Closed/Suction Drain Left Other (Comment) 19 [place] (Active)   Number of days:47       Nephrostomy 1 Left LOT [id number] (Active)   Number of days:683       Nephrostomy 1 Left LOT [id number]. (Active)   Number of days:592       Nephrostomy 1 Left LOT [id number] (Active)   Number of days:356       Incision/Surgical Site 03/09/17 Left Flank (Active)   Number of days:467       Incision/Surgical Site 06/28/17 Left Back (Active)   Number of days:356        Incision/Surgical Site 10/30/17 Left  (Active)   Number of days:232       Incision/Surgical Site 05/03/18 Left Abdomen (Active)   Number of days:47       Labs results:   Labs Ordered and Resulted from Time of ED Arrival Up to the Time of Departure from the ED   CBC WITH PLATELETS DIFFERENTIAL - Abnormal; Notable for the following:        Result Value    Hemoglobin 10.8 (*)     Hematocrit 33.7 (*)     All other components within normal limits   COMPREHENSIVE METABOLIC PANEL - Abnormal; Notable for the following:     Glucose 122 (*)     Urea Nitrogen 6 (*)     All other components within normal limits   LIPASE - Abnormal; Notable for the following:     Lipase 58 (*)     All other components within normal limits   ROUTINE UA WITH MICROSCOPIC REFLEX TO CULTURE - Abnormal; Notable for the following:     Specific Gravity Urine 1.001 (*)     Blood Urine Small (*)     Bacteria Urine Few (*)     All other components within normal limits   LACTIC ACID WHOLE BLOOD   CREATININE POCT   ISTAT CREATININE NURSING POCT       Imaging Studies:   Recent Results (from the past 24 hour(s))   CT Abdomen Pelvis w Contrast    Narrative    Examination:  CT ABDOMEN PELVIS W CONTRAST 6/18/2018 8:15 PM     History: Lower abdominal pain, recent left nephrectomy complicated by  abscess    Comparison: Fistulogram 6/5/2018, CT abdomen and pelvis 6/5/2018    Technique: CT of the abdomen and pelvis were obtained with IV  contrast. Sagittal and coronal reconstructions created and reviewed.    Contrast: 128 mL isovue 370    Findings:   The liver, spleen, gallbladder, and pancreas are unremarkable. No  right hydronephrosis or renal calculi. The right ureter is  unremarkable as is the urinary bladder.     There are postsurgical changes of left nephrectomy. In the left  nephrectomy bed there is a rim-enhancing fluid collection (series 5  image 126) measuring 2.0 x 3.1 cm. This collection effaces the fat  plane of the spleen and also the colon at the  "splenic flexure.  Definite fistula is not visualized. Previously, there was a left  percutaneous nephrostomy tube centered within this collection.  Extending inferiorly from this collection there is a dilated tubular  structure with peripheral enhancement. Structure may represent a  thrombosed ovarian vein or ureter. This structure is stable in size  measuring up to 14 mm at the level of the iliac crests.    The small and large bowel are normal in caliber. The appendix is  slightly enlarged in caliber, but otherwise unremarkable, similar to  previous exams. Small volume low pelvic free fluid, similar to  previous exams. No abdominal free air. No retroperitoneal, mesenteric,  or inguinal lymphadenopathy. The major abdominal vasculature is  patent.    No acute or suspicious osseous abnormalities. Linear subcutaneous  stranding in the left abdomen is unchanged from previous, likely  postsurgical.    Bibasilar atelectasis. The lung bases are otherwise clear. Trace  bilateral pleural effusions, decreased on the left than previous.      Impression    Impression:   1. Rim enhancing fluid collection in the left nephrectomy bed  concerning for abscess. This collection effaces the adjacent fat plane  of the spleen and the colon at the splenic flexure. No definite  fistula is identified.  2. Peripherally enhancing tubular structure extending from the left  nephrectomy bed to the low pelvis may represent thrombosed ovarian  vein or hydroureter.  3. Trace bilateral pleural effusions, improved on the left from  previous.    I have personally reviewed the examination and initial interpretation  and I agree with the findings.    RASHAWN KHANNA MD       Recent vital signs:   /70  Pulse 85  Temp 98.3  F (36.8  C) (Oral)  Resp 18  Ht 1.702 m (5' 7\")  Wt 95.3 kg (210 lb)  SpO2 100%  BMI 32.89 kg/m2    Cardiac Rhythm: Normal Sinus  Pt needs tele? No  Skin/wound Issues: None    Code Status: Full Code    Pain control: " fair    Nausea control: good    Abnormal labs/tests/findings requiring intervention: N/A    Family present during ED course? Yes   Family Comments/Social Situation comments: N/A    Tasks needing completion: None    Arthur Fountain RN  [facility]-- [phone]  [phone] [facility]  [phone] [facility]

## 2018-06-19 NOTE — ED NOTES
Initial Assessment: VSS. Communicates needs without difficulty. Port-A-Cath accessed for C.T. W/contrast dye. Pt cont to have abd discomfort

## 2018-06-19 NOTE — H&P
Urology Consult    Name: Angella St    MRN: [id number]   YOB: 1965               Chief Complaint:     History is obtained from the patient and chart review          History of Present Illness:   Angella St is a 52 year old female with history of cervical cancer s/p JESSICA and chemotherapy and left ureteral involvement resulting in hydronephrosis, recurrent pyelonephritis who is s/p robotic assisted left radical nephrectomy.  She subsequently developed an abscess at the nephrectomy bed requiring placement of drain per IR. She underwent sinogram and drain removal on 6/5/18 without complication and was seen in clinic 6/15/18 doing very well.    She presents to the ED noting rigors/chills over the past 2 days, associated with intermittent left flank pain.  Labs in the ED do not show any leukocytosis and she is vitally stable.  Her pain is currently well controlled and she has not required any pain medications, however a CT obtain shows recurrent of a rim enhancing fluid collection measuring 2.0 x 3.1 cm, without definite evidence of fistula.         Past Medical History:     Past Medical History:   Diagnosis Date     Anemia      Cancer (H)     Metastatic squamous cell cervical carcinoma     Chronic kidney disease      Hypertension 2007     Obesity     BMI >30            Past Surgical History:     Past Surgical History:   Procedure Laterality Date     COMBINED CYSTOSCOPY, INSERT STENT URETER(S) Left 6/13/2016    Procedure: COMBINED CYSTOSCOPY, INSERT STENT URETER(S);  Surgeon: Deja Salinas MD;  Location: [facility] OR     CYSTOSCOPY, RETROGRADES, COMBINED Left 6/13/2016    Procedure: COMBINED CYSTOSCOPY, RETROGRADES;  Surgeon: Deja Salinas MD;  Location: [facility] OR     DAVINCI NEPHRECTOMY Left 5/3/2018    Procedure: DAVINCI XI NEPHRECTOMY;  DAVINCI XI LEFT NEPHRECTOMY;  Surgeon: Deja Salinas MD;  Location:  OR     GENITOURINARY SURGERY  5/2015    PNT placement     HYSTERECTOMY   2011    [facility], [place]     PERCUTANEOUS NEPHROSTOMY Left 3/9/2017    Procedure: PERCUTANEOUS NEPHROSTOMY;  Surgeon: Bridger Meyer PA-C;  Location: [facility] OR     PERCUTANEOUS NEPHROSTOMY Left 6/28/2017    Procedure: PERCUTANEOUS NEPHROSTOMY;  Left Nephrostomy Tube Change;  Surgeon: Bridger Meyer PA-C;  Location: [facility] OR     PERCUTANEOUS NEPHROSTOMY Left 10/30/2017    Procedure: PERCUTANEOUS NEPHROSTOMY;  Left Nephrostomy Tube Change;  Surgeon: Maryann Chavez PA-C;  Location: [facility] OR            Social History:     Social History   Substance Use Topics     Smoking status: Never Smoker     Smokeless tobacco: Never Used     Alcohol use No      Comment: Does not drink alcohol            Family History:     Family History   Problem Relation Age of Onset     Hypertension Brother             Allergies:   No Known Allergies         Medications:     No current facility-administered medications for this encounter.      Current Outpatient Prescriptions   Medication Sig     Acetaminophen (TYLENOL PO)      amLODIPine (NORVASC) 10 MG tablet Take 0.5 tablets (5 mg) by mouth daily     amoxicillin-clavulanate (AUGMENTIN) 875-125 MG per tablet Take 1 tablet by mouth 2 times daily (Patient not taking: Reported on 6/15/2018)     COMPRESSION STOCKINGS 1 each daily (Patient not taking: Reported on 6/15/2018)     levofloxacin (LEVAQUIN) 500 MG tablet Take 1 tablet (500 mg) by mouth daily (Patient not taking: Reported on 6/15/2018)     LORazepam (ATIVAN) 1 MG tablet Take 1 tablet (1 mg) by mouth once as needed for anxiety (take 30 mins before thyroid nodule biopsy procedure) (Patient not taking: Reported on 6/15/2018)     oxyCODONE IR (ROXICODONE) 5 MG tablet Take 1-2 tablets (5-10 mg) by mouth every 3 hours as needed for other (pain control or improvement in physical function. Hold dose for analgesic side effects.)             Review of Systems:    ROS: 10 point ROS neg other than the symptoms noted above in the HPI            Physical Exam:   VS:  T: 98.3    HR: 85    BP: 125/77    RR: 18   GEN:  AOx3.  NAD.    CV:  RRR  LUNGS: Non-labored breathing.   BACK:  No midline or CVA tenderness.  ABD:  Soft.  Right flank tenderness, radiates to suprapubic area  : normal femal genitalia  SKIN:  Warm.  Dry          Data:   All laboratory data reviewed:      Recent Labs  Lab 06/18/18  1849   WBC 11.0   HGB 10.8*          Recent Labs  Lab 06/18/18  1849      POTASSIUM 3.9   CHLORIDE 105   CO2 24   BUN 6*   CR 0.68   *   HERON 9.1       Recent Labs  Lab 06/18/18  1902   COLOR Straw   APPEARANCE Clear   URINEGLC Negative   URINEBILI Negative   URINEKETONE Negative   SG 1.001*   URINEPH 7.0   PROTEIN Negative   NITRITE Negative   LEUKEST Negative   RBCU 0   WBCU <1       All pertinent imaging reviewed:    CT scan of the abdomen:   Impression:   1. Rim enhancing fluid collection in the left nephrectomy bed  concerning for abscess. This collection effaces the adjacent fat plane  of the spleen and the colon at the splenic flexure. No definite  fistula is identified.  2. Peripherally enhancing tubular structure extending from the left  nephrectomy bed to the low pelvis may represent thrombosed ovarian  vein or hydroureter.  3. Trace bilateral pleural effusions, improved on the left from  previous.            Impression and Plan:   Impression:   Angella St is a 52 year old female with history of left nephrectomy in 5/2018, complicated by abscess requiring IR drain.  This was removed with resolution of the collection on 6/5/18, but now returns with new fluid collection in the same area that is rim enhancing and associated with subjective chills.      Plan:     - Admit to Urology    - Initiate Unasyn per prior drain cultures    - Consult placed for drain placement per IR in am    - NPO, IVF         This patient's exam findings, labs, and imaging discussed with urology staff surgeon Dr. Loyola, who developed the treatment  plan.    Rohit Villafuerte MD  Urology Resident G2

## 2018-06-20 VITALS
OXYGEN SATURATION: 97 % | HEART RATE: 74 BPM | TEMPERATURE: 98.9 F | SYSTOLIC BLOOD PRESSURE: 149 MMHG | WEIGHT: 205 LBS | HEIGHT: 67 IN | BODY MASS INDEX: 32.18 KG/M2 | RESPIRATION RATE: 16 BRPM | DIASTOLIC BLOOD PRESSURE: 75 MMHG

## 2018-06-20 LAB
ANION GAP SERPL CALCULATED.3IONS-SCNC: 10 MMOL/L (ref 3–14)
BUN SERPL-MCNC: 6 MG/DL (ref 7–30)
CALCIUM SERPL-MCNC: 8.8 MG/DL (ref 8.5–10.1)
CHLORIDE SERPL-SCNC: 103 MMOL/L (ref 94–109)
CO2 SERPL-SCNC: 24 MMOL/L (ref 20–32)
CREAT SERPL-MCNC: 0.64 MG/DL (ref 0.52–1.04)
ERYTHROCYTE [DISTWIDTH] IN BLOOD BY AUTOMATED COUNT: 13.7 % (ref 10–15)
GFR SERPL CREATININE-BSD FRML MDRD: >90 ML/MIN/1.7M2
GLUCOSE SERPL-MCNC: 91 MG/DL (ref 70–99)
HCT VFR BLD AUTO: 33.4 % (ref 35–47)
HGB BLD-MCNC: 10.7 G/DL (ref 11.7–15.7)
MCH RBC QN AUTO: 27.1 PG (ref 26.5–33)
MCHC RBC AUTO-ENTMCNC: 32 G/DL (ref 31.5–36.5)
MCV RBC AUTO: 85 FL (ref 78–100)
PLATELET # BLD AUTO: 234 10E9/L (ref 150–450)
POTASSIUM SERPL-SCNC: 3.7 MMOL/L (ref 3.4–5.3)
RBC # BLD AUTO: 3.95 10E12/L (ref 3.8–5.2)
SODIUM SERPL-SCNC: 138 MMOL/L (ref 133–144)
WBC # BLD AUTO: 9.9 10E9/L (ref 4–11)

## 2018-06-20 PROCEDURE — 36415 COLL VENOUS BLD VENIPUNCTURE: CPT | Performed by: STUDENT IN AN ORGANIZED HEALTH CARE EDUCATION/TRAINING PROGRAM

## 2018-06-20 PROCEDURE — 25000132 ZZH RX MED GY IP 250 OP 250 PS 637: Performed by: STUDENT IN AN ORGANIZED HEALTH CARE EDUCATION/TRAINING PROGRAM

## 2018-06-20 PROCEDURE — 85027 COMPLETE CBC AUTOMATED: CPT | Performed by: STUDENT IN AN ORGANIZED HEALTH CARE EDUCATION/TRAINING PROGRAM

## 2018-06-20 PROCEDURE — 25000128 H RX IP 250 OP 636: Performed by: STUDENT IN AN ORGANIZED HEALTH CARE EDUCATION/TRAINING PROGRAM

## 2018-06-20 PROCEDURE — 80048 BASIC METABOLIC PNL TOTAL CA: CPT | Performed by: STUDENT IN AN ORGANIZED HEALTH CARE EDUCATION/TRAINING PROGRAM

## 2018-06-20 RX ORDER — CEFPODOXIME PROXETIL 200 MG/1
400 TABLET, FILM COATED ORAL 2 TIMES DAILY
Qty: 56 TABLET | Refills: 0 | Status: SHIPPED | OUTPATIENT
Start: 2018-06-20 | End: 2018-07-04

## 2018-06-20 RX ORDER — HEPARIN SODIUM (PORCINE) LOCK FLUSH IV SOLN 100 UNIT/ML 100 UNIT/ML
5 SOLUTION INTRAVENOUS ONCE
Status: COMPLETED | OUTPATIENT
Start: 2018-06-20 | End: 2018-06-20

## 2018-06-20 RX ADMIN — OXYCODONE HYDROCHLORIDE 5 MG: 5 TABLET ORAL at 13:09

## 2018-06-20 RX ADMIN — OXYCODONE HYDROCHLORIDE 5 MG: 5 TABLET ORAL at 08:02

## 2018-06-20 RX ADMIN — AMPICILLIN SODIUM AND SULBACTAM SODIUM 3 G: 2; 1 INJECTION, POWDER, FOR SOLUTION INTRAMUSCULAR; INTRAVENOUS at 09:26

## 2018-06-20 RX ADMIN — OXYCODONE HYDROCHLORIDE 5 MG: 5 TABLET ORAL at 00:20

## 2018-06-20 RX ADMIN — DOCUSATE SODIUM 100 MG: 100 CAPSULE, LIQUID FILLED ORAL at 08:02

## 2018-06-20 RX ADMIN — ACETAMINOPHEN 650 MG: 325 TABLET, FILM COATED ORAL at 00:20

## 2018-06-20 RX ADMIN — ACETAMINOPHEN 650 MG: 325 TABLET, FILM COATED ORAL at 13:09

## 2018-06-20 RX ADMIN — AMPICILLIN SODIUM AND SULBACTAM SODIUM 3 G: 2; 1 INJECTION, POWDER, FOR SOLUTION INTRAMUSCULAR; INTRAVENOUS at 03:59

## 2018-06-20 RX ADMIN — HEPARIN 5 ML: 100 SYRINGE at 13:10

## 2018-06-20 RX ADMIN — AMLODIPINE BESYLATE 5 MG: 5 TABLET ORAL at 08:02

## 2018-06-20 RX ADMIN — SODIUM CHLORIDE: 9 INJECTION, SOLUTION INTRAVENOUS at 02:00

## 2018-06-20 RX ADMIN — ACETAMINOPHEN 650 MG: 325 TABLET, FILM COATED ORAL at 08:02

## 2018-06-20 NOTE — DISCHARGE SUMMARY
Discharge Summary     Angella St MRN# [id number]   YOB: 1965 Age: 52 year old     Date of Admission:  6/18/2018  Date of Discharge::  6/20/2018  Admitting Physician:  Deja Salinas MD  Discharge Physician:  Natanael Camacho MD  Primary Care Physician:         Shamir Pugh          Admission Diagnoses:   Abscess after procedure [T81.4XXA]            Discharge Diagnosis:   Same as above           Procedures:   : * No surgery found *        Non-operative procedures:   Interventional radiology abscess aspiration          Consultations:   INTERVENTIONAL RADIOLOGY ADULT/PEDS IP CONSULT  MEDICATION HISTORY IP PHARMACY CONSULT           Imaging Studies:     Results for orders placed or performed during the hospital encounter of 06/18/18   CT Abdomen Pelvis w Contrast    Narrative    Examination:  CT ABDOMEN PELVIS W CONTRAST 6/18/2018 8:15 PM     History: Lower abdominal pain, recent left nephrectomy complicated by  abscess    Comparison: Fistulogram 6/5/2018, CT abdomen and pelvis 6/5/2018    Technique: CT of the abdomen and pelvis were obtained with IV  contrast. Sagittal and coronal reconstructions created and reviewed.    Contrast: 128 mL isovue 370    Findings:   The liver, spleen, gallbladder, and pancreas are unremarkable. No  right hydronephrosis or renal calculi. The right ureter is  unremarkable as is the urinary bladder.     There are postsurgical changes of left nephrectomy. In the left  nephrectomy bed there is a rim-enhancing fluid collection (series 5  image 126) measuring 2.0 x 3.1 cm. This collection effaces the fat  plane of the spleen and also the colon at the splenic flexure.  Definite fistula is not visualized. Previously, there was a left  percutaneous nephrostomy tube centered within this collection.  Extending inferiorly from this collection there is a dilated tubular  structure with peripheral enhancement. Structure may represent  a  thrombosed ovarian vein or ureter. This structure is stable in size  measuring up to 14 mm at the level of the iliac crests.    The small and large bowel are normal in caliber. The appendix is  slightly enlarged in caliber, but otherwise unremarkable, similar to  previous exams. Small volume low pelvic free fluid, similar to  previous exams. No abdominal free air. No retroperitoneal, mesenteric,  or inguinal lymphadenopathy. The major abdominal vasculature is  patent.    No acute or suspicious osseous abnormalities. Linear subcutaneous  stranding in the left abdomen is unchanged from previous, likely  postsurgical.    Bibasilar atelectasis. The lung bases are otherwise clear. Trace  bilateral pleural effusions, decreased on the left than previous.      Impression    Impression:   1. Rim enhancing fluid collection in the left nephrectomy bed  concerning for abscess. This collection effaces the adjacent fat plane  of the spleen and the colon at the splenic flexure. No definite  fistula is identified.  2. Peripherally enhancing tubular structure extending from the left  nephrectomy bed to the low pelvis may represent thrombosed ovarian  vein or hydroureter.  3. Trace bilateral pleural effusions, improved on the left from  previous.    I have personally reviewed the examination and initial interpretation  and I agree with the findings.    RASHAWN KHANNA MD   CT Renal Kidney Abscess Drain w Cath Rmvd    Narrative    Procedure: 6/19/2018  1. CT-guided left upper quadrant fluid collection/phlegmon aspiration    History: Left nephrectomy, competent kidney by postoperative fluid  collection. Previous drain placed on 5/21/2018, removed on 6/5/2018.  Recurrent small fluid collection/phlegmon in the left upper quadrant..      COMPARISON: CT on 6/18/2018     Staff: Bryon Hendrickson M.D.    Fellow: Dinesh Boyle M.D.    I, BRYON HENDRICKSON MD, attest that I was present for all critical portions  of the procedure and was immediately  available to provide guidance and  assistance during the remainder of the procedure.    Monitoring: Patient was placed on continuous monitoring supervised by  the IR nursing staff and IR attending. Patient remained stable  throughout the procedure.     Total sedation time: 25 minutes.    ATTENDING FACE-TO-FACE SEDATION TIME: Less than 5 minutes.    MEDICATIONS:  1. Versed IV: 1.5 mg   2. Fentanyl IV: 75 mcg    CT DOSE: 761 mGy    IV contrast: None.    Complications: None.    Consent: Informed written and verbal consent obtained.    Procedure/Findings: Patient placed prone on the interventional table.  A  CT scan of the upper abdomen was obtained. The appropriate  area prepped and draped in usual sterile fashion. Under CT guidance  the appropriate overlying area was identified and then anesthetized  with 10 ml of 1% lidocaine . A small incision was made over the site  with a # 11 blade. A 5 [place] Yueh was used under CT guidance to enter  the left upper quadrant fluid collection/phlegmon. The catheter was  advanced, the needle was removed. Aspiration yielded minimal fluid  return. Approximately 1 mL was aspirated and sent to lab for further  analysis.    Another 5 [place] Yueh was used under CT guidance to enter a slightly  more lateral aspect of the left upper quadrant fluid  collection/phlegmon. Catheter advanced and the needle was removed.  Aspiration once again yielded minimal fluid return. Approximately 1 mL  was aspirated and sent to lab for further analysis.    Catheters removed. Sterile dressing applied. Patient tolerated the  procedure well. Post procedural limited CT scan demonstrated no  immediate complication.      Impression    Impression: CT-guided aspiration of small left upper quadrant fluid  collection/phlegmon. Only 2 mL of fluid was able to be aspirated, sent  to lab for further analysis and culture. No drain was placed due to  small size of fluid collection/phlegmon and minimal  aspiration.    PLAN: Lab results pending.    I have personally reviewed the examination and initial interpretation  and I agree with the findings.    BRYON CHO MD            Medications Prior to Admission:     Prescriptions Prior to Admission   Medication Sig Dispense Refill Last Dose     Acetaminophen (TYLENOL PO) Take 500 mg by mouth every 6 hours as needed    6/18/2018 at Unknown time     amLODIPine (NORVASC) 10 MG tablet Take 0.5 tablets (5 mg) by mouth daily 100 tablet 3 6/18/2018 at Unknown time     senna-docusate (SENOKOT-S;PERICOLACE) 8.6-50 MG per tablet Take 1 tablet by mouth daily as needed for constipation   Past Week at PRN     COMPRESSION STOCKINGS 1 each daily (Patient not taking: Reported on 6/15/2018) 2 Units 1 Unknown at Unknown time            Discharge Medications:     Current Discharge Medication List      START taking these medications    Details   cefpodoxime (VANTIN) 200 MG tablet Take 2 tablets (400 mg) by mouth 2 times daily for 14 days  Qty: 56 tablet, Refills: 0    Associated Diagnoses: Abscess after procedure         CONTINUE these medications which have NOT CHANGED    Details   Acetaminophen (TYLENOL PO) Take 500 mg by mouth every 6 hours as needed       amLODIPine (NORVASC) 10 MG tablet Take 0.5 tablets (5 mg) by mouth daily  Qty: 100 tablet, Refills: 3    Associated Diagnoses: Benign essential hypertension      senna-docusate (SENOKOT-S;PERICOLACE) 8.6-50 MG per tablet Take 1 tablet by mouth daily as needed for constipation      COMPRESSION STOCKINGS 1 each daily  Qty: 2 Units, Refills: 1    Associated Diagnoses: Leg swelling                    Brief History of Illness:   Reason for admission requiring a surgical or invasive procedure:   * No surgery found *   The patient underwent the following procedure(s):   See above   There were no immediate complications during this procedure.    Please refer to the full operative summary for details.           Hospital Course:   Angella St  is a 52 year old woman who underwent robot assisted left radical nephrectomy in May of this year with a post operative course notable for development of an abscess in the resection bed now s/p IR drain placement and subsequent removal.  The patient was readmitted two days ago in the setting of left flank pain and chills with imaging revealing a 2.0 x 3.2 cm rim enhancing collection in the resection bed.  The patient underwent IR guided aspiration of the collection one day ago with gram stain revealing no organisms.  The patient remains afebrile today without leukocytosis or significant pain.  In light of the foregoing she was deemed appropriate for discharge to home on empiric antibiotic therapy (vantin) for 14 days with plan to follow up in clinic.         Final Pathology Result:   Pending at time of discharge         Discharge Instructions and Follow-Up:     Discharge Procedure Orders  Reason for your hospital stay   Order Comments: Pain/Abscess     Follow Up and recommended labs and tests   Order Comments: Follow up with Dr Salinas in 2 weeks     Activity   Order Comments: Your activity upon discharge: Regular activity   Order Specific Question Answer Comments   Is discharge order? Yes      Discharge Instructions   Order Comments: Activity  - Resume normal activity     Medications  - Over the counter acetaminophen for discomfort  - 2 week course of oral antibiotics (vantin).  We will follow up on your final cultures and alert you if the antibiotics need to be changed based on final culture results    Follow-Up:  - Follow up with Dr Salinas in clinic in 2 weeks    - Call or return sooner than your regularly scheduled visit if you develop any of the following: fever (greater than 101.5), uncontrolled pain, uncontrolled nausea or vomiting, as well as increased redness, swelling, or drainage from your wounds.     Phone numbers:   - Monday through Friday 8am to 4:30pm: Call [phone] with questions or to schedule  "or confirm appointment.    - Nights or weekends: call the after hours emergency pager - [phone] and tell the  \"I would like to page the Urology Resident on call.\"  - For emergencies, call 911     Full Code     Diet   Order Comments: Follow this diet upon discharge: Regular   Order Specific Question Answer Comments   Is discharge order? Yes               Discharge Disposition:   Discharged to Home      Condition at discharge: Good    --    Natanael Camacho MD  Urology Resident    11:17 AM, 6/20/2018    "

## 2018-06-20 NOTE — PLAN OF CARE
"Problem: Patient Care Overview  Goal: Plan of Care/Patient Progress Review  Outcome: Improving  /75 (BP Location: Left arm)  Pulse 74  Temp 98.9  F (37.2  C) (Oral)  Resp 16  Ht 1.702 m (5' 7\")  Wt 93 kg (205 lb)  SpO2 97%  BMI 32.11 kg/m2     Neuro: A&O X 4  Cardiac: VSS  Respiratory: lung sounds clear and equal bilaterally  GI/: 800 mL urine output, no bm this shift  Diet/Appetite: tolerating regular diet, ate 75% of breakfast and lunch  Skin: dressing CDI, scars  LDA: port hep locked and de-accessed  Activity: ambulated in hilliard, activity encouraged  Pain: c/o pain X 2 this shift, oxy 5 mg and tylenol given with relief  Plan: pt discharged to home or self care, outpatient antibiotics, discharge paperwork discussed and pt verbalized understanding        "

## 2018-06-20 NOTE — PROGRESS NOTES
"Urology  Progress Note    No acute events overnight  Pain well controlled  Tolerating diet without nausea or vomiting    Exam  Vital signs:  Temp: 98.3  F (36.8  C) Temp src: Oral BP: 132/73 Pulse: 74 Heart Rate: 99 Resp: 18 SpO2: 96 % O2 Device: None (Room air)   Height: 170.2 cm (5' 7\") Weight: 93 kg (205 lb)  Estimated body mass index is 32.11 kg/(m^2) as calculated from the following:    Height as of this encounter: 1.702 m (5' 7\").    Weight as of this encounter: 93 kg (205 lb).    No acute distress  Unlabored breathing  Abdomen soft, non-distended, non-tender.    Minimal left flank tenderness  Lower extremities non-edematous bilaterally    Labs 6/19  WBC 10.7  Hgb 10.8  Cr 0.69    AM labs pending    Assessment/Plan  52 year old y/o female who underwent left radical nephrectomy in May with a postoperative course complicated by an abscess in the nephrectomy bed now admitted with a recurrent collection.  Now s/p drainage.      Neuro: PRN acetaminophen and narcotics  CV: HDS - home amlodipine ordered  Pulm: incentive spirometry while awake  FEN/GI: Regular diet - DC IVF  Endo:No acute issues  : No acute issues  Heme/ID: Continue unasyn - cultures pending  Activity: As tolerated  PPx: SCDs    Seen and examined with the chief resident. Will discuss with Dr. Salinas.    Natanael Camacho MD  Urology Resident     Contacting the Urology Team     Please use the following job codes to reach the Urology Team. Note that you must use an in house phone and that job codes cannot receive text pages.     On weekdays, dial [phone] (or star-star-star 777 on the new [employer] telephones) then [phone] to reach the Adult Urology resident or PA on call    On weekdays, dial [phone] (or star-star-star 777 on the new [employer] telephones) then [phone] to reach the Pediatric Urology resident    On weeknights and weekends, dial [phone] (or star-star-star 777 on the new [employer] telephones) then [phone] to reach the Urology resident on call (for both Adult and " Pediatrics)

## 2018-06-20 NOTE — PLAN OF CARE
Problem: Patient Care Overview  Goal: Plan of Care/Patient Progress Review  Outcome: Improving  Vitals:    06/19/18 1948 06/19/18 2104 06/19/18 2201 06/20/18 0001   BP: 130/68  126/68 132/73   BP Location: Left arm  Left arm Left arm   Pulse: 90 88 83 74   Resp: 18 18 18   Temp: 100.1  F (37.8  C) 99.7  F (37.6  C) 99.7  F (37.6  C) 98.3  F (36.8  C)   TempSrc: Oral  Oral Oral   SpO2: 99% 98% 98% 96%   Weight:       Height:         RN [id number]    Tmax 100.1, came down to 98.3 w/ Tylenol. OVSS. O2 sats 96-99% on RA. LS clear. Up w/ SBA. Reports abd pain, given PO Oxy & Tylenol q4h. Voiding adequately in BR. Hypo BS, + gas, no BM overnight. Reg diet, tolerating, denies N/V. L flank dressing CDI. Port w/ TKO - IV ABX given per orders. Slept in between cares. Continue w/ POC.

## 2018-06-21 ENCOUNTER — TELEPHONE (OUTPATIENT)
Dept: INTERNAL MEDICINE | Facility: CLINIC | Age: 53
End: 2018-06-21

## 2018-06-21 ENCOUNTER — TELEPHONE (OUTPATIENT)
Dept: UROLOGY | Facility: CLINIC | Age: 53
End: 2018-06-21

## 2018-06-21 ENCOUNTER — CARE COORDINATION (OUTPATIENT)
Dept: CARE COORDINATION | Facility: CLINIC | Age: 53
End: 2018-06-21

## 2018-06-21 NOTE — TELEPHONE ENCOUNTER
[facility] Call Center    Phone Message    May a detailed message be left on voicemail: no    Reason for Call: Medication Question or concern regarding medication   Prescription Clarification  Name of Medication: cefpodoxime (VANTIN) 200 MG tablet  Prescribing Provider: Dr. Salinas   Pharmacy:  pharmacy, [address] [place] [address]   What on the order needs clarification? Pt called in to speak with someone about some side effects she's experiencing. She says the antibiotic she was given to take at home after being discharged from the hospital are making her vomit and she reports nausea as well. She wants to know if she should stop taking the medication or take a different medication.          Action Taken: Message routed to:  [facility]

## 2018-06-21 NOTE — TELEPHONE ENCOUNTER
Patient contacted urology regarding this and was told directions when taking this antibiotic.  Aden Rene LPN at 12:20 PM on 6/21/2018

## 2018-06-21 NOTE — TELEPHONE ENCOUNTER
[facility]    Phone Message    May a detailed message be left on voicemail: yes    Reason for Call: Other: PT was in hospital on 6/18 and has been on anitbiotics and they are making her feel ill.  PT is requesting a call back from a nurse to see what she can do.  Please follow up with the PT.     Action Taken: Message routed to:  [facility]

## 2018-06-21 NOTE — TELEPHONE ENCOUNTER
Spoke with patient to see if she had taken the Vantin on an empty stomach, in which she had.  I explained that when you take antibiotics on an empty stomach it can cause nausea and sometimes vomiting. She will take the next dose with something in her stomach and see if this helps.  I recommended if the symptoms persist to call the clinic back and we will put her on a different antibiotic.  Patient states understanding.    Homa Dalton RN  Care Coordinator- Reconstructive Urology

## 2018-06-22 LAB
BACTERIA SPEC CULT: ABNORMAL
SPECIMEN SOURCE: ABNORMAL

## 2018-06-27 ENCOUNTER — TELEPHONE (OUTPATIENT)
Dept: UROLOGY | Facility: CLINIC | Age: 53
End: 2018-06-27

## 2018-06-27 NOTE — TELEPHONE ENCOUNTER
----- Message from Maryse Rodriguez RN sent at 6/27/2018 10:49 AM CDT -----   Can you please schedule this patient to see Dr. Salinas on July 13th at noon. Dx:recurrent abscess.  ----- Message -----     From: Deaj Salinas MD     Sent: 6/26/2018   3:59 PM       To: Maryse Rodriguez RN    2-3 weeks for when she is done with antibiotics     ----- Message -----     From: Maryse Rodriguez RN     Sent: 6/26/2018   3:14 PM       To: Deja Salinas MD    Hi,    Just want to confirm that you want to see her back in 2 weeks?  She is currently scheduled for August 10th.   ----- Message -----     From: Natanael Foster MD     Sent: 6/20/2018  11:21 AM       To: Maryse Rodriguez RN    Hi Dr Rita Serra would like to see Ms St in clinic in 2 weeks for follow up after her recent admission for recurrent abscess.  Thanks.    -Natanael

## 2018-06-27 NOTE — TELEPHONE ENCOUNTER
Left message to call back to confirm appointment schedule for July 13th at Noon to follow up in Urology Clinic with Dr. Salinas. Gave call back number.

## 2018-07-05 DIAGNOSIS — E04.1 THYROID NODULE: ICD-10-CM

## 2018-07-05 DIAGNOSIS — N26.1 ATROPHIC KIDNEY: ICD-10-CM

## 2018-07-09 ENCOUNTER — PRE VISIT (OUTPATIENT)
Dept: UROLOGY | Facility: CLINIC | Age: 53
End: 2018-07-09

## 2018-07-12 ENCOUNTER — RADIANT APPOINTMENT (OUTPATIENT)
Dept: CT IMAGING | Facility: CLINIC | Age: 53
End: 2018-07-12
Attending: OBSTETRICS & GYNECOLOGY
Payer: MEDICAID

## 2018-07-12 DIAGNOSIS — C53.9 CERVICAL CANCER (H): ICD-10-CM

## 2018-07-12 RX ORDER — HEPARIN SODIUM (PORCINE) LOCK FLUSH IV SOLN 100 UNIT/ML 100 UNIT/ML
5 SOLUTION INTRAVENOUS ONCE
Status: COMPLETED | OUTPATIENT
Start: 2018-07-12 | End: 2018-07-12

## 2018-07-12 RX ORDER — IOPAMIDOL 755 MG/ML
126 INJECTION, SOLUTION INTRAVASCULAR ONCE
Status: COMPLETED | OUTPATIENT
Start: 2018-07-12 | End: 2018-07-12

## 2018-07-12 RX ADMIN — IOPAMIDOL 126 ML: 755 INJECTION, SOLUTION INTRAVASCULAR at 09:11

## 2018-07-12 RX ADMIN — HEPARIN SODIUM (PORCINE) LOCK FLUSH IV SOLN 100 UNIT/ML 5 ML: 100 SOLUTION at 09:20

## 2018-07-12 NOTE — DISCHARGE INSTRUCTIONS

## 2018-07-13 ENCOUNTER — OFFICE VISIT (OUTPATIENT)
Dept: UROLOGY | Facility: CLINIC | Age: 53
End: 2018-07-13
Payer: MEDICAID

## 2018-07-13 VITALS
SYSTOLIC BLOOD PRESSURE: 118 MMHG | HEART RATE: 81 BPM | WEIGHT: 210 LBS | DIASTOLIC BLOOD PRESSURE: 65 MMHG | HEIGHT: 67 IN | BODY MASS INDEX: 32.96 KG/M2

## 2018-07-13 DIAGNOSIS — N13.30 HYDRONEPHROSIS, LEFT: Primary | ICD-10-CM

## 2018-07-13 ASSESSMENT — PAIN SCALES - GENERAL: PAINLEVEL: NO PAIN (0)

## 2018-07-13 NOTE — LETTER
7/13/2018       RE: Angella St  C/o [address] Sherman  [address]  [facility] [address]     Dear Colleague,    Thank you for referring your patient, Angella St, to the [facility] UROLOGY AND INST FOR PROSTATE AND UROLOGIC CANCERS at [facility]. Please see a copy of my visit note below.    UROLOGIC DIAGNOSES:        CURRENT INTERVENTIONS:        History:       Angella St is a 52 year old female with history of hypertension, cervical cancer s/p JESSICA and chemotherapy, complicated by left ureteral involvement with hydronephrosis with recurrent pyelonephritis and CKD requiring chronic pain PNT s/p DaVinci nephrectomy of the left kidney on 5/3/18, complicated by retroperitoneal abscess s/p IR drain placement 5/21/18    Patient noticed pain from her drain site on Tuesday (5/29) and went to the emergency room. CT showed decrease fluid collection and patient had no obvious signs of worsening infection. Patient continues to have intermittent sharp stabbing pain that will last a second and is much worse when she does not take her pain medication. For the last week she has had less than 10cc of clear fluid in her drain. She continues to care for it by flushing it and emptying bag every night.  Denies fevers, chills, fatigue, dysuria, urinary freqeuncy, pelvic or back pain. She feel like she is able to go throughout her normal activity until the pain starts and she has to rest.     Patient is s/p removal of IR drain with resolution of fluid collection post op.   She states she is feeling much better with improved activity and appetite.          Imaging:      Labs:      MEDICATIONS:    Current Outpatient Prescriptions:      Acetaminophen (TYLENOL PO), Take 500 mg by mouth every 6 hours as needed , Disp: , Rfl:      amLODIPine (NORVASC) 10 MG tablet, Take 0.5 tablets (5 mg) by mouth daily, Disp: 100 tablet, Rfl: 3     COMPRESSION STOCKINGS, 1 each daily, Disp: 2 Units, Rfl: 1      "senna-docusate (SENOKOT-S;PERICOLACE) 8.6-50 MG per tablet, Take 1 tablet by mouth daily as needed for constipation, Disp: , Rfl:     ALLERGIES:   No Known Allergies    REVIEW OF SYSTEMS: Ten point review of systems without change as outlined in HPI    SURGICAL HISTORY:    Past Surgical History:   Procedure Laterality Date     COMBINED CYSTOSCOPY, INSERT STENT URETER(S) Left 6/13/2016    Procedure: COMBINED CYSTOSCOPY, INSERT STENT URETER(S);  Surgeon: Deja Salinas MD;  Location: [facility] OR     CYSTOSCOPY, RETROGRADES, COMBINED Left 6/13/2016    Procedure: COMBINED CYSTOSCOPY, RETROGRADES;  Surgeon: Deja Salinas MD;  Location: [facility]     DAVINCI NEPHRECTOMY Left 5/3/2018    Procedure: DAVINCI XI NEPHRECTOMY;  DAVINCI XI LEFT NEPHRECTOMY;  Surgeon: Deja Salinas MD;  Location: [facility] OR     GENITOURINARY SURGERY  5/2015    PNT placement     HYSTERECTOMY  2011    [facility], [place]     PERCUTANEOUS NEPHROSTOMY Left 3/9/2017    Procedure: PERCUTANEOUS NEPHROSTOMY;  Surgeon: Bridger Meyer PA-C;  Location: [facility]     PERCUTANEOUS NEPHROSTOMY Left 6/28/2017    Procedure: PERCUTANEOUS NEPHROSTOMY;  Left Nephrostomy Tube Change;  Surgeon: Bridger Meyer PA-C;  Location: [facility]     PERCUTANEOUS NEPHROSTOMY Left 10/30/2017    Procedure: PERCUTANEOUS NEPHROSTOMY;  Left Nephrostomy Tube Change;  Surgeon: Maryann Chavez PA-C;  Location: [facility] OR         PHYSICAL EXAM:    /65  Pulse 81  Ht 1.702 m (5' 7\")  Wt 95.3 kg (210 lb)  BMI 32.89 kg/m2    HEENT: Normocephalic and atraumatic    Cardiac: Not done    Back/Flank: Not done    CNS/PNS: Alert and oriented    Respiratory: Normal nonlabored breathing    Abdomen: Soft nontender and nondistended, incisions well healed     Peripheral Vascular: No peripheral edema    Mental Status: Normal    External Genitalia: Not done    Bladder: Not done    Urethra: Not done     Vagina: Not done    Cystoscopy:  Not Done      Urinalysis:  UA " RESULTS:  Recent Labs   Lab Test  05/21/18   0056   COLOR  Straw   APPEARANCE  Clear   URINEGLC  Negative   URINEBILI  Negative   URINEKETONE  Negative   SG  1.004   UBLD  Trace*   URINEPH  7.5*   PROTEIN  Negative   NITRITE  Negative   LEUKEST  Negative   RBCU  <1   WBCU  <1         IMPRESSION:    53 y/o F s/p left nephrectomy for non-functioning kidney post op course c/b retroperitoneal abscess now cleared     PLAN:    Follow up as needed     Total Time:  15 minutes   Time in Consultation: greater than 50%       Again, thank you for allowing me to participate in the care of your patient.      Sincerely,    Deja Salinas MD

## 2018-07-13 NOTE — NURSING NOTE
Chief Complaint   Patient presents with     RECHECK     recurrent abscess       Courtney Childers MA

## 2018-07-13 NOTE — PROGRESS NOTES
UROLOGIC DIAGNOSES:        CURRENT INTERVENTIONS:        History:       Angella St is a 52 year old female with history of hypertension, cervical cancer s/p JESSICA and chemotherapy, complicated by left ureteral involvement with hydronephrosis with recurrent pyelonephritis and CKD requiring chronic pain PNT s/p DaVinci nephrectomy of the left kidney on 5/3/18, complicated by retroperitoneal abscess s/p IR drain placement 5/21/18    Patient noticed pain from her drain site on Tuesday (5/29) and went to the emergency room. CT showed decrease fluid collection and patient had no obvious signs of worsening infection. Patient continues to have intermittent sharp stabbing pain that will last a second and is much worse when she does not take her pain medication. For the last week she has had less than 10cc of clear fluid in her drain. She continues to care for it by flushing it and emptying bag every night.  Denies fevers, chills, fatigue, dysuria, urinary freqeuncy, pelvic or back pain. She feel like she is able to go throughout her normal activity until the pain starts and she has to rest.     Patient is s/p removal of IR drain with resolution of fluid collection post op.   She states she is feeling much better with improved activity and appetite.          Imaging:      Labs:      MEDICATIONS:    Current Outpatient Prescriptions:      Acetaminophen (TYLENOL PO), Take 500 mg by mouth every 6 hours as needed , Disp: , Rfl:      amLODIPine (NORVASC) 10 MG tablet, Take 0.5 tablets (5 mg) by mouth daily, Disp: 100 tablet, Rfl: 3     COMPRESSION STOCKINGS, 1 each daily, Disp: 2 Units, Rfl: 1     senna-docusate (SENOKOT-S;PERICOLACE) 8.6-50 MG per tablet, Take 1 tablet by mouth daily as needed for constipation, Disp: , Rfl:     ALLERGIES:   No Known Allergies    REVIEW OF SYSTEMS: Ten point review of systems without change as outlined in HPI    SURGICAL HISTORY:    Past Surgical History:   Procedure Laterality Date     COMBINED  "CYSTOSCOPY, INSERT STENT URETER(S) Left 6/13/2016    Procedure: COMBINED CYSTOSCOPY, INSERT STENT URETER(S);  Surgeon: Deja Salinas MD;  [facility]     CYSTOSCOPY, RETROGRADES, COMBINED Left 6/13/2016    Procedure: COMBINED CYSTOSCOPY, RETROGRADES;  Surgeon: Deja Salinas MD;  [facility]     DAVINCI NEPHRECTOMY Left 5/3/2018    Procedure: DAVINCI XI NEPHRECTOMY;  DAVINCI XI LEFT NEPHRECTOMY;  Surgeon: Deja Salinas MD;  [facility]     GENITOURINARY SURGERY  5/2015    PNT placement     HYSTERECTOMY  2011    [place], Marialuisa     PERCUTANEOUS NEPHROSTOMY Left 3/9/2017    Procedure: PERCUTANEOUS NEPHROSTOMY;  Surgeon: Bridger Meyer PA-C;  [facility]     PERCUTANEOUS NEPHROSTOMY Left 6/28/2017    Procedure: PERCUTANEOUS NEPHROSTOMY;  Left Nephrostomy Tube Change;  Surgeon: Bridger Meyer PA-C;  [facility]     PERCUTANEOUS NEPHROSTOMY Left 10/30/2017    Procedure: PERCUTANEOUS NEPHROSTOMY;  Left Nephrostomy Tube Change;  Surgeon: Maryann Chavez PA-C;  [facility]         PHYSICAL EXAM:    /65  Pulse 81  Ht 1.702 m (5' 7\")  Wt 95.3 kg (210 lb)  BMI 32.89 kg/m2    HEENT: Normocephalic and atraumatic    Cardiac: Not done    Back/Flank: Not done    CNS/PNS: Alert and oriented    Respiratory: Normal nonlabored breathing    Abdomen: Soft nontender and nondistended, incisions well healed     Peripheral Vascular: No peripheral edema    Mental Status: Normal    External Genitalia: Not done    Bladder: Not done    Urethra: Not done     Vagina: Not done    Cystoscopy:  Not Done      Urinalysis:  UA RESULTS:  Recent Labs   Lab Test  05/21/18   0056   COLOR  Straw   APPEARANCE  Clear   URINEGLC  Negative   URINEBILI  Negative   URINEKETONE  Negative   SG  1.004   UBLD  Trace*   URINEPH  7.5*   PROTEIN  Negative   NITRITE  Negative   LEUKEST  Negative   RBCU  <1   WBCU  <1         IMPRESSION:    53 y/o F s/p left nephrectomy for " non-functioning kidney post op course c/b retroperitoneal abscess now cleared     PLAN:    Follow up as needed     Total Time:  15 minutes   Time in Consultation: greater than 50%

## 2018-07-13 NOTE — MR AVS SNAPSHOT
After Visit Summary   7/13/2018    Angella St    MRN: [id number]           Patient Information     Date Of Birth          1965        Visit Information        Provider Department      7/13/2018 12:00 PM Deja Salinas MD [facility]        Today's Diagnoses     Hydronephrosis, left    -  1      Care Instructions    Please follow up PRN          Follow-ups after your visit        Your next 10 appointments already scheduled     Jul 18, 2018 11:00 AM CDT   (Arrive by 10:45 AM)   Return Visit with Lam Moran MD   [facility] ([facility])    [address]  [address]  [facility] [place] [address] [phone]            Aug 10, 2018  3:45 PM CDT   (Arrive by 3:30 PM)   Return Visit with Deja Salinas MD   [facility] ([facility])    [address]  4th [address]  [facility] [address] [phone]            Sep 14, 2018 10:00 AM CDT   US BIOPSY THYROID FINE NEEDLE ASPIRATION with UCUS3,  IMAGING NURSE, UC PROCEDURAL RAD   [facility] ([facility])    [address]  1st [address]  [place] [address] [phone]           Bring a list of your medicines to the exam. Include vitamins, minerals and over-the-counter drugs.  Tell your doctor in advance:   If you are or may be pregnant.   If you are taking Coumadin (or any other blood thinners) 5 days prior to the exam for any special instructions.   If you are diabetic to determine if your insulin needs have to be adjusted for the exam.  IF YOUR DOCTOR ALSO PRESCRIBED SEDATION DURING THE EXAM (medicine to help you relax): You will receive separate instructions about driving, eating, and additional tests that may be necessary prior to your exam day.  Please call the Imaging Department at your exam  "site with any questions.            Sep 21, 2018  2:30 PM CDT   (Arrive by 2:15 PM)   RETURN ENDOCRINE with Amee Zee MD   [facility] ([facility])    [address] [place] [address] [phone]              Who to contact     Please call your clinic at [phone] to:    Ask questions about your health    Make or cancel appointments    Discuss your medicines    Learn about your test results    Speak to your doctor            Additional Information About Your Visit        [email or web address] Medical Information     [email or web address] gives you secure access to your electronic health record. If you see a primary care provider, you can also send messages to your care team and make appointments. If you have questions, please call your primary care clinic.  If you do not have a primary care provider, please call [phone] and they will assist you.      [email or web address] is an electronic gateway that provides easy, online access to your medical records. With [email or web address], you can request a clinic appointment, read your test results, renew a prescription or communicate with your care team.     To access your existing account, please contact your [facility] or call [phone] for assistance.        Care EveryWhere ID     This is your Care EveryWhere ID. This could be used by other organizations to access your [place] medical records  [id number]        Your Vitals Were     Pulse Height BMI (Body Mass Index)             81 1.702 m (5' 7\") 32.89 kg/m2          Blood Pressure from Last 3 Encounters:   07/13/18 118/65   06/20/18 149/75   06/15/18 108/66    Weight from Last 3 Encounters:   07/13/18 95.3 kg (210 lb)   06/19/18 93 kg (205 lb)   06/15/18 95.8 kg (211 lb 3.2 oz)              Today, you had the following     No orders found for display       Primary Care Provider Office Phone # Fax #    Shamir Pugh [phone]       [address]  [facility] [address]        Equal Access to Services     DORIS RODRIGUES : Hadii aad ku hadkianasisi Jo, waregisda lanie, qaservando boyermajudah decker, jin sadlerkathrynquique yusuf. So [facility] [phone].    ATENCIÓN: Si habla español, tiene a andrew disposición servicios gratuitos de asistencia lingüística. Llame al [phone].    We comply with applicable federal civil rights laws and [place] laws. We do not discriminate on the basis of race, color, national origin, age, disability, sex, sexual orientation, or gender identity.            Thank you!     Thank you for choosing [facility] AND [facility]  for your care. Our goal is always to provide you with excellent care. Hearing back from our patients is one way we can continue to improve our services. Please take a few minutes to complete the written survey that you may receive in the mail after your visit with us. Thank you!             Your Updated Medication List - Protect others around you: Learn how to safely use, store and throw away your medicines at www.disposemymeds.org.          This list is accurate as of 7/13/18 11:59 PM.  Always use your most recent med list.                   Brand Name Dispense Instructions for use Diagnosis    amLODIPine 10 MG tablet    NORVASC    100 tablet    Take 0.5 tablets (5 mg) by mouth daily    Benign essential hypertension       COMPRESSION STOCKINGS     2 Units    1 each daily    Leg swelling       senna-docusate 8.6-50 MG per tablet    SENOKOT-S;PERICOLACE     Take 1 tablet by mouth daily as needed for constipation        TYLENOL PO      Take 500 mg by mouth every 6 hours as needed

## 2018-07-18 ENCOUNTER — ONCOLOGY VISIT (OUTPATIENT)
Dept: ONCOLOGY | Facility: CLINIC | Age: 53
End: 2018-07-18
Attending: OBSTETRICS & GYNECOLOGY
Payer: MEDICAID

## 2018-07-18 VITALS
RESPIRATION RATE: 16 BRPM | TEMPERATURE: 98 F | BODY MASS INDEX: 32.8 KG/M2 | SYSTOLIC BLOOD PRESSURE: 123 MMHG | OXYGEN SATURATION: 99 % | HEIGHT: 67 IN | HEART RATE: 82 BPM | WEIGHT: 209 LBS | DIASTOLIC BLOOD PRESSURE: 81 MMHG

## 2018-07-18 DIAGNOSIS — C53.9 CERVICAL CANCER (H): Primary | ICD-10-CM

## 2018-07-18 PROCEDURE — G0463 HOSPITAL OUTPT CLINIC VISIT: HCPCS | Mod: ZF

## 2018-07-18 PROCEDURE — 99214 OFFICE O/P EST MOD 30 MIN: CPT | Mod: ZP | Performed by: OBSTETRICS & GYNECOLOGY

## 2018-07-18 ASSESSMENT — PAIN SCALES - GENERAL: PAINLEVEL: NO PAIN (0)

## 2018-07-18 NOTE — NURSING NOTE
"Oncology Rooming Note    July 18, 2018 10:02 AM   Angella St is a 52 year old female who presents for:    Chief Complaint   Patient presents with     Oncology Clinic Visit     Return Cervical Ca     Initial Vitals: /81  Pulse 82  Temp 98  F (36.7  C) (Oral)  Resp 16  Ht 1.702 m (5' 7\")  Wt 94.8 kg (209 lb)  SpO2 99%  BMI 32.73 kg/m2 Estimated body mass index is 32.73 kg/(m^2) as calculated from the following:    Height as of this encounter: 1.702 m (5' 7\").    Weight as of this encounter: 94.8 kg (209 lb). Body surface area is 2.12 meters squared.  No Pain (0) Comment: Data Unavailable   No LMP recorded. Patient has had a hysterectomy.  Allergies reviewed: Yes  Medications reviewed: Yes    Medications: Medication refills not needed today.  Pharmacy name entered into [employer]: [facility] [place], [place] - [address] [place] [address] [phone]    Clinical concerns: CT results. No new concerns     6 minutes for nursing intake (face to face time)     Joleen Connelly CMA              "

## 2018-07-18 NOTE — LETTER
2018       RE: Angella St  C/o Greyson Sherman  [address]  [facility] [address]     Dear Colleague,    Thank you for referring your patient, Angella St, to the [facility]. Please see a copy of my visit note below.                Follow Up Notes on Referred Patient    Date: 2018       Dr. Joyce Charles MD  No address on file       RE: Angella St  : 1965  DAYSI: 2018    Dear Dr. Joyce Charles:    Angella St is a 52 year old woman with a diagnosis of recurrent cervical cancer.           Patient presents today for followup.  She had recently had undergone a da Jaylin-assisted left nephrectomy due to renal atrophy and recurrent cervical cancer.  She had some postoperative complications with abscesses, which have now resolved.  She is now doing well, is eating and drinking well.  No nausea, vomiting, fever or chills.  Has not had any vaginal bleeding or discharge.  No B symptoms.         Past Medical History:    Past Medical History:   Diagnosis Date     Anemia      Cancer (H)     Metastatic squamous cell cervical carcinoma     Chronic kidney disease      Hypertension      Obesity     BMI >30         Past Surgical History:    Past Surgical History:   Procedure Laterality Date     COMBINED CYSTOSCOPY, INSERT STENT URETER(S) Left 2016    Procedure: COMBINED CYSTOSCOPY, INSERT STENT URETER(S);  Surgeon: Deja Salinas MD;  [facility]     CYSTOSCOPY, RETROGRADES, COMBINED Left 2016    Procedure: COMBINED CYSTOSCOPY, RETROGRADES;  Surgeon: Deja Salinas MD;  [facility]     DAVINCI NEPHRECTOMY Left 5/3/2018    Procedure: DAVINCI XI NEPHRECTOMY;  DAVINCI XI LEFT NEPHRECTOMY;  Surgeon: Deja Salinas MD;  [facility]     GENITOURINARY SURGERY  2015    PNT placement     HYSTERECTOMY      [place], Marialuisa     PERCUTANEOUS NEPHROSTOMY Left 3/9/2017    Procedure: PERCUTANEOUS NEPHROSTOMY;  Surgeon: Betsy  "Bridger Hernández PA-C;  [facility]     PERCUTANEOUS NEPHROSTOMY Left 6/28/2017    Procedure: PERCUTANEOUS NEPHROSTOMY;  Left Nephrostomy Tube Change;  Surgeon: Bridger Meyer PA-C;  [facility]     PERCUTANEOUS NEPHROSTOMY Left 10/30/2017    Procedure: PERCUTANEOUS NEPHROSTOMY;  Left Nephrostomy Tube Change;  Surgeon: Maryann Chavez PA-C;  [facility]         Health Maintenance Due   Topic Date Due     GRACIELA QUESTIONNAIRE 1 YEAR  11/02/1981     HIV SCREEN (SYSTEM ASSIGNED)  11/02/1981     PHQ-9 Q1YR  11/02/1981       Current Medications:     Current Outpatient Prescriptions   Medication Sig Dispense Refill     Acetaminophen (TYLENOL PO) Take 500 mg by mouth every 6 hours as needed        amLODIPine (NORVASC) 10 MG tablet Take 0.5 tablets (5 mg) by mouth daily 100 tablet 3     iohexol (OMNIPAQUE) 140 MG/ML SOLN solution Mix entire bottle (50ml) of contast with 600ml (20 ounces) of water and drink half 2 hrs prior to CT scan and half 1 hr prior to scan 50 mL 0     COMPRESSION STOCKINGS 1 each daily (Patient not taking: Reported on 7/18/2018) 2 Units 1     senna-docusate (SENOKOT-S;PERICOLACE) 8.6-50 MG per tablet Take 1 tablet by mouth daily as needed for constipation           Allergies:      No Known Allergies     Social History:     Social History   Substance Use Topics     Smoking status: Never Smoker     Smokeless tobacco: Never Used     Alcohol use No      Comment: Does not drink alcohol       History   Drug Use No     Comment: No drug use         Family History:       Family History   Problem Relation Age of Onset     Hypertension Brother          Physical Exam:     /81  Pulse 82  Temp 98  F (36.7  C) (Oral)  Resp 16  Ht 1.702 m (5' 7\")  Wt 94.8 kg (209 lb)  SpO2 99%  BMI 32.73 kg/m2  Body mass index is 32.73 kg/(m^2).    General Appearance: healthy and alert, no distress     Musculoskeletal: extremities non tender and without edema  Neurological: normal gait, no gross " defects     Psychiatric: appropriate mood and affect                               Hematological: normal cervical, supraclavicular and inguinal lymph nodes     ABDOMEN:  Soft, nontender, nondistended, no organomegaly.   PELVIC:  Normal external genitalia, normal vaginal mucosa, well-healed vaginal cuff.  No adnexal masses or tenderness.  Rectovaginal confirms.             Assessment:    Angella St is a 52 year old woman with a diagnosis of recurrent cervical cancer.     A total of 25 minutes was spent with the patient, 20 minutes of which were spent in counseling the patient and/or treatment planning.      1.  Recurrent cervical cancer.   2.  No evidence of disease.   3.  Status post left nephrectomy.        Discussed with the patient there is no current evidence of disease.  She has been doing well from a cervical cancer standpoint.  She will be 3 years out now in November from her last systemic chemotherapy.  We will see her back in 6 months for another surveillance visit with CT chest, abdomen and pelvis at that time in 6 months.  She has recently followed up with Urology for her left nephrectomy and has recovered well from that now.  She agrees with this plan, is very appreciative of her care.  All questions were answered.       Lam Moran MD, MS    Department of Obstetrics and Gynecology   Division of Gynecologic Oncology   [facility]  Phone: [phone]        CC  Patient Care Team:  Shamir Pugh MD as PCP - General (Internal Medicine)  Arthur Hernandez MD as PCP - Internal Medicine  Christy Kraus RN as Continuity Care Coordinator (Oncology)  Arthur Hernandez MD as Resident  Melida Rutledge APRN CNP as Referring Physician (Nurse Practitioner - Women's Health)  Shamir Pugh MD as MD (Internal Medicine)  Lam Moran MD as MD (Obstetrics & Gynecology, Maternal & Fetal Medicine)  Deja Salinas MD as MD  (Urology)  Maryse Rodriguez, RN as Registered Nurse

## 2018-07-18 NOTE — MR AVS SNAPSHOT
After Visit Summary   7/18/2018    Angella St    MRN: [id number]           Patient Information     Date Of Birth          1965        Visit Information        Provider Department      7/18/2018 11:00 AM Lam Moran MD [facility]        Today's Diagnoses     Cervical cancer (H)    -  1       Follow-ups after your visit        Your next 10 appointments already scheduled     Aug 10, 2018  3:45 PM CDT   (Arrive by 3:30 PM)   Return Visit with Deja Salinas MD   [facility] ([facility])    [address]  4th Floor  [facility] [address]   [phone]            Aug 15, 2018 10:00 AM CDT   Masonic Lab Draw with  MASONIC LAB DRAW   [facility] ([facility])    [address]  [address]  [facility] [address] [phone]            Sep 14, 2018 10:00 AM CDT   US BIOPSY THYROID FINE NEEDLE ASPIRATION with GEEUS3, GEE IMAGING NURSE, UC PROCEDURAL RAD   [facility] US ([facility])    [address]  1st Floor  [facility] [address] [phone]           Bring a list of your medicines to the exam. Include vitamins, minerals and over-the-counter drugs.  Tell your doctor in advance:   If you are or may be pregnant.   If you are taking Coumadin (or any other blood thinners) 5 days prior to the exam for any special instructions.   If you are diabetic to determine if your insulin needs have to be adjusted for the exam.  IF YOUR DOCTOR ALSO PRESCRIBED SEDATION DURING THE EXAM (medicine to help you relax): You will receive separate instructions about driving, eating, and additional tests that may be necessary prior to your exam day.  Please call the Imaging Department at your exam site with any questions.            Sep 19, 2018 10:30 AM CDT   Masonic Lab Draw with  MASONIC LAB DRAW     [facility] Masonic Lab Draw ([facility])    [address]  [address]  [facility] [place] [facility] [place] [address]   [phone]            Sep 21, 2018  2:30 PM CDT   (Arrive by 2:15 PM)   RETURN ENDOCRINE with Amee Zee MD   [facility]ology ([facility])    [address] [place] [address]  3rd Floor  [place] [address]   [phone]            Oct 24, 2018 10:00 AM CDT   Masonic Lab Draw with UC MASONIC LAB DRAW   [facility] Masonic Lab Draw ([facility])    [address]  [address]  [place] [address]   [phone]            Nov 28, 2018 10:00 AM CST   Masonic Lab Draw with UC MASONIC LAB DRAW   [facility] Masonic Lab Draw ([facility])    [address] [place] [address]  [address]  [place] [facility] [place] [address]   [phone]            Jan 02, 2019 10:00 AM CST   Masonic Lab Draw with UC MASONIC LAB DRAW   [facility] Masonic Lab Draw ([facility])    [address] [place] [address]  [address]  [place] [address]   [phone]              Who to contact     If you have questions or need follow up information about today's clinic visit or your schedule please contact [facility] directly at [phone].  Normal or non-critical lab and imaging results will be communicated to you by MyChart, letter or phone within 4 business days after the clinic has received the results. If you do not hear from us within 7 days, please contact the clinic through Zeter[employer] or phone. If you have a critical or abnormal lab result, we will notify you by phone as soon as possible.  Submit refill requests through [employer]atch or call your pharmacy and they will forward the refill request to us. Please allow 3 business days for your refill to be completed.          Additional Information About Your Visit        Pro[employer]atch Information     [employer] gives you secure access to your electronic health record. If you see a  "primary care provider, you can also send messages to your care team and make appointments. If you have questions, please call your primary care clinic.  If you do not have a primary care provider, please call [phone] and they will assist you.        Care EveryWhere ID     This is your Care EveryWhere ID. This could be used by other organizations to access your [employer] medical records  [id number]        Your Vitals Were     Pulse Temperature Respirations Height Pulse Oximetry BMI (Body Mass Index)    82 98  F (36.7  C) (Oral) 16 1.702 m (5' 7\") 99% 32.73 kg/m2       Blood Pressure from Last 3 Encounters:   07/18/18 123/81   07/13/18 118/65   06/20/18 149/75    Weight from Last 3 Encounters:   07/18/18 94.8 kg (209 lb)   07/13/18 95.3 kg (210 lb)   06/19/18 93 kg (205 lb)                 Today's Medication Changes          These changes are accurate as of 7/18/18 11:59 PM.  If you have any questions, ask your nurse or doctor.               Start taking these medicines.        Dose/Directions    iohexol 140 MG/ML Soln solution   Commonly known as:  OMNIPAQUE   Used for:  Cervical cancer (H)   Started by:  Lam Moran MD        Mix entire bottle (50ml) of contast with 600ml (20 ounces) of water and drink half 2 hrs prior to CT scan and half 1 hr prior to scan   Quantity:  50 mL   Refills:  0            Where to get your medicines      These medications were sent to [facility] [phone]  [address] [address]    Hours:  TRANSPLANT PHONE NUMBER [phone] Phone:  [phone]     iohexol 140 MG/ML Soln solution                Primary Care Provider Office Phone # Fax #    Shamir Pugh [phone]       [address] [place] [address]  [facility] [address]        Equal Access to Services     DORIS RODRIGUES AH: Sabiha Jo, nachoda luqbib, qaybta kaalrylee decker, jin castañeda " guillermina khloecorina doroteocameron labarbara ah. So [facility] [phone].    ATENCIÓN: Si habla oskar, tiene a anderw disposición servicios gratuitos de asistencia lingüística. Shemar johnson [phone].    We comply with applicable federal civil rights laws and [place] laws. We do not discriminate on the basis of race, color, national origin, age, disability, sex, sexual orientation, or gender identity.            Thank you!     Thank you for choosing [facility]  for your care. Our goal is always to provide you with excellent care. Hearing back from our patients is one way we can continue to improve our services. Please take a few minutes to complete the written survey that you may receive in the mail after your visit with us. Thank you!             Your Updated Medication List - Protect others around you: Learn how to safely use, store and throw away your medicines at www.disposemymeds.org.          This list is accurate as of 7/18/18 11:59 PM.  Always use your most recent med list.                   Brand Name Dispense Instructions for use Diagnosis    amLODIPine 10 MG tablet    NORVASC    100 tablet    Take 0.5 tablets (5 mg) by mouth daily    Benign essential hypertension       COMPRESSION STOCKINGS     2 Units    1 each daily    Leg swelling       iohexol 140 MG/ML Soln solution    OMNIPAQUE    50 mL    Mix entire bottle (50ml) of contast with 600ml (20 ounces) of water and drink half 2 hrs prior to CT scan and half 1 hr prior to scan    Cervical cancer (H)       senna-docusate 8.6-50 MG per tablet    SENOKOT-S;PERICOLACE     Take 1 tablet by mouth daily as needed for constipation        TYLENOL PO      Take 500 mg by mouth every 6 hours as needed

## 2018-07-19 NOTE — PROGRESS NOTES
Follow Up Notes on Referred Patient    Date: 2018       Dr. Joyce Charles MD  No address on file       RE: Angella St  : 1965  DAYSI: 2018    Dear Dr. Joyce Charles:    Angella St is a 52 year old woman with a diagnosis of recurrent cervical cancer.           Patient presents today for followup.  She had recently had undergone a da Jaylin-assisted left nephrectomy due to renal atrophy and recurrent cervical cancer.  She had some postoperative complications with abscesses, which have now resolved.  She is now doing well, is eating and drinking well.  No nausea, vomiting, fever or chills.  Has not had any vaginal bleeding or discharge.  No B symptoms.         Past Medical History:    Past Medical History:   Diagnosis Date     Anemia      Cancer (H)     Metastatic squamous cell cervical carcinoma     Chronic kidney disease      Hypertension      Obesity     BMI >30         Past Surgical History:    Past Surgical History:   Procedure Laterality Date     COMBINED CYSTOSCOPY, INSERT STENT URETER(S) Left 2016    Procedure: COMBINED CYSTOSCOPY, INSERT STENT URETER(S);  Surgeon: Deja Salinas MD;  [facility]     CYSTOSCOPY, RETROGRADES, COMBINED Left 2016    Procedure: COMBINED CYSTOSCOPY, RETROGRADES;  Surgeon: Deja Salinas MD;  [facility]     DAVINCI NEPHRECTOMY Left 5/3/2018    Procedure: DAVINCI XI NEPHRECTOMY;  DAVINCI XI LEFT NEPHRECTOMY;  Surgeon: Deja Salinas MD;  Location: [facility] OR     GENITOURINARY SURGERY  2015    PNT placement     HYSTERECTOMY      [place], Marialuisa     PERCUTANEOUS NEPHROSTOMY Left 3/9/2017    Procedure: PERCUTANEOUS NEPHROSTOMY;  Surgeon: Bridger Meyer PA-C;  [facility]     PERCUTANEOUS NEPHROSTOMY Left 2017    Procedure: PERCUTANEOUS NEPHROSTOMY;  Left Nephrostomy Tube Change;  Surgeon: Bridger Meyer PA-C;  [facility]     PERCUTANEOUS NEPHROSTOMY Left 10/30/2017  "   Procedure: PERCUTANEOUS NEPHROSTOMY;  Left Nephrostomy Tube Change;  Surgeon: Maryann Chavez PA-C;  Location:  OR         Health Maintenance Due   Topic Date Due     GRACIELA QUESTIONNAIRE 1 YEAR  11/02/1981     HIV SCREEN (SYSTEM ASSIGNED)  11/02/1981     PHQ-9 Q1YR  11/02/1981       Current Medications:     Current Outpatient Prescriptions   Medication Sig Dispense Refill     Acetaminophen (TYLENOL PO) Take 500 mg by mouth every 6 hours as needed        amLODIPine (NORVASC) 10 MG tablet Take 0.5 tablets (5 mg) by mouth daily 100 tablet 3     iohexol (OMNIPAQUE) 140 MG/ML SOLN solution Mix entire bottle (50ml) of contast with 600ml (20 ounces) of water and drink half 2 hrs prior to CT scan and half 1 hr prior to scan 50 mL 0     COMPRESSION STOCKINGS 1 each daily (Patient not taking: Reported on 7/18/2018) 2 Units 1     senna-docusate (SENOKOT-S;PERICOLACE) 8.6-50 MG per tablet Take 1 tablet by mouth daily as needed for constipation           Allergies:      No Known Allergies     Social History:     Social History   Substance Use Topics     Smoking status: Never Smoker     Smokeless tobacco: Never Used     Alcohol use No      Comment: Does not drink alcohol       History   Drug Use No     Comment: No drug use         Family History:       Family History   Problem Relation Age of Onset     Hypertension Brother          Physical Exam:     /81  Pulse 82  Temp 98  F (36.7  C) (Oral)  Resp 16  Ht 1.702 m (5' 7\")  Wt 94.8 kg (209 lb)  SpO2 99%  BMI 32.73 kg/m2  Body mass index is 32.73 kg/(m^2).    General Appearance: healthy and alert, no distress     Musculoskeletal: extremities non tender and without edema  Neurological: normal gait, no gross defects     Psychiatric: appropriate mood and affect                               Hematological: normal cervical, supraclavicular and inguinal lymph nodes     ABDOMEN:  Soft, nontender, nondistended, no organomegaly.   PELVIC:  Normal external genitalia, normal " vaginal mucosa, well-healed vaginal cuff.  No adnexal masses or tenderness.  Rectovaginal confirms.             Assessment:    Angella St is a 52 year old woman with a diagnosis of recurrent cervical cancer.     A total of 25 minutes was spent with the patient, 20 minutes of which were spent in counseling the patient and/or treatment planning.      1.  Recurrent cervical cancer.   2.  No evidence of disease.   3.  Status post left nephrectomy.        Discussed with the patient there is no current evidence of disease.  She has been doing well from a cervical cancer standpoint.  She will be 3 years out now in November from her last systemic chemotherapy.  We will see her back in 6 months for another surveillance visit with CT chest, abdomen and pelvis at that time in 6 months.  She has recently followed up with Urology for her left nephrectomy and has recovered well from that now.  She agrees with this plan, is very appreciative of her care.  All questions were answered.       Lam Moran MD, MS    Department of Obstetrics and Gynecology   Division of Gynecologic Oncology   [facility]  Phone: [phone]        CC  Patient Care Team:  Shamir Pugh MD as PCP - General (Internal Medicine)  Arthur Hernandez MD as PCP - Internal Medicine  Christy Kraus RN as Continuity Care Coordinator (Oncology)  Arthur Hernandez MD as Resident  Melida Rutledge APRN CNP as Referring Physician (Nurse Practitioner - Women's Health)  Shamir Pugh MD as MD (Internal Medicine)  Lam Moran MD as MD (Obstetrics & Gynecology, Maternal & Fetal Medicine)  Deja Salinas MD as MD (Urology)  Maryse Rodriguez RN as Registered Nurse  Deja Salinas MD as MD (Urology)  SELF, REFERRED

## 2018-08-15 PROCEDURE — 96523 IRRIG DRUG DELIVERY DEVICE: CPT

## 2018-08-15 PROCEDURE — 25000128 H RX IP 250 OP 636: Performed by: OBSTETRICS & GYNECOLOGY

## 2018-08-15 RX ORDER — HEPARIN SODIUM (PORCINE) LOCK FLUSH IV SOLN 100 UNIT/ML 100 UNIT/ML
5 SOLUTION INTRAVENOUS ONCE
Status: COMPLETED | OUTPATIENT
Start: 2018-08-15 | End: 2018-08-15

## 2018-08-15 RX ADMIN — SODIUM CHLORIDE, PRESERVATIVE FREE 5 ML: 5 INJECTION INTRAVENOUS at 10:38

## 2018-08-15 NOTE — NURSING NOTE
Chief Complaint   Patient presents with     Port Flush     Port flush only, good blood return, heparin locked and deaccessed.  Hx cervical CA.     Belinda Andersen RN

## 2018-08-21 ENCOUNTER — APPOINTMENT (OUTPATIENT)
Dept: CT IMAGING | Facility: CLINIC | Age: 53
End: 2018-08-21
Attending: EMERGENCY MEDICINE
Payer: MEDICAID

## 2018-08-21 ENCOUNTER — HOSPITAL ENCOUNTER (EMERGENCY)
Facility: CLINIC | Age: 53
Discharge: HOME | End: 2018-08-21
Attending: EMERGENCY MEDICINE | Admitting: EMERGENCY MEDICINE
Payer: MEDICAID

## 2018-08-21 VITALS
BODY MASS INDEX: 32.85 KG/M2 | SYSTOLIC BLOOD PRESSURE: 145 MMHG | TEMPERATURE: 97.4 F | HEIGHT: 67 IN | WEIGHT: 209.3 LBS | DIASTOLIC BLOOD PRESSURE: 86 MMHG | OXYGEN SATURATION: 99 %

## 2018-08-21 DIAGNOSIS — R10.9 LEFT FLANK PAIN: ICD-10-CM

## 2018-08-21 LAB
ALBUMIN SERPL-MCNC: 3.2 G/DL (ref 3.4–5)
ALBUMIN UR-MCNC: 10 MG/DL
ALP SERPL-CCNC: 59 U/L (ref 40–150)
ALT SERPL W P-5'-P-CCNC: 14 U/L (ref 0–50)
ANION GAP SERPL CALCULATED.3IONS-SCNC: 6 MMOL/L (ref 3–14)
APPEARANCE UR: CLEAR
AST SERPL W P-5'-P-CCNC: 7 U/L (ref 0–45)
BASOPHILS # BLD AUTO: 0 10E9/L (ref 0–0.2)
BASOPHILS NFR BLD AUTO: 0.2 %
BILIRUB SERPL-MCNC: 0.3 MG/DL (ref 0.2–1.3)
BILIRUB UR QL STRIP: NEGATIVE
BUN SERPL-MCNC: 10 MG/DL (ref 7–30)
CALCIUM SERPL-MCNC: 8.8 MG/DL (ref 8.5–10.1)
CHLORIDE SERPL-SCNC: 105 MMOL/L (ref 94–109)
CO2 SERPL-SCNC: 29 MMOL/L (ref 20–32)
COLOR UR AUTO: YELLOW
CREAT BLD-MCNC: 0.7 MG/DL (ref 0.52–1.04)
CREAT SERPL-MCNC: 0.81 MG/DL (ref 0.52–1.04)
DIFFERENTIAL METHOD BLD: ABNORMAL
EOSINOPHIL # BLD AUTO: 0.1 10E9/L (ref 0–0.7)
EOSINOPHIL NFR BLD AUTO: 1.2 %
ERYTHROCYTE [DISTWIDTH] IN BLOOD BY AUTOMATED COUNT: 14 % (ref 10–15)
GFR SERPL CREATININE-BSD FRML MDRD: 74 ML/MIN/1.7M2
GFR SERPL CREATININE-BSD FRML MDRD: 87 ML/MIN/1.7M2
GLUCOSE SERPL-MCNC: 125 MG/DL (ref 70–99)
GLUCOSE UR STRIP-MCNC: NEGATIVE MG/DL
HCT VFR BLD AUTO: 36.3 % (ref 35–47)
HGB BLD-MCNC: 11.4 G/DL (ref 11.7–15.7)
HGB UR QL STRIP: ABNORMAL
IMM GRANULOCYTES # BLD: 0 10E9/L (ref 0–0.4)
IMM GRANULOCYTES NFR BLD: 0.2 %
KETONES UR STRIP-MCNC: NEGATIVE MG/DL
LEUKOCYTE ESTERASE UR QL STRIP: NEGATIVE
LIPASE SERPL-CCNC: 112 U/L (ref 73–393)
LYMPHOCYTES # BLD AUTO: 2.4 10E9/L (ref 0.8–5.3)
LYMPHOCYTES NFR BLD AUTO: 41.4 %
MCH RBC QN AUTO: 26 PG (ref 26.5–33)
MCHC RBC AUTO-ENTMCNC: 31.4 G/DL (ref 31.5–36.5)
MCV RBC AUTO: 83 FL (ref 78–100)
MONOCYTES # BLD AUTO: 0.3 10E9/L (ref 0–1.3)
MONOCYTES NFR BLD AUTO: 5.7 %
MUCOUS THREADS #/AREA URNS LPF: PRESENT /LPF
NEUTROPHILS # BLD AUTO: 3 10E9/L (ref 1.6–8.3)
NEUTROPHILS NFR BLD AUTO: 51.3 %
NITRATE UR QL: NEGATIVE
NRBC # BLD AUTO: 0 10*3/UL
NRBC BLD AUTO-RTO: 0 /100
PH UR STRIP: 7.5 PH (ref 5–7)
PLATELET # BLD AUTO: 362 10E9/L (ref 150–450)
POTASSIUM SERPL-SCNC: 3.4 MMOL/L (ref 3.4–5.3)
PROT SERPL-MCNC: 8.7 G/DL (ref 6.8–8.8)
RBC # BLD AUTO: 4.39 10E12/L (ref 3.8–5.2)
RBC #/AREA URNS AUTO: 15 /HPF (ref 0–2)
SODIUM SERPL-SCNC: 140 MMOL/L (ref 133–144)
SOURCE: ABNORMAL
SP GR UR STRIP: 1.01 (ref 1–1.03)
SQUAMOUS #/AREA URNS AUTO: 1 /HPF (ref 0–1)
UROBILINOGEN UR STRIP-MCNC: NORMAL MG/DL (ref 0–2)
WBC # BLD AUTO: 5.8 10E9/L (ref 4–11)
WBC #/AREA URNS AUTO: 1 /HPF (ref 0–5)

## 2018-08-21 PROCEDURE — 81001 URINALYSIS AUTO W/SCOPE: CPT | Performed by: EMERGENCY MEDICINE

## 2018-08-21 PROCEDURE — 99285 EMERGENCY DEPT VISIT HI MDM: CPT | Mod: 25

## 2018-08-21 PROCEDURE — 25000132 ZZH RX MED GY IP 250 OP 250 PS 637: Performed by: EMERGENCY MEDICINE

## 2018-08-21 PROCEDURE — 99284 EMERGENCY DEPT VISIT MOD MDM: CPT | Mod: Z6 | Performed by: EMERGENCY MEDICINE

## 2018-08-21 PROCEDURE — 74177 CT ABD & PELVIS W/CONTRAST: CPT

## 2018-08-21 PROCEDURE — 80053 COMPREHEN METABOLIC PANEL: CPT | Performed by: EMERGENCY MEDICINE

## 2018-08-21 PROCEDURE — 25000128 H RX IP 250 OP 636: Performed by: STUDENT IN AN ORGANIZED HEALTH CARE EDUCATION/TRAINING PROGRAM

## 2018-08-21 PROCEDURE — 85025 COMPLETE CBC W/AUTO DIFF WBC: CPT | Performed by: EMERGENCY MEDICINE

## 2018-08-21 PROCEDURE — 25000125 ZZHC RX 250: Performed by: STUDENT IN AN ORGANIZED HEALTH CARE EDUCATION/TRAINING PROGRAM

## 2018-08-21 PROCEDURE — 83690 ASSAY OF LIPASE: CPT | Performed by: EMERGENCY MEDICINE

## 2018-08-21 PROCEDURE — 82565 ASSAY OF CREATININE: CPT

## 2018-08-21 RX ORDER — ACETAMINOPHEN 325 MG/1
650 TABLET ORAL ONCE
Status: COMPLETED | OUTPATIENT
Start: 2018-08-21 | End: 2018-08-21

## 2018-08-21 RX ORDER — IOPAMIDOL 755 MG/ML
128 INJECTION, SOLUTION INTRAVASCULAR ONCE
Status: COMPLETED | OUTPATIENT
Start: 2018-08-21 | End: 2018-08-21

## 2018-08-21 RX ADMIN — ACETAMINOPHEN 650 MG: 325 TABLET, FILM COATED ORAL at 11:20

## 2018-08-21 RX ADMIN — IOPAMIDOL 128 ML: 755 INJECTION, SOLUTION INTRAVENOUS at 11:10

## 2018-08-21 RX ADMIN — SODIUM CHLORIDE, PRESERVATIVE FREE 82 ML: 5 INJECTION INTRAVENOUS at 11:10

## 2018-08-21 ASSESSMENT — ENCOUNTER SYMPTOMS
CHILLS: 1
COUGH: 0
POLYDIPSIA: 0
NAUSEA: 0
NECK PAIN: 0
ABDOMINAL PAIN: 0
COLOR CHANGE: 0
FEVER: 1
BACK PAIN: 0
NECK STIFFNESS: 0
LIGHT-HEADEDNESS: 0
DIFFICULTY URINATING: 0
BRUISES/BLEEDS EASILY: 0
DYSURIA: 0
VOMITING: 0
AGITATION: 0
FLANK PAIN: 1
SHORTNESS OF BREATH: 0
ADENOPATHY: 0

## 2018-08-21 NOTE — ED AVS SNAPSHOT
[facility] [place], Emergency Department    [address] [place] [address]    Phone:  [phone]                                       Angella St   MRN: [id number]    Department:  [facility], Emergency Department   Date of Visit:  8/21/2018           After Visit Summary Signature Page     I have received my discharge instructions, and my questions have been answered. I have discussed any challenges I see with this plan with the nurse or doctor.    ..........................................................................................................................................  Patient/Patient Representative Signature      ..........................................................................................................................................  Patient Representative Print Name and Relationship to Patient    ..................................................               ................................................  Date                                            Time    ..........................................................................................................................................  Reviewed by Signature/Title    ...................................................              ..............................................  Date                                                            Time

## 2018-08-21 NOTE — ED AVS SNAPSHOT
[facility], Emergency Department    [address] [place] [address]    Phone:  [phone]                                       Angella St   MRN: [id number]    Department:  [facility], Emergency Department   Date of Visit:  8/21/2018           Patient Information     Date Of Birth          1965        Your diagnoses for this visit were:     Left flank pain        You were seen by Linh Hawkins MD.        Discharge Instructions       Please make an appointment to follow up with Urology Clinic (phone: [phone]) in 2-3 days for further evaluation and recommendations.  If your symptoms worsen please come back to the emergency department.          Discharge References/Attachments     FLANK PAIN, UNCERTAIN CAUSE (ENGLISH)      Your next 10 appointments already scheduled     Sep 14, 2018 10:00 AM CDT   US BIOPSY THYROID FINE NEEDLE ASPIRATION with UCUS3,  IMAGING NURSE, UC PROCEDURAL RAD   [facility] US ([facility])    [address]  1st Floor  [place] [address] [phone]           Bring a list of your medicines to the exam. Include vitamins, minerals and over-the-counter drugs.  Tell your doctor in advance:   If you are or may be pregnant.   If you are taking Coumadin (or any other blood thinners) 5 days prior to the exam for any special instructions.   If you are diabetic to determine if your insulin needs have to be adjusted for the exam.  IF YOUR DOCTOR ALSO PRESCRIBED SEDATION DURING THE EXAM (medicine to help you relax): You will receive separate instructions about driving, eating, and additional tests that may be necessary prior to your exam day.  Please call the Imaging Department at your exam site with any questions.            Sep 19, 2018 10:30 AM CDT   Masonic Lab Draw with  MASONIC LAB DRAW   [facility] Masonic Lab Draw ([facility])    [address]  [address]  [facility] [address]    [phone]            Sep 21, 2018  2:30 PM CDT   (Arrive by 2:15 PM)   RETURN ENDOCRINE with Amee Zee MD   [facility] Endocrinology ([facility])    [address]  3rd Floor  [facility] [address]   [phone]            Oct 24, 2018 10:00 AM CDT   Masonic Lab Draw with  MASONIC LAB DRAW   [facility] Masonic Lab Draw ([facility])    [address]  [address]  [facility] [address]   [phone]            Nov 28, 2018 10:00 AM CST   Masonic Lab Draw with UC MASONIC LAB DRAW   [facility] Masonic Lab Draw ([facility])    [address]  [address]  [facility] [address]   [phone]            Jan 02, 2019 10:00 AM CST   Masonic Lab Draw with UC MASONIC LAB DRAW   [facility] Masonic Lab Draw ([facility])    [address] [place] [address]  [address]  [facility] [address]   [phone]            Jan 07, 2019 11:00 AM CST   CT CHEST ABDOMEN PELVIS W/O & W CONTRAST with UCCT1   [facility] ([facility])    [address]  1st Floor  [facility] [address]   [phone]           Please bring any scans or X-rays taken at other hospitals, if similar tests were done. Also bring a list of your medicines, including vitamins, minerals and over-the-counter drugs. It is safest to leave personal items at home.  Be sure to tell your doctor:   If you have any allergies.   If there s any chance you are pregnant.   If you are breastfeeding.  How to prepare:   Do not eat or drink for 2 hours before your exam. If you need to take medicine, you may take it with small sips of water. (We may ask you to take liquid medicine as well.)   Please wear loose clothing, such as a sweat suit or jogging clothes. Avoid snaps, zippers and other metal. We may ask you to undress and put on a hospital gown.  Please arrive 30 minutes early for your CT. Once in the  department you might be asked to drink water 15-20 minutes prior to your exam.  If indicated you may be asked to drink an oral contrast in advance of your CT.  If this is the case, the imaging team will let you know or be in contact with you prior to your appointment  Patients over 70 or patients with diabetes or kidney problems:   If you haven t had a blood test (creatinine test) within the last 30 days, the Cardiologist/Radiologist may require you to get this test prior to your exam.  If you have diabetes:   Continue to take your metformin medication on the day of your exam  If you have any questions, please call the Imaging Department where you will have your exam.            Jan 14, 2019 12:40 PM CST   (Arrive by 12:25 PM)   Return Visit with [facility] ([facility])    [address] [place] [address]  [address]  [facility] [address] [phone]              24 Hour Appointment Hotline       To make an appointment at any [facility], call [phone] ([phone]). If you don't have a family doctor or clinic, we will help you find one. [facility] are conveniently located to serve the needs of you and your family.             Review of your medicines      Our records show that you are taking the medicines listed below. If these are incorrect, please call your family doctor or clinic.        Dose / Directions Last dose taken    amLODIPine 10 MG tablet   Commonly known as:  NORVASC   Dose:  5 mg   Quantity:  100 tablet        Take 0.5 tablets (5 mg) by mouth daily   Refills:  3        COMPRESSION STOCKINGS   Dose:  1 each   Quantity:  2 Units        1 each daily   Refills:  1        iohexol 140 MG/ML Soln solution   Commonly known as:  OMNIPAQUE   Quantity:  50 mL        Mix entire bottle (50ml) of contast with 600ml (20 ounces) of water and drink half 2 hrs prior to CT scan and half 1 hr prior to scan   Refills:  0         senna-docusate 8.6-50 MG per tablet   Commonly known as:  SENOKOT-S;PERICOLACE   Dose:  1 tablet        Take 1 tablet by mouth daily as needed for constipation   Refills:  0        TYLENOL PO   Dose:  500 mg        Take 500 mg by mouth every 6 hours as needed   Refills:  0                Procedures and tests performed during your visit     CBC with platelets differential    CT Abdomen Pelvis w Contrast    Comprehensive metabolic panel    Creatinine POCT    ISTAT creatinine  POCT    Lipase    Peripheral IV catheter    UA with Microscopic      Orders Needing Specimen Collection     None      Pending Results     No orders found from 8/19/2018 to 8/22/2018.            Pending Culture Results     No orders found from 8/19/2018 to 8/22/2018.            Pending Results Instructions     If you had any lab results that were not finalized at the time of your Discharge, you can call the ED Lab Result RN at [phone]. You will be contacted by this team for any positive Lab results or changes in treatment. The nurses are available 7 days a week from 10A to 6:30P.  You can leave a message 24 hours per day and they will return your call.        Thank you for choosing [facility]       Thank you for choosing [facility] for your care. Our goal is always to provide you with excellent care. Hearing back from our patients is one way we can continue to improve our services. Please take a few minutes to complete the written survey that you may receive in the mail after you visit with us. Thank you!        [employer] Information     [employer] gives you secure access to your electronic health record. If you see a primary care provider, you can also send messages to your care team and make appointments. If you have questions, please call your primary care clinic.  If you do not have a primary care provider, please call [phone] and they will assist you.        Care EveryWhere ID     This is your Care EveryWhere ID. This could be used by other  organizations to access your [facility] medical records  [id number]        Equal Access to Services     DORIS RODRIGUES : Sabiha Jo, saleem dela cruz, jin vasquez. So [facility] [phone].    ATENCIÓN: Si habla español, tiene a andrew disposición servicios gratuitos de asistencia lingüística. Llame al [phone].    We comply with applicable federal civil rights laws and [place] laws. We do not discriminate on the basis of race, color, national origin, age, disability, sex, sexual orientation, or gender identity.            After Visit Summary       This is your record. Keep this with you and show to your community pharmacist(s) and doctor(s) at your next visit.

## 2018-08-21 NOTE — DISCHARGE INSTRUCTIONS
Please make an appointment to follow up with Urology Clinic (phone: [phone]) in 2-3 days for further evaluation and recommendations.  If your symptoms worsen please come back to the emergency department.

## 2018-08-21 NOTE — ED TRIAGE NOTES
Pt presents from home with c/o throbbing L flank pain that radiates to the L back and started about a week ago. Pt has a hx of a L nephrectomy in May 2018. Pt also reports new intermittent L suprapubic pain above the incision. Pt reports chills and subjective fever over the past week and that both the pain and fevers have been resolved each day with tylenol.   Pt denies any changes in urine, frequency or difficulty.  Pt has not called or spoken with her primary MD or surgeon but was told by her surgeon at her last follow up in July to come to the ED for any concerns.     /72, HR 79, Sats 100% on RA, pt has not net taken her morning Norvasc but will do so now.

## 2018-08-21 NOTE — ED PROVIDER NOTES
History     Chief Complaint   Patient presents with     Flank Pain     L flank pain x1 week, May 2018 L nephrectomy     The history is provided by the patient and medical records.     Angella St is a 52 year old female with a history of CKD, HTN, obesity and anemia who presents with her daughter to the Emergency Department with complaints of left flank pain, chills and subjective fever. She reports coming to this ED 3x with the same complaints for which she was treated. The patient described the pain as throbbing and burning, mild, constant. She states she has a fever every night and notes she took Tylenol last night with relief of her symptoms. She denies vomiting or abdominal pain. She is currently not on antibiotics or steroids. She is not undergoing chemotherapy since 2015. She denied pain medications in the ED. She was recommended by her PCP to present to the ED if her symptoms worsened.    Of note, the patient is s/p left radical nephrectomy in May this year with a post-operative course of an abscess in the resection bed now s/p IR drain placement and subsequent removal. She was then readmitted 6/18/18-6/20/18 in the setting of left flank pain and chills. At that time, she underwent an IR guided aspiration of the abscess. She was discharged with an empiric antibiotic therapy (vantin) for 14 days, completed end of June.    I have reviewed the Medications, Allergies, Past Medical and Surgical History, and Social History in the [employer] system.  Past Medical History:   Diagnosis Date     Anemia      Cancer (H)     Metastatic squamous cell cervical carcinoma     Chronic kidney disease      Hypertension 2007     Obesity     BMI >30       Past Surgical History:   Procedure Laterality Date     COMBINED CYSTOSCOPY, INSERT STENT URETER(S) Left 6/13/2016    Procedure: COMBINED CYSTOSCOPY, INSERT STENT URETER(S);  Surgeon: Deja Salinas MD;  [facility]     CYSTOSCOPY, RETROGRADES, COMBINED Left 6/13/2016     Procedure: COMBINED CYSTOSCOPY, RETROGRADES;  Surgeon: Deja Salinas MD;  [facility]     DAVINCI NEPHRECTOMY Left 5/3/2018    Procedure: DAVINCI XI NEPHRECTOMY;  DAVINCI XI LEFT NEPHRECTOMY;  Surgeon: Deja Salinas MD;  [facility]     GENITOURINARY SURGERY  5/2015    PNT placement     HYSTERECTOMY  2011    [place]     PERCUTANEOUS NEPHROSTOMY Left 3/9/2017    Procedure: PERCUTANEOUS NEPHROSTOMY;  Surgeon: Bridger Meyer PA-C;  [facility]     PERCUTANEOUS NEPHROSTOMY Left 6/28/2017    Procedure: PERCUTANEOUS NEPHROSTOMY;  Left Nephrostomy Tube Change;  Surgeon: Bridger Meyer PA-C;  [facility]     PERCUTANEOUS NEPHROSTOMY Left 10/30/2017    Procedure: PERCUTANEOUS NEPHROSTOMY;  Left Nephrostomy Tube Change;  Surgeon: Maryann Chavez PA-C;  [facility]       Family History   Problem Relation Age of Onset     Hypertension Brother        Social History   Substance Use Topics     Smoking status: Never Smoker     Smokeless tobacco: Never Used     Alcohol use No      Comment: Does not drink alcohol       No current facility-administered medications for this encounter.      Current Outpatient Prescriptions   Medication     Acetaminophen (TYLENOL PO)     amLODIPine (NORVASC) 10 MG tablet     COMPRESSION STOCKINGS     iohexol (OMNIPAQUE) 140 MG/ML SOLN solution     senna-docusate (SENOKOT-S;PERICOLACE) 8.6-50 MG per tablet     Facility-Administered Medications Ordered in Other Encounters   Medication     sodium chloride (PF) 0.9% PF flush 20 mL      No Known Allergies    Review of Systems   Constitutional: Positive for chills and fever.   HENT: Negative for congestion.    Eyes: Negative for visual disturbance.   Respiratory: Negative for cough and shortness of breath.    Cardiovascular: Negative for chest pain.   Gastrointestinal: Negative for abdominal pain, nausea and vomiting.   Endocrine: Negative for polydipsia and polyuria.   Genitourinary:  "Positive for flank pain ( left). Negative for difficulty urinating and dysuria.   Musculoskeletal: Negative for back pain, neck pain and neck stiffness.   Skin: Negative for color change.   Allergic/Immunologic: Negative for immunocompromised state.   Neurological: Negative for light-headedness.   Hematological: Negative for adenopathy. Does not bruise/bleed easily.   Psychiatric/Behavioral: Negative for agitation and behavioral problems.       Physical Exam   BP: 142/72  Heart Rate: 81  Temp: 97.4  F (36.3  C)  Height: 170.2 cm (5' 7\")  Weight: 95.3 kg (210 lb)  SpO2: 100 %      Physical Exam   Constitutional: She is oriented to person, place, and time. She appears well-developed and well-nourished. No distress.   HENT:   Head: Normocephalic and atraumatic.   Mouth/Throat: Oropharynx is clear and moist. No oropharyngeal exudate.   Eyes: Conjunctivae and EOM are normal. Pupils are equal, round, and reactive to light. No scleral icterus.   Neck: Normal range of motion. Neck supple.   Cardiovascular: Normal rate, normal heart sounds and intact distal pulses.    Pulmonary/Chest: Effort normal and breath sounds normal. No respiratory distress. She has no wheezes. She has no rales.   Abdominal: Soft. Bowel sounds are normal. She exhibits no distension. There is no tenderness. There is no rebound, no guarding, no CVA tenderness, no tenderness at McBurney's point and negative Sunshine's sign.   Musculoskeletal: Normal range of motion. She exhibits no edema or tenderness.   Neurological: She is alert and oriented to person, place, and time. No cranial nerve deficit. She exhibits normal muscle tone. Coordination normal.   Skin: Skin is warm. No rash noted. She is not diaphoretic. No erythema.   No rash on left flank.   Psychiatric: She has a normal mood and affect. Her behavior is normal. Judgment and thought content normal.   Nursing note and vitals reviewed.      ED Course     ED Course     Procedures             Critical " Care time:  none             Labs Ordered and Resulted from Time of ED Arrival Up to the Time of Departure from the ED   CBC WITH PLATELETS DIFFERENTIAL - Abnormal; Notable for the following:        Result Value    Hemoglobin 11.4 (*)     MCH 26.0 (*)     MCHC 31.4 (*)     All other components within normal limits   COMPREHENSIVE METABOLIC PANEL - Abnormal; Notable for the following:     Glucose 125 (*)     Albumin 3.2 (*)     All other components within normal limits   ROUTINE UA WITH MICROSCOPIC - Abnormal; Notable for the following:     Blood Urine Small (*)     pH Urine 7.5 (*)     Protein Albumin Urine 10 (*)     RBC Urine 15 (*)     Mucous Urine Present (*)     All other components within normal limits   LIPASE   CREATININE POCT   PERIPHERAL IV CATHETER   ISTAT CREATININE NURSING POCT            Assessments & Plan (with Medical Decision Making)   52-year-old woman presenting with left flank pain.  Differential diagnosis: Postoperative abscess, postoperative hemorrhage, postoperative pain, shingles, acute diverticulitis, SBO, volvulus.    After the recent physical exam, the patient appears to be in no acute distress. I will obtain laboratory studies and CT abdomen/pelvis for further diagnostic evaluation.  Initially, the patient declined analgesics, but later asked for oral Tylenol, which I did order.    Laboratory studies returned with no leukocytosis, WBC is normal at 5800.  There is anemia, with hemoglobin of 11.4 which is better than patient's baseline.  Electrolytes show no evidence of dehydration, creatinine is normal 0.1.  LFTs and lipase are normal.  Urinalysis shows no evidence of infection, but there is some hematuria.    I reviewed the patient s CT abdomen/pelvis and I read the Radiology report; it appears that there is a further decrease in size of the postoperative abscess and left nephrectomy bed.  There is an unchanged appearance of the rim-enhancing tubular fluid structure extending from the  nephrectomy bed down to the left hemipelvis.  I do not believe the patient has any signs of worsening infection.  Her pain was well-controlled with oral Tylenol.  She was informed about these findings and she feels comfortable with the plan to be discharged and follow-up with Urology Clinic.  She was advised to return if her symptoms worsen and she agrees with this plan.      This part of the document was transcribed by Raciel Rosa, Medical Scribe.     I have reviewed the nursing notes.    I have reviewed the findings, diagnosis, plan and need for follow up with the patient.    Discharge Medication List as of 8/21/2018 11:59 AM          Final diagnoses:   Left flank pain   INajma, am serving as a trained medical scribe to document services personally performed by Linh Hawkins MD, based on the provider's statements to me.   Linh RICHARDSON MD, was physically present and have reviewed and verified the accuracy of this note documented by Najma Glasgow.      8/21/2018   [facility], [place], EMERGENCY DEPARTMENT     Linh Hawkins MD  08/21/18 0332

## 2018-09-18 ENCOUNTER — TELEPHONE (OUTPATIENT)
Dept: ENDOCRINOLOGY | Facility: CLINIC | Age: 53
End: 2018-09-18

## 2018-09-18 NOTE — TELEPHONE ENCOUNTER
No insurance to cover  9/21/18 appointment with Dr Zee. Non Urgent so  cancelled and rescheduled for  6 months out when she will have better coverage.

## 2018-09-18 NOTE — TELEPHONE ENCOUNTER
----- Message from Amee Zee MD sent at 9/17/2018  4:39 PM CDT -----  Regarding: RE: IS Fridays appt URGENT   No urgent - she can cancel the appt and move it out about 6 months (hopefully she will have insurance by then)    ----- Message -----     From: Viktoriya Shukla RN     Sent: 9/17/2018  10:05 AM       To: Amee Zee MD  Subject: IS Fridays appt URGENT                           She has only Emergency care insurance  now. Is coming in Friday to see you  Urgent? If not it should be cancelled She will have to pay %     Oriented - self; Oriented - place; Oriented - time

## 2018-09-26 PROCEDURE — 96523 IRRIG DRUG DELIVERY DEVICE: CPT

## 2018-09-26 PROCEDURE — 25000128 H RX IP 250 OP 636: Performed by: OBSTETRICS & GYNECOLOGY

## 2018-09-26 RX ORDER — HEPARIN SODIUM (PORCINE) LOCK FLUSH IV SOLN 100 UNIT/ML 100 UNIT/ML
5 SOLUTION INTRAVENOUS ONCE
Status: COMPLETED | OUTPATIENT
Start: 2018-09-26 | End: 2018-09-26

## 2018-09-26 RX ADMIN — Medication 5 ML: at 09:49

## 2018-09-26 NOTE — NURSING NOTE
"Chief Complaint   Patient presents with     Port Flush     port accessed and flushed by rn.       Port accessed with 20g 3/4\" gripper needle by RN.  Port flushed with saline and heparin.  Port de-accessed.    Patricia Mathews RN    "

## 2018-11-02 PROCEDURE — 96523 IRRIG DRUG DELIVERY DEVICE: CPT

## 2018-11-02 PROCEDURE — 25000128 H RX IP 250 OP 636: Performed by: OBSTETRICS & GYNECOLOGY

## 2018-11-02 RX ORDER — HEPARIN SODIUM (PORCINE) LOCK FLUSH IV SOLN 100 UNIT/ML 100 UNIT/ML
5 SOLUTION INTRAVENOUS ONCE
Status: COMPLETED | OUTPATIENT
Start: 2018-11-02 | End: 2018-11-02

## 2018-11-02 RX ADMIN — Medication 5 ML: at 10:25

## 2018-11-02 NOTE — NURSING NOTE
Chief Complaint   Patient presents with     Port Flush     port flushed by RN     There were no vitals taken for this visit.    Port accessed by RN. Line flushed with NS & Heparin. Pt tolerated well.     Destiny Hernandez

## 2018-11-28 PROCEDURE — 96523 IRRIG DRUG DELIVERY DEVICE: CPT

## 2018-11-28 PROCEDURE — 25000128 H RX IP 250 OP 636: Performed by: OBSTETRICS & GYNECOLOGY

## 2018-11-28 RX ORDER — HEPARIN SODIUM (PORCINE) LOCK FLUSH IV SOLN 100 UNIT/ML 100 UNIT/ML
5 SOLUTION INTRAVENOUS
Status: COMPLETED | OUTPATIENT
Start: 2018-11-28 | End: 2018-11-28

## 2018-11-28 RX ADMIN — SODIUM CHLORIDE, PRESERVATIVE FREE 5 ML: 5 INJECTION INTRAVENOUS at 10:14

## 2018-11-28 NOTE — NURSING NOTE
"Chief Complaint   Patient presents with     Port Flush     port flushed by rn.     Port accessed with 20 gauge 3/4\" gripper needle by rn.  Good blood return noted and port flushed with NS and heparin.  Pt tolerated well.   Yuliya Abraham RN      "

## 2019-01-07 ENCOUNTER — ANCILLARY PROCEDURE (OUTPATIENT)
Dept: CT IMAGING | Facility: CLINIC | Age: 54
End: 2019-01-07
Attending: OBSTETRICS & GYNECOLOGY
Payer: MEDICAID

## 2019-01-07 DIAGNOSIS — C53.9 CERVICAL CANCER (H): ICD-10-CM

## 2019-01-07 RX ORDER — IOPAMIDOL 755 MG/ML
128 INJECTION, SOLUTION INTRAVASCULAR ONCE
Status: COMPLETED | OUTPATIENT
Start: 2019-01-07 | End: 2019-01-07

## 2019-01-07 RX ORDER — HEPARIN SODIUM (PORCINE) LOCK FLUSH IV SOLN 100 UNIT/ML 100 UNIT/ML
500 SOLUTION INTRAVENOUS ONCE
Status: COMPLETED | OUTPATIENT
Start: 2019-01-07 | End: 2019-01-07

## 2019-01-07 RX ADMIN — HEPARIN SODIUM (PORCINE) LOCK FLUSH IV SOLN 100 UNIT/ML 500 UNITS: 100 SOLUTION at 09:04

## 2019-01-07 RX ADMIN — IOPAMIDOL 128 ML: 755 INJECTION, SOLUTION INTRAVASCULAR at 08:51

## 2019-01-07 NOTE — DISCHARGE INSTRUCTIONS

## 2019-01-14 ENCOUNTER — ONCOLOGY VISIT (OUTPATIENT)
Dept: ONCOLOGY | Facility: CLINIC | Age: 54
End: 2019-01-14
Attending: NURSE PRACTITIONER
Payer: MEDICAID

## 2019-01-14 VITALS
WEIGHT: 207.2 LBS | SYSTOLIC BLOOD PRESSURE: 132 MMHG | BODY MASS INDEX: 32.45 KG/M2 | OXYGEN SATURATION: 99 % | TEMPERATURE: 98.1 F | HEART RATE: 81 BPM | DIASTOLIC BLOOD PRESSURE: 84 MMHG

## 2019-01-14 DIAGNOSIS — Z08 ENCOUNTER FOR FOLLOW-UP SURVEILLANCE OF CERVICAL CANCER: Primary | ICD-10-CM

## 2019-01-14 DIAGNOSIS — Z95.828 PORT-A-CATH IN PLACE: ICD-10-CM

## 2019-01-14 DIAGNOSIS — Z85.41 ENCOUNTER FOR FOLLOW-UP SURVEILLANCE OF CERVICAL CANCER: Primary | ICD-10-CM

## 2019-01-14 PROCEDURE — G0145 SCR C/V CYTO,THINLAYER,RESCR: HCPCS | Performed by: NURSE PRACTITIONER

## 2019-01-14 PROCEDURE — 99214 OFFICE O/P EST MOD 30 MIN: CPT | Mod: ZP | Performed by: NURSE PRACTITIONER

## 2019-01-14 PROCEDURE — G0463 HOSPITAL OUTPT CLINIC VISIT: HCPCS | Mod: ZF,25

## 2019-01-14 PROCEDURE — G0008 ADMIN INFLUENZA VIRUS VAC: HCPCS

## 2019-01-14 PROCEDURE — 25000128 H RX IP 250 OP 636: Mod: ZF | Performed by: NURSE PRACTITIONER

## 2019-01-14 PROCEDURE — 90686 IIV4 VACC NO PRSV 0.5 ML IM: CPT | Mod: ZF | Performed by: NURSE PRACTITIONER

## 2019-01-14 RX ADMIN — INFLUENZA A VIRUS A/MICHIGAN/45/2015 X-275 (H1N1) ANTIGEN (FORMALDEHYDE INACTIVATED), INFLUENZA A VIRUS A/SINGAPORE/INFIMH-16-0019/2016 IVR-186 (H3N2) ANTIGEN (FORMALDEHYDE INACTIVATED), INFLUENZA B VIRUS B/PHUKET/3073/2013 ANTIGEN (FORMALDEHYDE INACTIVATED), AND INFLUENZA B VIRUS B/MARYLAND/15/2016 BX-69A ANTIGEN (FORMALDEHYDE INACTIVATED) 0.5 ML: 15; 15; 15; 15 INJECTION, SUSPENSION INTRAMUSCULAR at 12:53

## 2019-01-14 ASSESSMENT — PAIN SCALES - GENERAL: PAINLEVEL: NO PAIN (0)

## 2019-01-14 NOTE — LETTER
2019       RE: Angella St  C/o [address] Sherman  [address]  [place] [address]     Dear Colleague,    Thank you for referring your patient, Angella St, to the [facility]. Please see a copy of my visit note below.    Gynecologic Oncology Follow-Up Visit    RE: Angella St  MRN: [id number]  : 1965  Date of Visit: 2019    CC: Angella St  is a 53 year old female with a history of recurrent cervical cancer. She completed treatment with nine cycles of cisplatin/paclitaxel/bevacizumab on 11/27/15. She presents today for a six month surveillance visit.    HPI: Angella comes to the clinic feeling well without gynecologic concerns. She has noticed mild intermittent LLQ cramping the past three days relieved with Tylenol but no fevers or chills, states this does not feel similar to her previous issues with abscess in her left nephrectomy bed. Not sexually active. Denies pelvic pain or vaginal bleeding. Up to date on seeing her PCP and mammogram, due for a colonoscopy. She would like a flu shot today. She also would like her port out- has had in for over three years, getting this flushed every month.    Oncology History:  : Treated for cervical cancer with hysterectomy in [facility]. No adjuvant treatment.  4/24/15: Presented to [facility] in [place] and had CT A/P that showed severe left hydronephrosis with moderate left hydroureter secondary to a left pelvic mass (4.9 x 4.8 cm x 3.8 cm) 4/26/15: [facility] Lasix renogram that showed good perfusion to the right kidney. No significant perfusion on the left.  4/28/15: CT guided peritoneal biopsy of the left hemipelvic mass that showed metastatic squamous cell carcinoma, consistent with cervical primary   5/1/15: Presented to the [facility] ED with left/central pelvic pain and dizziness present since last . She was seen by OBGYN who recommended GYN ONC referral.     5/6/15: CT guided biopsy  of pelvic mass again reveals squamous cell carcinoma.  5/11/15: Admitted to [facility] medicine service with abdominal pain, seen by urology for obstruction to the left urinary system. CT A/P showed marked left hydronephrosis with cortical thinning in the left kidney, consistent with chronic obstruction. IR placed 8 Urdu nephroureteral tube at [facility]. She was discharged to home with plan to follow up with GYN ONC and [facility]  5/20/15: admitted to [facility] gyn/onc service for control of abdominal pain. Treated for enterococcus/acinetobacter UTI.   6/3/2015-7/17/15: Cycles #1-3 Cisplatin/Taxol/Avastin  8/7/15: CT CAP: solid mass in left pelvic sidewall, decreased in size from prior study. The previously identified hypermetabolic hypodense liver lesion is also not identified on this study, likely due to treatment response. Unchanged left paraaortic lymph node. No new pulmonary nodules. Atrophic left kidney. Multinodular enlarged thyroid.   8/10/15-9/22/15: Cycles #4-6 Cisplatin/Taxol/Avastin  10/9/15: CT cap IMPRESSION:   1. Stable 3 mm right upper lobe nodule, recommend continued attention on followup.  2. Stable left pelvic sidewall mass measuring up to 2.9 cm.   3. 4 mm hypodensity in the right lobe of the liver which is too small to further characterize and indeterminate. Continued attention on followup is needed.  4. Stable atrophic appearance of the left kidney with percutaneous nephrostomy tube in place. Decreased enhancement in comparison with the contralateral kidney suggests some degree of renal dysfunction. Perinephric inflammatory changes are again seen, potentially indicating infection versus inflammatory response.  5. Diffusely enlarged thyroid with multiple hypoattenuating nodules.      Plan: 3 more cycles Cisplatin/Taxol/Avastin based on  and then repeat PET.      10/14/15: Cycle #7 Cisplatin/Taxol/Avastin.   11/6/15: Cycle #8 Cisplatin/Taxol/Avastin.  11/23/15: PNT exchange.  11/27/15:  Cycle #9 Cisplatin/Taxol/Avastin.     Past Medical History:   Diagnosis Date     Anemia      Cancer (H)     Metastatic squamous cell cervical carcinoma     Chronic kidney disease      Hypertension 2007     Obesity     BMI >30       Past Surgical History:   Procedure Laterality Date     COMBINED CYSTOSCOPY, INSERT STENT URETER(S) Left 6/13/2016    Procedure: COMBINED CYSTOSCOPY, INSERT STENT URETER(S);  Surgeon: Deja Salinas MD;  [facility]     CYSTOSCOPY, RETROGRADES, COMBINED Left 6/13/2016    Procedure: COMBINED CYSTOSCOPY, RETROGRADES;  Surgeon: Deja Salinas MD;  [facility]     DAVINCI NEPHRECTOMY Left 5/3/2018    Procedure: DAVINCI XI NEPHRECTOMY;  DAVINCI XI LEFT NEPHRECTOMY;  Surgeon: Deja Salinas MD;  [facility]     GENITOURINARY SURGERY  5/2015    PNT placement     HYSTERECTOMY  2011    [place], [place]     PERCUTANEOUS NEPHROSTOMY Left 3/9/2017    Procedure: PERCUTANEOUS NEPHROSTOMY;  Surgeon: Bridger Meyer PA-C;  [facility]     PERCUTANEOUS NEPHROSTOMY Left 6/28/2017    Procedure: PERCUTANEOUS NEPHROSTOMY;  Left Nephrostomy Tube Change;  Surgeon: Bridger Meyer PA-C;  [facility]     PERCUTANEOUS NEPHROSTOMY Left 10/30/2017    Procedure: PERCUTANEOUS NEPHROSTOMY;  Left Nephrostomy Tube Change;  Surgeon: Maryann Chavez PA-C;  [facility]       Social History     Socioeconomic History     Marital status:      Spouse name: Not on file     Number of children: 3     Years of education: Not on file     Highest education level: Not on file   Social Needs     Financial resource strain: Not on file     Food insecurity - worry: Not on file     Food insecurity - inability: Not on file     Transportation needs - medical: Not on file     Transportation needs - non-medical: Not on file   Occupational History     Occupation: no working   Tobacco Use     Smoking status: Never Smoker     Smokeless tobacco: Never Used    Substance and Sexual Activity     Alcohol use: No     Alcohol/week: 0.0 oz     Comment: Does not drink alcohol     Drug use: No     Comment: No drug use     Sexual activity: Not Currently   Other Topics Concern     Parent/sibling w/ CABG, MI or angioplasty before 65F 55M? Not Asked   Social History Narrative    Mom  from [place] in her 40's    Dad  in his 60's presumed to be from hunger as a result of the on going war in [place] at the time.    Patient has a brother and a sister who are alive and well.      Patient has three children:  Female age 37 alive and well; Female age 35 alive and well; Female age 33 alive and well    Patient reports  is in [place].       Family History   Problem Relation Age of Onset     Hypertension Brother        Current Outpatient Medications   Medication     Acetaminophen (TYLENOL PO)     amLODIPine (NORVASC) 10 MG tablet     iohexol (OMNIPAQUE) 140 MG/ML SOLN solution     COMPRESSION STOCKINGS     senna-docusate (SENOKOT-S;PERICOLACE) 8.6-50 MG per tablet     No current facility-administered medications for this visit.         No Known Allergies    OBJECTIVE:    Physical Exam:    /84   Pulse 81   Temp 98.1  F (36.7  C) (Oral)   Wt 94 kg (207 lb 3.2 oz)   SpO2 99%   BMI 32.45 kg/m       CONSTITUTIONAL: Alert non-toxic appearing female in no acute distress  HEAD: Normocephalic, atraumatic  NECK: Neck supple without lymphadenopathy  RESPIRATORY: Lungs clear to auscultation, no increased work of breathing noted  CV: Regular rate and rhythm, S1S2, no clicks, murmurs, rubs, or gallops; bilateral lower extremities without edema, dorsalis pedis pulses 2+ bilaterally  GASTROINTESTINAL: Normoactive bowel sounds x4 quadrants, abdomen soft, non-distended, and non-tender to palpation without masses or organomegaly  GENITOURINARY: External genitalia and urethral meatus pink without lesions, masses, or excoriation. Vagina pink and smooth without masses or lesions. Vaginal  cuff without nodularity, masses, or lesions. Cervix surgically absent. Small amount thin white physiologic discharge. Vaginal Pap obtained. Bimanual exam reveals no masses or fullness. Rectovaginal exam confirms these findings.  LYMPHATIC: Cervical, supraclavicular, and inguinal nodes without lymphadenopathy  MUSCULOSKELETAL: Moves all extremities, no obvious muscle wasting  NEUROLOGIC: No gross deficits, normal gait  SKIN: Appropriate color for race, warm and dry, no rashes or lesions to unclothed skin  PSYCHIATRIC: Pleasant and interactive, affect bright, makes appropriate eye contact, thought process linear      Data:  In this patient with history of cervical cancer, status  post hysterectomy and chemotherapy:  1.  Left nephrectomy. Small residual rim-enhancing collection in left  nephrectomy bed with decreased surrounding fat stranding.  Attention  on follow-up.  2.  Presumed chronic hydroureter on the left is not significantly  changed.  3. Hysterectomy. Stable appearance of the vaginal cuff. Trace fluid in  the deep pelvis, decreased from prior.  4. Stable subpleural nodules in the lung bases.  No new pulmonary  nodule.    Assessment/Plan:  1) Cervical cancer: No signs or symptoms of recurrence on history, exam, or imaging. Reviewed CT with Angella. Continue surveillance every six months x3 years (through 11/2020) then annually thereafter with Pap and pelvic/rectal exam. Reviewed signs and symptoms  of recurrence including but not limited to bleeding from vagina, bladder, or rectum, bloating, early satiety, swelling in the lower extremities, abdominal or lower back pain, changes in bowel or bladder patterns, shortness of breath, increased fatigue, unexplained weight loss, and night sweats. Reviewed signs and symptoms for when she should contact the clinic or seek additional care. Patient to contact the clinic with any questions or concerns in the interim. Monitor LLQ cramping- CT shows that rim enhancing  collection in L nephrectomy bed is stable and that fat stranding is decreased, but she should be seen if she starts to have fevers, chills, or worsening pain.   2) Health maintenance issues discussed today include to follow up with PCP for co-morbid conditions and non-gynecologic issues. To discuss colonoscopy with PCP. Influenza vaccine today. IR port removal ordered- three years out from chemotherapy.  3) Patient verbalized understanding of and agreement with plan.    A total of 20 minutes of face to face time spent with patient, over 50% of which was spent in counseling and coordination of care.    BARBARA Bates, FNP-C  Division of Gynecologic Oncology  [facility]  Pager: [phone]

## 2019-01-14 NOTE — NURSING NOTE
"Oncology Rooming Note    January 14, 2019 11:48 AM   Angella St is a 53 year old female who presents for:    Chief Complaint   Patient presents with     Oncology Clinic Visit     Return; Cervical CA     Initial Vitals: /84   Pulse 81   Temp 98.1  F (36.7  C) (Oral)   Wt 94 kg (207 lb 3.2 oz)   SpO2 99%   BMI 32.45 kg/m   Estimated body mass index is 32.45 kg/m  as calculated from the following:    Height as of 8/21/18: 1.702 m (5' 7\").    Weight as of this encounter: 94 kg (207 lb 3.2 oz). Body surface area is 2.11 meters squared.  No Pain (0) Comment: Data Unavailable   No LMP recorded. Patient has had a hysterectomy.  Allergies reviewed: Yes  Medications reviewed: Yes    Medications: MEDICATION REFILLS NEEDED TODAY. Provider was notified.  Pharmacy name entered into [employer]: [facility] [place], [place] - [address] [place] [address] [phone]    Clinical concerns: Patient requests a refill of amlodipine today. She is also interested in having a flu shot today. Patient is having intermittent cramping in her left lower abdominal area. At its worst, it can be as high as 4-5 out of 10 on the pain scale. She uses heat to relieve the pain, which does help. Patient does say that having BM helps relieve the pain sometimes.   Adriana was notified.    8 minutes for nursing intake (face to face time)     Ila Cordon MA              "

## 2019-01-14 NOTE — NURSING NOTE
[address]ella St      1.  Has the patient received the information for the influenza vaccine? YES    2.  Does the patient have any of the following contraindications?     Allergy to eggs? No     Allergic reaction to previous influenza vaccines? No     Any other problems to previous influenza vaccines? No     Paralyzed by Guillain-Spring syndrome? No     Currently pregnant? NO     Current moderate or severe illness? No     Allergy to contact lens solution? No    3.  The vaccine has been administered in the usual fashion and the patient was instructed to wait 20 minutes before leaving the building in the event of an allergic reaction: YES    Vaccination given by Joleen Connelly CMA (AAMA)    Recorded by Joleen Connelly

## 2019-01-14 NOTE — PROGRESS NOTES
Gynecologic Oncology Follow-Up Visit    RE: Angella St  MRN: [id number]  : 1965  Date of Visit: 2019    CC: Angella St  is a 53 year old female with a history of recurrent cervical cancer. She completed treatment with nine cycles of cisplatin/paclitaxel/bevacizumab on 11/27/15. She presents today for a six month surveillance visit.    HPI: Angella comes to the clinic feeling well without gynecologic concerns. She has noticed mild intermittent LLQ cramping the past three days relieved with Tylenol but no fevers or chills, states this does not feel similar to her previous issues with abscess in her left nephrectomy bed. Not sexually active. Denies pelvic pain or vaginal bleeding. Up to date on seeing her PCP and mammogram, due for a colonoscopy. She would like a flu shot today. She also would like her port out- has had in for over three years, getting this flushed every month.    Oncology History:  : Treated for cervical cancer with hysterectomy in [facility]. No adjuvant treatment.  4/24/15: Presented to [facility] in [place] and had CT A/P that showed severe left hydronephrosis with moderate left hydroureter secondary to a left pelvic mass (4.9 x 4.8 cm x 3.8 cm) 4/26/15: [facility] renogram that showed good perfusion to the right kidney. No significant perfusion on the left.  4/28/15: CT guided peritoneal biopsy of the left hemipelvic mass that showed metastatic squamous cell carcinoma, consistent with cervical primary   5/1/15: Presented to the [facility] ED with left/central pelvic pain and dizziness present since last . She was seen by OBGYN who recommended GYN ONC referral.     5/6/15: CT guided biopsy of pelvic mass again reveals squamous cell carcinoma.  5/11/15: Admitted to [facility] medicine service with abdominal pain, seen by urology for obstruction to the left urinary system. CT A/P showed marked left hydronephrosis with cortical thinning in the left  kidney, consistent with chronic obstruction. IR placed 8 [place] nephroureteral tube at [facility]. She was discharged to home with plan to follow up with GYN ONC and urology at the [facility].  5/20/15: admitted to [facility] gyn/onc service for control of abdominal pain. Treated for enterococcus/acinetobacter UTI.   6/3/2015-7/17/15: Cycles #1-3 Cisplatin/Taxol/Avastin  8/7/15: CT CAP: solid mass in left pelvic sidewall, decreased in size from prior study. The previously identified hypermetabolic hypodense liver lesion is also not identified on this study, likely due to treatment response. Unchanged left paraaortic lymph node. No new pulmonary nodules. Atrophic left kidney. Multinodular enlarged thyroid.   8/10/15-9/22/15: Cycles #4-6 Cisplatin/Taxol/Avastin  10/9/15: CT cap IMPRESSION:   1. Stable 3 mm right upper lobe nodule, recommend continued attention on followup.  2. Stable left pelvic sidewall mass measuring up to 2.9 cm.   3. 4 mm hypodensity in the right lobe of the liver which is too small to further characterize and indeterminate. Continued attention on followup is needed.  4. Stable atrophic appearance of the left kidney with percutaneous nephrostomy tube in place. Decreased enhancement in comparison with the contralateral kidney suggests some degree of renal dysfunction. Perinephric inflammatory changes are again seen, potentially indicating infection versus inflammatory response.  5. Diffusely enlarged thyroid with multiple hypoattenuating nodules.      Plan: 3 more cycles Cisplatin/Taxol/Avastin based on  and then repeat PET.      10/14/15: Cycle #7 Cisplatin/Taxol/Avastin.   11/6/15: Cycle #8 Cisplatin/Taxol/Avastin.  11/23/15: PNT exchange.  11/27/15: Cycle #9 Cisplatin/Taxol/Avastin.     Past Medical History:   Diagnosis Date     Anemia      Cancer (H)     Metastatic squamous cell cervical carcinoma     Chronic kidney disease      Hypertension 2007     Obesity     BMI >30       Past Surgical History:    Procedure Laterality Date     COMBINED CYSTOSCOPY, INSERT STENT URETER(S) Left 6/13/2016    Procedure: COMBINED CYSTOSCOPY, INSERT STENT URETER(S);  Surgeon: Deja Salinas MD;  Location: [facility] OR     CYSTOSCOPY, RETROGRADES, COMBINED Left 6/13/2016    Procedure: COMBINED CYSTOSCOPY, RETROGRADES;  Surgeon: Deja Salinas MD;  Location: [facility] OR     DAVINCI NEPHRECTOMY Left 5/3/2018    Procedure: DAVINCI XI NEPHRECTOMY;  DAVINCI XI LEFT NEPHRECTOMY;  Surgeon: Deja Salinas MD;  Location: [facility] OR     GENITOURINARY SURGERY  5/2015    PNT placement     HYSTERECTOMY  2011    [place]     PERCUTANEOUS NEPHROSTOMY Left 3/9/2017    Procedure: PERCUTANEOUS NEPHROSTOMY;  Surgeon: Bridger Meyer PA-C;  Location: [facility]     PERCUTANEOUS NEPHROSTOMY Left 6/28/2017    Procedure: PERCUTANEOUS NEPHROSTOMY;  Left Nephrostomy Tube Change;  Surgeon: Bridger Meyer PA-C;  Location: [facility]     PERCUTANEOUS NEPHROSTOMY Left 10/30/2017    Procedure: PERCUTANEOUS NEPHROSTOMY;  Left Nephrostomy Tube Change;  Surgeon: Maryann Chavez PA-C;  Location: [facility] OR       Social History     Socioeconomic History     Marital status:      Spouse name: Not on file     Number of children: 3     Years of education: Not on file     Highest education level: Not on file   Social Needs     Financial resource strain: Not on file     Food insecurity - worry: Not on file     Food insecurity - inability: Not on file     Transportation needs - medical: Not on file     Transportation needs - non-medical: Not on file   Occupational History     Occupation: no working   Tobacco Use     Smoking status: Never Smoker     Smokeless tobacco: Never Used   Substance and Sexual Activity     Alcohol use: No     Alcohol/week: 0.0 oz     Comment: Does not drink alcohol     Drug use: No     Comment: No drug use     Sexual activity: Not Currently   Other Topics Concern     Parent/sibling w/ CABG, MI or angioplasty before  65F 55M? Not Asked   Social History Narrative    Mom  from [place] in her 40's    Dad  in his 60's presumed to be from hunger as a result of the on going war in [facility] at the time.    Patient has a brother and a sister who are alive and well.      Patient has three children:  Female age 37 alive and well; Female age 35 alive and well; Female age 33 alive and well    Patient reports  is in [facility].       Family History   Problem Relation Age of Onset     Hypertension Brother        Current Outpatient Medications   Medication     Acetaminophen (TYLENOL PO)     amLODIPine (NORVASC) 10 MG tablet     iohexol (OMNIPAQUE) 140 MG/ML SOLN solution     COMPRESSION STOCKINGS     senna-docusate (SENOKOT-S;PERICOLACE) 8.6-50 MG per tablet     No current facility-administered medications for this visit.         No Known Allergies    Review of Systems  General: Denies fatigue, changes in weight, weakness, appetite changes, night sweats, hot flashes, fever, chills, or difficulty sleeping  HEENT: Denies headaches, hair loss, visual difficulty or disturbances, masses, head injury, tinnitus, hearing loss, epistaxis, congestion, problems with teeth or gums, dysphonia, or dysphagia  Pulmonary: Denies cough, sputum, hemoptysis, shortness of breath, dyspnea on exertion, wheezing, or allergies  Cardiovascular: + HTN. Denies chest pain, fainting, palpitations, murmurs, activity intolerance, swelling in legs  Gastrointestinal: + LLQ cramping. Denies nausea, vomiting, constipation, diarrhea, bloating, heartburn, melena, hematochezia, or jaundice  Genitourinary: Denies dysuria, urinary urgency or frequency, hematuria, cloudy or malodorous urine, incontinence, repeat urinary tract infections, flank pain, pelvic pain, vaginal bleeding, vaginal discharge, or vaginal dryness  Sexual Function: Denies pain with intercourse, changes in libido, or changes in orgasm  Integumentary: Denies rashes, sores, changing moles, or  scarring  Hematologic: Denies swollen lymph nodes, masses, easy bruising, or easy bleeding  Musculoskeletal: Denies falls, back pain, myalgias, arthralgias, stiffness, muscle weakness, or muscle cramps  Neurologic: Denies numbness or tingling, changes in memory, difficulty with walking, dizziness, seizures, or tremors  Psychiatric: Denies anxiety, depression, mood changes, suicidal thoughts, or difficulty concentrating  Endocrine: Denies polydipsia, polyuria, temperature intolerance, or history of thyroid disease      OBJECTIVE:    Physical Exam:    /84   Pulse 81   Temp 98.1  F (36.7  C) (Oral)   Wt 94 kg (207 lb 3.2 oz)   SpO2 99%   BMI 32.45 kg/m      CONSTITUTIONAL: Alert non-toxic appearing female in no acute distress  HEAD: Normocephalic, atraumatic  NECK: Neck supple without lymphadenopathy  RESPIRATORY: Lungs clear to auscultation, no increased work of breathing noted  CV: Regular rate and rhythm, S1S2, no clicks, murmurs, rubs, or gallops; bilateral lower extremities without edema, dorsalis pedis pulses 2+ bilaterally  GASTROINTESTINAL: Normoactive bowel sounds x4 quadrants, abdomen soft, non-distended, and non-tender to palpation without masses or organomegaly  GENITOURINARY: External genitalia and urethral meatus pink without lesions, masses, or excoriation. Vagina pink and smooth without masses or lesions. Vaginal cuff without nodularity, masses, or lesions. Cervix surgically absent. Small amount thin white physiologic discharge. Vaginal Pap obtained. Bimanual exam reveals no masses or fullness. Rectovaginal exam confirms these findings.  LYMPHATIC: Cervical, supraclavicular, and inguinal nodes without lymphadenopathy  MUSCULOSKELETAL: Moves all extremities, no obvious muscle wasting  NEUROLOGIC: No gross deficits, normal gait  SKIN: Appropriate color for race, warm and dry, no rashes or lesions to unclothed skin  PSYCHIATRIC: Pleasant and interactive, affect bright, makes appropriate eye  contact, thought process linear      Data:  In this patient with history of cervical cancer, status  post hysterectomy and chemotherapy:  1.  Left nephrectomy. Small residual rim-enhancing collection in left  nephrectomy bed with decreased surrounding fat stranding.  Attention  on follow-up.  2.  Presumed chronic hydroureter on the left is not significantly  changed.  3. Hysterectomy. Stable appearance of the vaginal cuff. Trace fluid in  the deep pelvis, decreased from prior.  4. Stable subpleural nodules in the lung bases.  No new pulmonary  nodule.    Assessment/Plan:  1) Cervical cancer: No signs or symptoms of recurrence on history, exam, or imaging. Reviewed CT with Angella. Continue surveillance every six months x3 years (through 11/2020) then annually thereafter with Pap and pelvic/rectal exam. Reviewed signs and symptoms  of recurrence including but not limited to bleeding from vagina, bladder, or rectum, bloating, early satiety, swelling in the lower extremities, abdominal or lower back pain, changes in bowel or bladder patterns, shortness of breath, increased fatigue, unexplained weight loss, and night sweats. Reviewed signs and symptoms for when she should contact the clinic or seek additional care. Patient to contact the clinic with any questions or concerns in the interim. Monitor LLQ cramping- CT shows that rim enhancing collection in L nephrectomy bed is stable and that fat stranding is decreased, but she should be seen if she starts to have fevers, chills, or worsening pain.   2) Health maintenance issues discussed today include to follow up with PCP for co-morbid conditions and non-gynecologic issues. To discuss colonoscopy with PCP. Influenza vaccine today. IR port removal ordered- three years out from chemotherapy.  3) Patient verbalized understanding of and agreement with plan.    A total of 20 minutes of face to face time spent with patient, over 50% of which was spent in counseling and  coordination of care.    BARBARA Bates, FNP-C  Division of Gynecologic Oncology  [facility]  Pager: [phone]

## 2019-01-17 LAB
COPATH REPORT: NORMAL
PAP: NORMAL

## 2019-01-23 ENCOUNTER — TELEPHONE (OUTPATIENT)
Dept: ONCOLOGY | Facility: CLINIC | Age: 54
End: 2019-01-23

## 2019-01-28 ENCOUNTER — ANESTHESIA EVENT (OUTPATIENT)
Dept: SURGERY | Facility: AMBULATORY SURGERY CENTER | Age: 54
End: 2019-01-28

## 2019-01-29 ENCOUNTER — HOSPITAL ENCOUNTER (OUTPATIENT)
Facility: AMBULATORY SURGERY CENTER | Age: 54
End: 2019-01-29
Attending: PHYSICIAN ASSISTANT
Payer: MEDICAID

## 2019-01-29 ENCOUNTER — ANESTHESIA (OUTPATIENT)
Dept: SURGERY | Facility: AMBULATORY SURGERY CENTER | Age: 54
End: 2019-01-29

## 2019-01-29 ENCOUNTER — ANCILLARY PROCEDURE (OUTPATIENT)
Dept: RADIOLOGY | Facility: AMBULATORY SURGERY CENTER | Age: 54
End: 2019-01-29
Payer: MEDICAID

## 2019-01-29 VITALS
TEMPERATURE: 97.7 F | HEIGHT: 67 IN | OXYGEN SATURATION: 99 % | HEART RATE: 60 BPM | DIASTOLIC BLOOD PRESSURE: 84 MMHG | BODY MASS INDEX: 32.54 KG/M2 | SYSTOLIC BLOOD PRESSURE: 148 MMHG | RESPIRATION RATE: 16 BRPM | WEIGHT: 207.3 LBS

## 2019-01-29 DIAGNOSIS — Z95.828 PORT-A-CATH IN PLACE: ICD-10-CM

## 2019-01-29 LAB
ERYTHROCYTE [DISTWIDTH] IN BLOOD BY AUTOMATED COUNT: 13.7 % (ref 10–15)
HCT VFR BLD AUTO: 37.1 % (ref 35–47)
HGB BLD-MCNC: 11.5 G/DL (ref 11.7–15.7)
INR PPP: 0.97 (ref 0.86–1.14)
MCH RBC QN AUTO: 25.3 PG (ref 26.5–33)
MCHC RBC AUTO-ENTMCNC: 31 G/DL (ref 31.5–36.5)
MCV RBC AUTO: 82 FL (ref 78–100)
PLATELET # BLD AUTO: 236 10E9/L (ref 150–450)
RBC # BLD AUTO: 4.54 10E12/L (ref 3.8–5.2)
WBC # BLD AUTO: 5.4 10E9/L (ref 4–11)

## 2019-01-29 RX ORDER — OXYCODONE HYDROCHLORIDE 5 MG/1
5 TABLET ORAL EVERY 4 HOURS PRN
Status: DISCONTINUED | OUTPATIENT
Start: 2019-01-29 | End: 2019-01-30 | Stop reason: HOSPADM

## 2019-01-29 RX ORDER — NALOXONE HYDROCHLORIDE 0.4 MG/ML
.1-.4 INJECTION, SOLUTION INTRAMUSCULAR; INTRAVENOUS; SUBCUTANEOUS
Status: DISCONTINUED | OUTPATIENT
Start: 2019-01-29 | End: 2019-01-30 | Stop reason: HOSPADM

## 2019-01-29 RX ORDER — DEXTROSE MONOHYDRATE 25 G/50ML
25-50 INJECTION, SOLUTION INTRAVENOUS
Status: DISCONTINUED | OUTPATIENT
Start: 2019-01-29 | End: 2019-01-30 | Stop reason: HOSPADM

## 2019-01-29 RX ORDER — FENTANYL CITRATE 50 UG/ML
25-50 INJECTION, SOLUTION INTRAMUSCULAR; INTRAVENOUS
Status: DISCONTINUED | OUTPATIENT
Start: 2019-01-29 | End: 2019-01-30 | Stop reason: HOSPADM

## 2019-01-29 RX ORDER — SODIUM CHLORIDE 9 MG/ML
INJECTION, SOLUTION INTRAVENOUS CONTINUOUS
Status: DISCONTINUED | OUTPATIENT
Start: 2019-01-29 | End: 2019-01-30 | Stop reason: HOSPADM

## 2019-01-29 RX ORDER — LIDOCAINE 40 MG/G
CREAM TOPICAL
Status: DISCONTINUED | OUTPATIENT
Start: 2019-01-29 | End: 2019-01-30 | Stop reason: HOSPADM

## 2019-01-29 RX ORDER — SODIUM CHLORIDE, SODIUM LACTATE, POTASSIUM CHLORIDE, CALCIUM CHLORIDE 600; 310; 30; 20 MG/100ML; MG/100ML; MG/100ML; MG/100ML
INJECTION, SOLUTION INTRAVENOUS CONTINUOUS
Status: DISCONTINUED | OUTPATIENT
Start: 2019-01-29 | End: 2019-01-30 | Stop reason: HOSPADM

## 2019-01-29 RX ORDER — HYDROMORPHONE HYDROCHLORIDE 1 MG/ML
.3-.5 INJECTION, SOLUTION INTRAMUSCULAR; INTRAVENOUS; SUBCUTANEOUS EVERY 10 MIN PRN
Status: DISCONTINUED | OUTPATIENT
Start: 2019-01-29 | End: 2019-01-30 | Stop reason: HOSPADM

## 2019-01-29 RX ORDER — MEPERIDINE HYDROCHLORIDE 25 MG/ML
12.5 INJECTION INTRAMUSCULAR; INTRAVENOUS; SUBCUTANEOUS
Status: DISCONTINUED | OUTPATIENT
Start: 2019-01-29 | End: 2019-01-30 | Stop reason: HOSPADM

## 2019-01-29 RX ORDER — ACETAMINOPHEN 325 MG/1
975 TABLET ORAL ONCE
Status: COMPLETED | OUTPATIENT
Start: 2019-01-29 | End: 2019-01-29

## 2019-01-29 RX ORDER — NICOTINE POLACRILEX 4 MG
15-30 LOZENGE BUCCAL
Status: DISCONTINUED | OUTPATIENT
Start: 2019-01-29 | End: 2019-01-30 | Stop reason: HOSPADM

## 2019-01-29 RX ORDER — ONDANSETRON 2 MG/ML
4 INJECTION INTRAMUSCULAR; INTRAVENOUS EVERY 30 MIN PRN
Status: DISCONTINUED | OUTPATIENT
Start: 2019-01-29 | End: 2019-01-30 | Stop reason: HOSPADM

## 2019-01-29 RX ORDER — ONDANSETRON 4 MG/1
4 TABLET, ORALLY DISINTEGRATING ORAL EVERY 30 MIN PRN
Status: DISCONTINUED | OUTPATIENT
Start: 2019-01-29 | End: 2019-01-30 | Stop reason: HOSPADM

## 2019-01-29 RX ORDER — GABAPENTIN 300 MG/1
300 CAPSULE ORAL ONCE
Status: COMPLETED | OUTPATIENT
Start: 2019-01-29 | End: 2019-01-29

## 2019-01-29 RX ADMIN — GABAPENTIN 300 MG: 300 CAPSULE ORAL at 07:49

## 2019-01-29 RX ADMIN — ACETAMINOPHEN 975 MG: 325 TABLET ORAL at 07:48

## 2019-01-29 RX ADMIN — SODIUM CHLORIDE, SODIUM LACTATE, POTASSIUM CHLORIDE, CALCIUM CHLORIDE: 600; 310; 30; 20 INJECTION, SOLUTION INTRAVENOUS at 07:49

## 2019-01-29 ASSESSMENT — MIFFLIN-ST. JEOR: SCORE: 1567.94

## 2019-01-29 NOTE — DISCHARGE INSTRUCTIONS
A collaboration between [facility] and [facility]  Experts in minimally invasive, targeted treatments performed using imaging guidance    Venous Access Device, Port Catheter or Tunneled Central Line Removal    Today you had your existing venous access device removed, either because it was no longer needed or because there was malfunction or infection issues.    One of our Radiology PAs performed this procedure for you today:  ? Maryann Marin, MS, PA-C      After you go home:  - Drink plenty of fluids.  Generally 6-8 (8 ounce) glasses a day is recommended.  - Resume your regular diet unless otherwise ordered by a medical provider.  - Keep any applied tape/gauze dressings clean and dry.  Change tape/gauze dressings if they get wet or soiled.  - You may shower the following day after procedure, however cover and protect from moisture any tape/gauze dressings.  You may let water hit and run over dried skin glue, but do not scrub.  Pat the area dry after showering.  - Port removal incisions are closed with absorbable suture, meaning they do not need to be removed at a later date, and a topical skin adhesive (skin glue).  This glue will wear off in 7-14 days.  Do not remove before this time.  If 14 days have passed and residual glue is present, you may gently remove it.  - You may remove tape/gauze dressings after 5 days if the site looks closed and in the process of healing.  - Do not apply gels, lotions, or ointments to the glue site for the first 10 days as this may cause the glue to prematurely soften and fail.  - Do not perform strenuous activities or lift greater than 10 pounds for the next three days.  - If there is bleeding or oozing from the procedure site, apply firm pressure to the area for 5-10 minutes.  If the bleeding continues seek medical advice at the numbers below.  - Mild procedure site discomfort can be treated with an ice pack and over-the-counter  pain relievers.             Call our Interventional Radiology (IR) service if:  - If you start bleeding from the procedure site.  If you do start to bleed from the site, lie down and hold some pressure on the site.  Our radiology provider can help you decide if you need to return to the hospital.  - If you have new or worsening pain related to the procedure.  - If you have concerning swelling at the procedure site.  - If you develop persistent nausea or vomiting.  - If you develop hives or a rash or any unexplained itching.  - If you have a fever of greater than 100.5  F and chills in the first 5 days after procedure.  - Any other concerns related to your procedure.      [facility]  Interventional Radiology (IR)  [address]  [address] [place] [address]    Contact Number:  [phone]  (IR control desk)  - Monday - Friday 8:00 am - 4:30 pm    After hours for urgent concerns:  [phone]  - After 4:30 pm Monday - Friday, Weekends and Holidays.   - Ask for Interventional Radiology on-call.  Someone is available 24 hours a day.  - [facility] toll free number:  [phone]

## 2019-01-29 NOTE — PROCEDURES
Interventional Radiology Brief Post Procedure Note    Procedure: Chest Port Removal    Proceduralist: Maryann Marin PA-C    Assistant: None    Time Out: Prior to the start of the procedure and with procedural staff participation, I verbally confirmed the patient s identity using two indicators, relevant allergies, that the procedure was appropriate and matched the consent or emergent situation, and that the correct equipment/implants were available. Immediately prior to starting the procedure I conducted the Time Out with the procedural staff and re-confirmed the patient s name, procedure, and site/side. (The Joint Commission universal protocol was followed.)  Yes        Sedation: None. Local Anesthestic used    Findings: Completed removal of right side chest port in its entirety.       Estimated Blood Loss: Minimal    Fluoroscopy Time:  minute(s)    SPECIMENS: None    Complications: 1. None     Condition: Stable    Plan: Follow up per primary team.    Comments: See dictated procedure note for full details.    Maryann Marin PA-C

## 2019-04-26 ENCOUNTER — HOSPITAL ENCOUNTER (EMERGENCY)
Facility: CLINIC | Age: 54
Discharge: HOME | End: 2019-04-26
Attending: EMERGENCY MEDICINE | Admitting: EMERGENCY MEDICINE
Payer: MEDICAID

## 2019-04-26 ENCOUNTER — APPOINTMENT (OUTPATIENT)
Dept: ULTRASOUND IMAGING | Facility: CLINIC | Age: 54
End: 2019-04-26
Attending: EMERGENCY MEDICINE
Payer: MEDICAID

## 2019-04-26 VITALS
TEMPERATURE: 98.3 F | HEIGHT: 67 IN | HEART RATE: 91 BPM | WEIGHT: 202 LBS | DIASTOLIC BLOOD PRESSURE: 62 MMHG | BODY MASS INDEX: 31.71 KG/M2 | OXYGEN SATURATION: 96 % | RESPIRATION RATE: 16 BRPM | SYSTOLIC BLOOD PRESSURE: 127 MMHG

## 2019-04-26 DIAGNOSIS — Z90.5 SINGLE KIDNEY: ICD-10-CM

## 2019-04-26 DIAGNOSIS — R10.9 RIGHT FLANK PAIN: ICD-10-CM

## 2019-04-26 DIAGNOSIS — N10 ACUTE PYELONEPHRITIS: Primary | ICD-10-CM

## 2019-04-26 LAB
ALBUMIN UR-MCNC: 30 MG/DL
ANION GAP SERPL CALCULATED.3IONS-SCNC: 4 MMOL/L (ref 3–14)
APPEARANCE UR: ABNORMAL
BACTERIA #/AREA URNS HPF: ABNORMAL /HPF
BASOPHILS # BLD AUTO: 0 10E9/L (ref 0–0.2)
BASOPHILS NFR BLD AUTO: 0.2 %
BILIRUB UR QL STRIP: NEGATIVE
BUN SERPL-MCNC: 10 MG/DL (ref 7–30)
CALCIUM SERPL-MCNC: 8.8 MG/DL (ref 8.5–10.1)
CHLORIDE SERPL-SCNC: 107 MMOL/L (ref 94–109)
CO2 SERPL-SCNC: 26 MMOL/L (ref 20–32)
COLOR UR AUTO: YELLOW
CREAT SERPL-MCNC: 0.75 MG/DL (ref 0.52–1.04)
DIFFERENTIAL METHOD BLD: ABNORMAL
EOSINOPHIL # BLD AUTO: 0 10E9/L (ref 0–0.7)
EOSINOPHIL NFR BLD AUTO: 0.2 %
ERYTHROCYTE [DISTWIDTH] IN BLOOD BY AUTOMATED COUNT: 13.9 % (ref 10–15)
GFR SERPL CREATININE-BSD FRML MDRD: 89 ML/MIN/{1.73_M2}
GLUCOSE SERPL-MCNC: 94 MG/DL (ref 70–99)
GLUCOSE UR STRIP-MCNC: NEGATIVE MG/DL
HCT VFR BLD AUTO: 38.1 % (ref 35–47)
HGB BLD-MCNC: 11.7 G/DL (ref 11.7–15.7)
HGB UR QL STRIP: ABNORMAL
IMM GRANULOCYTES # BLD: 0.1 10E9/L (ref 0–0.4)
IMM GRANULOCYTES NFR BLD: 0.5 %
KETONES UR STRIP-MCNC: NEGATIVE MG/DL
LEUKOCYTE ESTERASE UR QL STRIP: ABNORMAL
LIPASE SERPL-CCNC: 86 U/L (ref 73–393)
LYMPHOCYTES # BLD AUTO: 2.3 10E9/L (ref 0.8–5.3)
LYMPHOCYTES NFR BLD AUTO: 18.8 %
MCH RBC QN AUTO: 25.7 PG (ref 26.5–33)
MCHC RBC AUTO-ENTMCNC: 30.7 G/DL (ref 31.5–36.5)
MCV RBC AUTO: 84 FL (ref 78–100)
MONOCYTES # BLD AUTO: 0.9 10E9/L (ref 0–1.3)
MONOCYTES NFR BLD AUTO: 7.1 %
NEUTROPHILS # BLD AUTO: 9.1 10E9/L (ref 1.6–8.3)
NEUTROPHILS NFR BLD AUTO: 73.2 %
NITRATE UR QL: NEGATIVE
NRBC # BLD AUTO: 0 10*3/UL
NRBC BLD AUTO-RTO: 0 /100
PH UR STRIP: 7 PH (ref 5–7)
PLATELET # BLD AUTO: 224 10E9/L (ref 150–450)
POTASSIUM SERPL-SCNC: 3.2 MMOL/L (ref 3.4–5.3)
RBC # BLD AUTO: 4.56 10E12/L (ref 3.8–5.2)
RBC #/AREA URNS AUTO: 13 /HPF (ref 0–2)
SODIUM SERPL-SCNC: 138 MMOL/L (ref 133–144)
SOURCE: ABNORMAL
SP GR UR STRIP: 1.01 (ref 1–1.03)
SQUAMOUS #/AREA URNS AUTO: 6 /HPF (ref 0–1)
TRANS CELLS #/AREA URNS HPF: <1 /HPF (ref 0–1)
UROBILINOGEN UR STRIP-MCNC: 4 MG/DL (ref 0–2)
WBC # BLD AUTO: 12.4 10E9/L (ref 4–11)
WBC #/AREA URNS AUTO: 73 /HPF (ref 0–5)

## 2019-04-26 PROCEDURE — 25800030 ZZH RX IP 258 OP 636: Performed by: EMERGENCY MEDICINE

## 2019-04-26 PROCEDURE — 76775 US EXAM ABDO BACK WALL LIM: CPT | Mod: 26 | Performed by: EMERGENCY MEDICINE

## 2019-04-26 PROCEDURE — 85025 COMPLETE CBC W/AUTO DIFF WBC: CPT | Performed by: EMERGENCY MEDICINE

## 2019-04-26 PROCEDURE — 80048 BASIC METABOLIC PNL TOTAL CA: CPT | Performed by: EMERGENCY MEDICINE

## 2019-04-26 PROCEDURE — 96365 THER/PROPH/DIAG IV INF INIT: CPT

## 2019-04-26 PROCEDURE — 81001 URINALYSIS AUTO W/SCOPE: CPT | Performed by: EMERGENCY MEDICINE

## 2019-04-26 PROCEDURE — 76705 ECHO EXAM OF ABDOMEN: CPT

## 2019-04-26 PROCEDURE — 76775 US EXAM ABDO BACK WALL LIM: CPT

## 2019-04-26 PROCEDURE — 25000128 H RX IP 250 OP 636: Performed by: EMERGENCY MEDICINE

## 2019-04-26 PROCEDURE — 96375 TX/PRO/DX INJ NEW DRUG ADDON: CPT

## 2019-04-26 PROCEDURE — 96361 HYDRATE IV INFUSION ADD-ON: CPT

## 2019-04-26 PROCEDURE — 99285 EMERGENCY DEPT VISIT HI MDM: CPT | Mod: 25 | Performed by: EMERGENCY MEDICINE

## 2019-04-26 PROCEDURE — 83690 ASSAY OF LIPASE: CPT | Performed by: EMERGENCY MEDICINE

## 2019-04-26 PROCEDURE — 99285 EMERGENCY DEPT VISIT HI MDM: CPT | Mod: 25

## 2019-04-26 RX ORDER — HYDROCODONE BITARTRATE AND ACETAMINOPHEN 5; 325 MG/1; MG/1
1 TABLET ORAL EVERY 6 HOURS PRN
Qty: 5 TABLET | Refills: 0 | Status: SHIPPED | OUTPATIENT
Start: 2019-04-26 | End: 2019-05-15

## 2019-04-26 RX ORDER — HYDROMORPHONE HYDROCHLORIDE 1 MG/ML
0.5 INJECTION, SOLUTION INTRAMUSCULAR; INTRAVENOUS; SUBCUTANEOUS
Status: COMPLETED | OUTPATIENT
Start: 2019-04-26 | End: 2019-04-26

## 2019-04-26 RX ORDER — SODIUM CHLORIDE 9 MG/ML
1000 INJECTION, SOLUTION INTRAVENOUS CONTINUOUS
Status: DISCONTINUED | OUTPATIENT
Start: 2019-04-26 | End: 2019-04-26 | Stop reason: HOSPADM

## 2019-04-26 RX ORDER — CEFPODOXIME PROXETIL 200 MG/1
200 TABLET, FILM COATED ORAL 2 TIMES DAILY
Qty: 28 TABLET | Refills: 0 | Status: SHIPPED | OUTPATIENT
Start: 2019-04-26 | End: 2019-05-15

## 2019-04-26 RX ORDER — ONDANSETRON 4 MG/1
4 TABLET, ORALLY DISINTEGRATING ORAL EVERY 6 HOURS PRN
Qty: 10 TABLET | Refills: 0 | Status: SHIPPED | OUTPATIENT
Start: 2019-04-26 | End: 2019-05-03

## 2019-04-26 RX ORDER — CEFTRIAXONE 2 G/1
2 INJECTION, POWDER, FOR SOLUTION INTRAMUSCULAR; INTRAVENOUS ONCE
Status: COMPLETED | OUTPATIENT
Start: 2019-04-26 | End: 2019-04-26

## 2019-04-26 RX ADMIN — SODIUM CHLORIDE 1000 ML: 9 INJECTION, SOLUTION INTRAVENOUS at 16:10

## 2019-04-26 RX ADMIN — SODIUM CHLORIDE 1000 ML: 9 INJECTION, SOLUTION INTRAVENOUS at 17:50

## 2019-04-26 RX ADMIN — CEFTRIAXONE SODIUM 2 G: 2 INJECTION, POWDER, FOR SOLUTION INTRAMUSCULAR; INTRAVENOUS at 16:35

## 2019-04-26 RX ADMIN — Medication 0.5 MG: at 16:34

## 2019-04-26 ASSESSMENT — ENCOUNTER SYMPTOMS
FEVER: 1
COUGH: 0
DIFFICULTY URINATING: 0
FLANK PAIN: 1
DYSURIA: 0
CONSTIPATION: 0
NAUSEA: 1
VOMITING: 0
HEADACHES: 0
FREQUENCY: 0
SHORTNESS OF BREATH: 0
CHILLS: 1
BLOOD IN STOOL: 0
HEMATURIA: 0
ABDOMINAL PAIN: 0
DIARRHEA: 0
BACK PAIN: 0

## 2019-04-26 ASSESSMENT — MIFFLIN-ST. JEOR: SCORE: 1543.9

## 2019-04-26 NOTE — ED AVS SNAPSHOT
[facility], [place], Emergency Department  [address] [place] [id number]  Phone:  [phone]                                    [address]   MRN: [id number]    Department:  [facility], Emergency Department   Date of Visit:  4/26/2019           After Visit Summary Signature Page    I have received my discharge instructions, and my questions have been answered. I have discussed any challenges I see with this plan with the nurse or doctor.    ..........................................................................................................................................  Patient/Patient Representative Signature      ..........................................................................................................................................  Patient Representative Print Name and Relationship to Patient    ..................................................               ................................................  Date                                   Time    ..........................................................................................................................................  Reviewed by Signature/Title    ...................................................              ..............................................  Date                                               Time          22EPIC Rev 08/18

## 2019-04-26 NOTE — DISCHARGE INSTRUCTIONS
Please make an appointment to follow up with Your Primary Care Provider in 2-3 days for reevaluation.    Take zofran for nausea. Drink plenty of fluids. Take the prescribed antibiotic to treat kidney infection. Complete whole course of antibiotics, even if you feel better. You will be called with new prescription if your culture shows resistance to the antibiotic you were given.      Return to the ED for worsening pain, dehydration, fever, decreased urine output, or inability to take oral medications.

## 2019-04-26 NOTE — ED PROVIDER NOTES
[place] EMERGENCY DEPARTMENT ([facility])  4/26/19   History     Chief Complaint   Patient presents with     Flank Pain     The history is provided by the patient and medical records.     Angella St is a 53 year old female with a medical history significant for cervical cancer s/p JESSICA and chemotherapy treatment and left ureteral involvement resulting in hydronephrosis and recurrent pyelonephritis s/p left radical nephrectomy on 5/3/2018 who presents to the Emergency Department for evaluation of right flank pain that radiates around to her RUQ.  The patient reports that her pain started approximately 1 week ago; she describes the pain as intermittent and sharp.  She states that she has been taking Tylenol for pain management.  However, yesterday she began feeling feverish and chilled.  The patient notes that her pain feels similar to the pain that she had in her left flank last year prior to having the left nephrectomy.  The patient denies any history of kidney stones.  The patient today denies any dysuria, vaginal discharge, diarrhea, headache, or chest pain. The patient does note that she had some nausea earlier this morning after drinking tea, but this has since resolved and she denies any episodes of vomiting.  The patient reports that she has been having normal bowel movements and she denies any constipation.  Patient denies any recent antibiotic use.  Patient reports that she is not currently on any cancer treatment.    I have reviewed the Medications, Allergies, Past Medical and Surgical History, and Social History in the [employer] system.    Past Medical History:   Diagnosis Date     Anemia      Cancer (H)     Metastatic squamous cell cervical carcinoma     Chronic kidney disease      Hypertension 2007     Obesity     BMI >30       Past Surgical History:   Procedure Laterality Date     COMBINED CYSTOSCOPY, INSERT STENT URETER(S) Left 6/13/2016    Procedure: COMBINED CYSTOSCOPY, INSERT STENT  URETER(S);  Surgeon: Deja Salinas MD;  [facility]     CYSTOSCOPY, RETROGRADES, COMBINED Left 6/13/2016    Procedure: COMBINED CYSTOSCOPY, RETROGRADES;  Surgeon: Deja Salinas MD;  [facility]     DAVINCI NEPHRECTOMY Left 5/3/2018    Procedure: DAVINCI XI NEPHRECTOMY;  DAVINCI XI LEFT NEPHRECTOMY;  Surgeon: Deja Salinas MD;  Location: [facility]     GENITOURINARY SURGERY  5/2015    PNT placement     HYSTERECTOMY  2011    [place], [place]     IR PORT REMOVAL RIGHT  1/29/2019     PERCUTANEOUS NEPHROSTOMY Left 3/9/2017    Procedure: PERCUTANEOUS NEPHROSTOMY;  Surgeon: Bridger Meyer PA-C;  [facility]     PERCUTANEOUS NEPHROSTOMY Left 6/28/2017    Procedure: PERCUTANEOUS NEPHROSTOMY;  Left Nephrostomy Tube Change;  Surgeon: Bridger Meyer PA-C;  [facility]     PERCUTANEOUS NEPHROSTOMY Left 10/30/2017    Procedure: PERCUTANEOUS NEPHROSTOMY;  Left Nephrostomy Tube Change;  Surgeon: Maryann Chavez PA-C;  [facility]     REMOVE PORT VASCULAR ACCESS Right 1/29/2019    Procedure: Right Port Removal;  Surgeon: Maryann Marin PA-C;  [facility]       Family History   Problem Relation Age of Onset     Hypertension Brother        Social History     Tobacco Use     Smoking status: Never Smoker     Smokeless tobacco: Never Used   Substance Use Topics     Alcohol use: No     Alcohol/week: 0.0 oz     Comment: Does not drink alcohol       Current Facility-Administered Medications   Medication     sodium chloride 0.9% infusion     Current Outpatient Medications   Medication     amLODIPine (NORVASC) 10 MG tablet     cefpodoxime (VANTIN) 200 MG tablet     HYDROcodone-acetaminophen (NORCO) 5-325 MG tablet     ondansetron (ZOFRAN ODT) 4 MG ODT tab     Acetaminophen (TYLENOL PO)     COMPRESSION STOCKINGS     iohexol (OMNIPAQUE) 140 MG/ML SOLN solution     senna-docusate (SENOKOT-S;PERICOLACE) 8.6-50 MG per tablet      No Known Allergies      Review of Systems  "  Constitutional: Positive for chills and fever (subjective).   Respiratory: Negative for cough and shortness of breath.    Cardiovascular: Negative for chest pain.   Gastrointestinal: Positive for nausea (resolved). Negative for abdominal pain, blood in stool, constipation, diarrhea and vomiting.   Genitourinary: Positive for flank pain (right with radiation to RUQ). Negative for decreased urine volume, difficulty urinating, dysuria, frequency, hematuria, urgency and vaginal discharge.   Musculoskeletal: Negative for back pain.   Neurological: Negative for headaches.   All other systems reviewed and are negative.      Physical Exam   BP: 126/57  Pulse: 78  Temp: 98.9  F (37.2  C)  Resp: 16  Height: 170.2 cm (5' 7\")  Weight: 91.6 kg (202 lb)  SpO2: 99 %      Physical Exam   Constitutional: She is oriented to person, place, and time. She appears well-developed and well-nourished. No distress.   HENT:   Head: Atraumatic.   Nose: Nose normal.   Mouth/Throat: Oropharynx is clear and moist. No oropharyngeal exudate.   Eyes: Pupils are equal, round, and reactive to light. Conjunctivae are normal.   Neck: Normal range of motion. Neck supple.   Cardiovascular: Normal rate, regular rhythm, normal heart sounds and intact distal pulses.   No murmur heard.  Pulmonary/Chest: Effort normal and breath sounds normal. No stridor. No respiratory distress. She has no decreased breath sounds. She has no wheezes. She has no rhonchi. She has no rales.   Abdominal: Soft. Bowel sounds are normal. She exhibits no distension and no mass. There is tenderness in the right upper quadrant and epigastric area. There is guarding and positive Sunshine's sign. There is no rigidity, no rebound, no CVA tenderness and no tenderness at McBurney's point. No hernia.   Musculoskeletal: Normal range of motion. She exhibits no edema, tenderness or deformity.   Neurological: She is alert and oriented to person, place, and time.   Skin: Skin is warm and dry. No " rash noted. She is not diaphoretic. No pallor.   Psychiatric: She has a normal mood and affect. Her behavior is normal.   Nursing note and vitals reviewed.      ED Course   3:05 PM  The patient was seen and examined by Mariela Knight MD in [place].        Procedures  Results for orders placed during the hospital encounter of 04/26/19   POC US RETROPERITONEAL LIMITED    Impression Limited Bedside Renal Ultrasound, performed and interpreted by me.    Indication: Flank pain and Abdominal pain  Images of the the right and left kidney were acquired in short and long axis, evaluating for hydronephrosis. A short and long axis of the bladder was evaluated for bladder stones and stones at the ureterovesicular junction. Image quality was satisfactory..     Findings:  No evidence of right sided hydronephrosis.    No urinary bladder stones seen.         IMPRESSION: No evidence of right sided hydronephrosis or stones.            Critical Care time:  none             Labs Ordered and Resulted from Time of ED Arrival Up to the Time of Departure from the ED   ROUTINE UA WITH MICROSCOPIC - Abnormal; Notable for the following components:       Result Value    Blood Urine Moderate (*)     Protein Albumin Urine 30 (*)     Urobilinogen mg/dL 4.0 (*)     Leukocyte Esterase Urine Large (*)     WBC Urine 73 (*)     RBC Urine 13 (*)     Bacteria Urine Few (*)     Squamous Epithelial /HPF Urine 6 (*)     All other components within normal limits   CBC WITH PLATELETS DIFFERENTIAL - Abnormal; Notable for the following components:    WBC 12.4 (*)     MCH 25.7 (*)     MCHC 30.7 (*)     Absolute Neutrophil 9.1 (*)     All other components within normal limits   BASIC METABOLIC PANEL - Abnormal; Notable for the following components:    Potassium 3.2 (*)     All other components within normal limits   LIPASE   PERIPHERAL IV CATHETER   NURSING DRAW AND HOLD   HCG QUAL URINE POCT           Assessments & Plan (with Medical Decision Making)   Angella St is  a 53 year old female with a medical history significant for cervical cancer s/p JESSICA and chemotherapy treatment and left ureteral involvement resulting in hydronephrosis and recurrent pyelonephritis s/p left radical nephrectomy (5/3/2018) who presents to the Emergency Department for evaluation of right flank pain with radiation around to her RUQ.    Ddx: cholecystitis, symptomatic cholelithiasis, pancreatitis, pyelonephritis, urolithiasis    RUQ US nl. No right sided hydro. WBC 12.4, UA c/w UTI. Pain improved with dilaudid and IVF. Given ceftriaxone for pyelo. Reviewed previous culture results with MDR e coli, klebsiella, staph aureus. Susceptible to cephalosporins. Afebrile with normal vitals. No vomiting. Nontoxic. Her RUQ tenderness is unusual for pyelo. Also no CVAT.     Given unilateral kidney, discussed with Urology regarding need for further imaging. No e/o obstruction on US but I am uncertain if a CT would be needed to rule out abscess, forniceal rupture, reflux, or other relevant pathology. Urology did not think admission was warranted unless there was e/o obstruction. However, did not know about imaging.     Discussed with patient and offered obs admission but she wanted to go home. Verbalized understanding of risks including resistant UTI, sepsis, kidney injury/failure, dialysis. She understood importance of compliance with antibiotics and will follow up with PC in 2-3 days or return to ED if symptoms worsen. Discharged with 14 day course of cefpodoxime. She will be called if Urine culture results show resistance to cefpodoxime and will fill a new rx or come back for admission. Also given 5 tabs norco, zofran. Encouraged po hydration. Return precautions provided. Please see AVS for details.       I have reviewed the nursing notes.    I have reviewed the findings, diagnosis, plan and need for follow up with the patient.       Medication List      Started    cefpodoxime 200 MG tablet  Commonly known as:   VANTIN  200 mg, Oral, 2 TIMES DAILY     HYDROcodone-acetaminophen 5-325 MG tablet  Commonly known as:  NORCO  1 tablet, Oral, EVERY 6 HOURS PRN     ondansetron 4 MG ODT tab  Commonly known as:  ZOFRAN ODT  4 mg, Oral, EVERY 6 HOURS PRN            Final diagnoses:   Right flank pain   Acute pyelonephritis   Single kidney     IRay, am serving as a trained medical scribe to document services personally performed by Mariela Knight MD, based on the provider's statements to me.   IMariela MD, was physically present and have reviewed and verified the accuracy of this note documented by Ray Avila.    4/26/2019   [facility], [place], EMERGENCY DEPARTMENT     Mariela Knight MD  04/26/19 1913

## 2019-04-26 NOTE — ED TRIAGE NOTES
Patient presents to triage c/o right flank pain x 1 week. Pt has also been experiencing fevers (unknown temperature) since yesterday. Pt felt feverish this morning and took 1,000 mg of Tylenol at noon.

## 2019-04-30 ENCOUNTER — ANCILLARY PROCEDURE (OUTPATIENT)
Dept: CT IMAGING | Facility: CLINIC | Age: 54
End: 2019-04-30
Attending: INTERNAL MEDICINE
Payer: MEDICAID

## 2019-04-30 ENCOUNTER — OFFICE VISIT (OUTPATIENT)
Dept: INTERNAL MEDICINE | Facility: CLINIC | Age: 54
End: 2019-04-30
Payer: MEDICAID

## 2019-04-30 VITALS
HEART RATE: 67 BPM | BODY MASS INDEX: 32.26 KG/M2 | DIASTOLIC BLOOD PRESSURE: 83 MMHG | SYSTOLIC BLOOD PRESSURE: 142 MMHG | WEIGHT: 206 LBS | TEMPERATURE: 98.1 F

## 2019-04-30 DIAGNOSIS — R10.11 RUQ ABDOMINAL PAIN: Primary | ICD-10-CM

## 2019-04-30 DIAGNOSIS — N12 PYELONEPHRITIS: ICD-10-CM

## 2019-04-30 DIAGNOSIS — R10.11 RUQ ABDOMINAL PAIN: ICD-10-CM

## 2019-04-30 RX ORDER — IOPAMIDOL 755 MG/ML
126 INJECTION, SOLUTION INTRAVASCULAR ONCE
Status: COMPLETED | OUTPATIENT
Start: 2019-04-30 | End: 2019-04-30

## 2019-04-30 RX ADMIN — IOPAMIDOL 126 ML: 755 INJECTION, SOLUTION INTRAVASCULAR at 09:24

## 2019-04-30 ASSESSMENT — PAIN SCALES - GENERAL: PAINLEVEL: MILD PAIN (2)

## 2019-04-30 NOTE — DISCHARGE INSTRUCTIONS

## 2019-04-30 NOTE — NURSING NOTE
Chief Complaint   Patient presents with     Chills     Fever     Flank Pain     Right sided flank pain, went to ED.        Manda Natarajan LPN at 7:54 AM on April 30, 2019

## 2019-04-30 NOTE — PROGRESS NOTES
"PRIMARY CARE CLINIC    Resident Clinic Note        Visit Date: April 30, 2019    Angella St  MRN: [id number]  YOB: 1965    HPI:  Angella St is a 53 year old female cervical cancer s/p JESSICA and chemotherapy (completed 11/2015), recurrent left sided pyelonephritis s/p left nephrectomy, presenting for ED follow up for right sided abdominal pain.    Patient presented to ED with 1 week right sided \"pricking\" and \"pulling pain.\" This was associated with fevers/chills and so she presented to the ED. Workup demonstrated mild leukocytosis and UA concerning for infection. So she was treated with fluids and a dose of cetriaxone. Had been recommended for overnight admission, but patient opted for a trial of outpatient therapy, and she was discharged with pain medication a 2 week course of cefpodixime.    Patient states she did not have a bowel movement for several days after her ED encounter, but did have improvement in abdominal pain after passing stool. She has some residual pain more in her right lower back, but otherwise feels good. She has no nausea, fevers, chills. She has never had any urinary symptoms with her prior urinary infections.    She overall feels improved.     ROS:  10 point ROS was reviewed and negative other than stated in HPI    Past medical history reviewed.    Past Medical History:   Diagnosis Date     Anemia      Cancer (H)     Metastatic squamous cell cervical carcinoma     Chronic kidney disease      Hypertension 2007     Obesity     BMI >30       No Known Allergies    Past Surgical History:   Procedure Laterality Date     COMBINED CYSTOSCOPY, INSERT STENT URETER(S) Left 6/13/2016    Procedure: COMBINED CYSTOSCOPY, INSERT STENT URETER(S);  Surgeon: Deja Salinas MD;  [facility]     CYSTOSCOPY, RETROGRADES, COMBINED Left 6/13/2016    Procedure: COMBINED CYSTOSCOPY, RETROGRADES;  Surgeon: Deja Salinas MD;  [facility]     DAVINCI NEPHRECTOMY Left " 5/3/2018    Procedure: DAVINCI XI NEPHRECTOMY;  DAVINCI XI LEFT NEPHRECTOMY;  Surgeon: Deja Salinas MD;  Location: [facility]     GENITOURINARY SURGERY  5/2015    PNT placement     HYSTERECTOMY  2011    [place], [place]     IR PORT REMOVAL RIGHT  1/29/2019     PERCUTANEOUS NEPHROSTOMY Left 3/9/2017    Procedure: PERCUTANEOUS NEPHROSTOMY;  Surgeon: Bridger Meyer PA-C;  [facility]     PERCUTANEOUS NEPHROSTOMY Left 6/28/2017    Procedure: PERCUTANEOUS NEPHROSTOMY;  Left Nephrostomy Tube Change;  Surgeon: Bridger Meyer PA-C;  [facility]     PERCUTANEOUS NEPHROSTOMY Left 10/30/2017    Procedure: PERCUTANEOUS NEPHROSTOMY;  Left Nephrostomy Tube Change;  Surgeon: Maryann Chavez PA-C;  [facility]     REMOVE PORT VASCULAR ACCESS Right 1/29/2019    Procedure: Right Port Removal;  Surgeon: Maryann Marin PA-C;  [facility]       Family History   Problem Relation Age of Onset     Hypertension Brother        Social History     Socioeconomic History     Marital status:      Spouse name: Not on file     Number of children: 3     Years of education: Not on file     Highest education level: Not on file   Occupational History     Occupation: no working   Social Needs     Financial resource strain: Not on file     Food insecurity:     Worry: Not on file     Inability: Not on file     Transportation needs:     Medical: Not on file     Non-medical: Not on file   Tobacco Use     Smoking status: Never Smoker     Smokeless tobacco: Never Used   Substance and Sexual Activity     Alcohol use: No     Alcohol/week: 0.0 oz     Comment: Does not drink alcohol     Drug use: No     Comment: No drug use     Sexual activity: Not Currently   Lifestyle     Physical activity:     Days per week: Not on file     Minutes per session: Not on file     Stress: Not on file   Relationships     Social connections:     Talks on phone: Not on file     Gets together: Not on file     Attends Sabianism  service: Not on file     Active member of club or organization: Not on file     Attends meetings of clubs or organizations: Not on file     Relationship status: Not on file     Intimate partner violence:     Fear of current or ex partner: Not on file     Emotionally abused: Not on file     Physically abused: Not on file     Forced sexual activity: Not on file   Other Topics Concern     Parent/sibling w/ CABG, MI or angioplasty before 65F 55M? Not Asked   Social History Narrative    Mom  from [place] in her 40's    Dad  in his 60's presumed to be from hunger as a result of the on going war in [facility] at the time.    Patient has a brother and a sister who are alive and well.      Patient has three children:  Female age 37 alive and well; Female age 35 alive and well; Female age 33 alive and well    Patient reports  is in [facility].       Current Outpatient Medications   Medication     Acetaminophen (TYLENOL PO)     amLODIPine (NORVASC) 10 MG tablet     cefpodoxime (VANTIN) 200 MG tablet     HYDROcodone-acetaminophen (NORCO) 5-325 MG tablet     iohexol (OMNIPAQUE) 140 MG/ML SOLN solution     ondansetron (ZOFRAN ODT) 4 MG ODT tab     senna-docusate (SENOKOT-S;PERICOLACE) 8.6-50 MG per tablet     COMPRESSION STOCKINGS     No current facility-administered medications for this visit.        Vitals:  /83   Pulse 67   Temp 98.1  F (36.7  C) (Oral)   Wt 93.4 kg (206 lb)   BMI 32.26 kg/m      Physical Exam:  General: NAD  HEENT: Oral mucosa moist and non-erythematous, PERRLA, EOM intact  CV: RRR, normal S1S2, no m/c/r  Resp: Clear to auscultation bilaterally, no wheezes or crackles  Back: no spinal or paraspinal tenderness. Some spasm over right lower back. No CVA tenderness.  Abdomen: BS+, +Sunshine's sign. No additional abdominal tenderness.   Extremities: WWP, no pedal edema  Neuro: AAOx3, no lateralizing symptoms or focal neurologic deficits    IMPRESSION:   1.  No sonographic findings to explain right  "upper quadrant abdominal  pain.  2.  Enlarged right kidney, consistent with expected hypertrophy, given  history of left nephrectomy.     I have personally reviewed the examination and initial interpretation  and I agree with the findings.      IMPRESSION: No evidence of right sided hydronephrosis or stones.    Assessment:  Angella St is a 53 year old female cervical cancer s/p JESSICA and chemotherapy (completed 11/2015), recurrent left sided pyelonephritis s/p left nephrectomy, presenting for ED follow up after empiric treatment for new right sided abdominal pain.     Plan:  #Right Pyelonephritis, Resolving  #H/o Left Recurrent Pyelonephritis s/p nephrectomy  #H/o MDR pyelonephritis  Presenting with 1 week \"pricking/pulling\" right upper quadrant pain with subjective fevers/chills, but without diarrhea/urinary symptoms, nausea/vomiting. She was otherwise afebrile with mild leukocytosis. UA suggestive of bacturia, but culture was unfortunately not sent. Also without liver enzyme elevation. Ultrasound imaging without hydronephrosis or abnormalities in liver or bile ducts, though exam with positive Sunshine's sign, no LFTs on initial labs.  Either way, she is improving with time and antibiotics. Possible etiologies include treated pyelonephritis, PID, biliary disease, less concern for recurrent cancer, though prior imaging has previously had liver spots worriesome for metastatic disease. Other possibilities include adhesions or even a partial bowel obstruction with her multiple prior surgeries.  - Addendum: CT showing right pyelonephritis. Patient was called and updated with results. We will recheck a UA with culture 1 week after completing antibiotics and have her schedule follow up with Dr. Pugh in 2-3 weeks.       Patient seen and discussed with Dr. Ovalle.     Estuardo Cohen MD, KIRBY  PGY-3, Internal Medicine   [phone]    Pt was seen and examined with Dr. Cohen.  I agree with his and her documentation as " noted above.    My additional comments: None    Adrianna Ovalle MD

## 2019-05-15 ENCOUNTER — OFFICE VISIT (OUTPATIENT)
Dept: INTERNAL MEDICINE | Facility: CLINIC | Age: 54
End: 2019-05-15
Payer: MEDICAID

## 2019-05-15 VITALS
OXYGEN SATURATION: 98 % | DIASTOLIC BLOOD PRESSURE: 73 MMHG | WEIGHT: 207 LBS | HEART RATE: 71 BPM | TEMPERATURE: 97.8 F | SYSTOLIC BLOOD PRESSURE: 109 MMHG | BODY MASS INDEX: 32.42 KG/M2

## 2019-05-15 DIAGNOSIS — E04.9 GOITER: ICD-10-CM

## 2019-05-15 DIAGNOSIS — R31.29 MICROSCOPIC HEMATURIA: ICD-10-CM

## 2019-05-15 DIAGNOSIS — E87.6 HYPOKALEMIA: Primary | ICD-10-CM

## 2019-05-15 DIAGNOSIS — E87.6 HYPOKALEMIA: ICD-10-CM

## 2019-05-15 DIAGNOSIS — N12 PYELONEPHRITIS: ICD-10-CM

## 2019-05-15 LAB
ALBUMIN UR-MCNC: NEGATIVE MG/DL
ANION GAP SERPL CALCULATED.3IONS-SCNC: 8 MMOL/L (ref 3–14)
APPEARANCE UR: ABNORMAL
BACTERIA #/AREA URNS HPF: ABNORMAL /HPF
BILIRUB UR QL STRIP: NEGATIVE
BUN SERPL-MCNC: 17 MG/DL (ref 7–30)
CALCIUM SERPL-MCNC: 9.5 MG/DL (ref 8.5–10.1)
CHLORIDE SERPL-SCNC: 104 MMOL/L (ref 94–109)
CO2 SERPL-SCNC: 27 MMOL/L (ref 20–32)
COLOR UR AUTO: YELLOW
CREAT SERPL-MCNC: 0.65 MG/DL (ref 0.52–1.04)
GFR SERPL CREATININE-BSD FRML MDRD: >90 ML/MIN/{1.73_M2}
GLUCOSE SERPL-MCNC: 102 MG/DL (ref 70–99)
GLUCOSE UR STRIP-MCNC: NEGATIVE MG/DL
HGB UR QL STRIP: ABNORMAL
KETONES UR STRIP-MCNC: NEGATIVE MG/DL
LEUKOCYTE ESTERASE UR QL STRIP: ABNORMAL
MUCOUS THREADS #/AREA URNS LPF: PRESENT /LPF
NITRATE UR QL: NEGATIVE
PH UR STRIP: 6 PH (ref 5–7)
POTASSIUM SERPL-SCNC: 3.8 MMOL/L (ref 3.4–5.3)
RBC #/AREA URNS AUTO: 7 /HPF (ref 0–2)
SODIUM SERPL-SCNC: 139 MMOL/L (ref 133–144)
SOURCE: ABNORMAL
SP GR UR STRIP: 1.01 (ref 1–1.03)
SQUAMOUS #/AREA URNS AUTO: 4 /HPF (ref 0–1)
UROBILINOGEN UR STRIP-MCNC: 0 MG/DL (ref 0–2)
WBC #/AREA URNS AUTO: 4 /HPF (ref 0–5)

## 2019-05-15 ASSESSMENT — PAIN SCALES - GENERAL: PAINLEVEL: NO PAIN (0)

## 2019-05-15 NOTE — PROGRESS NOTES
HPI  53-year-old returns today for reevaluation following her recent urinary tract infection and pyelonephritis.  She is been doing quite well.  No fever chills sweats and she denies any flank pain.  Occasionally she will experience brief episodes of some transient spasm-like activity in the right flank.  This is occurred on 2 occasions in the last couple of weeks lasted about 10 minutes resolving after she drinks some water.  Said no change in urination no burning pain blood in the urine bowel movements have been doing well.  We reviewed her urinalysis today and this looks significantly improved.  Past Medical History:   Diagnosis Date     Anemia      Cancer (H)     Metastatic squamous cell cervical carcinoma     Chronic kidney disease      Hypertension 2007     Obesity     BMI >30     Past Surgical History:   Procedure Laterality Date     COMBINED CYSTOSCOPY, INSERT STENT URETER(S) Left 6/13/2016    Procedure: COMBINED CYSTOSCOPY, INSERT STENT URETER(S);  Surgeon: Deja Salinas MD;  [facility]     CYSTOSCOPY, RETROGRADES, COMBINED Left 6/13/2016    Procedure: COMBINED CYSTOSCOPY, RETROGRADES;  Surgeon: Deja Salinas MD;  [facility]     DAVINCI NEPHRECTOMY Left 5/3/2018    Procedure: DAVINCI XI NEPHRECTOMY;  DAVINCI XI LEFT NEPHRECTOMY;  Surgeon: Deja Salinas MD;  [facility]     GENITOURINARY SURGERY  5/2015    PNT placement     [place]  [place]    [place]     IR PORT REMOVAL RIGHT  1/29/2019     PERCUTANEOUS NEPHROSTOMY Left 3/9/2017    Procedure: PERCUTANEOUS NEPHROSTOMY;  Surgeon: Bridger Meyer PA-C;  [facility]     PERCUTANEOUS NEPHROSTOMY Left 6/28/2017    Procedure: PERCUTANEOUS NEPHROSTOMY;  Left Nephrostomy Tube Change;  Surgeon: Bridger Meyer PA-C;  [facility]     PERCUTANEOUS NEPHROSTOMY Left 10/30/2017    Procedure: PERCUTANEOUS NEPHROSTOMY;  Left Nephrostomy Tube Change;  Surgeon: Maryann Chavez PA-C;   [facility]     REMOVE PORT VASCULAR ACCESS Right 2019    Procedure: Right Port Removal;  Surgeon: Maryann Marin PA-C;  [facility]     Family History   Problem Relation Age of Onset     Hypertension Brother      Social History     Socioeconomic History     Marital status:      Spouse name: None     Number of children: 3     Years of education: None     Highest education level: None   Occupational History     Occupation: no working   Social Needs     Financial resource strain: None     Food insecurity:     Worry: None     Inability: None     Transportation needs:     Medical: None     Non-medical: None   Tobacco Use     Smoking status: Never Smoker     Smokeless tobacco: Never Used   Substance and Sexual Activity     Alcohol use: No     Alcohol/week: 0.0 oz     Comment: Does not drink alcohol     Drug use: No     Comment: No drug use     Sexual activity: Not Currently   Lifestyle     Physical activity:     Days per week: None     Minutes per session: None     Stress: None   Relationships     Social connections:     Talks on phone: None     Gets together: None     Attends Anglican service: None     Active member of club or organization: None     Attends meetings of clubs or organizations: None     Relationship status: None     Intimate partner violence:     Fear of current or ex partner: None     Emotionally abused: None     Physically abused: None     Forced sexual activity: None   Other Topics Concern     Parent/sibling w/ CABG, MI or angioplasty before 65F 55M? Not Asked   Social History Narrative    Mom  from [place] in her 40's    Dad  in his 60's presumed to be from hunger as a result of the on going war in [place] at the time.    Patient has a brother and a sister who are alive and well.      Patient has three children:  Female age 37 alive and well; Female age 35 alive and well; Female age 33 alive and well    Patient reports  is in [place].     Complete review of  symptoms negative except as noted above.    Exam:  /73   Pulse 71   Temp 97.8  F (36.6  C) (Oral)   Wt 93.9 kg (207 lb)   SpO2 98%   Breastfeeding? No   BMI 32.42 kg/m    207 lbs 0 oz  PHYSICAL EXAMINATION:   The patient is alert, oriented with a clear sensorium.   Skin shows no lesions or rashes and good turgor.   Head is normocephalic and atraumatic.   Neck shows no nodes, diffuse thyromegaly.   Back is nontender.   Lungs are clear to auscultation.   Heart shows normal S1 and S2 without murmur or gallop.   Abdomen is soft, nontender without masses or organomegaly.   Extremities show no edema and no evidence of active synovitis.   Neurologic examination non-focal    Labs reviewed with her:  Results for orders placed or performed in visit on 05/15/19   UA with Microscopic reflex to Culture   Result Value Ref Range    Color Urine Yellow     Appearance Urine Slightly Cloudy     Glucose Urine Negative NEG^Negative mg/dL    Bilirubin Urine Negative NEG^Negative    Ketones Urine Negative NEG^Negative mg/dL    Specific Gravity Urine 1.014 1.003 - 1.035    Blood Urine Moderate (A) NEG^Negative    pH Urine 6.0 5.0 - 7.0 pH    Protein Albumin Urine Negative NEG^Negative mg/dL    Urobilinogen mg/dL 0.0 0.0 - 2.0 mg/dL    Nitrite Urine Negative NEG^Negative    Leukocyte Esterase Urine Trace (A) NEG^Negative    Source Midstream Urine     WBC Urine 4 0 - 5 /HPF    RBC Urine 7 (H) 0 - 2 /HPF    Bacteria Urine Few (A) NEG^Negative /HPF    Squamous Epithelial /HPF Urine 4 (H) 0 - 1 /HPF    Mucous Urine Present (A) NEG^Negative /LPF       ASSESSMENT  1 urinary tract infection resolved  2 thyromegaly we will reassess thyroid function  3 hypertension controlled  4 CKD we will reevaluate  5 microscopic hematuria will refer to urology    Plan  We will check her TSH and BMP today she has several follow-up scheduled in the next several months or reassess with me in 6 months or sooner if there is any increased symptoms or  problems    This note was completed using Dragon voice recognition software.  Although reviewed after completion, some word and grammatical errors may occur.    Shamir Pugh MD  General Internal Medicine  [facility]  [phone]

## 2019-05-15 NOTE — PATIENT INSTRUCTIONS
[facility] Medication Refill Request Information:  * Please contact your pharmacy regarding ANY request for medication refills.  ** [facility] Prescription Fax = [phone]  * Please allow 3 business days for routine medication refills.  * Please allow 5 business days for controlled substance medication refills.     [facility] Test Result notification information:  *You will be notified with in 7-10 days of your appointment day regarding the results of your test.  If you are on MyChart you will be notified as soon as the provider has reviewed the results and signed off on them.    [facility]: [phone]

## 2019-05-15 NOTE — NURSING NOTE
Chief Complaint   Patient presents with     Pain     Pt is here to discuss pain on the left side near kidney. Duration 2 weeks.      Sophia Escalona LPN at 9:31 AM on 5/15/2019.

## 2019-06-12 ENCOUNTER — PRE VISIT (OUTPATIENT)
Dept: UROLOGY | Facility: CLINIC | Age: 54
End: 2019-06-12

## 2019-06-12 NOTE — TELEPHONE ENCOUNTER
Reason for visit: Hematuria     Relevant information: History of hydronephrosis, recurrent pyelonephritis, CKD, left nephrectomy    Records/imaging/labs/orders: pt seen by Dr. Salinas    Pt called: Message sent to scheduling team to move pt to Dr. Salinas    At Rooming: dip/pvr

## 2019-06-14 ENCOUNTER — PRE VISIT (OUTPATIENT)
Dept: UROLOGY | Facility: CLINIC | Age: 54
End: 2019-06-14

## 2019-06-14 ENCOUNTER — OFFICE VISIT (OUTPATIENT)
Dept: UROLOGY | Facility: CLINIC | Age: 54
End: 2019-06-14
Payer: MEDICAID

## 2019-06-14 VITALS
HEART RATE: 68 BPM | HEIGHT: 67 IN | WEIGHT: 210 LBS | DIASTOLIC BLOOD PRESSURE: 86 MMHG | SYSTOLIC BLOOD PRESSURE: 141 MMHG | BODY MASS INDEX: 32.96 KG/M2

## 2019-06-14 DIAGNOSIS — I10 BENIGN ESSENTIAL HYPERTENSION: ICD-10-CM

## 2019-06-14 DIAGNOSIS — N13.1 HYDRONEPHROSIS WITH URETERAL STRICTURE, NOT ELSEWHERE CLASSIFIED: Primary | ICD-10-CM

## 2019-06-14 DIAGNOSIS — R31.9 URINARY TRACT INFECTION WITH HEMATURIA, SITE UNSPECIFIED: ICD-10-CM

## 2019-06-14 DIAGNOSIS — N39.0 URINARY TRACT INFECTION WITH HEMATURIA, SITE UNSPECIFIED: ICD-10-CM

## 2019-06-14 LAB
ALBUMIN UR-MCNC: NEGATIVE MG/DL
APPEARANCE UR: CLEAR
BACTERIA #/AREA URNS HPF: ABNORMAL /HPF
BILIRUB UR QL STRIP: NEGATIVE
COLOR UR AUTO: YELLOW
GLUCOSE UR STRIP-MCNC: NEGATIVE MG/DL
HGB UR QL STRIP: ABNORMAL
KETONES UR STRIP-MCNC: NEGATIVE MG/DL
LEUKOCYTE ESTERASE UR QL STRIP: NEGATIVE
MUCOUS THREADS #/AREA URNS LPF: PRESENT /LPF
NITRATE UR QL: NEGATIVE
PH UR STRIP: 7 PH (ref 5–7)
RBC #/AREA URNS AUTO: 5 /HPF (ref 0–2)
SOURCE: ABNORMAL
SP GR UR STRIP: 1.01 (ref 1–1.03)
SQUAMOUS #/AREA URNS AUTO: 3 /HPF (ref 0–1)
UROBILINOGEN UR STRIP-MCNC: 0 MG/DL (ref 0–2)
WBC #/AREA URNS AUTO: 3 /HPF (ref 0–5)

## 2019-06-14 PROCEDURE — 87086 URINE CULTURE/COLONY COUNT: CPT | Performed by: UROLOGY

## 2019-06-14 PROCEDURE — 88112 CYTOPATH CELL ENHANCE TECH: CPT | Performed by: UROLOGY

## 2019-06-14 PROCEDURE — 88120 CYTP URNE 3-5 PROBES EA SPEC: CPT | Performed by: UROLOGY

## 2019-06-14 RX ORDER — AMLODIPINE BESYLATE 10 MG/1
TABLET ORAL
Qty: 45 TABLET | Refills: 3 | Status: SHIPPED | OUTPATIENT
Start: 2019-06-14 | End: 2019-11-18

## 2019-06-14 RX ORDER — LIDOCAINE HYDROCHLORIDE 20 MG/ML
JELLY TOPICAL ONCE
Status: COMPLETED | OUTPATIENT
Start: 2019-06-14 | End: 2019-06-14

## 2019-06-14 RX ORDER — ESTRADIOL 0.1 MG/G
2 CREAM VAGINAL
Qty: 42.5 G | Refills: 3 | Status: SHIPPED | OUTPATIENT
Start: 2019-06-14 | End: 2022-11-15

## 2019-06-14 RX ADMIN — LIDOCAINE HYDROCHLORIDE: 20 JELLY TOPICAL at 10:12

## 2019-06-14 ASSESSMENT — PAIN SCALES - GENERAL: PAINLEVEL: NO PAIN (0)

## 2019-06-14 ASSESSMENT — MIFFLIN-ST. JEOR: SCORE: 1580.18

## 2019-06-14 NOTE — LETTER
6/14/2019       RE: Angella St  C/o Greyson Sherman  [address]  [facility] [address]     Dear Colleague,    Thank you for referring your patient, Angella St, to the [facility] UROLOGY AND INST FOR PROSTATE AND UROLOGIC CANCERS at [facility]. Please see a copy of my visit note below.    UROLOGIC DIAGNOSES:        CURRENT INTERVENTIONS:        History:       Angella St is a 52 year old female with history of hypertension, cervical cancer s/p JESSICA and chemotherapy, complicated by left ureteral involvement with hydronephrosis with recurrent pyelonephritis and CKD requiring chronic pain PNT s/p DaVinci nephrectomy of the left kidney on 5/3/18, complicated by retroperitoneal abscess s/p IR drain placement 5/21/18    Patient noticed pain from her drain site on Tuesday (5/29) and went to the emergency room. CT showed decrease fluid collection and patient had no obvious signs of worsening infection. Patient continues to have intermittent sharp stabbing pain that will last a second and is much worse when she does not take her pain medication. For the last week she has had less than 10cc of clear fluid in her drain. She continues to care for it by flushing it and emptying bag every night.  Denies fevers, chills, fatigue, dysuria, urinary freqeuncy, pelvic or back pain. She feel like she is able to go throughout her normal activity until the pain starts and she has to rest.     Patient is s/p removal of IR drain with resolution of fluid collection post op.       Patient noted to have right flank pain recently. Was suspected of having pyelo given imaging. Noted to have microhematuria. No urine culture sent but was given antibiotics.   Notes that her pain improved.     We discussed imaging, work up for hematuria, topical estrogen, urinary tract infection.          Imaging:      Labs:      MEDICATIONS:    Current Outpatient Medications:      Acetaminophen (TYLENOL PO), Take 500 mg  by mouth every 6 hours as needed , Disp: , Rfl:      COMPRESSION STOCKINGS, 1 each daily, Disp: 2 Units, Rfl: 1     estradiol (ESTRACE) 0.1 MG/GM vaginal cream, Place 2 g vaginally three times a week Place small amount on fingertip and apply to urethra., Disp: 42.5 g, Rfl: 3     iohexol (OMNIPAQUE) 140 MG/ML SOLN solution, Mix entire bottle (50ml) of contast with 600ml (20 ounces) of water and drink half 2 hrs prior to CT scan and half 1 hr prior to scan, Disp: 50 mL, Rfl: 0     senna-docusate (SENOKOT-S;PERICOLACE) 8.6-50 MG per tablet, Take 1 tablet by mouth daily as needed for constipation, Disp: , Rfl:      amLODIPine (NORVASC) 10 MG tablet, TAKE ONE-HALF OF A TABLET BY MOUTH DAILY, Disp: 45 tablet, Rfl: 3  No current facility-administered medications for this visit.     ALLERGIES:   No Known Allergies    REVIEW OF SYSTEMS: Ten point review of systems without change as outlined in HPI    SURGICAL HISTORY:    Past Surgical History:   Procedure Laterality Date     COMBINED CYSTOSCOPY, INSERT STENT URETER(S) Left 6/13/2016    Procedure: COMBINED CYSTOSCOPY, INSERT STENT URETER(S);  Surgeon: Deja Salinas MD;  Location:  OR     CYSTOSCOPY, RETROGRADES, COMBINED Left 6/13/2016    Procedure: COMBINED CYSTOSCOPY, RETROGRADES;  Surgeon: Deja Salinas MD;  Location:  OR     DAVINCI NEPHRECTOMY Left 5/3/2018    Procedure: DAVINCI XI NEPHRECTOMY;  DAVINCI XI LEFT NEPHRECTOMY;  Surgeon: Deja Salinas MD;  Location:  OR     GENITOURINARY SURGERY  5/2015    PNT placement     HYSTERECTOMY  2011    [place]     IR PORT REMOVAL RIGHT  1/29/2019     PERCUTANEOUS NEPHROSTOMY Left 3/9/2017    Procedure: PERCUTANEOUS NEPHROSTOMY;  Surgeon: Bridger Meyer PA-C;  [facility]     PERCUTANEOUS NEPHROSTOMY Left 6/28/2017    Procedure: PERCUTANEOUS NEPHROSTOMY;  Left Nephrostomy Tube Change;  Surgeon: Bridger Meyer PA-C;  [facility]     PERCUTANEOUS  "NEPHROSTOMY Left 10/30/2017    Procedure: PERCUTANEOUS NEPHROSTOMY;  Left Nephrostomy Tube Change;  Surgeon: Maryann Chavez PA-C;  [facility]     REMOVE PORT VASCULAR ACCESS Right 1/29/2019    Procedure: Right Port Removal;  Surgeon: Maryann Marin PA-C;  [facility]         PHYSICAL EXAM:    /86   Pulse 68   Ht 1.702 m (5' 7\")   Wt 95.3 kg (210 lb)   BMI 32.89 kg/m       HEENT: Normocephalic and atraumatic    Cardiac: Not done    Back/Flank: Not done    CNS/PNS: Alert and oriented    Respiratory: Normal nonlabored breathing    Abdomen: Soft nontender and nondistended, incisions well healed     Peripheral Vascular: No peripheral edema    Mental Status: Normal    External Genitalia: Not done    Bladder: Not done    Urethra: normal     Vagina: normal external appearance     Cystoscopy:      .CYSTO FEMALE    Discussed with patient the alternatives, risks, and procedure. Questions were answered. Informed consent was obtained for cystoscopy.    June 14, 2019 CYSTOSCOPY:  The patient was brought to the procedures room where she was placed in the supine position.  She was prepped and draped in a sterile fashion.  A #16-[place] flexible cystoscope was introduced into the urinary bladder.  The anterior urethra, membranous urethra, and bladder neck were negative.   Within the urinary bladder, there was no evidence of stones, tumors, or growths.  The ureteral orifices were well visualized bilaterally and found to have clear efflux of urine.  There was no evidence of recurrence.  The patient had grade minimal trabeculation.  On retroflex view, no lesions were seen.                Urinalysis:  UA RESULTS:  Recent Labs   Lab Test  05/21/18   0056   COLOR  Straw   APPEARANCE  Clear   URINEGLC  Negative   URINEBILI  Negative   URINEKETONE  Negative   SG  1.004   UBLD  Trace*   URINEPH  7.5*   PROTEIN  Negative   NITRITE  Negative   LEUKEST  Negative   RBCU  <1   WBCU  <1         IMPRESSION:    53 y/o F s/p left " nephrectomy for non-functioning kidney post op course c/b retroperitoneal abscess now cleared     PLAN:    Trial of topical estrogen to decrease risk of urinary tract infection  Follow up with U/a, urine culture, urine cytology (also sent today) in four months     Total Time:  15 minutes   Time in Consultation: greater than 50% this was apart from time spent performing cystoscopy       Again, thank you for allowing me to participate in the care of your patient.      Sincerely,    Deja Salinas MD

## 2019-06-14 NOTE — PROGRESS NOTES
UROLOGIC DIAGNOSES:        CURRENT INTERVENTIONS:        History:       Angella St is a 52 year old female with history of hypertension, cervical cancer s/p JESSICA and chemotherapy, complicated by left ureteral involvement with hydronephrosis with recurrent pyelonephritis and CKD requiring chronic pain PNT s/p DaVinci nephrectomy of the left kidney on 5/3/18, complicated by retroperitoneal abscess s/p IR drain placement 5/21/18    Patient noticed pain from her drain site on Tuesday (5/29) and went to the emergency room. CT showed decrease fluid collection and patient had no obvious signs of worsening infection. Patient continues to have intermittent sharp stabbing pain that will last a second and is much worse when she does not take her pain medication. For the last week she has had less than 10cc of clear fluid in her drain. She continues to care for it by flushing it and emptying bag every night.  Denies fevers, chills, fatigue, dysuria, urinary freqeuncy, pelvic or back pain. She feel like she is able to go throughout her normal activity until the pain starts and she has to rest.     Patient is s/p removal of IR drain with resolution of fluid collection post op.       Patient noted to have right flank pain recently. Was suspected of having pyelo given imaging. Noted to have microhematuria. No urine culture sent but was given antibiotics.   Notes that her pain improved.     We discussed imaging, work up for hematuria, topical estrogen, urinary tract infection.          Imaging:      Labs:      MEDICATIONS:    Current Outpatient Medications:      Acetaminophen (TYLENOL PO), Take 500 mg by mouth every 6 hours as needed , Disp: , Rfl:      COMPRESSION STOCKINGS, 1 each daily, Disp: 2 Units, Rfl: 1     estradiol (ESTRACE) 0.1 MG/GM vaginal cream, Place 2 g vaginally three times a week Place small amount on fingertip and apply to urethra., Disp: 42.5 g, Rfl: 3     iohexol (OMNIPAQUE) 140 MG/ML SOLN solution, Mix entire  bottle (50ml) of contast with 600ml (20 ounces) of water and drink half 2 hrs prior to CT scan and half 1 hr prior to scan, Disp: 50 mL, Rfl: 0     senna-docusate (SENOKOT-S;PERICOLACE) 8.6-50 MG per tablet, Take 1 tablet by mouth daily as needed for constipation, Disp: , Rfl:      amLODIPine (NORVASC) 10 MG tablet, TAKE ONE-HALF OF A TABLET BY MOUTH DAILY, Disp: 45 tablet, Rfl: 3  No current facility-administered medications for this visit.     ALLERGIES:   No Known Allergies    REVIEW OF SYSTEMS: Ten point review of systems without change as outlined in HPI    SURGICAL HISTORY:    Past Surgical History:   Procedure Laterality Date     COMBINED CYSTOSCOPY, INSERT STENT URETER(S) Left 6/13/2016    Procedure: COMBINED CYSTOSCOPY, INSERT STENT URETER(S);  Surgeon: Deja Salinas MD;  [facility]     CYSTOSCOPY, RETROGRADES, COMBINED Left 6/13/2016    Procedure: COMBINED CYSTOSCOPY, RETROGRADES;  Surgeon: Deja Salinas MD;  [facility]     DAVINCI NEPHRECTOMY Left 5/3/2018    Procedure: DAVINCI XI NEPHRECTOMY;  DAVINCI XI LEFT NEPHRECTOMY;  Surgeon: Deja Salinas MD;  [facility]     GENITOURINARY SURGERY  5/2015    PNT placement     HYSTERECTOMY  2011    [place], [place]     IR PORT REMOVAL RIGHT  1/29/2019     PERCUTANEOUS NEPHROSTOMY Left 3/9/2017    Procedure: PERCUTANEOUS NEPHROSTOMY;  Surgeon: Bridger Meyer PA-C;  [facility]     PERCUTANEOUS NEPHROSTOMY Left 6/28/2017    Procedure: PERCUTANEOUS NEPHROSTOMY;  Left Nephrostomy Tube Change;  Surgeon: Bridger Meyer PA-C;  [facility]     PERCUTANEOUS NEPHROSTOMY Left 10/30/2017    Procedure: PERCUTANEOUS NEPHROSTOMY;  Left Nephrostomy Tube Change;  Surgeon: Maryann Chavez PA-C;  [facility]     REMOVE PORT VASCULAR ACCESS Right 1/29/2019    Procedure: Right Port Removal;  Surgeon: Maryann Marin PA-C;  [facility]         PHYSICAL EXAM:    /86   Pulse 68   Ht 1.702 m (5'  "7\")   Wt 95.3 kg (210 lb)   BMI 32.89 kg/m      HEENT: Normocephalic and atraumatic    Cardiac: Not done    Back/Flank: Not done    CNS/PNS: Alert and oriented    Respiratory: Normal nonlabored breathing    Abdomen: Soft nontender and nondistended, incisions well healed     Peripheral Vascular: No peripheral edema    Mental Status: Normal    External Genitalia: Not done    Bladder: Not done    Urethra: normal     Vagina: normal external appearance     Cystoscopy:      .CYSTO FEMALE    Discussed with patient the alternatives, risks, and procedure. Questions were answered. Informed consent was obtained for cystoscopy.    June 14, 2019 CYSTOSCOPY:  The patient was brought to the procedures room where she was placed in the supine position.  She was prepped and draped in a sterile fashion.  A #16-[place]izen [place] flexible cystoscope was introduced into the urinary bladder.  The anterior urethra, membranous urethra, and bladder neck were negative.   Within the urinary bladder, there was no evidence of stones, tumors, or growths.  The ureteral orifices were well visualized bilaterally and found to have clear efflux of urine.  There was no evidence of recurrence.  The patient had grade minimal trabeculation.  On retroflex view, no lesions were seen.                Urinalysis:  UA RESULTS:  Recent Labs   Lab Test  05/21/18   0056   COLOR  Straw   APPEARANCE  Clear   URINEGLC  Negative   URINEBILI  Negative   URINEKETONE  Negative   SG  1.004   UBLD  Trace*   URINEPH  7.5*   PROTEIN  Negative   NITRITE  Negative   LEUKEST  Negative   RBCU  <1   WBCU  <1         IMPRESSION:    51 y/o F s/p left nephrectomy for non-functioning kidney post op course c/b retroperitoneal abscess now cleared     PLAN:    Trial of topical estrogen to decrease risk of urinary tract infection  Follow up with U/a, urine culture, urine cytology (also sent today) in four months     Total Time:  15 minutes   Time in Consultation: greater than 50% this was apart " from time spent performing cystoscopy

## 2019-06-14 NOTE — TELEPHONE ENCOUNTER
Chief Complaint : Return-9 Mo    Hx/Sx: Microhematuria/Hydronephrosis     Records/Orders: Available    Pt Contacted: N/A    At Rooming: Urine

## 2019-06-14 NOTE — NURSING NOTE
"New-Hematuria, UA on 4/26    She states she gets up often during the night to pee, but denies other urinary sx and pains.        Chief Complaint   Patient presents with     New Patient     Hematuria        Blood pressure 141/86, pulse 68, height 1.702 m (5' 7\"), weight 95.3 kg (210 lb), not currently breastfeeding. Body mass index is 32.89 kg/m .    Patient Active Problem List   Diagnosis     Cervical cancer (H)     Encounter for long-term current use of medication     Abdominal pain     Hypertension     Drug-induced peripheral neuropathy (H)     Hydronephrosis     Recurrent pyelonephritis     Abscess after procedure     Nontoxic multinodular goiter     Thyroid nodule     Atrophic kidney       No Known Allergies    Current Outpatient Medications   Medication Sig Dispense Refill     Acetaminophen (TYLENOL PO) Take 500 mg by mouth every 6 hours as needed        amLODIPine (NORVASC) 10 MG tablet Take 0.5 tablets (5 mg) by mouth daily 100 tablet 3     COMPRESSION STOCKINGS 1 each daily 2 Units 1     iohexol (OMNIPAQUE) 140 MG/ML SOLN solution Mix entire bottle (50ml) of contast with 600ml (20 ounces) of water and drink half 2 hrs prior to CT scan and half 1 hr prior to scan 50 mL 0     senna-docusate (SENOKOT-S;PERICOLACE) 8.6-50 MG per tablet Take 1 tablet by mouth daily as needed for constipation         Social History     Tobacco Use     Smoking status: Never Smoker     Smokeless tobacco: Never Used   Substance Use Topics     Alcohol use: No     Alcohol/week: 0.0 oz     Comment: Does not drink alcohol     Drug use: No     Comment: No drug use       Chalo Rodriguez, EMT  6/14/2019  8:01 AM      "

## 2019-06-14 NOTE — PATIENT INSTRUCTIONS
Please use your medication as directed.    Schedule an appointment with Dr. Loyola in four (4) months.    It was a pleasure meeting with you today.  Thank you for allowing me and my team the privilege of caring for you today.  YOU are the reason we are here, and I truly hope we provided you with the excellent service you deserve.  Please let us know if there is anything else we can do for you so that we can be sure you are leaving completely satisfied with your care experience.         Chalo Rodriguez

## 2019-06-14 NOTE — NURSING NOTE
"Chief Complaint   Patient presents with     New Patient     Hematuria        Blood pressure 141/86, pulse 68, height 1.702 m (5' 7\"), weight 95.3 kg (210 lb), not currently breastfeeding. Body mass index is 32.89 kg/m .    Patient Active Problem List   Diagnosis     Cervical cancer (H)     Encounter for long-term current use of medication     Abdominal pain     Hypertension     Drug-induced peripheral neuropathy (H)     Hydronephrosis     Recurrent pyelonephritis     Abscess after procedure     Nontoxic multinodular goiter     Thyroid nodule     Atrophic kidney       No Known Allergies    Current Outpatient Medications   Medication Sig Dispense Refill     Acetaminophen (TYLENOL PO) Take 500 mg by mouth every 6 hours as needed        amLODIPine (NORVASC) 10 MG tablet Take 0.5 tablets (5 mg) by mouth daily 100 tablet 3     COMPRESSION STOCKINGS 1 each daily 2 Units 1     iohexol (OMNIPAQUE) 140 MG/ML SOLN solution Mix entire bottle (50ml) of contast with 600ml (20 ounces) of water and drink half 2 hrs prior to CT scan and half 1 hr prior to scan 50 mL 0     senna-docusate (SENOKOT-S;PERICOLACE) 8.6-50 MG per tablet Take 1 tablet by mouth daily as needed for constipation         Social History     Tobacco Use     Smoking status: Never Smoker     Smokeless tobacco: Never Used   Substance Use Topics     Alcohol use: No     Alcohol/week: 0.0 oz     Comment: Does not drink alcohol     Drug use: No     Comment: No drug use       Invasive Procedure Safety Checklist:    Procedure: Cystoscopy    Action: Complete sections and checkboxes as appropriate.    Pre-procedure:  1. Patient ID Verified with 2 identifiers (Jessica and  or MRN) : YES    2. Procedure and site verified with patient/designee (when able) : YES    3. Accurate consent documentation in medical record : YES    4. H&P (or appropriate assessment) documented in medical record : N/A  H&P must be up to 30 days prior to procedure an updated within 24 hours of           "       Procedure as applicable.     5. Relevant diagnostic and radiology test results appropriately labeled and displayed as applicable : YES    6. Blood products, implants, devices, and/or special equipment available for the procedure as applicable : YES    7. Procedure site(s) marked with provider initials [Exclusions: none] : NO    8. Marking not required. Reason : Yes  Procedure does not require site marking    Time Out:     Time-Out performed immediately prior to starting procedure, including verbal and active participation of all team members addressing: YES    1. Correct patient identity.  2. Confirmed that the correct side and site are marked.  3. An accurate procedure to be done.  4. Agreement on the procedure to be done.  5. Correct patient position.  6. Relevant images and results are properly labeled and appropriately displayed.  7. The need to administer antibiotics or fluids for irrigation purposes during the procedure as applicable.  8. Safety precautions based on patient history or medication use.    During Procedure: Verification of correct person, site, and procedure occurs any time the responsibility for care of the patient is transferred to another member of the care team.    The following medication was given:     MEDICATION: Lidocaine Uro-Jet 2% 200mg (20mg/mL)  ROUTE: Urethral   SITE: Urethra   DOSE: 10mL  LOT #: [id number]  : [employer]   EXPIRATION DATE: 2-21  NDC#: [address]-96   Was there drug waste? No    Prior to injection, verified patient identity using patient's name and date of birth.  Due to injection administration, patient instructed to remain in clinic for 15 minutes  afterwards, and to report any adverse reaction to me immediately.    Drug Amount Wasted:  None.  Vial/Syringe: Single dose vial      CRISTHIAN Steinberg  6/14/2019  9:02 AM

## 2019-06-15 LAB
BACTERIA SPEC CULT: NORMAL
Lab: NORMAL
SPECIMEN SOURCE: NORMAL

## 2019-06-21 ENCOUNTER — TELEPHONE (OUTPATIENT)
Dept: ENDOCRINOLOGY | Facility: CLINIC | Age: 54
End: 2019-06-21

## 2019-06-21 ENCOUNTER — OFFICE VISIT (OUTPATIENT)
Dept: ENDOCRINOLOGY | Facility: CLINIC | Age: 54
End: 2019-06-21
Payer: MEDICAID

## 2019-06-21 VITALS
WEIGHT: 209.6 LBS | SYSTOLIC BLOOD PRESSURE: 124 MMHG | HEART RATE: 69 BPM | DIASTOLIC BLOOD PRESSURE: 83 MMHG | BODY MASS INDEX: 32.83 KG/M2

## 2019-06-21 DIAGNOSIS — E04.2 NONTOXIC MULTINODULAR GOITER: Primary | ICD-10-CM

## 2019-06-21 ASSESSMENT — PAIN SCALES - GENERAL: PAINLEVEL: NO PAIN (0)

## 2019-06-21 ASSESSMENT — PATIENT HEALTH QUESTIONNAIRE - PHQ9: SUM OF ALL RESPONSES TO PHQ QUESTIONS 1-9: 0

## 2019-06-21 NOTE — LETTER
6/21/2019     RE: Angella St  C/o Greyson Sherman  [address]  [facility] [address]     Dear Colleague,    Thank you for referring your patient, Angella St, to the [facility] ENDOCRINOLOGY at [facility]. Please see a copy of my visit note below.    [facility]  Endocrinology  Amee Zee MD  06/21/2019      Chief Complaint:   RECHECK     History of Present Illness:   Angella St is a 53 year old female with a history of nontoxic multinodular goiter, cervical cancer s/p JESSICA and chemotherapy, hypertension, hydronephrosis, pyelonephritis, and chronic kidney disease who presents for follow-up of thyroid nodules.    #1 Thyroid nodules  She was discovered to have thyroid goiter back in 2015 during her cervical cancer evaluation. She underwent PET/CT scan which showed heterogenous thyroid nodules with increased FDG uptake.   The patient had a following thyroid US and US guided biopsy from the right and left dominant thyroid nodules in 11/2015. Cytology was benign. Repeated FNA biopsy was obtained from the left dominant thyroid nodule which again turned back to be benign.   The patient had a repeated thyroid US in 4/2018 which showed some changes in the right thyroid nodule, and mild enlargement in some of the nodules on the left.    Interval history:  She is not working currently and her insurance only covers emergencies. She notes that her insurance would not pay for her to have a repeat biopsy done, so she decided to wait to have it completed. She feels her neck is getting larger. She denies any trouble swallowing or change in appetite. She denies any voice changes. She denies any changes in her ability to eat.      Review of Systems:   Pertinent items are noted in HPI, remainder of complete ROS is negative.      Active Medications:      Acetaminophen (TYLENOL PO), Take 500 mg by mouth every 6 hours as needed , Disp: , Rfl:      amLODIPine (NORVASC) 10 MG tablet, TAKE  ONE-HALF OF A TABLET BY MOUTH DAILY, Disp: 45 tablet, Rfl: 3     COMPRESSION STOCKINGS, 1 each daily, Disp: 2 Units, Rfl: 1     estradiol (ESTRACE) 0.1 MG/GM vaginal cream, Place 2 g vaginally three times a week Place small amount on fingertip and apply to urethra., Disp: 42.5 g, Rfl: 3     iohexol (OMNIPAQUE) 140 MG/ML SOLN solution, Mix entire bottle (50ml) of contast with 600ml (20 ounces) of water and drink half 2 hrs prior to CT scan and half 1 hr prior to scan, Disp: 50 mL, Rfl: 0     senna-docusate (SENOKOT-S;PERICOLACE) 8.6-50 MG per tablet, Take 1 tablet by mouth daily as needed for constipation, Disp: , Rfl:       Allergies:   Patient has no known allergies.      Past Medical History:  Anemia   Metastatic squamous cell cervical carcinoma  Chronic kidney disease  Hypertension   Obesity   Drug-induced peripheral neuropathy  Hydronephrosis  Recurrent pyelonephritis  Nontoxic multinodular goiter  Thyroid nodule  Atrophic kidney      Past Surgical History:  Combined cystoscopy, insert stent ureters, left - 5/13/16  Cystoscopy, retrogrades, combined, left - 6/13/16  Davinci Nephrectomy, left - 5/3/18  PNT placement - 5/2015  Hysterectomy - 2011  Port removal, right - 1/29/19  Percutaneous nephrostomy, left (x3) - 10/20/1    Family History:   Hypertension - brother      Social History:   The patient is  with 3 children, a nonsmoker, and does not consume alcohol.      Physical Exam:   /83   Pulse 69   Wt 95.1 kg (209 lb 9.6 oz)   BMI 32.83 kg/m      GENERAL APPEARANCE: Alert and no distress  NECK: No lymphadenopathy appreciated - prominent mass noted at isthmus of thyroid, with noted left sided nodule about 3 cm in size  Thyroid US: right side 1.2 x 1.1 x 2.4 cm nodule. However, there is a very dominant left thyroid nodule that extends over to the isthmus which is hard to tell if it is one or multiple nodules which are dominantly solid roughly 3.8 x 3.4 x ~4.5 cm in size , there is a second dominat  nodule on left side that is 3.2 x 2.0 x 3.0 cm in size  CV: RRR without M/R/G  Lungs: CTA bilaterally  Abdomen: Soft, Nontender, non distended, positive bowel sounds   Neuro:no focal deficits  Mood: Normal   Lymph: neg in neck and supraclavicular area       Data:  TSH   Date Value Ref Range Status   04/04/2018 1.81 0.40 - 4.00 mU/L Final        Assessment and Plan:  #1 MNG: Thyroid nodules  Review of her ultrasound today compared with her April 2018 exam shows the following: Slight enlargement of the dominant nodule on the right (currently 2.4 cm in size, previously 2.0 cm in size).  It is difficult to distinguish between the multiple nodules on the left side compared with her current measurement.  However these nodules on the left side are primarily isoechoic and not cystic.  Therefore I think ultrasound-guided ethanol ablation will not be helpful.      In addition, the patient reports that these nodules have grown on the left side and are increasingly symptomatic with noted neck pressure.  She has multiple nodules about 2 to 3 cm on her left side which has not been biopsied.  The main nodule on her left side (nodule 4) that has been biopsied twice and found to be benign.    I suspect that her multinodular goiter is enlarging.  If insurance approves, we will have the patient obtain a formal neck ultrasound for evaluation.  Nevertheless, given her increased symptoms (neck pressure), as well as significant nodules on her left side which have not been biopsied, I do think repeat neck ultrasound, ultrasound-guided FNA, and consideration of ENT resection would be warranted.  We will see what her insurance might be able to cover.    Will recheck TFTs today.    Follow-up: Return in about 6 months (around 12/21/2019).      Scribe Disclosure:  I, Marva Alberto, am serving as a scribe to document services personally performed by Amee Zee MD at this visit, based upon the provider's statements to me. All  documentation has been reviewed by the aforementioned provider prior to being entered into the official medical record.     Portions of this medical record were completed by a scribe. UPON MY REVIEW AND AUTHENTICATION BY ELECTRONIC SIGNATURE, this confirms (a) I performed the applicable clinical services, and (b) the record is accurate.      Amee Zee MD

## 2019-06-21 NOTE — TELEPHONE ENCOUNTER
"Per Dr Zee's check out note today:     \" to see - we would like to do FNA for thyroid but her insurance only covers emergencies-  What would be the best option for pt?\"    Please contact patient to discuss.    Thanks,  Ronda  [phone]   "

## 2019-06-21 NOTE — PROGRESS NOTES
[facility]  Endocrinology  Amee Zee MD  06/21/2019      Chief Complaint:   RECHECK     History of Present Illness:   Angella St is a 53 year old female with a history of nontoxic multinodular goiter, cervical cancer s/p JESSICA and chemotherapy, hypertension, hydronephrosis, pyelonephritis, and chronic kidney disease who presents for follow-up of thyroid nodules.    #1 Thyroid nodules  She was discovered to have thyroid goiter back in 2015 during her cervical cancer evaluation. She underwent PET/CT scan which showed heterogenous thyroid nodules with increased FDG uptake.   The patient had a following thyroid US and US guided biopsy from the right and left dominant thyroid nodules in 11/2015. Cytology was benign. Repeated FNA biopsy was obtained from the left dominant thyroid nodule which again turned back to be benign.   The patient had a repeated thyroid US in 4/2018 which showed some changes in the right thyroid nodule, and mild enlargement in some of the nodules on the left.    Interval history:  She is not working currently and her insurance only covers emergencies. She notes that her insurance would not pay for her to have a repeat biopsy done, so she decided to wait to have it completed. She feels her neck is getting larger. She denies any trouble swallowing or change in appetite. She denies any voice changes. She denies any changes in her ability to eat.      Review of Systems:   Pertinent items are noted in HPI, remainder of complete ROS is negative.      Active Medications:      Acetaminophen (TYLENOL PO), Take 500 mg by mouth every 6 hours as needed , Disp: , Rfl:      amLODIPine (NORVASC) 10 MG tablet, TAKE ONE-HALF OF A TABLET BY MOUTH DAILY, Disp: 45 tablet, Rfl: 3     COMPRESSION STOCKINGS, 1 each daily, Disp: 2 Units, Rfl: 1     estradiol (ESTRACE) 0.1 MG/GM vaginal cream, Place 2 g vaginally three times a week Place small amount on fingertip and apply to urethra., Disp: 42.5 g, Rfl: 3     iohexol  (OMNIPAQUE) 140 MG/ML SOLN solution, Mix entire bottle (50ml) of contast with 600ml (20 ounces) of water and drink half 2 hrs prior to CT scan and half 1 hr prior to scan, Disp: 50 mL, Rfl: 0     senna-docusate (SENOKOT-S;PERICOLACE) 8.6-50 MG per tablet, Take 1 tablet by mouth daily as needed for constipation, Disp: , Rfl:       Allergies:   Patient has no known allergies.      Past Medical History:  Anemia   Metastatic squamous cell cervical carcinoma  Chronic kidney disease  Hypertension   Obesity   Drug-induced peripheral neuropathy  Hydronephrosis  Recurrent pyelonephritis  Nontoxic multinodular goiter  Thyroid nodule  Atrophic kidney      Past Surgical History:  Combined cystoscopy, insert stent ureters, left - 5/13/16  Cystoscopy, retrogrades, combined, left - 6/13/16  Davinci Nephrectomy, left - 5/3/18  PNT placement - 5/2015  Hysterectomy - 2011  Port removal, right - 1/29/19  Percutaneous nephrostomy, left (x3) - 10/20/1    Family History:   Hypertension - brother      Social History:   The patient is  with 3 children, a nonsmoker, and does not consume alcohol.      Physical Exam:   /83   Pulse 69   Wt 95.1 kg (209 lb 9.6 oz)   BMI 32.83 kg/m     GENERAL APPEARANCE: Alert and no distress  NECK: No lymphadenopathy appreciated - prominent mass noted at isthmus of thyroid, with noted left sided nodule about 3 cm in size  Thyroid US: right side 1.2 x 1.1 x 2.4 cm nodule. However, there is a very dominant left thyroid nodule that extends over to the isthmus which is hard to tell if it is one or multiple nodules which are dominantly solid roughly 3.8 x 3.4 x ~4.5 cm in size , there is a second dominat nodule on left side that is 3.2 x 2.0 x 3.0 cm in size  CV: RRR without M/R/G  Lungs: CTA bilaterally  Abdomen: Soft, Nontender, non distended, positive bowel sounds   Neuro:no focal deficits  Mood: Normal   Lymph: neg in neck and supraclavicular area       Data:  TSH   Date Value Ref Range Status    04/04/2018 1.81 0.40 - 4.00 mU/L Final        Assessment and Plan:  #1 MNG: Thyroid nodules  Review of her ultrasound today compared with her April 2018 exam shows the following: Slight enlargement of the dominant nodule on the right (currently 2.4 cm in size, previously 2.0 cm in size).  It is difficult to distinguish between the multiple nodules on the left side compared with her current measurement.  However these nodules on the left side are primarily isoechoic and not cystic.  Therefore I think ultrasound-guided ethanol ablation will not be helpful.      In addition, the patient reports that these nodules have grown on the left side and are increasingly symptomatic with noted neck pressure.  She has multiple nodules about 2 to 3 cm on her left side which has not been biopsied.  The main nodule on her left side (nodule 4) that has been biopsied twice and found to be benign.    I suspect that her multinodular goiter is enlarging.  If insurance approves, we will have the patient obtain a formal neck ultrasound for evaluation.  Nevertheless, given her increased symptoms (neck pressure), as well as significant nodules on her left side which have not been biopsied, I do think repeat neck ultrasound, ultrasound-guided FNA, and consideration of ENT resection would be warranted.  We will see what her insurance might be able to cover.    Will recheck TFTs today.    Follow-up: Return in about 6 months (around 12/21/2019).      Scribe Disclosure:  I, Marva Alberto, am serving as a scribe to document services personally performed by Amee Zee MD at this visit, based upon the provider's statements to me. All documentation has been reviewed by the aforementioned provider prior to being entered into the official medical record.     Portions of this medical record were completed by a scribe. UPON MY REVIEW AND AUTHENTICATION BY ELECTRONIC SIGNATURE, this confirms (a) I performed the applicable clinical services, and  (b) the record is accurate.

## 2019-06-21 NOTE — PATIENT INSTRUCTIONS
"To expedite your medication refill(s), please contact your pharmacy and have them fax a refill request to: [phone].  *Please allow 3 business days for routine medication refills.  *Please allow 5 business days for controlled substance medication refills.  --------------------  To schedule an appointment (including lab work) you can reach our clinic schedulers at [phone], or you can schedule any follow up appointment directly through [employer] by clicking on the \"Visits\" tab and selecting \"Schedule an Appointment.\"    To ask a question to your Endocrine care team, please send them a [employer] message, or reach them by phone at [phone] and press option #3.    For after-hours urgent Endocrine issues, that do not require 911, please dial [phone], and ask to speak with the Endocrinologist On-Call.    For questions related to your bill, please contact:  [facility]: [phone]  [facility]: [phone]    If you do not have enough, or have no insurance for your care, or have questions about possible costs and coverage, please reach out to our [facility] Financial Counselors to discuss with them any options you may have. To reach them, please call [phone].    --------------------  Please Note: If you are active on [employer], all future test results will be sent by [employer] message only and will no longer be sent by mail. You may also receive communication directly from your physician.    "

## 2019-06-24 ENCOUNTER — TELEPHONE (OUTPATIENT)
Dept: ENDOCRINOLOGY | Facility: CLINIC | Age: 54
End: 2019-06-24

## 2019-06-24 NOTE — TELEPHONE ENCOUNTER
Valente Lea Dr our financial counselor spoke to this patient last week and gave her an application to fill out to sign up for Community Care. He says there's an option to try to get her Emergency MA if you deem this urgent. If not urgent, he says once the patient mails in the Community Care application it can take a few weeks before officially becoming active.    Just wanted to fill you in so you can decide how to proceed with the biopsy.    Thanks,  Ronda  [phone]

## 2019-06-24 NOTE — TELEPHONE ENCOUNTER
Will call pt next month to see status    --  Hi Dr Zee,     Valente Wood our financial counselor spoke to this patient last week and gave her an application to fill out to sign up for Community Care. He says there's an option to try to get her Emergency MA if you deem this urgent. If not urgent, he says once the patient mails in the Community Care application it can take a few weeks before officially becoming active.     Just wanted to fill you in so you can decide how to proceed with the biopsy.     Thanks,  Ronda  [phone]

## 2019-06-26 LAB — COPATH REPORT: NORMAL

## 2019-06-27 NOTE — TELEPHONE ENCOUNTER
Ronal Victor,    Please see below.    Amee Zee MD  You 3 days ago      Let's wait - can we schedule something for August 2019 and let her work on the community care part? If it is not approved, then we can cancel the August US and US with FNA     This is technically not an emergency.         Are you able to let me know if she doesn't get approved for community care?

## 2019-07-10 ENCOUNTER — ONCOLOGY VISIT (OUTPATIENT)
Dept: ONCOLOGY | Facility: CLINIC | Age: 54
End: 2019-07-10
Attending: OBSTETRICS & GYNECOLOGY
Payer: MEDICAID

## 2019-07-10 VITALS
HEART RATE: 81 BPM | TEMPERATURE: 98.5 F | OXYGEN SATURATION: 98 % | WEIGHT: 213.8 LBS | SYSTOLIC BLOOD PRESSURE: 142 MMHG | DIASTOLIC BLOOD PRESSURE: 80 MMHG | BODY MASS INDEX: 33.49 KG/M2

## 2019-07-10 DIAGNOSIS — R30.0 DYSURIA: ICD-10-CM

## 2019-07-10 DIAGNOSIS — C53.9 CERVICAL CANCER (H): ICD-10-CM

## 2019-07-10 DIAGNOSIS — R30.0 DYSURIA: Primary | ICD-10-CM

## 2019-07-10 LAB
ALBUMIN UR-MCNC: NEGATIVE MG/DL
APPEARANCE UR: CLEAR
BACTERIA #/AREA URNS HPF: ABNORMAL /HPF
BILIRUB UR QL STRIP: NEGATIVE
COLOR UR AUTO: YELLOW
GLUCOSE UR STRIP-MCNC: NEGATIVE MG/DL
HGB UR QL STRIP: ABNORMAL
KETONES UR STRIP-MCNC: NEGATIVE MG/DL
LEUKOCYTE ESTERASE UR QL STRIP: NEGATIVE
NITRATE UR QL: NEGATIVE
PH UR STRIP: 7 PH (ref 5–7)
RBC #/AREA URNS AUTO: 16 /HPF (ref 0–2)
SOURCE: ABNORMAL
SP GR UR STRIP: 1.02 (ref 1–1.03)
SQUAMOUS #/AREA URNS AUTO: 2 /HPF (ref 0–1)
UROBILINOGEN UR STRIP-MCNC: 0 MG/DL (ref 0–2)
WBC #/AREA URNS AUTO: 3 /HPF (ref 0–5)

## 2019-07-10 PROCEDURE — G0463 HOSPITAL OUTPT CLINIC VISIT: HCPCS | Mod: ZF

## 2019-07-10 PROCEDURE — 99214 OFFICE O/P EST MOD 30 MIN: CPT | Mod: ZP | Performed by: OBSTETRICS & GYNECOLOGY

## 2019-07-10 PROCEDURE — 81001 URINALYSIS AUTO W/SCOPE: CPT | Performed by: OBSTETRICS & GYNECOLOGY

## 2019-07-10 ASSESSMENT — PAIN SCALES - GENERAL: PAINLEVEL: NO PAIN (0)

## 2019-07-10 NOTE — NURSING NOTE
"Oncology Rooming Note    July 10, 2019 2:14 PM   Angella St is a 53 year old female who presents for:    Chief Complaint   Patient presents with     Oncology Clinic Visit     Cervical Cancer     Initial Vitals: /80   Pulse 81   Temp 98.5  F (36.9  C) (Oral)   Wt 97 kg (213 lb 12.8 oz)   SpO2 98%   BMI 33.49 kg/m   Estimated body mass index is 33.49 kg/m  as calculated from the following:    Height as of 6/14/19: 1.702 m (5' 7\").    Weight as of this encounter: 97 kg (213 lb 12.8 oz). Body surface area is 2.14 meters squared.  No Pain (0) Comment: Data Unavailable   No LMP recorded. Patient has had a hysterectomy.  Allergies reviewed: Yes  Medications reviewed: Yes    Medications: Medication refills not needed today.  Pharmacy name entered into [employer]: [facility] [place], [place] - [address] [facility] [id number]    Clinical concerns: Burning with urination x 3 days.      SHAISTA CHIN CMA                "

## 2019-07-10 NOTE — LETTER
7/10/2019       RE: Angella St  C/o Greyson Sherman  [address]  [facility] [address]     Dear Colleague,    Thank you for referring your patient, Angella St, to the [facility]. Please see a copy of my visit note below.                Follow Up Notes on Referred Patient    Date: 2019       Dr. Joyce Charles MD  No address on file       RE: Angella St  : 1965  DAYSI: 7/10/2019    Dear Dr. Joyce Charles:    Angella St is a 52 year old woman with a diagnosis of recurrent cervical cancer.            Patient presents today for followup.  She is now doing well, is eating and drinking well.  No nausea, vomiting, fever or chills.  Has not had any vaginal bleeding or discharge.  No B symptoms.       Past Medical History:    Past Medical History:   Diagnosis Date     Anemia      Cancer (H)     Metastatic squamous cell cervical carcinoma     Chronic kidney disease      Hypertension 2007     Obesity     BMI >30         Past Surgical History:    Past Surgical History:   Procedure Laterality Date     COMBINED CYSTOSCOPY, INSERT STENT URETER(S) Left 2016    Procedure: COMBINED CYSTOSCOPY, INSERT STENT URETER(S);  Surgeon: Deja Salinas MD;  [facility]     CYSTOSCOPY, RETROGRADES, COMBINED Left 2016    Procedure: COMBINED CYSTOSCOPY, RETROGRADES;  Surgeon: Deja Salinas MD;  [facility]     DAVINCI NEPHRECTOMY Left 5/3/2018    Procedure: DAVINCI XI NEPHRECTOMY;  DAVINCI XI LEFT NEPHRECTOMY;  Surgeon: Deja Salinas MD;  [facility]     GENITOURINARY SURGERY  2015    PNT placement     HYSTERECTOMY      [place], Marialuisa     IR PORT REMOVAL RIGHT  2019     PERCUTANEOUS NEPHROSTOMY Left 3/9/2017    Procedure: PERCUTANEOUS NEPHROSTOMY;  Surgeon: Bridger Meyer PA-C;  [facility]     PERCUTANEOUS NEPHROSTOMY Left 2017    Procedure: PERCUTANEOUS NEPHROSTOMY;  Left Nephrostomy Tube Change;  Surgeon:  Bridger Meyer PA-C;  [facility]     PERCUTANEOUS NEPHROSTOMY Left 10/30/2017    Procedure: PERCUTANEOUS NEPHROSTOMY;  Left Nephrostomy Tube Change;  Surgeon: Maryann Chavez PA-C;  [facility]     REMOVE PORT VASCULAR ACCESS Right 1/29/2019    Procedure: Right Port Removal;  Surgeon: Maryann Marin PA-C;  [facility]         Health Maintenance Due   Topic Date Due     PREVENTIVE CARE VISIT  1965     GRACIELA ASSESSMENT  1965     ADVANCE CARE PLANNING  1965     HIV SCREENING  11/02/1978     ZOSTER IMMUNIZATION (1 of 2) 11/02/2013       Current Medications:     Current Outpatient Medications   Medication Sig Dispense Refill     Acetaminophen (TYLENOL PO) Take 500 mg by mouth every 6 hours as needed        amLODIPine (NORVASC) 10 MG tablet TAKE ONE-HALF OF A TABLET BY MOUTH DAILY 45 tablet 3     iohexol (OMNIPAQUE) 140 MG/ML solution for oral use Mix entire bottle (50ml) of contast with 600ml (20 ounces) of water and drink half 2 hrs prior to CT scan and half 1 hr prior to scan 50 mL 0     ciprofloxacin (CIPRO) 500 MG tablet Take 1 tablet (500 mg) by mouth 2 times daily 6 tablet 0     COMPRESSION STOCKINGS 1 each daily 2 Units 1     estradiol (ESTRACE) 0.1 MG/GM vaginal cream Place 2 g vaginally three times a week Place small amount on fingertip and apply to urethra. (Patient not taking: Reported on 7/10/2019) 42.5 g 3     iohexol (OMNIPAQUE) 140 MG/ML SOLN solution Mix entire bottle (50ml) of contast with 600ml (20 ounces) of water and drink half 2 hrs prior to CT scan and half 1 hr prior to scan (Patient not taking: Reported on 7/10/2019) 50 mL 0     senna-docusate (SENOKOT-S;PERICOLACE) 8.6-50 MG per tablet Take 1 tablet by mouth daily as needed for constipation           Allergies:      No Known Allergies     Social History:     Social History     Tobacco Use     Smoking status: Never Smoker     Smokeless tobacco: Never Used   Substance Use Topics     Alcohol use: No      Alcohol/week: 0.0 oz     Comment: Does not drink alcohol       History   Drug Use No     Comment: No drug use         Family History:       Family History   Problem Relation Age of Onset     Hypertension Brother          Physical Exam:     /80   Pulse 81   Temp 98.5  F (36.9  C) (Oral)   Wt 97 kg (213 lb 12.8 oz)   SpO2 98%   BMI 33.49 kg/m     Body mass index is 33.49 kg/m .    General Appearance:  healthy and alert, no distress     Musculoskeletal:         extremities non tender and without edema  Neurological:   normal gait, no gross defects                Psychiatric:      appropriate mood and affect                               Hematological:            normal cervical, supraclavicular and inguinal lymph nodes                ABDOMEN:  Soft, nontender, nondistended, no organomegaly.     PELVIC:  Normal external genitalia, normal vaginal mucosa, well-healed vaginal cuff.  No adnexal masses or tenderness.  Rectovaginal confirms.                  Assessment:     Angella St is a 52 year old woman with a diagnosis of recurrent cervical cancer.      A total of 25 minutes was spent with the patient, 20 minutes of which were spent in counseling the patient and/or treatment planning.        1.  Recurrent cervical cancer.   2.  No evidence of disease.   3.  Status post left nephrectomy.        Discussed with the patient there is no current evidence of disease.  She has been doing well from a cervical cancer standpoint.  She will be 4 years out now in November from her last systemic chemotherapy.  We will see her back in 6 months for another surveillance visit with CT chest, abdomen and pelvis at that time in 6 months.  She has recently followed up with Urology for her left nephrectomy and has recovered well from that now.  She agrees with this plan, is very appreciative of her care.  All questions were answered.         Lam Moran MD, MS    Department of Obstetrics and  Gynecology   Division of Gynecologic Oncology   [facility]  Phone: [phone]      CC  Patient Care Team:  Shamir Pugh MD as PCP - General (Internal Medicine)  Christy Kraus, RN as Continuity Care Coordinator (Oncology)  Melida Rutledge APRN CNP as Referring Physician (Nurse Practitioner - Women's Health)  Shamir Pugh MD as MD (Internal Medicine)  Deja Salinas MD as MD (Urology)  Maryse Rodriguez, RN as Registered Nurse

## 2019-07-15 DIAGNOSIS — R30.0 DYSURIA: Primary | ICD-10-CM

## 2019-07-15 RX ORDER — CIPROFLOXACIN 500 MG/1
500 TABLET, FILM COATED ORAL 2 TIMES DAILY
Qty: 6 TABLET | Refills: 0 | Status: SHIPPED | OUTPATIENT
Start: 2019-07-15 | End: 2019-11-18

## 2019-07-23 NOTE — PROGRESS NOTES
Follow Up Notes on Referred Patient    Date: 2019       Dr. Joyce Charles MD  No address on file       RE: Angella St  : 1965  DAYSI: 7/10/2019    Dear Dr. Joyce Charles:    Angella St is a 52 year old woman with a diagnosis of recurrent cervical cancer.            Patient presents today for followup.  She is now doing well, is eating and drinking well.  No nausea, vomiting, fever or chills.  Has not had any vaginal bleeding or discharge.  No B symptoms.       Past Medical History:    Past Medical History:   Diagnosis Date     Anemia      Cancer (H)     Metastatic squamous cell cervical carcinoma     Chronic kidney disease      Hypertension      Obesity     BMI >30         Past Surgical History:    Past Surgical History:   Procedure Laterality Date     COMBINED CYSTOSCOPY, INSERT STENT URETER(S) Left 2016    Procedure: COMBINED CYSTOSCOPY, INSERT STENT URETER(S);  Surgeon: Deja Salinas MD;  [facility]     CYSTOSCOPY, RETROGRADES, COMBINED Left 2016    Procedure: COMBINED CYSTOSCOPY, RETROGRADES;  Surgeon: Deja Salinas MD;  [facility]     DAVINCI NEPHRECTOMY Left 5/3/2018    Procedure: DAVINCI XI NEPHRECTOMY;  DAVINCI XI LEFT NEPHRECTOMY;  Surgeon: Deja Salinas MD;  [facility]     GENITOURINARY SURGERY  2015    PNT placement     HYSTERECTOMY      [place], Mar[place]uisa     IR PORT REMOVAL RIGHT  2019     PERCUTANEOUS NEPHROSTOMY Left 3/9/2017    Procedure: PERCUTANEOUS NEPHROSTOMY;  Surgeon: Bridger Meyer PA-C;  [facility]     PERCUTANEOUS NEPHROSTOMY Left 2017    Procedure: PERCUTANEOUS NEPHROSTOMY;  Left Nephrostomy Tube Change;  Surgeon: Bridger Meyer PA-C;  [facility]     PERCUTANEOUS NEPHROSTOMY Left 10/30/2017    Procedure: PERCUTANEOUS NEPHROSTOMY;  Left Nephrostomy Tube Change;  Surgeon: Maryann Chavez PA-C;  [facility]     REMOVE PORT VASCULAR ACCESS Right 2019     Procedure: Right Port Removal;  Surgeon: Maryann Marin PA-C;  Location:  OR         Health Maintenance Due   Topic Date Due     PREVENTIVE CARE VISIT  1965     GRACIELA ASSESSMENT  1965     ADVANCE CARE PLANNING  1965     HIV SCREENING  11/02/1978     ZOSTER IMMUNIZATION (1 of 2) 11/02/2013       Current Medications:     Current Outpatient Medications   Medication Sig Dispense Refill     Acetaminophen (TYLENOL PO) Take 500 mg by mouth every 6 hours as needed        amLODIPine (NORVASC) 10 MG tablet TAKE ONE-HALF OF A TABLET BY MOUTH DAILY 45 tablet 3     iohexol (OMNIPAQUE) 140 MG/ML solution for oral use Mix entire bottle (50ml) of contast with 600ml (20 ounces) of water and drink half 2 hrs prior to CT scan and half 1 hr prior to scan 50 mL 0     ciprofloxacin (CIPRO) 500 MG tablet Take 1 tablet (500 mg) by mouth 2 times daily 6 tablet 0     COMPRESSION STOCKINGS 1 each daily 2 Units 1     estradiol (ESTRACE) 0.1 MG/GM vaginal cream Place 2 g vaginally three times a week Place small amount on fingertip and apply to urethra. (Patient not taking: Reported on 7/10/2019) 42.5 g 3     iohexol (OMNIPAQUE) 140 MG/ML SOLN solution Mix entire bottle (50ml) of contast with 600ml (20 ounces) of water and drink half 2 hrs prior to CT scan and half 1 hr prior to scan (Patient not taking: Reported on 7/10/2019) 50 mL 0     senna-docusate (SENOKOT-S;PERICOLACE) 8.6-50 MG per tablet Take 1 tablet by mouth daily as needed for constipation           Allergies:      No Known Allergies     Social History:     Social History     Tobacco Use     Smoking status: Never Smoker     Smokeless tobacco: Never Used   Substance Use Topics     Alcohol use: No     Alcohol/week: 0.0 oz     Comment: Does not drink alcohol       History   Drug Use No     Comment: No drug use         Family History:       Family History   Problem Relation Age of Onset     Hypertension Brother          Physical Exam:     /80   Pulse 81    Temp 98.5  F (36.9  C) (Oral)   Wt 97 kg (213 lb 12.8 oz)   SpO2 98%   BMI 33.49 kg/m    Body mass index is 33.49 kg/m .    General Appearance:  healthy and alert, no distress     Musculoskeletal:         extremities non tender and without edema  Neurological:   normal gait, no gross defects                Psychiatric:      appropriate mood and affect                               Hematological:            normal cervical, supraclavicular and inguinal lymph nodes                ABDOMEN:  Soft, nontender, nondistended, no organomegaly.     PELVIC:  Normal external genitalia, normal vaginal mucosa, well-healed vaginal cuff.  No adnexal masses or tenderness.  Rectovaginal confirms.                  Assessment:     Angella St is a 52 year old woman with a diagnosis of recurrent cervical cancer.      A total of 25 minutes was spent with the patient, 20 minutes of which were spent in counseling the patient and/or treatment planning.        1.  Recurrent cervical cancer.   2.  No evidence of disease.   3.  Status post left nephrectomy.        Discussed with the patient there is no current evidence of disease.  She has been doing well from a cervical cancer standpoint.  She will be 4 years out now in November from her last systemic chemotherapy.  We will see her back in 6 months for another surveillance visit with CT chest, abdomen and pelvis at that time in 6 months.  She has recently followed up with Urology for her left nephrectomy and has recovered well from that now.  She agrees with this plan, is very appreciative of her care.  All questions were answered.         Lam Moran MD, MS    Department of Obstetrics and Gynecology   Division of Gynecologic Oncology   [facility]  Phone: [phone]      CC  Patient Care Team:  Shamir Pugh MD as PCP - General (Internal Medicine)  Christy Kraus RN as Continuity Care Coordinator (Oncology)  Melida Rutledge,  APRN CNP as Referring Physician (Nurse Practitioner - Women's Health)  Shamir Pugh MD as MD (Internal Medicine)  Lam Moran MD as MD (Obstetrics & Gynecology, Maternal & Fetal Medicine)  Deja Salinas MD as MD (Urology)  Maryse Rodriguez RN as Registered Nurse  Deja Salinas MD as MD (Urology)  SELF, REFERRED

## 2019-09-30 ENCOUNTER — PRE VISIT (OUTPATIENT)
Dept: UROLOGY | Facility: CLINIC | Age: 54
End: 2019-09-30

## 2019-09-30 NOTE — TELEPHONE ENCOUNTER
Chief Complaint : Return-4 Mo    Hx/Sx: Hydro/ Urethra Stricture/UTI    Records/Orders: Available     Pt Contacted: n/a    At Rooming: Urine for UA/UC/ Cyto w/ Fish

## 2019-10-04 ENCOUNTER — OFFICE VISIT (OUTPATIENT)
Dept: UROLOGY | Facility: CLINIC | Age: 54
End: 2019-10-04
Payer: MEDICAID

## 2019-10-04 VITALS
DIASTOLIC BLOOD PRESSURE: 78 MMHG | HEART RATE: 69 BPM | WEIGHT: 210 LBS | SYSTOLIC BLOOD PRESSURE: 122 MMHG | HEIGHT: 65 IN | BODY MASS INDEX: 34.99 KG/M2

## 2019-10-04 DIAGNOSIS — N13.1 HYDRONEPHROSIS WITH URETERAL STRICTURE, NOT ELSEWHERE CLASSIFIED: Primary | ICD-10-CM

## 2019-10-04 LAB
ALBUMIN UR-MCNC: NEGATIVE MG/DL
APPEARANCE UR: CLEAR
BILIRUB UR QL STRIP: NEGATIVE
COLOR UR AUTO: YELLOW
GLUCOSE UR STRIP-MCNC: NEGATIVE MG/DL
HGB UR QL STRIP: ABNORMAL
HYALINE CASTS #/AREA URNS LPF: 1 /LPF (ref 0–2)
KETONES UR STRIP-MCNC: NEGATIVE MG/DL
LEUKOCYTE ESTERASE UR QL STRIP: NEGATIVE
MUCOUS THREADS #/AREA URNS LPF: PRESENT /LPF
NITRATE UR QL: NEGATIVE
PH UR STRIP: 7 PH (ref 5–7)
RBC #/AREA URNS AUTO: 16 /HPF (ref 0–2)
SOURCE: ABNORMAL
SP GR UR STRIP: 1.02 (ref 1–1.03)
SQUAMOUS #/AREA URNS AUTO: 1 /HPF (ref 0–1)
UROBILINOGEN UR STRIP-MCNC: 0 MG/DL (ref 0–2)
WBC #/AREA URNS AUTO: 2 /HPF (ref 0–5)

## 2019-10-04 PROCEDURE — 87086 URINE CULTURE/COLONY COUNT: CPT | Performed by: UROLOGY

## 2019-10-04 PROCEDURE — 88112 CYTOPATH CELL ENHANCE TECH: CPT | Performed by: UROLOGY

## 2019-10-04 ASSESSMENT — MIFFLIN-ST. JEOR: SCORE: 1548.43

## 2019-10-04 ASSESSMENT — PAIN SCALES - GENERAL: PAINLEVEL: NO PAIN (0)

## 2019-10-04 NOTE — NURSING NOTE
Chief Complaint   Patient presents with     RECHECK     Hydro/ Urethra Stricture/UTI       Courtney Childers MA

## 2019-10-04 NOTE — PROGRESS NOTES
UROLOGIC DIAGNOSES:        CURRENT INTERVENTIONS:        History:       Angella St is a 52 year old female with history of hypertension, cervical cancer s/p JESSICA and chemotherapy, complicated by left ureteral involvement with hydronephrosis with recurrent pyelonephritis and CKD requiring chronic pain PNT s/p DaVinci nephrectomy of the left kidney on 5/3/18, complicated by retroperitoneal abscess s/p IR drain placement 5/21/18    Patient noticed pain from her drain site on Tuesday (5/29) and went to the emergency room. CT showed decrease fluid collection and patient had no obvious signs of worsening infection. Patient continues to have intermittent sharp stabbing pain that will last a second and is much worse when she does not take her pain medication. For the last week she has had less than 10cc of clear fluid in her drain. She continues to care for it by flushing it and emptying bag every night.  Denies fevers, chills, fatigue, dysuria, urinary freqeuncy, pelvic or back pain. She feel like she is able to go throughout her normal activity until the pain starts and she has to rest.     Patient is s/p removal of IR drain with resolution of fluid collection post op.       Patient noted to have right flank pain recently. Was suspected of having pyelo given imaging. Noted to have microhematuria. No urine culture sent but was given antibiotics.   Notes that her pain improved.     Patient presents today with no complaints.  No further UTI.   At last visit was given rx for topical estrogen     Has not yet had U/a, urine culture, cytology.     WE discussed recurrent UTI, pyelo, hematuria, and previous surgery.                    Imaging:      Labs:      MEDICATIONS:    Current Outpatient Medications:      Acetaminophen (TYLENOL PO), Take 500 mg by mouth every 6 hours as needed , Disp: , Rfl:      amLODIPine (NORVASC) 10 MG tablet, TAKE ONE-HALF OF A TABLET BY MOUTH DAILY, Disp: 45 tablet, Rfl: 3     ciprofloxacin (CIPRO)  500 MG tablet, Take 1 tablet (500 mg) by mouth 2 times daily, Disp: 6 tablet, Rfl: 0     COMPRESSION STOCKINGS, 1 each daily, Disp: 2 Units, Rfl: 1     estradiol (ESTRACE) 0.1 MG/GM vaginal cream, Place 2 g vaginally three times a week Place small amount on fingertip and apply to urethra., Disp: 42.5 g, Rfl: 3     iohexol (OMNIPAQUE) 140 MG/ML SOLN solution, Mix entire bottle (50ml) of contast with 600ml (20 ounces) of water and drink half 2 hrs prior to CT scan and half 1 hr prior to scan, Disp: 50 mL, Rfl: 0     iohexol (OMNIPAQUE) 140 MG/ML solution for oral use, Mix entire bottle (50ml) of contast with 600ml (20 ounces) of water and drink half 2 hrs prior to CT scan and half 1 hr prior to scan, Disp: 50 mL, Rfl: 0     senna-docusate (SENOKOT-S;PERICOLACE) 8.6-50 MG per tablet, Take 1 tablet by mouth daily as needed for constipation, Disp: , Rfl:     ALLERGIES:   No Known Allergies    REVIEW OF SYSTEMS: Ten point review of systems without change as outlined in HPI    SURGICAL HISTORY:    Past Surgical History:   Procedure Laterality Date     COMBINED CYSTOSCOPY, INSERT STENT URETER(S) Left 6/13/2016    Procedure: COMBINED CYSTOSCOPY, INSERT STENT URETER(S);  Surgeon: Deja Salinas MD;  [facility]     CYSTOSCOPY, RETROGRADES, COMBINED Left 6/13/2016    Procedure: COMBINED CYSTOSCOPY, RETROGRADES;  Surgeon: Deja Salinas MD;  [facility]     DAVINCI NEPHRECTOMY Left 5/3/2018    Procedure: DAVINCI XI NEPHRECTOMY;  DAVINCI XI LEFT NEPHRECTOMY;  Surgeon: Deja Salinas MD;  [facility]     GENITOURINARY SURGERY  5/2015    PNT placement     HYSTERECTOMY  2011    [place], [place]uisa     IR PORT REMOVAL RIGHT  1/29/2019     PERCUTANEOUS NEPHROSTOMY Left 3/9/2017    Procedure: PERCUTANEOUS NEPHROSTOMY;  Surgeon: Bridger Meyer PA-C;  [facility]     PERCUTANEOUS NEPHROSTOMY Left 6/28/2017    Procedure: PERCUTANEOUS NEPHROSTOMY;  Left Nephrostomy Tube Change;   "Surgeon: Bridger Meyer PA-C;  [facility]     PERCUTANEOUS NEPHROSTOMY Left 10/30/2017    Procedure: PERCUTANEOUS NEPHROSTOMY;  Left Nephrostomy Tube Change;  Surgeon: Maryann Chavez PA-C;  [facility]     REMOVE PORT VASCULAR ACCESS Right 1/29/2019    Procedure: Right Port Removal;  Surgeon: Maryann Marin PA-C;  [facility]         PHYSICAL EXAM:    /78   Pulse 69   Ht 1.651 m (5' 5\")   Wt 95.3 kg (210 lb)   BMI 34.95 kg/m      HEENT: Normocephalic and atraumatic    Cardiac: Not done    Back/Flank: Not done    CNS/PNS: Alert and oriented    Respiratory: Normal nonlabored breathing    Abdomen: Soft nontender and nondistended, incisions well healed     Peripheral Vascular: No peripheral edema    Mental Status: Normal    External Genitalia: Not done    Bladder: Not done    Urethra: normal     Vagina: normal external appearance     Cystoscopy:        Urinalysis:  UA RESULTS:  Recent Labs   Lab Test  05/21/18   0056   COLOR  Straw   APPEARANCE  Clear   URINEGLC  Negative   URINEBILI  Negative   URINEKETONE  Negative   SG  1.004   UBLD  Trace*   URINEPH  7.5*   PROTEIN  Negative   NITRITE  Negative   LEUKEST  Negative   RBCU  <1   WBCU  <1         IMPRESSION:    53 y/o F s/p left nephrectomy for non-functioning kidney post op course c/b retroperitoneal abscess now cleared now with history of right pyelo     PLAN:    Will send U/a, urine culture, urine cytology today   Will refill topical estrogen PRN   Follow up PRN             Total Time:  15 minutes   Time in Consultation: greater than 50%     "

## 2019-10-04 NOTE — LETTER
10/4/2019       RE: Angella St  C/o Greyson Sherman  [address]  [facility] [address]     Dear Colleague,    Thank you for referring your patient, Angella St, to the [facility] UROLOGY AND INST FOR PROSTATE AND UROLOGIC CANCERS at [facility]. Please see a copy of my visit note below.    UROLOGIC DIAGNOSES:        CURRENT INTERVENTIONS:        History:       Angella St is a 52 year old female with history of hypertension, cervical cancer s/p JESSICA and chemotherapy, complicated by left ureteral involvement with hydronephrosis with recurrent pyelonephritis and CKD requiring chronic pain PNT s/p DaVinci nephrectomy of the left kidney on 5/3/18, complicated by retroperitoneal abscess s/p IR drain placement 5/21/18    Patient noticed pain from her drain site on Tuesday (5/29) and went to the emergency room. CT showed decrease fluid collection and patient had no obvious signs of worsening infection. Patient continues to have intermittent sharp stabbing pain that will last a second and is much worse when she does not take her pain medication. For the last week she has had less than 10cc of clear fluid in her drain. She continues to care for it by flushing it and emptying bag every night.  Denies fevers, chills, fatigue, dysuria, urinary freqeuncy, pelvic or back pain. She feel like she is able to go throughout her normal activity until the pain starts and she has to rest.     Patient is s/p removal of IR drain with resolution of fluid collection post op.       Patient noted to have right flank pain recently. Was suspected of having pyelo given imaging. Noted to have microhematuria. No urine culture sent but was given antibiotics.   Notes that her pain improved.     Patient presents today with no complaints.  No further UTI.   At last visit was given rx for topical estrogen     Has not yet had U/a, urine culture, cytology.     WE discussed recurrent UTI, pyelo, hematuria, and  previous surgery.                    Imaging:      Labs:      MEDICATIONS:    Current Outpatient Medications:      Acetaminophen (TYLENOL PO), Take 500 mg by mouth every 6 hours as needed , Disp: , Rfl:      amLODIPine (NORVASC) 10 MG tablet, TAKE ONE-HALF OF A TABLET BY MOUTH DAILY, Disp: 45 tablet, Rfl: 3     ciprofloxacin (CIPRO) 500 MG tablet, Take 1 tablet (500 mg) by mouth 2 times daily, Disp: 6 tablet, Rfl: 0     COMPRESSION STOCKINGS, 1 each daily, Disp: 2 Units, Rfl: 1     estradiol (ESTRACE) 0.1 MG/GM vaginal cream, Place 2 g vaginally three times a week Place small amount on fingertip and apply to urethra., Disp: 42.5 g, Rfl: 3     iohexol (OMNIPAQUE) 140 MG/ML SOLN solution, Mix entire bottle (50ml) of contast with 600ml (20 ounces) of water and drink half 2 hrs prior to CT scan and half 1 hr prior to scan, Disp: 50 mL, Rfl: 0     iohexol (OMNIPAQUE) 140 MG/ML solution for oral use, Mix entire bottle (50ml) of contast with 600ml (20 ounces) of water and drink half 2 hrs prior to CT scan and half 1 hr prior to scan, Disp: 50 mL, Rfl: 0     senna-docusate (SENOKOT-S;PERICOLACE) 8.6-50 MG per tablet, Take 1 tablet by mouth daily as needed for constipation, Disp: , Rfl:     ALLERGIES:   No Known Allergies    REVIEW OF SYSTEMS: Ten point review of systems without change as outlined in HPI    SURGICAL HISTORY:    Past Surgical History:   Procedure Laterality Date     COMBINED CYSTOSCOPY, INSERT STENT URETER(S) Left 6/13/2016    Procedure: COMBINED CYSTOSCOPY, INSERT STENT URETER(S);  Surgeon: Deja Salinas MD;  [facility]     CYSTOSCOPY, RETROGRADES, COMBINED Left 6/13/2016    Procedure: COMBINED CYSTOSCOPY, RETROGRADES;  Surgeon: Deja Salinas MD;  [facility]     DAVINCI NEPHRECTOMY Left 5/3/2018    Procedure: DAVINCI XI NEPHRECTOMY;  DAVINCI XI LEFT NEPHRECTOMY;  Surgeon: Deja Salinas MD;  [facility]     GENITOURINARY SURGERY  5/2015    PNT placement      "HYSTERECTOMY  2011    [place], Marialuisa     IR PORT REMOVAL RIGHT  1/29/2019     PERCUTANEOUS NEPHROSTOMY Left 3/9/2017    Procedure: PERCUTANEOUS NEPHROSTOMY;  Surgeon: Bridger Meyer PA-C;  [facility]     PERCUTANEOUS NEPHROSTOMY Left 6/28/2017    Procedure: PERCUTANEOUS NEPHROSTOMY;  Left Nephrostomy Tube Change;  Surgeon: Bridger Meyer PA-C;  [facility]     PERCUTANEOUS NEPHROSTOMY Left 10/30/2017    Procedure: PERCUTANEOUS NEPHROSTOMY;  Left Nephrostomy Tube Change;  Surgeon: Maryann Chavez PA-C;  [facility]     REMOVE PORT VASCULAR ACCESS Right 1/29/2019    Procedure: Right Port Removal;  Surgeon: Maryann Marin PA-C;  [facility]         PHYSICAL EXAM:    /78   Pulse 69   Ht 1.651 m (5' 5\")   Wt 95.3 kg (210 lb)   BMI 34.95 kg/m       HEENT: Normocephalic and atraumatic    Cardiac: Not done    Back/Flank: Not done    CNS/PNS: Alert and oriented    Respiratory: Normal nonlabored breathing    Abdomen: Soft nontender and nondistended, incisions well healed     Peripheral Vascular: No peripheral edema    Mental Status: Normal    External Genitalia: Not done    Bladder: Not done    Urethra: normal     Vagina: normal external appearance     Cystoscopy:        Urinalysis:  UA RESULTS:  Recent Labs   Lab Test  05/21/18   0056   COLOR  Straw   APPEARANCE  Clear   URINEGLC  Negative   URINEBILI  Negative   URINEKETONE  Negative   SG  1.004   UBLD  Trace*   URINEPH  7.5*   PROTEIN  Negative   NITRITE  Negative   LEUKEST  Negative   RBCU  <1   WBCU  <1         IMPRESSION:    53 y/o F s/p left nephrectomy for non-functioning kidney post op course c/b retroperitoneal abscess now cleared now with history of right pyelo     PLAN:    Will send U/a, urine culture, urine cytology today   Will refill topical estrogen PRN   Follow up PRN             Total Time:  15 minutes   Time in Consultation: greater than 50%       Again, thank you for allowing me to participate in the care " of your patient.      Sincerely,    Deja Salinas MD

## 2019-10-05 LAB
BACTERIA SPEC CULT: NORMAL
Lab: NORMAL
SPECIMEN SOURCE: NORMAL

## 2019-10-07 LAB — COPATH REPORT: NORMAL

## 2019-10-18 ENCOUNTER — TELEPHONE (OUTPATIENT)
Dept: ENDOCRINOLOGY | Facility: CLINIC | Age: 54
End: 2019-10-18

## 2019-10-18 NOTE — TELEPHONE ENCOUNTER
"----- Message from Christiane Bae sent at 10/18/2019  9:28 AM CDT -----  Regarding: RE: Community Care  Angella Oliva has been approved for Community Care through the [facility] 10/3/19 - 9/30/20.    Thank you,    Christiane Bae  Financial Counselor  [facility]  [phone]  ----- Message -----  From: Dolores Benítez [facility]  Sent: 10/18/2019   9:03 AM CDT  To: Viktoriya Mello, RN  Subject: RE: Community Care                               I\"m forwarding to Christiane one of the financial counselors.  Dolores  ----- Message -----  From: Viktoriya Shukla, RN  Sent: 10/17/2019   4:34 PM CDT  To: ERIC Ye  Subject: Community Care                                   Do you know  how I find out  about her community care insurance  application if it was approved?   ----- Message -----  From: Amee Zee MD  Sent: 10/17/2019   1:27 PM CDT  To: [facility]    Can you ask her if her community care insurance has been approved so we can arrange the ultrasound-guided FNA and surgical consult?  ----- Message -----  From: Amee Zee MD  Sent: 8/7/2019  To: Amee Zee MD    Community care insurance approved so pt can have eval and us and fna and surgical consult?           "

## 2019-10-18 NOTE — TELEPHONE ENCOUNTER
Approved for [facility]  10/3/19 -9/30/20 . Viktoriya Shukla RN on 10/18/2019 at 11:26 AM

## 2019-10-21 ENCOUNTER — TELEPHONE (OUTPATIENT)
Dept: ENDOCRINOLOGY | Facility: CLINIC | Age: 54
End: 2019-10-21

## 2019-10-21 DIAGNOSIS — E04.2 NONTOXIC MULTINODULAR GOITER: Primary | ICD-10-CM

## 2019-10-23 NOTE — TELEPHONE ENCOUNTER
Us guided FNA of thyroid ordered  -    Please enter order for biopsy. Imaging says there isn't one.     Thanks,  Ronda

## 2019-10-28 ENCOUNTER — ANCILLARY PROCEDURE (OUTPATIENT)
Dept: ULTRASOUND IMAGING | Facility: CLINIC | Age: 54
End: 2019-10-28
Attending: INTERNAL MEDICINE
Payer: MEDICAID

## 2019-10-28 ENCOUNTER — DOCUMENTATION ONLY (OUTPATIENT)
Dept: CARE COORDINATION | Facility: CLINIC | Age: 54
End: 2019-10-28

## 2019-10-28 ENCOUNTER — TELEPHONE (OUTPATIENT)
Dept: ENDOCRINOLOGY | Facility: CLINIC | Age: 54
End: 2019-10-28

## 2019-10-28 DIAGNOSIS — E04.2 NONTOXIC MULTINODULAR GOITER: ICD-10-CM

## 2019-10-28 DIAGNOSIS — E04.2 NONTOXIC MULTINODULAR GOITER: Primary | ICD-10-CM

## 2019-10-28 PROCEDURE — 88172 CYTP DX EVAL FNA 1ST EA SITE: CPT | Performed by: INTERNAL MEDICINE

## 2019-10-28 PROCEDURE — 88173 CYTOPATH EVAL FNA REPORT: CPT | Mod: 91 | Performed by: INTERNAL MEDICINE

## 2019-10-28 RX ORDER — LIDOCAINE HYDROCHLORIDE 10 MG/ML
5 INJECTION, SOLUTION INFILTRATION; PERINEURAL ONCE
Status: COMPLETED | OUTPATIENT
Start: 2019-10-28 | End: 2019-10-28

## 2019-10-28 RX ADMIN — LIDOCAINE HYDROCHLORIDE 5 ML: 10 INJECTION, SOLUTION INFILTRATION; PERINEURAL at 10:30

## 2019-10-28 RX ADMIN — LIDOCAINE HYDROCHLORIDE 5 ML: 10 INJECTION, SOLUTION INFILTRATION; PERINEURAL at 11:15

## 2019-10-28 NOTE — LETTER
Patient:  Angella Whitaker  :   1965  MRN:     [id number]        Ms.Alice Whitaker  C/O MO RODRIGUEZ  [address]   [facility] [address]        2019    Dear Angella    Thank you for doing the recent biopsy.  They biopsied one nodule on the right and 4 nodules on the left.  All were benign.  This is great news.    If you have any questions, please feel free to contact my nurse at [phone] select option #3 for triage nurse  or  option #1 for scheduling related questions.    Regards    Amee Tello MD    Resulted Orders   Fine needle aspiration   Result Value Ref Range    Copath Report       Patient Name: ANGELLA WHITAKER  MR#: [id number]  Specimen #: [id number]  Collected: 10/28/2019  Received: 10/28/2019  Reported: 10/29/2019 16:57  Ordering Phy(s): AMEE TELLO    For improved result formatting, select 'View Enhanced Report Format' under   Linked Documents section.    SPECIMEN/STAIN PROCESS:  A: Thyroid, right inferior #3 , ultrasound guided fine needle aspiration       Pap-Cyto x 3, Diff Quick Stain-cyto x 3  B: Thyroid, left superior #1, ultrasound guided fine needle aspiration       Pap-Cyto x 3, Diff Quick Stain-cyto x 3  C: Thyroid, left superior #2 , ultrasound guided fine needle aspiration       Pap-Cyto x 3, Diff Quick Stain-cyto x 3  D: Thyroid, left mid #3 , ultrasound guided fine needle aspiration       Pap-Cyto x 3, Diff Quick Stain-cyto x 3  E: Thyroid, left inferior #4 , ultrasound guided fine needle aspiration       Pap-Cyto x 3, Diff Quick Stain-cyto x 3    ----------------------------------------------------------------    CYTOLOGIC INTERPRETATION:    A.   Thyroid, right inferior #3 , ultrasound guided fine needle aspiration:  - Benign  Consistent with a benign nodule (includes adenomatoid nodule, colloid   nodule, etc.)    The [place] implied risk of malignancy and recommended clinical   management:  Benign has a 0-3% risk of malignancy, recommended management is clinical    follow-up    Specimen Adequacy: Satisfactory for evaluation.    B.  Thyroid, left superior #1, ultrasound guided fine needle aspiration:  - Benign  Consistent with a benign nodule (includes adenomatoid nodule, colloid   nodule, etc.)    The [place] implied risk of malignancy and recommended clinical   management:  Benign has a 0-3% risk of malignancy, recommended management is clinical   follow-up    Specimen Adequacy: Satisfactory for evaluation.    C.  Thyroid, left superior #2 , ultrasound guided fine needle aspiration:  - Benign  Consistent with a benign nodule (includes adenomatoid nodule, colloid   nodule, etc.)    The [place] implied risk of malignancy an d recommended clinical   management:  Benign has a 0-3% risk of malignancy, recommended management is clinical   follow-up    Specimen Adequacy: Satisfactory for evaluation.    D.  Thyroid, left mid #3 , ultrasound guided fine needle aspiration:  - Benign  Consistent with a benign nodule (includes adenomatoid nodule, colloid   nodule, etc.)    The [place] implied risk of malignancy and recommended clinical   management:  Benign has a 0-3% risk of malignancy, recommended management is clinical   follow-up    Specimen Adequacy: Satisfactory for evaluation.    E.  Thyroid, left inferior #4 , ultrasound guided fine needle aspiration:  - Benign  Consistent with a benign nodule (includes adenomatoid nodule, colloid   nodule, etc.)    The [place] implied risk of malignancy and recommended clinical   management:  Benign has a 0-3% risk of malignancy, recommended management is clinical   follow-up    Specimen Adequacy: Satisfactory for evaluation.    I have personally reviewed all specimens a nd/or slides, including the   listed special stains, and used them  with my medical judgement to determine or confirm the final diagnosis.    Electronically signed out by:  Jerome Tran M.D., [facility]    CLINICAL HISTORY:  Right inferior #3 (1.1 x 1.1 x 2.1   cm)  Left superior #1 (2.9 x 2.2 x 2.1 cm)  Left superior #2 (3.5 x 2.8 x 2.9 cm)  Left mid #3 (3.4 x 2.3 x 3.4 cm)  Left inferior #4 (5.3 x 4.3 x 4.0 cm)    ,    GROSS:  A. Thyroid, right inferior #3 , ultrasound guided fine needle aspiration:    Received are 3 fixed slides,  processed for Pap stain, and 3 air dried slides, processed for Diff Quik   stain. Afirma tube held.    B. Thyroid, left superior #1, ultrasound guided fine needle aspiration:    Received are 3 fixed slides,  processed for Pap stain, and 3 air dried slides, processed for Diff Quik   stain. Afirma tube held.    C. Thyroid, left superior #2 , ultrasound guided fine needle aspiration:    Received are 3 fixed slides,  processed for Pap stain, and 3 air d ried slides, processed for Diff Quik   stain. Afirma tube held.    D. Thyroid, left mid #3 , ultrasound guided fine needle aspiration:    Received are 3 fixed slides, processed  for Pap stain, and 3 air dried slides, processed for Diff Quik stain.   Afirma tube held.    E. Thyroid, left inferior #4 , ultrasound guided fine needle aspiration:    Received are 3 fixed slides,  processed for Pap stain, and 3 air dried slides, processed for Diff Quik   stain. Afirma tube held.    INTRAOPERATIVE CONSULTATION:  FNA Performance: Fine needle aspiration was not performed by [facility],    Pathology staff.  Aspirate immediate study/adequacy:  I, Dr. MAYRA Tran III, MD attest that I immediately examined smears while   the procedure was underway and  determined or confirmed the adequacy of the specimens via telepathology.  It is of note that the final assessment and report may be performed and   signed by a different pathologist.    Onsite adequacy/interpretation:  Pass A1 adequate, Pass A2 put into afir ma, Pass A3-A4 adequate.  Pass B1 inadequate, Pass B2 put into afirma, Pass B3-B4 adequate.  Pass C1 inadequate, Pass C2 put into afrima, Pass C3 inadequate, Pass C4   adequate.  Pass D1 adequate, Pass D2 put into  afirma, Pass D3-D4 adequate.  Pass A1oridhjbg, Pass E2 put into afirma, Pass E3 adequate, Pass E4   inadequate.    MICROSCOPIC:  Microscopic examination is performed.  Neri Hopkins MD Cytopathology fellow      Jerome Tran III, MD   Attending    CPT Codes:  A: 63015-XAMG-ASC, 27371-ZOZB-NPK, 15816-YOME  B: 04056-HHWV, 53201-YNUT-NKK, 62388-XJGZ-UKL  C: 09270-JDWO, 41029-DEWW-RTX, 64492-YHVE-HVT  D: 57028-HYMX, 73277-BCPO-ERS, 04550-DKJG-EHX  E: 08863-WZYL, 32544-VGEI-XRY, 97399-MIMI-DXI    COLLECTION SITE:  Client:  [facility]  Location:  [facility] (B)    The technical component of this testing was completed at the [facility], with the professional component performed   at the [facility], [address] [place] [phone] ([phone])    Resident  [facility]

## 2019-10-29 LAB — COPATH REPORT: NORMAL

## 2019-10-30 ENCOUNTER — TELEPHONE (OUTPATIENT)
Dept: ENDOCRINOLOGY | Facility: CLINIC | Age: 54
End: 2019-10-30

## 2019-10-30 NOTE — TELEPHONE ENCOUNTER
Largest nodule on the right and 4 nodules on the left were biopsied..  All were benign. Called pt - pt notified    --  Dear Angella    Thank you for doing the recent biopsy.  They biopsied one nodule on the right and 4 nodules on the left.  All were benign.  This is great news.    If you have any questions, please feel free to contact my nurse at [phone] select option #3 for triage nurse  or  option #1 for scheduling related questions.    Regards    Amee Zee MD

## 2019-11-18 ENCOUNTER — OFFICE VISIT (OUTPATIENT)
Dept: INTERNAL MEDICINE | Facility: CLINIC | Age: 54
End: 2019-11-18
Payer: MEDICAID

## 2019-11-18 VITALS
SYSTOLIC BLOOD PRESSURE: 125 MMHG | OXYGEN SATURATION: 97 % | WEIGHT: 226 LBS | DIASTOLIC BLOOD PRESSURE: 77 MMHG | BODY MASS INDEX: 37.61 KG/M2 | HEART RATE: 72 BPM

## 2019-11-18 DIAGNOSIS — E78.5 HYPERLIPIDEMIA, UNSPECIFIED HYPERLIPIDEMIA TYPE: ICD-10-CM

## 2019-11-18 DIAGNOSIS — I10 BENIGN ESSENTIAL HYPERTENSION: ICD-10-CM

## 2019-11-18 DIAGNOSIS — N13.1 HYDRONEPHROSIS WITH URETERAL STRICTURE, NOT ELSEWHERE CLASSIFIED: ICD-10-CM

## 2019-11-18 DIAGNOSIS — Z23 NEED FOR VACCINATION: Primary | ICD-10-CM

## 2019-11-18 DIAGNOSIS — E04.2 NONTOXIC MULTINODULAR GOITER: ICD-10-CM

## 2019-11-18 LAB
CHOLEST SERPL-MCNC: 189 MG/DL
HDLC SERPL-MCNC: 51 MG/DL
LDLC SERPL CALC-MCNC: 120 MG/DL
NONHDLC SERPL-MCNC: 138 MG/DL
TRIGL SERPL-MCNC: 89 MG/DL
TSH SERPL DL<=0.005 MIU/L-ACNC: 1.51 MU/L (ref 0.4–4)

## 2019-11-18 PROCEDURE — 88112 CYTOPATH CELL ENHANCE TECH: CPT | Performed by: UROLOGY

## 2019-11-18 RX ORDER — AMLODIPINE BESYLATE 5 MG/1
5 TABLET ORAL DAILY
Qty: 100 TABLET | Refills: 3 | Status: SHIPPED | OUTPATIENT
Start: 2019-11-18 | End: 2020-11-24

## 2019-11-18 ASSESSMENT — PAIN SCALES - GENERAL: PAINLEVEL: NO PAIN (0)

## 2019-11-18 NOTE — PATIENT INSTRUCTIONS
[facility] Medication Refill Request Information:  * Please contact your pharmacy regarding ANY request for medication refills.  ** [facility] Prescription Fax = [phone]  * Please allow 3 business days for routine medication refills.  * Please allow 5 business days for controlled substance medication refills.     [facility] Test Result notification information:  *You will be notified with in 7-10 days of your appointment day regarding the results of your test.  If you are on MyChart you will be notified as soon as the provider has reviewed the results and signed off on them.    [facility]: [phone]

## 2019-11-18 NOTE — PROGRESS NOTES
HPI  54-year-old returns today for reevaluation of her blood pressure when questions about taking a ketogenic diet.  She is been doing well she attributes her initial blood pressure elevation today on the fact that she has not taken her amlodipine today.  Been taking this 5 mg a day by splitting 10 mg tablets.  She is tolerating this well she said no side effects or ill effects related to this.  She is interested in a ketogenic diet and we discussed the difficulty with this the problems with this and the need to monitor lipids especially if she increases the fat content of the diet.  We will have her consider this.  Otherwise she is been doing well she is following with endocrinology regarding her multinodular goiter and recent fine-needle biopsy was benign.  She is walking 30 minutes 5 days a week on average.  She is tolerating this well without side effects or ill effects.  Past Medical History:   Diagnosis Date     Anemia      Cancer (H)     Metastatic squamous cell cervical carcinoma     Chronic kidney disease      Hypertension 2007     Obesity     BMI >30     Past Surgical History:   Procedure Laterality Date     COMBINED CYSTOSCOPY, INSERT STENT URETER(S) Left 6/13/2016    Procedure: COMBINED CYSTOSCOPY, INSERT STENT URETER(S);  Surgeon: Deja Salinas MD;  Location: [facility] OR     CYSTOSCOPY, RETROGRADES, COMBINED Left 6/13/2016    Procedure: COMBINED CYSTOSCOPY, RETROGRADES;  Surgeon: Deja Salinas MD;  [facility]: [facility] OR     DAVINCI NEPHRECTOMY Left 5/3/2018    Procedure: DAVINCI XI NEPHRECTOMY;  DAVINCI XI LEFT NEPHRECTOMY;  Surgeon: Deja Salinas MD;  Location:  OR     GENITOURINARY SURGERY  5/2015    PNT placement     HYSTERECTOMY  2011    [place]     IR PORT REMOVAL RIGHT  1/29/2019     PERCUTANEOUS NEPHROSTOMY Left 3/9/2017    Procedure: PERCUTANEOUS NEPHROSTOMY;  Surgeon: Bridger Meyer PA-C;  [facility]: [facility] OR     PERCUTANEOUS NEPHROSTOMY Left 6/28/2017     Procedure: PERCUTANEOUS NEPHROSTOMY;  Left Nephrostomy Tube Change;  Surgeon: Bridger Meyer PA-C;  [facility] OR     PERCUTANEOUS NEPHROSTOMY Left 10/30/2017    Procedure: PERCUTANEOUS NEPHROSTOMY;  Left Nephrostomy Tube Change;  Surgeon: Maryann Chavez PA-C;  [facility]: [facility] OR     REMOVE PORT VASCULAR ACCESS Right 2019    Procedure: Right Port Removal;  Surgeon: Maryann Marin PA-C;  Location: [facility] OR     Family History   Problem Relation Age of Onset     Hypertension Brother      Social History     Socioeconomic History     Marital status:      Spouse name: None     Number of children: 3     Years of education: None     Highest education level: None   Occupational History     Occupation: no working   Social Needs     Financial resource strain: None     Food insecurity:     Worry: None     Inability: None     Transportation needs:     Medical: None     Non-medical: None   Tobacco Use     Smoking status: Never Smoker     Smokeless tobacco: Never Used   Substance and Sexual Activity     Alcohol use: No     Alcohol/week: 0.0 standard drinks     Comment: Does not drink alcohol     Drug use: No     Comment: No drug use     Sexual activity: Not Currently   Lifestyle     Physical activity:     Days per week: None     Minutes per session: None     Stress: None   Relationships     Social connections:     Talks on phone: None     Gets together: None     Attends [employer] service: None     Active member of club or organization: None     Attends meetings of clubs or organizations: None     Relationship status: None     Intimate partner violence:     Fear of current or ex partner: None     Emotionally abused: None     Physically abused: None     Forced sexual activity: None   Other Topics Concern     Parent/sibling w/ CABG, MI or angioplasty before 65F 55M? Not Asked   Social History Narrative    Mom  from [place] in her 40's    Dad  in his 60's presumed to be from hunger as a result of the on  going war in [facility] at the time.    Patient has a brother and a sister who are alive and well.      Patient has three children:  Female age 37 alive and well; Female age 35 alive and well; Female age 33 alive and well    Patient reports  is in [facility].       Exam:  /77 (BP Location: Right arm, Patient Position: Sitting, Cuff Size: Adult Large)   Pulse 72   Wt 102.5 kg (226 lb)   SpO2 97%   BMI 37.61 kg/m    226 lbs 0 oz  The patient is alert, oriented with a clear sensorium.   Skin shows no lesions or rashes and good turgor.   Head is normocephalic and atraumatic.    Neck shows massive thyromegaly.     Lungs are clear.   Heart shows normal S1 and S2 without murmur or gallop.    Extremities show no edema.    ASSESSMENT  1 hypertension appears well controlled  2 hyperlipidemia needs reassessment  3 multinodular goiter  4 history of cervical cancer  5 status post nephrectomy for atrophic kidney    Plan  We will check her lipids today along with her thyroid function.  She is current on her immunizations when I have her continue with her walking at least 5 days a week plan to have her follow-up for reassessment in a year.  Over 25 minutes spent with patient the majority in counseling and coordinating care.    This note was completed using Dragon voice recognition software.  Although reviewed after completion, some word and grammatical errors may occur.    Shamir Pugh MD  General Internal Medicine  [facility]  [phone]

## 2019-11-18 NOTE — NURSING NOTE
Chief Complaint   Patient presents with     Recheck Medication     pt here for follow up       Courtney Durham CMA, EMT at 9:59 AM on 11/18/2019.

## 2019-11-20 LAB — COPATH REPORT: NORMAL

## 2020-01-10 ENCOUNTER — TELEPHONE (OUTPATIENT)
Dept: ENDOCRINOLOGY | Facility: CLINIC | Age: 55
End: 2020-01-10

## 2020-01-10 ENCOUNTER — OFFICE VISIT (OUTPATIENT)
Dept: ENDOCRINOLOGY | Facility: CLINIC | Age: 55
End: 2020-01-10
Payer: MEDICAID

## 2020-01-10 VITALS
HEIGHT: 65 IN | SYSTOLIC BLOOD PRESSURE: 151 MMHG | BODY MASS INDEX: 36.32 KG/M2 | DIASTOLIC BLOOD PRESSURE: 85 MMHG | WEIGHT: 218 LBS | HEART RATE: 85 BPM

## 2020-01-10 DIAGNOSIS — E04.2 NONTOXIC MULTINODULAR GOITER: Primary | ICD-10-CM

## 2020-01-10 ASSESSMENT — PAIN SCALES - GENERAL: PAINLEVEL: NO PAIN (0)

## 2020-01-10 ASSESSMENT — MIFFLIN-ST. JEOR: SCORE: 1579.72

## 2020-01-10 NOTE — TELEPHONE ENCOUNTER
PLEASE HELP SCHEDULE THE FOLLOWING APPT(S): Return Appointment    TIME FRAME NEEDED BY: In 1 year.       SCHEDULING COMMENTS (optional): with US before visit per Dr Zee.   Mariel Rico, RN on 1/10/2020 at 3:27 PM

## 2020-01-10 NOTE — PROGRESS NOTES
[facility]  Endocrinology  Amee Zee MD  01/10/2020      Chief Complaint:   Follow up nodules     History of Present Illness:   Angella St is a 54 year old female who presents for follow up of thyroid nodules.    #1 Thyroid nodules status post biopsy in October 2019 of the dominant nodule on the right side and 4 nodules on the left side all of which were benign  She was discovered to have thyroid goiter back in 2015 during her cervical cancer evaluation. She underwent PET/CT scan which showed heterogenous thyroid nodules with increased FDG uptake. The patient had a following thyroid US and US guided biopsy from the right and left dominant thyroid nodules in 11/2015. Cytology was benign. Repeated FNA biopsy was obtained from the left dominant thyroid nodule which again turned back to be benign.  Repeat thyroid ultrasound in 4/2018 which showed some changes in the right thyroid nodule, and mild enlargement in some of the nodules on the left.  Floor ultrasound June 2019 showed enlargement of nodules in both the right and left thyroid.  Biopsy could not be done at the time as she only had emergency insurance.      Interval history:  She was able to undergo ultrasound guided FNA of her right dominant nodule (#3) and all 4 nodules on the left side on 10/2019.  These were all benign.  She denies any issues with swallowing or breathing and she has no pressure in her neck.  November 2019 TSH was normal.       Review of Systems:   Pertinent items are noted in HPI, remainder of complete ROS is negative.      Active Medications:      Acetaminophen (TYLENOL PO), Take 500 mg by mouth every 6 hours as needed , Disp: , Rfl:      amLODIPine (NORVASC) 5 MG tablet, Take 1 tablet (5 mg) by mouth daily, Disp: 100 tablet, Rfl: 3     estradiol (ESTRACE) 0.1 MG/GM vaginal cream, Place 2 g vaginally three times a week Place small amount on fingertip and apply to urethra. (Patient not taking: Reported on 1/10/2020), Disp: 42.5 g, Rfl:  "3     senna-docusate (SENOKOT-S;PERICOLACE) 8.6-50 MG per tablet, Take 1 tablet by mouth daily as needed for constipation, Disp: , Rfl:       Allergies:   No known drug allergies.       Past Medical History:  Nontoxic multinodular goiter  Cervical cancer   Chronic kidney disease  Recurrent pyelonephritis  Atrophic kidney   Hydronephrosis   Hypertension  Obesity  Anemia   Peripheral neuropathy     Past Surgical History:  Percutaneous nephrostomy tube change 6/28/17, 10/30/17, 1/29/19  Nephrectomy, left 5/3/18  Cystoscopy, ureteral stent placement 6/13/16  Hysterectomy 2011  Vascular port placement     Family History:   Hypertension - brother       Social History:   Presents to clinic alone.  Tobacco Use: No previous or current tobacco use.   Alcohol Use: No alcohol use.   PCP: Shamir Pugh      Physical Exam:   BP (!) 151/85   Pulse 85   Ht 1.651 m (5' 5\")   Wt 98.9 kg (218 lb)   BMI 36.28 kg/m       GENERAL APPEARANCE: Alert and no distress  NECK: No lymphadenopathy appreciated  Thyroid: Very prominent enlarged left thyroid, easily 80 g in size, firm  Mood: Normal   Lymph: neg in neck and supraclavicular area      Imaging:  Thyroid ultrasound FNA biopsy 10/28/2019:  FINDINGS:   After describing the risks, benefits, and alternatives to ultrasound guided multilocular thyroid nodule fine needle aspiration, the patient elected to proceed and written and an informed consent was obtained.     The patient was then placed supine and preliminary grayscale images demonstrated numerous thyroid nodules similar to the previous exam from 4/20/2018. Specifically:  Right nodule #1: Spongiform, 7 x 5 x 8 mm compared to 4 x 4 x 7 mm previously.  Right nodule #2: Spongiform, 4 x 3 x 4 mm compared to 4 x 2 x 4 mm previously.  Right nodule #3: Solid, 1.1 x 1.1 x 2.1 cm, compared to 1.1 x 1.0 x 2.0 cm previously.     Left nodule #1: Solid, 2.9 x 2.2 x 2.1 cm compared to 2.6 x 2.2 x 2.0 cm previously.  Left nodule #2: " Solid/cystic, 3.5 x 2.8 x 2.9 cm compared to 3.4 x 2.7 x 2.8 cm previously.  Left nodule #3: Solid/cystic, 3.4 x 2.3 x 3.4 cm compared to 2.9 x 1.9 x 3.2 cm previously.  Left nodule #4: Solid, 5.3 x 4.3 x 4.1 cm compared to 4.0 x 3.6 x 3.1 cm previously.     The patient was then prepped and draped in a sterile fashion. Local anesthesia was achieved using a total of 7 cc of 1% lidocaine. Under direct sonographic guidance, four passes of the right nodule #3 were performed using 25-gauge needles. This process was repeated exactly for nodules 1-4 on the left.     Preliminary interpretation from pathology suggests adequate cellularity for diagnosis. There were no immediate complications.     IMPRESSION:   1. Technically successful ultrasound-guided bilateral thyroid nodule fine-needle aspiration.  2. Compared to 4/4/2018, solid nodules on the left (#1, #3, #4) have increased in size. A subcentimeter benign-appearing right nodule is also mildly increased in size (nodule #1).    Assessment and Plan:  #1 Thyroid nodules status post biopsy in October 2019 of the dominant nodule on the right side and 4 nodules on the left side all of which were benign  Reviewed her ultrasound and biopsy results from October 2019.  She has substantial nodules on her left side.  Several nodules on her left side (#1, #3, #4) has increased compared with the 2018 exam.  All biopsies have been benign.    Discussed that her nodules may continue to grow over time and begin to cause issues.  In addition, any nodule greater than 4 cm is at risk for sampling issues and resection could be considered.  Given the size of the nodules on her left side, the only real treatment option is surgical excision.  As she is currently asymptomatic, she is not interested in surgery.  Will plan to repeat a thyroid ultrasound in 1 year and revisit this issue.  If she does develop symptoms in the interim, she should contact us and we would be happy to reassess.    - US  Thyroid     Follow-up: Return in about 1 year (around 1/10/2021).      Scribe Disclosure:  I, Dolores Andersonaliza, am serving as a scribe to document services personally performed by Amee Zee MD at this visit, based upon the provider's statements to me. All documentation has been reviewed by the aforementioned provider prior to being entered into the official medical record.     Portions of this medical record were completed by a scribe. UPON MY REVIEW AND AUTHENTICATION BY ELECTRONIC SIGNATURE, this confirms (a) I performed the applicable clinical services, and (b) the record is accurate.

## 2020-01-10 NOTE — TELEPHONE ENCOUNTER
----- Message from Amee Zee MD sent at 1/10/2020  3:04 PM CST -----  Pt have return visit with me in January 2020 with neck US before visit?

## 2020-01-13 ENCOUNTER — ANCILLARY PROCEDURE (OUTPATIENT)
Dept: CT IMAGING | Facility: CLINIC | Age: 55
End: 2020-01-13
Attending: OBSTETRICS & GYNECOLOGY
Payer: MEDICAID

## 2020-01-13 DIAGNOSIS — C53.9 CERVICAL CANCER (H): ICD-10-CM

## 2020-01-13 LAB
CREAT BLD-MCNC: 0.7 MG/DL (ref 0.52–1.04)
GFR SERPL CREATININE-BSD FRML MDRD: 87 ML/MIN/{1.73_M2}

## 2020-01-13 RX ORDER — IOPAMIDOL 755 MG/ML
134 INJECTION, SOLUTION INTRAVASCULAR ONCE
Status: COMPLETED | OUTPATIENT
Start: 2020-01-13 | End: 2020-01-13

## 2020-01-13 RX ADMIN — IOPAMIDOL 134 ML: 755 INJECTION, SOLUTION INTRAVASCULAR at 11:00

## 2020-01-15 ENCOUNTER — ONCOLOGY VISIT (OUTPATIENT)
Dept: ONCOLOGY | Facility: CLINIC | Age: 55
End: 2020-01-15
Attending: OBSTETRICS & GYNECOLOGY
Payer: MEDICAID

## 2020-01-15 VITALS
DIASTOLIC BLOOD PRESSURE: 80 MMHG | BODY MASS INDEX: 36.29 KG/M2 | OXYGEN SATURATION: 100 % | WEIGHT: 217.8 LBS | TEMPERATURE: 98.4 F | RESPIRATION RATE: 14 BRPM | SYSTOLIC BLOOD PRESSURE: 124 MMHG | HEIGHT: 65 IN | HEART RATE: 79 BPM

## 2020-01-15 DIAGNOSIS — C53.9 CERVICAL CANCER (H): Primary | ICD-10-CM

## 2020-01-15 PROCEDURE — G0463 HOSPITAL OUTPT CLINIC VISIT: HCPCS | Mod: ZF

## 2020-01-15 PROCEDURE — 99214 OFFICE O/P EST MOD 30 MIN: CPT | Mod: ZP | Performed by: OBSTETRICS & GYNECOLOGY

## 2020-01-15 ASSESSMENT — PAIN SCALES - GENERAL: PAINLEVEL: NO PAIN (0)

## 2020-01-15 ASSESSMENT — MIFFLIN-ST. JEOR: SCORE: 1578.81

## 2020-01-15 NOTE — LETTER
1/15/2020       RE: Angella St  C/o Greyson Sherman  [address]  [facility] [address]     Dear Colleague,    Thank you for referring your patient, Angella St, to the [facility]. Please see a copy of my visit note below.                Follow Up Notes on Referred Patient    Date: 1/15/2020       Dr. Joyce Charles MD  No address on file       RE: Angella St  : 1965  DAYSI: 1/15/2020    Dear Dr. Joyce Charles:    Angella St is a 54 year old woman with a diagnosis of recurrent cervical cancer.            Patient presents today for followup.  She is now doing well, is eating and drinking well.  No nausea, vomiting, fever or chills.  Has not had any vaginal bleeding or discharge.  No B symptoms.     Past Medical History:    Past Medical History:   Diagnosis Date     Anemia      Cancer (H)     Metastatic squamous cell cervical carcinoma     Chronic kidney disease      Hypertension 2007     Obesity     BMI >30         Past Surgical History:    Past Surgical History:   Procedure Laterality Date     COMBINED CYSTOSCOPY, INSERT STENT URETER(S) Left 2016    Procedure: COMBINED CYSTOSCOPY, INSERT STENT URETER(S);  Surgeon: Deja Salinas MD;  [facility]     CYSTOSCOPY, RETROGRADES, COMBINED Left 2016    Procedure: COMBINED CYSTOSCOPY, RETROGRADES;  Surgeon: Deja Salinas MD;  [facility]     DAVINCI NEPHRECTOMY Left 5/3/2018    Procedure: DAVINCI XI NEPHRECTOMY;  DAVINCI XI LEFT NEPHRECTOMY;  Surgeon: Deja Salinas MD;  [facility]     GENITOURINARY SURGERY  2015    PNT placement     HYSTERECTOMY      [place], [place]     IR PORT REMOVAL RIGHT  2019     PERCUTANEOUS NEPHROSTOMY Left 3/9/2017    Procedure: PERCUTANEOUS NEPHROSTOMY;  Surgeon: Bridger Meyer PA-C;  [facility]     PERCUTANEOUS NEPHROSTOMY Left 2017    Procedure: PERCUTANEOUS NEPHROSTOMY;  Left Nephrostomy Tube Change;  Surgeon:  "Bridger Meyer PA-C;  [facility]     PERCUTANEOUS NEPHROSTOMY Left 10/30/2017    Procedure: PERCUTANEOUS NEPHROSTOMY;  Left Nephrostomy Tube Change;  Surgeon: Maryann Chavez PA-C;  [facility]     REMOVE PORT VASCULAR ACCESS Right 1/29/2019    Procedure: Right Port Removal;  Surgeon: Maryann Marin PA-C;  [facility]         Health Maintenance Due   Topic Date Due     PREVENTIVE CARE VISIT  1965     ADVANCE CARE PLANNING  1965     HIV SCREENING  11/02/1978     HPV  11/02/1984     ZOSTER IMMUNIZATION (1 of 2) 11/02/2013       Current Medications:     Current Outpatient Medications   Medication Sig Dispense Refill     Acetaminophen (TYLENOL PO) Take 500 mg by mouth every 6 hours as needed        amLODIPine (NORVASC) 5 MG tablet Take 1 tablet (5 mg) by mouth daily 100 tablet 3     senna-docusate (SENOKOT-S;PERICOLACE) 8.6-50 MG per tablet Take 1 tablet by mouth daily as needed for constipation       estradiol (ESTRACE) 0.1 MG/GM vaginal cream Place 2 g vaginally three times a week Place small amount on fingertip and apply to urethra. (Patient not taking: Reported on 1/10/2020) 42.5 g 3         Allergies:      No Known Allergies     Social History:     Social History     Tobacco Use     Smoking status: Never Smoker     Smokeless tobacco: Never Used   Substance Use Topics     Alcohol use: No     Alcohol/week: 0.0 standard drinks     Comment: Does not drink alcohol       History   Drug Use No     Comment: No drug use         Family History:       Family History   Problem Relation Age of Onset     Hypertension Brother          Physical Exam:     /80 (BP Location: Right arm, Patient Position: Chair, Cuff Size: Adult Large)   Pulse 79   Temp 98.4  F (36.9  C) (Oral)   Resp 14   Ht 1.651 m (5' 5\")   Wt 98.8 kg (217 lb 12.8 oz)   SpO2 100%   BMI 36.24 kg/m     Body mass index is 36.24 kg/m .    General Appearance:  healthy and alert, no " distress     Musculoskeletal:         extremities non tender and without edema  Neurological:   normal gait, no gross defects     Psychiatric:      appropriate mood and affect              Hematological:            normal cervical, supraclavicular and inguinal lymph nodes     ABDOMEN:  Soft, nontender, nondistended, no organomegaly.      PELVIC:  Normal external genitalia, normal vaginal mucosa, well-healed vaginal cuff.  No adnexal masses or tenderness.  Rectovaginal confirms.                  Assessment:     Angella St is a 54 year old woman with a diagnosis of recurrent cervical cancer.      A total of 25 minutes was spent with the patient, 20 minutes of which were spent in counseling the patient and/or treatment planning.        1.  Recurrent cervical cancer.   2.  No evidence of disease.   3.  Status post left nephrectomy.        Discussed with the patient there is no current evidence of disease.  She has been doing well from a cervical cancer standpoint.  She will be 4 years out now in November from her last systemic chemotherapy.  We will see her back in 6 months for another surveillance visit with CT chest, abdomen and pelvis at that time in 6 months.  She has recently followed up with Urology for her left nephrectomy and has recovered well from that now.  She agrees with this plan, is very appreciative of her care.  All questions were answered.         Lam Moran MD, MS    Department of Obstetrics and Gynecology   Division of Gynecologic Oncology   [facility]  Phone: [phone]  CC  Patient Care Team:  Shamir Pugh MD as PCP - General (Internal Medicine)  Christy Kraus, TONYA as Continuity Care Coordinator (Oncology)  Melida Rutledge APRN CNP as Referring Physician (Nurse Practitioner - Women's Health)  Shamir Pugh MD as MD (Internal Medicine)  Lam Moran MD as MD (Obstetrics & Gynecology, Maternal & Fetal  Medicine)  Deja Salinas MD as MD (Urology)  Maryse Rodriguez RN as Registered Nurse  Deja Salinas MD as MD (Urology)

## 2020-01-15 NOTE — PROGRESS NOTES
Follow Up Notes on Referred Patient    Date: 1/15/2020       Dr. Joyce Charles MD  No address on file       RE: Angella St  : 1965  DAYSI: 1/15/2020    Dear Dr. Joyce Charles:    Angella St is a 54 year old woman with a diagnosis of recurrent cervical cancer.            Patient presents today for followup.  She is now doing well, is eating and drinking well.  No nausea, vomiting, fever or chills.  Has not had any vaginal bleeding or discharge.  No B symptoms.     Past Medical History:    Past Medical History:   Diagnosis Date     Anemia      Cancer (H)     Metastatic squamous cell cervical carcinoma     Chronic kidney disease      Hypertension      Obesity     BMI >30         Past Surgical History:    Past Surgical History:   Procedure Laterality Date     COMBINED CYSTOSCOPY, INSERT STENT URETER(S) Left 2016    Procedure: COMBINED CYSTOSCOPY, INSERT STENT URETER(S);  Surgeon: Deja Salinas MD;  [facility]     CYSTOSCOPY, RETROGRADES, COMBINED Left 2016    Procedure: COMBINED CYSTOSCOPY, RETROGRADES;  Surgeon: Deja Salinas MD;  [facility]     DAVINCI NEPHRECTOMY Left 5/3/2018    Procedure: DAVINCI XI NEPHRECTOMY;  DAVINCI XI LEFT NEPHRECTOMY;  Surgeon: Deja Salinas MD;  [facility]     GENITOURINARY SURGERY  2015    PNT placement     HYSTERECTOMY      [place], [place]     IR PORT REMOVAL RIGHT  2019     PERCUTANEOUS NEPHROSTOMY Left 3/9/2017    Procedure: PERCUTANEOUS NEPHROSTOMY;  Surgeon: Bridger Meyer PA-C;  [facility]     PERCUTANEOUS NEPHROSTOMY Left 2017    Procedure: PERCUTANEOUS NEPHROSTOMY;  Left Nephrostomy Tube Change;  Surgeon: Bridger Meyer PA-C;  [facility]     PERCUTANEOUS NEPHROSTOMY Left 10/30/2017    Procedure: PERCUTANEOUS NEPHROSTOMY;  Left Nephrostomy Tube Change;  Surgeon: Maryann Chavez PA-C;  [facility]     REMOVE PORT VASCULAR ACCESS Right 2019  "   Procedure: Right Port Removal;  Surgeon: Maryann Marin PA-C;  Location:  OR         Health Maintenance Due   Topic Date Due     PREVENTIVE CARE VISIT  1965     ADVANCE CARE PLANNING  1965     HIV SCREENING  11/02/1978     HPV  11/02/1984     ZOSTER IMMUNIZATION (1 of 2) 11/02/2013       Current Medications:     Current Outpatient Medications   Medication Sig Dispense Refill     Acetaminophen (TYLENOL PO) Take 500 mg by mouth every 6 hours as needed        amLODIPine (NORVASC) 5 MG tablet Take 1 tablet (5 mg) by mouth daily 100 tablet 3     senna-docusate (SENOKOT-S;PERICOLACE) 8.6-50 MG per tablet Take 1 tablet by mouth daily as needed for constipation       estradiol (ESTRACE) 0.1 MG/GM vaginal cream Place 2 g vaginally three times a week Place small amount on fingertip and apply to urethra. (Patient not taking: Reported on 1/10/2020) 42.5 g 3         Allergies:      No Known Allergies     Social History:     Social History     Tobacco Use     Smoking status: Never Smoker     Smokeless tobacco: Never Used   Substance Use Topics     Alcohol use: No     Alcohol/week: 0.0 standard drinks     Comment: Does not drink alcohol       History   Drug Use No     Comment: No drug use         Family History:       Family History   Problem Relation Age of Onset     Hypertension Brother          Physical Exam:     /80 (BP Location: Right arm, Patient Position: Chair, Cuff Size: Adult Large)   Pulse 79   Temp 98.4  F (36.9  C) (Oral)   Resp 14   Ht 1.651 m (5' 5\")   Wt 98.8 kg (217 lb 12.8 oz)   SpO2 100%   BMI 36.24 kg/m    Body mass index is 36.24 kg/m .    General Appearance:  healthy and alert, no distress     Musculoskeletal:         extremities non tender and without edema  Neurological:   normal gait, no gross defects     Psychiatric:      appropriate mood and affect              Hematological:            normal cervical, supraclavicular and inguinal lymph nodes     ABDOMEN:  Soft, nontender, " nondistended, no organomegaly.      PELVIC:  Normal external genitalia, normal vaginal mucosa, well-healed vaginal cuff.  No adnexal masses or tenderness.  Rectovaginal confirms.                  Assessment:     Angella St is a 54 year old woman with a diagnosis of recurrent cervical cancer.      A total of 25 minutes was spent with the patient, 20 minutes of which were spent in counseling the patient and/or treatment planning.        1.  Recurrent cervical cancer.   2.  No evidence of disease.   3.  Status post left nephrectomy.        Discussed with the patient there is no current evidence of disease.  She has been doing well from a cervical cancer standpoint.  She will be 4 years out now in November from her last systemic chemotherapy.  We will see her back in 6 months for another surveillance visit with CT chest, abdomen and pelvis at that time in 6 months.  She has recently followed up with Urology for her left nephrectomy and has recovered well from that now.  She agrees with this plan, is very appreciative of her care.  All questions were answered.         Lam Moran MD, MS    Department of Obstetrics and Gynecology   Division of Gynecologic Oncology   [facility]  Phone: [phone]  CC  Patient Care Team:  Shamir Pugh MD as PCP - General (Internal Medicine)  Christy Kraus, TONYA as Continuity Care Coordinator (Oncology)  Melida Rtuledge APRN CNP as Referring Physician (Nurse Practitioner - Women's Health)  Shamir Pugh MD as MD (Internal Medicine)  Lam Moran MD as MD (Obstetrics & Gynecology, Maternal & Fetal Medicine)  Deja Salinas MD as MD (Urology)  Maryse Rodriguez RN as Registered Nurse  Deja Salinas MD as MD (Urology)  SELF, REFERRED

## 2020-01-15 NOTE — NURSING NOTE
"Oncology Rooming Note    January 15, 2020 2:16 PM   Angella St is a 54 year old female who presents for:    Chief Complaint   Patient presents with     Oncology Clinic Visit     Return; Cervical Ca     Initial Vitals: Ht 1.651 m (5' 5\")   Wt 98.8 kg (217 lb 12.8 oz)   BMI 36.24 kg/m   Estimated body mass index is 36.24 kg/m  as calculated from the following:    Height as of this encounter: 1.651 m (5' 5\").    Weight as of this encounter: 98.8 kg (217 lb 12.8 oz). Body surface area is 2.13 meters squared.  No Pain (0) Comment: Data Unavailable   No LMP recorded. Patient has had a hysterectomy.  Allergies reviewed: Yes  Medications reviewed: Yes    Medications: Medication refills not needed today.  Pharmacy name entered into [employer]: [facility] [place], [place] - [id number]    Clinical concerns: No new concerns.       Brisa Montenegro CMA              "

## 2020-03-10 ENCOUNTER — HEALTH MAINTENANCE LETTER (OUTPATIENT)
Age: 55
End: 2020-03-10

## 2020-07-13 ENCOUNTER — ANCILLARY PROCEDURE (OUTPATIENT)
Dept: CT IMAGING | Facility: CLINIC | Age: 55
End: 2020-07-13
Attending: OBSTETRICS & GYNECOLOGY
Payer: MEDICAID

## 2020-07-13 DIAGNOSIS — C53.9 CERVICAL CANCER (H): ICD-10-CM

## 2020-07-13 RX ORDER — IOPAMIDOL 755 MG/ML
132 INJECTION, SOLUTION INTRAVASCULAR ONCE
Status: COMPLETED | OUTPATIENT
Start: 2020-07-13 | End: 2020-07-13

## 2020-07-13 RX ADMIN — IOPAMIDOL 132 ML: 755 INJECTION, SOLUTION INTRAVASCULAR at 10:03

## 2020-07-15 ENCOUNTER — VIRTUAL VISIT (OUTPATIENT)
Dept: ONCOLOGY | Facility: CLINIC | Age: 55
End: 2020-07-15
Attending: OBSTETRICS & GYNECOLOGY
Payer: MEDICAID

## 2020-07-15 DIAGNOSIS — C53.1 MALIGNANT NEOPLASM OF EXOCERVIX (H): Primary | ICD-10-CM

## 2020-07-15 PROCEDURE — 99443 ZZC PHYSICIAN TELEPHONE EVALUATION 21-30 MIN: CPT | Performed by: OBSTETRICS & GYNECOLOGY

## 2020-07-15 NOTE — LETTER
"    7/15/2020         RE: Angella St  C/o Greyson Sherman  [address]  [facility] [address]        Dear Colleague,    Thank you for referring your patient, Angella St, to the [facility]. Please see a copy of my visit note below.    Angella St is a 54 year old female who is being evaluated via a billable telephone visit.      The patient has been notified of following:     \"This telephone visit will be conducted via a call between you and your physician/provider. We have found that certain health care needs can be provided without the need for a physical exam.  This service lets us provide the care you need with a short phone conversation.  If a prescription is necessary we can send it directly to your pharmacy.  If lab work is needed we can place an order for that and you can then stop by our lab to have the test done at a later time.    Telephone visits are billed at different rates depending on your insurance coverage. During this emergency period, for some insurers they may be billed the same as an in-person visit.  Please reach out to your insurance provider with any questions.    If during the course of the call the physician/provider feels a telephone visit is not appropriate, you will not be charged for this service.\"    Patient has given verbal consent for Telephone visit?  Yes    What phone number would you like to be contacted at? [phone]     How would you like to obtain your AVS? Mail a copy     Vitals - Patient Reported  Weight (Patient Reported): 90.7 kg (200 lb)  Height (Patient Reported): 170.2 cm (5' 7\")  BMI (Based on Pt Reported Ht/Wt): 31.32  Pain Score: No Pain (0)      I have reviewed and updated the patient's allergies and medication list.    Concerns: No concerns  Refills: No refills needed.        Priscilla Saxena CMA      Phone call duration: *** minutes    {signature options:[id number]}              Again, thank you for allowing me to participate in the " care of your patient.        Sincerely,        Lam Moran MD

## 2020-07-15 NOTE — PROGRESS NOTES
"Angella St is a 54 year old female who is being evaluated via a billable telephone visit.      The patient has been notified of following:     \"This telephone visit will be conducted via a call between you and your physician/provider. We have found that certain health care needs can be provided without the need for a physical exam.  This service lets us provide the care you need with a short phone conversation.  If a prescription is necessary we can send it directly to your pharmacy.  If lab work is needed we can place an order for that and you can then stop by our lab to have the test done at a later time.    Telephone visits are billed at different rates depending on your insurance coverage. During this emergency period, for some insurers they may be billed the same as an in-person visit.  Please reach out to your insurance provider with any questions.    If during the course of the call the physician/provider feels a telephone visit is not appropriate, you will not be charged for this service.\"    Patient has given verbal consent for Telephone visit?  Yes    What phone number would you like to be contacted at? [phone]     How would you like to obtain your AVS? Mail a copy     Vitals - Patient Reported  Weight (Patient Reported): 90.7 kg (200 lb)  Height (Patient Reported): 170.2 cm (5' 7\")  BMI (Based on Pt Reported Ht/Wt): 31.32  Pain Score: No Pain (0)      I have reviewed and updated the patient's allergies and medication list.    Concerns: No concerns  Refills: No refills needed.        Priscilla Saxena CMA      Phone call duration: 25 minutes                Follow Up Notes on Referred Patient    Date: 7/15/2020       Dr. Joyce Charles MD  No address on file       RE: Angella St  : 1965  DAYSI: 7/15/2020    Angella St is a 54 year old woman with a diagnosis of recurrent cervical cancer.            Patient presents today for followup.  She is now doing well, is eating and drinking well.  No " nausea, vomiting, fever or chills.  Has not had any vaginal bleeding or discharge.  No B symptoms.     Oncology History:  2011: Treated for cervical cancer with hysterectomy in [place]. No adjuvant treatment.  4/24/15: Presented to [facility] in [place] and had CT A/P that showed severe left hydronephrosis with moderate left hydroureter secondary to a left pelvic mass (4.9 x 4.8 cm x 3.8 cm) 4/26/15: [facility] Lasix renogram that showed good perfusion to the right kidney. No significant perfusion on the left.  4/28/15: CT guided peritoneal biopsy of the left hemipelvic mass that showed metastatic squamous cell carcinoma, consistent with cervical primary   5/1/15: Presented to the [facility] ED with left/central pelvic pain and dizziness present since last June. She was seen by OBGYN who recommended GYN ONC referral.     5/6/15: CT guided biopsy of pelvic mass again reveals squamous cell carcinoma.  5/11/15: Admitted to [facility] medicine service with abdominal pain, seen by urology for obstruction to the left urinary system. CT A/P showed marked left hydronephrosis with cortical thinning in the left kidney, consistent with chronic obstruction. IR placed 8 Nepali nephroureteral tube at [facility]. She was discharged to home with plan to follow up with GYN ONC [facility]  5/20/15: admitted to [facility] gyn/onc service for control of abdominal pain. Treated for enterococcus/acinetobacter UTI.   6/3/2015-7/17/15: Cycles #1-3 Cisplatin/Taxol/Avastin  8/7/15: CT CAP: solid mass in left pelvic sidewall, decreased in size from prior study. The previously identified hypermetabolic hypodense liver lesion is also not identified on this study, likely due to treatment response. Unchanged left paraaortic lymph node. No new pulmonary nodules. Atrophic left kidney. Multinodular enlarged thyroid.   8/10/15-9/22/15: Cycles #4-6 Cisplatin/Taxol/Avastin  10/9/15: CT cap IMPRESSION:   1. Stable 3 mm right  upper lobe nodule, recommend continued attention on followup.  2. Stable left pelvic sidewall mass measuring up to 2.9 cm.   3. 4 mm hypodensity in the right lobe of the liver which is too small to further characterize and indeterminate. Continued attention on followup is needed.  4. Stable atrophic appearance of the left kidney with percutaneous nephrostomy tube in place. Decreased enhancement in comparison with the contralateral kidney suggests some degree of renal dysfunction. Perinephric inflammatory changes are again seen, potentially indicating infection versus inflammatory response.  5. Diffusely enlarged thyroid with multiple hypoattenuating nodules.      Plan: 3 more cycles Cisplatin/Taxol/Avastin based on  and then repeat PET.      10/14/15: Cycle #7 Cisplatin/Taxol/Avastin.   11/6/15: Cycle #8 Cisplatin/Taxol/Avastin.  11/23/15: PNT exchange.  11/27/15: Cycle #9 Cisplatin/Taxol/Avastin.        Past Medical History:    Past Medical History:   Diagnosis Date     Anemia      Cancer (H)     Metastatic squamous cell cervical carcinoma     Chronic kidney disease      Hypertension 2007     Obesity     BMI >30         Past Surgical History:    Past Surgical History:   Procedure Laterality Date     COMBINED CYSTOSCOPY, INSERT STENT URETER(S) Left 6/13/2016    Procedure: COMBINED CYSTOSCOPY, INSERT STENT URETER(S);  Surgeon: Deja Salinas MD;  [facility]     CYSTOSCOPY, RETROGRADES, COMBINED Left 6/13/2016    Procedure: COMBINED CYSTOSCOPY, RETROGRADES;  Surgeon: Deja Salinas MD;  [facility]     DAVINCI NEPHRECTOMY Left 5/3/2018    Procedure: DAVINCI XI NEPHRECTOMY;  DAVINCI XI LEFT NEPHRECTOMY;  Surgeon: Deja Salinas MD;  Location:  OR     GENITOURINARY SURGERY  5/2015    PNT placement     HYSTERECTOMY  2011    [place], [place]     IR PORT REMOVAL RIGHT  1/29/2019     PERCUTANEOUS NEPHROSTOMY Left 3/9/2017    Procedure: PERCUTANEOUS NEPHROSTOMY;  Surgeon:  Bridger Meyer PA-C;  [facility]     PERCUTANEOUS NEPHROSTOMY Left 6/28/2017    Procedure: PERCUTANEOUS NEPHROSTOMY;  Left Nephrostomy Tube Change;  Surgeon: Bridger Meyer PA-C;  [facility]     PERCUTANEOUS NEPHROSTOMY Left 10/30/2017    Procedure: PERCUTANEOUS NEPHROSTOMY;  Left Nephrostomy Tube Change;  Surgeon: Maryann Chavez PA-C;  [facility]     REMOVE PORT VASCULAR ACCESS Right 1/29/2019    Procedure: Right Port Removal;  Surgeon: Maryann Marin PA-C;  [facility]         Health Maintenance Due   Topic Date Due     PREVENTIVE CARE VISIT  1965     ADVANCE CARE PLANNING  1965     HIV SCREENING  11/02/1978     ZOSTER IMMUNIZATION (1 of 2) 11/02/2013     MAMMO SCREENING  04/04/2020       Current Medications:     Current Outpatient Medications   Medication Sig Dispense Refill     Acetaminophen (TYLENOL PO) Take 500 mg by mouth every 6 hours as needed        amLODIPine (NORVASC) 5 MG tablet Take 1 tablet (5 mg) by mouth daily 100 tablet 3     estradiol (ESTRACE) 0.1 MG/GM vaginal cream Place 2 g vaginally three times a week Place small amount on fingertip and apply to urethra. (Patient not taking: Reported on 1/10/2020) 42.5 g 3     senna-docusate (SENOKOT-S;PERICOLACE) 8.6-50 MG per tablet Take 1 tablet by mouth daily as needed for constipation           Allergies:      No Known Allergies     Social History:     Social History     Tobacco Use     Smoking status: Never Smoker     Smokeless tobacco: Never Used   Substance Use Topics     Alcohol use: No     Alcohol/week: 0.0 standard drinks     Comment: Does not drink alcohol       History   Drug Use No     Comment: No drug use         Family History:         Family History   Problem Relation Age of Onset     Hypertension Brother          Physical Exam:     There were no vitals taken for this visit.  There is no height or weight on file to calculate BMI.    General Appearance:  healthy and alert, no  distress      Psychiatric:      appropriate mood and affect                      Assessment:     Angella St is a 54 year old woman with a diagnosis of recurrent cervical cancer.      A total of 25 minutes was spent with the patient, 20 minutes of which were spent in counseling the patient and/or treatment planning.        1.  Recurrent cervical cancer.   2.  No evidence of disease.   3.  Status post left nephrectomy.        Discussed with the patient there is no current evidence of disease.  She has been doing well from a cervical cancer standpoint.  She will be 4 years out now in November from her last systemic chemotherapy.  We will see her back in 6 months for another surveillance visit with CT chest, abdomen and pelvis at that time in 6 months.  She has recently followed up with Urology for her left nephrectomy and has recovered well from that now.  She agrees with this plan, is very appreciative of her care.  All questions were answered.         Lam Moran MD, MS    Department of Obstetrics and Gynecology   Division of Gynecologic Oncology   [facility]  Phone: [phone]      CC  Patient Care Team:  Shamir Pugh MD as PCP - General (Internal Medicine)  Melida Rutledge APRN CNP as Referring Physician (Nurse Practitioner - Women's Health)  Shamir Pugh MD as MD (Internal Medicine)  Lam Moran MD as MD (Obstetrics & Gynecology, Maternal & Fetal Medicine)  Deja Salinas MD as MD (Urology)  Maryse Rodriguez, TONYA as Registered Nurse  Deja Salinas MD as MD (Urology)  Shamir Pugh MD as Assigned PCP  SELF, REFERRED

## 2020-11-20 DIAGNOSIS — I10 BENIGN ESSENTIAL HYPERTENSION: Primary | ICD-10-CM

## 2020-11-24 RX ORDER — AMLODIPINE BESYLATE 5 MG/1
5 TABLET ORAL DAILY
Qty: 100 TABLET | Refills: 0 | Status: SHIPPED | OUTPATIENT
Start: 2020-11-24 | End: 2021-03-04

## 2020-12-27 ENCOUNTER — HEALTH MAINTENANCE LETTER (OUTPATIENT)
Age: 55
End: 2020-12-27

## 2021-02-12 DIAGNOSIS — C53.1 MALIGNANT NEOPLASM OF EXOCERVIX (H): Primary | ICD-10-CM

## 2021-02-15 ENCOUNTER — TELEPHONE (OUTPATIENT)
Dept: ENDOCRINOLOGY | Facility: CLINIC | Age: 56
End: 2021-02-15

## 2021-02-15 NOTE — TELEPHONE ENCOUNTER
[facility] Call Center    Phone Message    May a detailed message be left on voicemail: yes     Reason for Call: Other: Patient was wondering if Dr. Zee wanted her to do an Ultrasound. No orders in Epic. Please call patient to verify. Thank you.      Action Taken: Message routed to:  [facility] ([facility] Endo    Travel Screening: Not Applicable                                                                         [LVEF ___%] : LVEF [unfilled]% [___] : [unfilled] [None] : no pulmonary hypertension

## 2021-02-16 ENCOUNTER — TELEPHONE (OUTPATIENT)
Dept: ENDOCRINOLOGY | Facility: CLINIC | Age: 56
End: 2021-02-16

## 2021-02-16 DIAGNOSIS — E04.2 NONTOXIC MULTINODULAR GOITER: Primary | ICD-10-CM

## 2021-03-02 ENCOUNTER — ANCILLARY PROCEDURE (OUTPATIENT)
Dept: CT IMAGING | Facility: CLINIC | Age: 56
End: 2021-03-02
Attending: OBSTETRICS & GYNECOLOGY
Payer: MEDICAID

## 2021-03-02 DIAGNOSIS — C53.1 MALIGNANT NEOPLASM OF EXOCERVIX (H): ICD-10-CM

## 2021-03-02 LAB
CREAT BLD-MCNC: 0.7 MG/DL (ref 0.52–1.04)
GFR SERPL CREATININE-BSD FRML MDRD: 86 ML/MIN/{1.73_M2}

## 2021-03-02 PROCEDURE — 74177 CT ABD & PELVIS W/CONTRAST: CPT | Mod: GC | Performed by: RADIOLOGY

## 2021-03-02 PROCEDURE — 36415 COLL VENOUS BLD VENIPUNCTURE: CPT | Performed by: PATHOLOGY

## 2021-03-02 PROCEDURE — 71260 CT THORAX DX C+: CPT | Mod: GC | Performed by: RADIOLOGY

## 2021-03-02 PROCEDURE — 82565 ASSAY OF CREATININE: CPT | Performed by: PATHOLOGY

## 2021-03-02 RX ORDER — IODIXANOL 320 MG/ML
150 INJECTION, SOLUTION INTRAVASCULAR ONCE
Status: COMPLETED | OUTPATIENT
Start: 2021-03-02 | End: 2021-03-02

## 2021-03-02 RX ORDER — IOPAMIDOL 755 MG/ML
132 INJECTION, SOLUTION INTRAVASCULAR ONCE
Status: DISCONTINUED | OUTPATIENT
Start: 2021-03-02 | End: 2021-03-02 | Stop reason: CLARIF

## 2021-03-02 RX ADMIN — IODIXANOL 150 ML: 320 INJECTION, SOLUTION INTRAVASCULAR at 09:07

## 2021-03-03 ENCOUNTER — ONCOLOGY VISIT (OUTPATIENT)
Dept: ONCOLOGY | Facility: CLINIC | Age: 56
End: 2021-03-03
Attending: OBSTETRICS & GYNECOLOGY
Payer: MEDICAID

## 2021-03-03 VITALS
SYSTOLIC BLOOD PRESSURE: 152 MMHG | TEMPERATURE: 98 F | DIASTOLIC BLOOD PRESSURE: 83 MMHG | WEIGHT: 223.6 LBS | OXYGEN SATURATION: 99 % | HEART RATE: 73 BPM | RESPIRATION RATE: 16 BRPM | BODY MASS INDEX: 37.21 KG/M2

## 2021-03-03 DIAGNOSIS — C53.9 CERVICAL CANCER (H): Primary | ICD-10-CM

## 2021-03-03 DIAGNOSIS — R91.8 PULMONARY NODULES: ICD-10-CM

## 2021-03-03 PROCEDURE — G0463 HOSPITAL OUTPT CLINIC VISIT: HCPCS

## 2021-03-03 PROCEDURE — 87624 HPV HI-RISK TYP POOLED RSLT: CPT | Performed by: OBSTETRICS & GYNECOLOGY

## 2021-03-03 PROCEDURE — 99214 OFFICE O/P EST MOD 30 MIN: CPT | Performed by: OBSTETRICS & GYNECOLOGY

## 2021-03-03 PROCEDURE — G0145 SCR C/V CYTO,THINLAYER,RESCR: HCPCS | Performed by: OBSTETRICS & GYNECOLOGY

## 2021-03-03 ASSESSMENT — PAIN SCALES - GENERAL: PAINLEVEL: NO PAIN (0)

## 2021-03-03 NOTE — NURSING NOTE
"Oncology Rooming Note    March 3, 2021 1:59 PM   Angella St is a 55 year old female who presents for:    Chief Complaint   Patient presents with     Oncology Clinic Visit     CERVICAL CANCER      Initial Vitals: BP (!) 152/83   Pulse 73   Temp 98  F (36.7  C) (Oral)   Resp 16   Wt 101.4 kg (223 lb 9.6 oz)   SpO2 99%   BMI 37.21 kg/m   Estimated body mass index is 37.21 kg/m  as calculated from the following:    Height as of 1/15/20: 1.651 m (5' 5\").    Weight as of this encounter: 101.4 kg (223 lb 9.6 oz). Body surface area is 2.16 meters squared.  No Pain (0) Comment: Data Unavailable   No LMP recorded. Patient has had a hysterectomy.  Allergies reviewed: Yes  Medications reviewed: Yes    Medications: Medication refills not needed today.  Pharmacy name entered into EPIC: [facility] [place], [place] - [address] [place] [address] [phone]    Clinical concerns: No new concerns today  Dr Moran  was NOT notified.      Nuvia Bueno            "

## 2021-03-03 NOTE — LETTER
3/3/2021         RE: Angella St  [address] [facility] [address]        Dear Colleague,    Thank you for referring your patient, Angella St, to the [facility]. Please see a copy of my visit note below.                Follow Up Notes on Referred Patient    Date: 3/3/2021       Dr. Joyce Charles MD  No address on file       RE: Angella St  : 1965  DAYSI: 3/3/2021    Angella St is a 55 year old woman with a diagnosis of recurrent cervical cancer.            Patient presents today for followup.  She is now doing well, is eating and drinking well.  No nausea, vomiting, fever or chills.  Has not had any vaginal bleeding or discharge.  No B symptoms.      Oncology History:  : Treated for cervical cancer with hysterectomy in [facility]. No adjuvant treatment.  4/24/15: Presented to [facility] in [place] and had CT A/P that showed severe left hydronephrosis with moderate left hydroureter secondary to a left pelvic mass (4.9 x 4.8 cm x 3.8 cm) [facility] that showed good perfusion to the right kidney. No significant perfusion on the left.  4/28/15: CT guided peritoneal biopsy of the left hemipelvic mass that showed metastatic squamous cell carcinoma, consistent with cervical primary   5/1/15: Presented to the [facility] [place] [facility] ED with left/central pelvic pain and dizziness present since last . She was seen by OBGYN who recommended GYN ONC referral.     5/6/15: CT guided biopsy of pelvic mass again reveals squamous cell carcinoma.  5/11/15: Admitted to [facility] [place] medicine service with abdominal pain, seen by urology for obstruction to the left urinary system. CT A/P showed marked left hydronephrosis with cortical thinning in the left kidney, consistent with chronic obstruction. IR placed 8 [place] nephroureteral tube at [facility] [place]. She was discharged to home with plan to follow up with GYN ONC and urology at the [facility].  5/20/15: admitted to [facility] gyn/onc service for control of abdominal pain. Treated for enterococcus/acinetobacter UTI.   6/3/2015-7/17/15: Cycles #1-3 Cisplatin/Taxol/Avastin  8/7/15: CT CAP: solid mass in left pelvic sidewall, decreased in size from prior study. The previously identified hypermetabolic hypodense liver lesion is also not identified on this study, likely due to treatment response. Unchanged left paraaortic lymph node. No new pulmonary nodules. Atrophic left kidney. Multinodular enlarged thyroid.   8/10/15-9/22/15: Cycles #4-6 Cisplatin/Taxol/Avastin  10/9/15: CT cap IMPRESSION:   1. Stable 3 mm right upper lobe nodule, recommend continued attention on followup.  2. Stable left pelvic sidewall mass measuring up to 2.9 cm.   3. 4 mm hypodensity in the right lobe of the liver which is too small to further characterize and indeterminate. Continued attention on followup is needed.  4. Stable atrophic appearance of the left kidney with percutaneous nephrostomy tube in place. Decreased enhancement in comparison with the contralateral kidney suggests some degree of renal dysfunction. Perinephric inflammatory changes are again seen, potentially indicating infection versus inflammatory response.  5. Diffusely enlarged thyroid with multiple hypoattenuating nodules.      Plan: 3 more cycles Cisplatin/Taxol/Avastin based on  and then repeat PET.      10/14/15: Cycle #7 Cisplatin/Taxol/Avastin.   11/6/15: Cycle #8 Cisplatin/Taxol/Avastin.  11/23/15: PNT exchange.  11/27/15: Cycle #9 Cisplatin/Taxol/Avastin.       Past Medical History:    Past Medical History:   Diagnosis Date     Anemia      Cancer (H)     Metastatic squamous cell cervical carcinoma     Chronic kidney disease      Hypertension 2007     Obesity     BMI >30         Past Surgical History:    Past Surgical History:   Procedure Laterality Date     COMBINED CYSTOSCOPY, INSERT STENT URETER(S) Left 6/13/2016    Procedure: COMBINED CYSTOSCOPY,  INSERT STENT URETER(S);  Surgeon: Deja Salinas MD;  [facility]     CYSTOSCOPY, RETROGRADES, COMBINED Left 6/13/2016    Procedure: COMBINED CYSTOSCOPY, RETROGRADES;  Surgeon: Deja Salinas MD;  [facility]     DAVINCI NEPHRECTOMY Left 5/3/2018    Procedure: DAVINCI XI NEPHRECTOMY;  DAVINCI XI LEFT NEPHRECTOMY;  Surgeon: Deja Salinas MD;  [facility]     GENITOURINARY SURGERY  5/2015    PNT placement     HYSTERECTOMY  2011    [place]     IR PORT REMOVAL RIGHT  1/29/2019     PERCUTANEOUS NEPHROSTOMY Left 3/9/2017    Procedure: PERCUTANEOUS NEPHROSTOMY;  Surgeon: Bridger Meyer PA-C;  [facility]     PERCUTANEOUS NEPHROSTOMY Left 6/28/2017    Procedure: PERCUTANEOUS NEPHROSTOMY;  Left Nephrostomy Tube Change;  Surgeon: Bridger Meyer PA-C;  [facility]     PERCUTANEOUS NEPHROSTOMY Left 10/30/2017    Procedure: PERCUTANEOUS NEPHROSTOMY;  Left Nephrostomy Tube Change;  Surgeon: Maryann Chavez PA-C;  [facility]     REMOVE PORT VASCULAR ACCESS Right 1/29/2019    Procedure: Right Port Removal;  Surgeon: Maryann Marin PA-C;  [facility]         Health Maintenance Due   Topic Date Due     PREVENTIVE CARE VISIT  1965     ADVANCE CARE PLANNING  1965     HIV SCREENING  11/02/1978     ZOSTER IMMUNIZATION (1 of 2) 11/02/2013     MAMMO SCREENING  04/04/2020     INFLUENZA VACCINE (1) 09/01/2020     PHQ-2  01/01/2021       Current Medications:     Current Outpatient Medications   Medication Sig Dispense Refill     amLODIPine (NORVASC) 5 MG tablet Take 1 tablet (5 mg) by mouth daily 100 tablet 0     Acetaminophen (TYLENOL PO) Take 500 mg by mouth every 6 hours as needed        estradiol (ESTRACE) 0.1 MG/GM vaginal cream Place 2 g vaginally three times a week Place small amount on fingertip and apply to urethra. (Patient not taking: Reported on 1/10/2020) 42.5 g 3     senna-docusate (SENOKOT-S;PERICOLACE) 8.6-50 MG per tablet  Take 1 tablet by mouth daily as needed for constipation           Allergies:      No Known Allergies     Social History:     Social History     Tobacco Use     Smoking status: Never Smoker     Smokeless tobacco: Never Used   Substance Use Topics     Alcohol use: No     Alcohol/week: 0.0 standard drinks     Comment: Does not drink alcohol       History   Drug Use No     Comment: No drug use         Family History:         Family History   Problem Relation Age of Onset     Hypertension Brother          Physical Exam:     BP (!) 152/83   Pulse 73   Temp 98  F (36.7  C) (Oral)   Resp 16   Wt 101.4 kg (223 lb 9.6 oz)   SpO2 99%   BMI 37.21 kg/m    Body mass index is 37.21 kg/m .        General Appearance:  healthy and alert, no distress     Musculoskeletal:         extremities non tender and without edema  Neurological:   normal gait, no gross defects     Psychiatric:      appropriate mood and affect              Hematological:            normal cervical, supraclavicular and inguinal lymph nodes     ABDOMEN:  Soft, nontender, nondistended, no organomegaly.      PELVIC:  Normal external genitalia, normal vaginal mucosa, well-healed vaginal cuff.  No adnexal masses or tenderness.  Rectovaginal confirms. Pap smear taken.           Assessment:     Angella St is a 55 year old woman with a diagnosis of recurrent cervical cancer.      A total of 25 minutes was spent with the patient, 20 minutes of which were spent in counseling the patient and/or treatment planning.        1.  Recurrent cervical cancer.   2.  No evidence of disease.   3.  Status post left nephrectomy.   4. 3mm lung nodule new stablle 6mm lung nodule       Discussed with the patient there is no current evidence of disease.  She has been doing well from a cervical cancer standpoint.  She is over 5 years out now from her last systemic chemotherapy.  We will see her back in 6 months for another surveillance visit with CT chest and if stable CT chest can go  to once a year.  She will need yearly papa smears. She has recently followed up with Urology for her left nephrectomy and has recovered well from that now.  She agrees with this plan, is very appreciative of her care.  All questions were answered.         Lam Moran MD, MS    Department of Obstetrics and Gynecology   Division of Gynecologic Oncology   [facility]  Phone: [phone]  CC  Patient Care Team:  Shamir Pugh MD as PCP - General (Internal Medicine)  Melida Rutledge APRN CNP as Referring Physician (Nurse Practitioner - Women's Health)  Deja Salinas MD as MD (Urology)  Maryse Rodriguez RN as Registered Nurse  Shamir Pugh MD as Assigned PCP  Amee Zee MD as Assigned Endocrinology Provider

## 2021-03-03 NOTE — PROGRESS NOTES
Follow Up Notes on Referred Patient    Date: 3/3/2021       Dr. Joyce Charles MD  No address on file       RE: Angella St  : 1965  DAYSI: 3/3/2021    Angella St is a 55 year old woman with a diagnosis of recurrent cervical cancer.            Patient presents today for followup.  She is now doing well, is eating and drinking well.  No nausea, vomiting, fever or chills.  Has not had any vaginal bleeding or discharge.  No B symptoms.      Oncology History:  : Treated for cervical cancer with hysterectomy in [facility]. No adjuvant treatment.  4/24/15: [facility] [place] and had CT A/P that showed severe left hydronephrosis with moderate left hydroureter secondary to a left pelvic mass (4.9 x 4.8 cm x 3.8 cm) 4/26/15: [facility] renogram that showed good perfusion to the right kidney. No significant perfusion on the left.  4/28/15: CT guided peritoneal biopsy of the left hemipelvic mass that showed metastatic squamous cell carcinoma, consistent with cervical primary   5/1/15: Presented to the [facility] ED with left/central pelvic pain and dizziness present since last . She was seen by OBGYN who recommended GYN ONC referral.     5/6/15: CT guided biopsy of pelvic mass again reveals squamous cell carcinoma.  5/11/15: Ad[place] [facility] [place] with [place] pain, seen by urology for obstruction to the left urinary system. CT A/P showed marked left hydronephrosis with cortical thinning in the left kidney, consistent with chronic obstruction. IR placed 8 [place] nephroureteral tube [place] [facility] She was discharged to home with plan to follow up with GYN ONC and urology at the [facility].  5/20/15: admitted to [facility] gyn/onc service for control of abdominal pain. Treated for enterococcus/acinetobacter UTI.   6/3/2015-7/17/15: Cycles #1-3 Cisplatin/Taxol/Avastin  8/7/15: CT CAP: solid mass in left pelvic sidewall, decreased in size from prior study.  The previously identified hypermetabolic hypodense liver lesion is also not identified on this study, likely due to treatment response. Unchanged left paraaortic lymph node. No new pulmonary nodules. Atrophic left kidney. Multinodular enlarged thyroid.   8/10/15-9/22/15: Cycles #4-6 Cisplatin/Taxol/Avastin  10/9/15: CT cap IMPRESSION:   1. Stable 3 mm right upper lobe nodule, recommend continued attention on followup.  2. Stable left pelvic sidewall mass measuring up to 2.9 cm.   3. 4 mm hypodensity in the right lobe of the liver which is too small to further characterize and indeterminate. Continued attention on followup is needed.  4. Stable atrophic appearance of the left kidney with percutaneous nephrostomy tube in place. Decreased enhancement in comparison with the contralateral kidney suggests some degree of renal dysfunction. Perinephric inflammatory changes are again seen, potentially indicating infection versus inflammatory response.  5. Diffusely enlarged thyroid with multiple hypoattenuating nodules.      Plan: 3 more cycles Cisplatin/Taxol/Avastin based on  and then repeat PET.      10/14/15: Cycle #7 Cisplatin/Taxol/Avastin.   11/6/15: Cycle #8 Cisplatin/Taxol/Avastin.  11/23/15: PNT exchange.  11/27/15: Cycle #9 Cisplatin/Taxol/Avastin.       Past Medical History:    Past Medical History:   Diagnosis Date     Anemia      Cancer (H)     Metastatic squamous cell cervical carcinoma     Chronic kidney disease      Hypertension 2007     Obesity     BMI >30         Past Surgical History:    Past Surgical History:   Procedure Laterality Date     COMBINED CYSTOSCOPY, INSERT STENT URETER(S) Left 6/13/2016    Procedure: COMBINED CYSTOSCOPY, INSERT STENT URETER(S);  Surgeon: Deja Salinas MD;  [facility]     CYSTOSCOPY, RETROGRADES, COMBINED Left 6/13/2016    Procedure: COMBINED CYSTOSCOPY, RETROGRADES;  Surgeon: Deja Salinas MD;  [facility]     DAVINCI NEPHRECTOMY Left  5/3/2018    Procedure: DAVINCI XI NEPHRECTOMY;  DAVINCI XI LEFT NEPHRECTOMY;  Surgeon: Deja Salinas MD;  [facility]     GENITOURINARY SURGERY  5/2015    PNT placement     HYSTERECTOMY  2011    [place], [place]     IR PORT REMOVAL RIGHT  1/29/2019     PERCUTANEOUS NEPHROSTOMY Left 3/9/2017    Procedure: PERCUTANEOUS NEPHROSTOMY;  Surgeon: Bridger Meyer PA-C;  [facility]     PERCUTANEOUS NEPHROSTOMY Left 6/28/2017    Procedure: PERCUTANEOUS NEPHROSTOMY;  Left Nephrostomy Tube Change;  Surgeon: Bridger Meyer PA-C;  [facility]     PERCUTANEOUS NEPHROSTOMY Left 10/30/2017    Procedure: PERCUTANEOUS NEPHROSTOMY;  Left Nephrostomy Tube Change;  Surgeon: Maryann Chavez PA-C;  [facility]     REMOVE PORT VASCULAR ACCESS Right 1/29/2019    Procedure: Right Port Removal;  Surgeon: Maryann Marin PA-C;  [facility]         Health Maintenance Due   Topic Date Due     PREVENTIVE CARE VISIT  1965     ADVANCE CARE PLANNING  1965     HIV SCREENING  11/02/1978     ZOSTER IMMUNIZATION (1 of 2) 11/02/2013     MAMMO SCREENING  04/04/2020     INFLUENZA VACCINE (1) 09/01/2020     PHQ-2  01/01/2021       Current Medications:     Current Outpatient Medications   Medication Sig Dispense Refill     amLODIPine (NORVASC) 5 MG tablet Take 1 tablet (5 mg) by mouth daily 100 tablet 0     Acetaminophen (TYLENOL PO) Take 500 mg by mouth every 6 hours as needed        estradiol (ESTRACE) 0.1 MG/GM vaginal cream Place 2 g vaginally three times a week Place small amount on fingertip and apply to urethra. (Patient not taking: Reported on 1/10/2020) 42.5 g 3     senna-docusate (SENOKOT-S;PERICOLACE) 8.6-50 MG per tablet Take 1 tablet by mouth daily as needed for constipation           Allergies:      No Known Allergies     Social History:     Social History     Tobacco Use     Smoking status: Never Smoker     Smokeless tobacco: Never Used   Substance Use Topics     Alcohol use: No      Alcohol/week: 0.0 standard drinks     Comment: Does not drink alcohol       History   Drug Use No     Comment: No drug use         Family History:         Family History   Problem Relation Age of Onset     Hypertension Brother          Physical Exam:     BP (!) 152/83   Pulse 73   Temp 98  F (36.7  C) (Oral)   Resp 16   Wt 101.4 kg (223 lb 9.6 oz)   SpO2 99%   BMI 37.21 kg/m    Body mass index is 37.21 kg/m .        General Appearance:  healthy and alert, no distress     Musculoskeletal:         extremities non tender and without edema  Neurological:   normal gait, no gross defects     Psychiatric:      appropriate mood and affect              Hematological:            normal cervical, supraclavicular and inguinal lymph nodes     ABDOMEN:  Soft, nontender, nondistended, no organomegaly.      PELVIC:  Normal external genitalia, normal vaginal mucosa, well-healed vaginal cuff.  No adnexal masses or tenderness.  Rectovaginal confirms. Pap smear taken.           Assessment:     Angella St is a 55 year old woman with a diagnosis of recurrent cervical cancer.      A total of 25 minutes was spent with the patient, 20 minutes of which were spent in counseling the patient and/or treatment planning.        1.  Recurrent cervical cancer.   2.  No evidence of disease.   3.  Status post left nephrectomy.   4. 3mm lung nodule new stablle 6mm lung nodule       Discussed with the patient there is no current evidence of disease.  She has been doing well from a cervical cancer standpoint.  She is over 5 years out now from her last systemic chemotherapy.  We will see her back in 6 months for another surveillance visit with CT chest and if stable CT chest can go to once a year.  She will need yearly papa smears. She has recently followed up with Urology for her left nephrectomy and has recovered well from that now.  She agrees with this plan, is very appreciative of her care.  All questions were answered.         Lam RODRIGUEZ  RENE Moran MD, MS    Department of Obstetrics and Gynecology   Division of Gynecologic Oncology   [facility]  Phone: [phone]  CC  Patient Care Team:  Shamir Pugh MD as PCP - General (Internal Medicine)  Melida Rutledge APRN CNP as Referring Physician (Nurse Practitioner - Women's Health)  Shamir Pugh MD as MD (Internal Medicine)  Lam Moran MD as MD (Maternal & Fetal Medicine)  Deja Salinas MD as MD (Urology)  Maryse Rodriguez RN as Registered Nurse  Deja Slainas MD as MD (Urology)  Shamir Pugh MD as Assigned PCP  Deja Salinas MD as Assigned Surgical Provider  Amee Zee MD as Assigned Endocrinology Provider  Lam Moran MD as Assigned Cancer Care Provider  SELF, REFERRED

## 2021-03-04 ENCOUNTER — TELEPHONE (OUTPATIENT)
Dept: ENDOCRINOLOGY | Facility: CLINIC | Age: 56
End: 2021-03-04

## 2021-03-04 NOTE — LETTER
Patient:  Angella St  :   1965  MRN:     [id number]        [address]  [address] [address]        2021    Dear Angella    I see that you do not have a follow-up appointment in endocrinology. I would recommend that you be evaluated by an endocrinologist to minimize complications. If you like to be seen at the [facility], please call [phone] select option #1 for an appointment.    Regards    Amee Zee MD

## 2021-03-07 LAB
COPATH REPORT: NORMAL
PAP: NORMAL

## 2021-03-08 LAB
FINAL DIAGNOSIS: NORMAL
HPV HR 12 DNA CVX QL NAA+PROBE: NEGATIVE
HPV16 DNA SPEC QL NAA+PROBE: NEGATIVE
HPV18 DNA SPEC QL NAA+PROBE: NEGATIVE
SPECIMEN DESCRIPTION: NORMAL
SPECIMEN SOURCE CVX/VAG CYTO: NORMAL

## 2021-03-16 ENCOUNTER — DOCUMENTATION ONLY (OUTPATIENT)
Dept: CARE COORDINATION | Facility: CLINIC | Age: 56
End: 2021-03-16

## 2021-03-26 ENCOUNTER — OFFICE VISIT (OUTPATIENT)
Dept: INTERNAL MEDICINE | Facility: CLINIC | Age: 56
End: 2021-03-26
Payer: MEDICAID

## 2021-03-26 VITALS
DIASTOLIC BLOOD PRESSURE: 81 MMHG | OXYGEN SATURATION: 98 % | SYSTOLIC BLOOD PRESSURE: 127 MMHG | BODY MASS INDEX: 37.44 KG/M2 | HEART RATE: 83 BPM | WEIGHT: 225 LBS

## 2021-03-26 DIAGNOSIS — E78.5 HYPERLIPIDEMIA, UNSPECIFIED HYPERLIPIDEMIA TYPE: ICD-10-CM

## 2021-03-26 DIAGNOSIS — Z12.31 ENCOUNTER FOR SCREENING MAMMOGRAM FOR BREAST CANCER: ICD-10-CM

## 2021-03-26 DIAGNOSIS — I10 BENIGN ESSENTIAL HYPERTENSION: ICD-10-CM

## 2021-03-26 DIAGNOSIS — I10 ESSENTIAL HYPERTENSION: ICD-10-CM

## 2021-03-26 DIAGNOSIS — E66.01 MORBID OBESITY (H): ICD-10-CM

## 2021-03-26 DIAGNOSIS — E04.2 NONTOXIC MULTINODULAR GOITER: Primary | ICD-10-CM

## 2021-03-26 PROCEDURE — 99396 PREV VISIT EST AGE 40-64: CPT | Performed by: INTERNAL MEDICINE

## 2021-03-26 RX ORDER — AMLODIPINE BESYLATE 5 MG/1
TABLET ORAL
Qty: 90 TABLET | Refills: 3 | Status: SHIPPED | OUTPATIENT
Start: 2021-03-26 | End: 2021-10-19

## 2021-03-26 NOTE — NURSING NOTE
Chief Complaint   Patient presents with     Physical     physical       Digna Teran MA, at 2:35 PM on 3/26/2021.

## 2021-03-26 NOTE — PATIENT INSTRUCTIONS
[facility] Medication Refill Request Information:  * Please contact your pharmacy regarding ANY request for medication refills.  ** [facility] Prescription Fax = [phone]  * Please allow 3 business days for routine medication refills.  * Please allow 5 business days for controlled substance medication refills.     [facility] Test Result notification information:  *You will be notified with in 7-10 days of your appointment day regarding the results of your test.  If you are on MyChart you will be notified as soon as the provider has reviewed the results and signed off on them.    [facility]: [phone]     To schedule your lab appointment & mammogram please call: [phone]

## 2021-03-26 NOTE — PROGRESS NOTES
HPI  55-year-old presents today for physical examination.  She has been doing well although acknowledges she has not been doing much in the way of exercise or physical activity through the winter.  She is seen somewhat motivated to increase that now.  She has tolerated her amlodipine well.  She not had any side effects or symptoms related to this.  She has had no associated dizziness lightheadedness or swelling.  She is no longer following the ketogenic diet.  She did have some bowel changes but this improved with starting probiotics and she feels much better now using the probiotics regularly.  Past Medical History:   Diagnosis Date     Anemia      Cancer (H)     Metastatic squamous cell cervical carcinoma     Chronic kidney disease      Hypertension 2007     Obesity     BMI >30     Past Surgical History:   Procedure Laterality Date     COMBINED CYSTOSCOPY, INSERT STENT URETER(S) Left 6/13/2016    Procedure: COMBINED CYSTOSCOPY, INSERT STENT URETER(S);  Surgeon: Deja Salinas MD;  [facility]     CYSTOSCOPY, RETROGRADES, COMBINED Left 6/13/2016    Procedure: COMBINED CYSTOSCOPY, RETROGRADES;  Surgeon: Deja Salinas MD;  [facility]     DAVINCI NEPHRECTOMY Left 5/3/2018    Procedure: DAVINCI XI NEPHRECTOMY;  DAVINCI XI LEFT NEPHRECTOMY;  Surgeon: Deja Salinas MD;  [facility]     GENITOURINARY SURGERY  5/2015    PNT placement     [place]  [place]     IR PORT REMOVAL RIGHT  1/29/2019     PERCUTANEOUS NEPHROSTOMY Left 3/9/2017    Procedure: PERCUTANEOUS NEPHROSTOMY;  Surgeon: Bridger Meyer PA-C;  [facility]     PERCUTANEOUS NEPHROSTOMY Left 6/28/2017    Procedure: PERCUTANEOUS NEPHROSTOMY;  Left Nephrostomy Tube Change;  Surgeon: Bridger Meyer PA-C;  [facility]     PERCUTANEOUS NEPHROSTOMY Left 10/30/2017    Procedure: PERCUTANEOUS NEPHROSTOMY;  Left Nephrostomy Tube Change;  Surgeon: Maryann Chavez PA-C;   [facility]     REMOVE PORT VASCULAR ACCESS Right 2019    Procedure: Right Port Removal;  Surgeon: Maryann Marin PA-C;  [facility]     Family History   Problem Relation Age of Onset     Hypertension Brother      Social History     Socioeconomic History     Marital status:      Spouse name: Not on file     Number of children: 3     Years of education: Not on file     Highest education level: Not on file   Occupational History     Occupation: no working   Social Needs     Financial resource strain: Not on file     Food insecurity     Worry: Not on file     Inability: Not on file     Transportation needs     Medical: Not on file     Non-medical: Not on file   Tobacco Use     Smoking status: Never Smoker     Smokeless tobacco: Never Used   Substance and Sexual Activity     Alcohol use: No     Alcohol/week: 0.0 standard drinks     Comment: Does not drink alcohol     Drug use: No     Comment: No drug use     Sexual activity: Not Currently   Lifestyle     Physical activity     Days per week: Not on file     Minutes per session: Not on file     Stress: Not on file   Relationships     Social connections     Talks on phone: Not on file     Gets together: Not on file     Attends Mandaen service: Not on file     Active member of club or organization: Not on file     Attends meetings of clubs or organizations: Not on file     Relationship status: Not on file     Intimate partner violence     Fear of current or ex partner: Not on file     Emotionally abused: Not on file     Physically abused: Not on file     Forced sexual activity: Not on file   Other Topics Concern     Parent/sibling w/ CABG, MI or angioplasty before 65F 55M? Not Asked   Social History Narrative    Mom  from [place] in her 40's    Dad  in his 60's presumed to be from hunger as a result of the on going war in [place] at the time.    Patient has a brother and a sister who are alive and well.      Patient has three children:  Female  age 37 alive and well; Female age 35 alive and well; Female age 33 alive and well    Patient reports  is in [facility].     Complete review of symptoms negative except as noted above.    Exam:  /81 (BP Location: Right arm, Patient Position: Sitting, Cuff Size: Adult Large)   Pulse 83   Wt 102.1 kg (225 lb)   SpO2 98%   BMI 37.44 kg/m    225 lbs 0 oz  PHYSICAL EXAMINATION:   The patient is alert, oriented with a clear sensorium.   Skin shows no lesions or rashes and good turgor.   Head is normocephalic and atraumatic.   Eyes show PERRLA. Fundi are benign.   Ears show normal TMs bilaterally.   Mouth shows clear oral mucosa.   Neck shows no nodes, massive irregular thyromegaly no bruits.   Back is nontender.   Lungs are clear to percussion and auscultation.   Heart shows normal S1 and S2 without murmur or gallop.   Abdomen is obese, soft, nontender without masses or organomegaly.   Extremities show no edema and no evidence of active synovitis.   Neurologic examination shows cranial nerves II-XII intact. Motor shows normal strength. Reflexes are full and symmetrical.       ASSESSMENT  1 hypertension appears well controlled  2 hyperlipidemia needs reassessment  3 huge multinodular goiter  4 history of cervical cancer  5 status post nephrectomy for atrophic kidney  6 Obesity    Plan  In light of the massive thyroid we will get a ultrasound to see if there is any significant changes here.  Also set her up for a mammogram however follow-up for fasting labs.  Discussed the importance of regular exercise and she will continue on the amlodipine.    This note was completed using Dragon voice recognition software.      Shamir Pugh MD  General Internal Medicine  [facility]  [phone]

## 2021-04-16 ENCOUNTER — ANCILLARY PROCEDURE (OUTPATIENT)
Dept: MAMMOGRAPHY | Facility: CLINIC | Age: 56
End: 2021-04-16
Attending: INTERNAL MEDICINE
Payer: MEDICAID

## 2021-04-16 ENCOUNTER — ANCILLARY PROCEDURE (OUTPATIENT)
Dept: ULTRASOUND IMAGING | Facility: CLINIC | Age: 56
End: 2021-04-16
Attending: INTERNAL MEDICINE
Payer: MEDICAID

## 2021-04-16 DIAGNOSIS — E04.2 NONTOXIC MULTINODULAR GOITER: ICD-10-CM

## 2021-04-16 DIAGNOSIS — Z12.31 ENCOUNTER FOR SCREENING MAMMOGRAM FOR BREAST CANCER: ICD-10-CM

## 2021-04-16 DIAGNOSIS — E78.5 HYPERLIPIDEMIA, UNSPECIFIED HYPERLIPIDEMIA TYPE: ICD-10-CM

## 2021-04-16 DIAGNOSIS — I10 ESSENTIAL HYPERTENSION: ICD-10-CM

## 2021-04-16 LAB
ALBUMIN SERPL-MCNC: 3.6 G/DL (ref 3.4–5)
ALP SERPL-CCNC: 60 U/L (ref 40–150)
ALT SERPL W P-5'-P-CCNC: 32 U/L (ref 0–50)
ANION GAP SERPL CALCULATED.3IONS-SCNC: 7 MMOL/L (ref 3–14)
AST SERPL W P-5'-P-CCNC: 12 U/L (ref 0–45)
BASOPHILS # BLD AUTO: 0 10E9/L (ref 0–0.2)
BASOPHILS NFR BLD AUTO: 0.5 %
BILIRUB SERPL-MCNC: 0.5 MG/DL (ref 0.2–1.3)
BUN SERPL-MCNC: 14 MG/DL (ref 7–30)
CALCIUM SERPL-MCNC: 8.9 MG/DL (ref 8.5–10.1)
CHLORIDE SERPL-SCNC: 106 MMOL/L (ref 94–109)
CHOLEST SERPL-MCNC: 183 MG/DL
CO2 SERPL-SCNC: 29 MMOL/L (ref 20–32)
CREAT SERPL-MCNC: 0.74 MG/DL (ref 0.52–1.04)
DIFFERENTIAL METHOD BLD: NORMAL
EOSINOPHIL # BLD AUTO: 0.1 10E9/L (ref 0–0.7)
EOSINOPHIL NFR BLD AUTO: 1.7 %
ERYTHROCYTE [DISTWIDTH] IN BLOOD BY AUTOMATED COUNT: 12.7 % (ref 10–15)
GFR SERPL CREATININE-BSD FRML MDRD: 90 ML/MIN/{1.73_M2}
GLUCOSE SERPL-MCNC: 151 MG/DL (ref 70–99)
HCT VFR BLD AUTO: 38 % (ref 35–47)
HDLC SERPL-MCNC: 52 MG/DL
HGB BLD-MCNC: 12 G/DL (ref 11.7–15.7)
IMM GRANULOCYTES # BLD: 0 10E9/L (ref 0–0.4)
IMM GRANULOCYTES NFR BLD: 0.2 %
LDLC SERPL CALC-MCNC: 111 MG/DL
LYMPHOCYTES # BLD AUTO: 3.1 10E9/L (ref 0.8–5.3)
LYMPHOCYTES NFR BLD AUTO: 50.5 %
MCH RBC QN AUTO: 26.9 PG (ref 26.5–33)
MCHC RBC AUTO-ENTMCNC: 31.6 G/DL (ref 31.5–36.5)
MCV RBC AUTO: 85 FL (ref 78–100)
MONOCYTES # BLD AUTO: 0.6 10E9/L (ref 0–1.3)
MONOCYTES NFR BLD AUTO: 9.3 %
NEUTROPHILS # BLD AUTO: 2.3 10E9/L (ref 1.6–8.3)
NEUTROPHILS NFR BLD AUTO: 37.8 %
NONHDLC SERPL-MCNC: 131 MG/DL
NRBC # BLD AUTO: 0 10*3/UL
NRBC BLD AUTO-RTO: 0 /100
PLATELET # BLD AUTO: 199 10E9/L (ref 150–450)
POTASSIUM SERPL-SCNC: 3.6 MMOL/L (ref 3.4–5.3)
PROT SERPL-MCNC: 8 G/DL (ref 6.8–8.8)
RBC # BLD AUTO: 4.46 10E12/L (ref 3.8–5.2)
SODIUM SERPL-SCNC: 142 MMOL/L (ref 133–144)
TRIGL SERPL-MCNC: 101 MG/DL
TSH SERPL DL<=0.005 MIU/L-ACNC: 1.74 MU/L (ref 0.4–4)
WBC # BLD AUTO: 6 10E9/L (ref 4–11)

## 2021-04-16 PROCEDURE — 80053 COMPREHEN METABOLIC PANEL: CPT | Performed by: PATHOLOGY

## 2021-04-16 PROCEDURE — 80061 LIPID PANEL: CPT | Performed by: PATHOLOGY

## 2021-04-16 PROCEDURE — 77067 SCR MAMMO BI INCL CAD: CPT | Mod: GC | Performed by: RADIOLOGY

## 2021-04-16 PROCEDURE — 36415 COLL VENOUS BLD VENIPUNCTURE: CPT | Performed by: PATHOLOGY

## 2021-04-16 PROCEDURE — 76536 US EXAM OF HEAD AND NECK: CPT | Mod: GC | Performed by: RADIOLOGY

## 2021-04-16 PROCEDURE — 80050 GENERAL HEALTH PANEL: CPT | Performed by: PATHOLOGY

## 2021-04-16 PROCEDURE — 85025 COMPLETE CBC W/AUTO DIFF WBC: CPT | Performed by: PATHOLOGY

## 2021-09-17 NOTE — CONSULTS
Forms on MD's desk.   Urology Consult    Name: Angella St    MRN: [id number]   YOB: 1965               Chief Complaint:     History is obtained from the patient and chart review          History of Present Illness:   Angella St is a 52 year old female with history of cervical cancer s/p JESSICA and chemotherapy and left ureteral involvement resulting in hydronephrosis, recurrent pyelonephritis who is s/p robotic assisted left radical nephrectomy.  She subsequently developed an abscess at the nephrectomy bed requiring placement of drain per IR. She underwent sinogram and drain removal on 6/5/18 without complication and was seen in clinic 6/15/18 doing very well.    She presents to the ED noting rigors/chills over the past 2 days, associated with intermittent left flank pain.  Labs in the ED do not show any leukocytosis and she is vitally stable.  Her pain is currently well controlled and she has not required any pain medications, however a CT obtain shows recurrent of a rim enhancing fluid collection measuring 2.0 x 3.1 cm, without definite evidence of fistula.         Past Medical History:     Past Medical History:   Diagnosis Date     Anemia      Cancer (H)     Metastatic squamous cell cervical carcinoma     Chronic kidney disease      Hypertension 2007     Obesity     BMI >30            Past Surgical History:     Past Surgical History:   Procedure Laterality Date     COMBINED CYSTOSCOPY, INSERT STENT URETER(S) Left 6/13/2016    Procedure: COMBINED CYSTOSCOPY, INSERT STENT URETER(S);  Surgeon: Deja Salinas MD;  [facility]     CYSTOSCOPY, RETROGRADES, COMBINED Left 6/13/2016    Procedure: COMBINED CYSTOSCOPY, RETROGRADES;  Surgeon: Deja Salinas MD;  [facility]     DAVINCI NEPHRECTOMY Left 5/3/2018    Procedure: DAVINCI XI NEPHRECTOMY;  DAVINCI XI LEFT NEPHRECTOMY;  Surgeon: Deja Salinas MD;  Location:  OR     GENITOURINARY SURGERY  5/2015    PNT placement     HYSTERECTOMY   2011    [facility], [place]     PERCUTANEOUS NEPHROSTOMY Left 3/9/2017    Procedure: PERCUTANEOUS NEPHROSTOMY;  Surgeon: Bridger Meyer PA-C;  [facility]     PERCUTANEOUS NEPHROSTOMY Left 6/28/2017    Procedure: PERCUTANEOUS NEPHROSTOMY;  Left Nephrostomy Tube Change;  Surgeon: Bridger Meyer PA-C;  [facility]     PERCUTANEOUS NEPHROSTOMY Left 10/30/2017    Procedure: PERCUTANEOUS NEPHROSTOMY;  Left Nephrostomy Tube Change;  Surgeon: Maryann Chavez PA-C;  [facility]            Social History:     Social History   Substance Use Topics     Smoking status: Never Smoker     Smokeless tobacco: Never Used     Alcohol use No      Comment: Does not drink alcohol            Family History:     Family History   Problem Relation Age of Onset     Hypertension Brother             Allergies:   No Known Allergies         Medications:     No current facility-administered medications for this encounter.      Current Outpatient Prescriptions   Medication Sig     Acetaminophen (TYLENOL PO)      amLODIPine (NORVASC) 10 MG tablet Take 0.5 tablets (5 mg) by mouth daily     amoxicillin-clavulanate (AUGMENTIN) 875-125 MG per tablet Take 1 tablet by mouth 2 times daily (Patient not taking: Reported on 6/15/2018)     COMPRESSION STOCKINGS 1 each daily (Patient not taking: Reported on 6/15/2018)     levofloxacin (LEVAQUIN) 500 MG tablet Take 1 tablet (500 mg) by mouth daily (Patient not taking: Reported on 6/15/2018)     LORazepam (ATIVAN) 1 MG tablet Take 1 tablet (1 mg) by mouth once as needed for anxiety (take 30 mins before thyroid nodule biopsy procedure) (Patient not taking: Reported on 6/15/2018)     oxyCODONE IR (ROXICODONE) 5 MG tablet Take 1-2 tablets (5-10 mg) by mouth every 3 hours as needed for other (pain control or improvement in physical function. Hold dose for analgesic side effects.)             Review of Systems:    ROS: 10 point ROS neg other than the symptoms noted above in the HPI            Physical Exam:   VS:  T: 98.3    HR: 85    BP: 125/77    RR: 18   GEN:  AOx3.  NAD.    CV:  RRR  LUNGS: Non-labored breathing.   BACK:  No midline or CVA tenderness.  ABD:  Soft.  Right flank tenderness, radiates to suprapubic area  : normal femal genitalia  SKIN:  Warm.  Dry          Data:   All laboratory data reviewed:      Recent Labs  Lab 06/18/18  1849   WBC 11.0   HGB 10.8*          Recent Labs  Lab 06/18/18  1849      POTASSIUM 3.9   CHLORIDE 105   CO2 24   BUN 6*   CR 0.68   *   HERON 9.1       Recent Labs  Lab 06/18/18  1902   COLOR Straw   APPEARANCE Clear   URINEGLC Negative   URINEBILI Negative   URINEKETONE Negative   SG 1.001*   URINEPH 7.0   PROTEIN Negative   NITRITE Negative   LEUKEST Negative   RBCU 0   WBCU <1       All pertinent imaging reviewed:    CT scan of the abdomen:        Renal ultrasound:               Impression and Plan:   Impression:   Angella St is a 52 year old female with history of left nephrectomy in 5/2018, complicated by abscess requiring IR drain.  This was removed with resolution of the collection on 6/5/18, but now returns with new fluid collection in the same area that is rim enhancing and associated with subjective chills.      Plan:     - Admit to Uroloyg    - Initiate Unasyn per prior drain cultures    - Consult placed for drain placement per IR in am    - NPO, IVF         This patient's exam findings, labs, and imaging discussed with urology staff surgeon Dr. Loyola, who developed the treatment plan.    Rohit Villafuerte MD  Urology Resident G2

## 2021-09-21 NOTE — PROGRESS NOTES
Gynecologic Oncology Follow Up Note    Date: 2021    RE: Angella St  : 1965  DAYSI: 2021    CC:  Recurrent cervical cancer    HPI:  Angella St is a 55 year old woman with a history of recurrent cervical cancer.  Initially, she was surgically treated in  with recurrence 2015.  She completed treatment for her recurrence with chemotherapy 2015.  She is here today for a surveillance visit.     Oncology History:  : Treated for cervical cancer with hysterectomy in [facility]. No adjuvant treatment.  4/24/15: Presented to [facility] in [place] and had CT A/P that showed severe left hydronephrosis with moderate left hydroureter secondary to a left pelvic mass (4.9 x 4.8 cm x 3.8 cm) 4/26/15: MyCube Lasix renogram that showed good perfusion to the right kidney. No significant perfusion on the left.  4/28/15: CT guided peritoneal biopsy of the left hemipelvic mass that showed metastatic squamous cell carcinoma, consistent with cervical primary   5/1/15: Presented to the [facility] ED with left/central pelvic pain and dizziness present since last . She was seen by OBGYN who recommended GYN ONC referral.     5/6/15: CT guided biopsy of pelvic mass again reveals squamous cell carcinoma.  5/11/15: [facility] with abdominal pain, seen by urology for obstruction to the left urinary system. CT A/P showed marked left hydronephrosis with cortical thinning in the left kidney, consistent with chronic obstruction. IR placed 8 Turkish nephroureteral tube at [facility]. She was discharged to home with plan to follow up with GYN ONC and urology at the [facility].  5/20/15: admitted to [facility] gyn/onc service for control of abdominal pain. Treated for enterococcus/acinetobacter UTI.   6/3/2015-7/17/15: Cycles #1-3 Cisplatin/Taxol/Avastin  8/7/15: CT CAP: solid mass in left pelvic sidewall, decreased in size from prior study. The previously identified hypermetabolic  hypodense liver lesion is also not identified on this study, likely due to treatment response. Unchanged left paraaortic lymph node. No new pulmonary nodules. Atrophic left kidney. Multinodular enlarged thyroid.   8/10/15-9/22/15: Cycles #4-6 Cisplatin/Taxol/Avastin  10/9/15: CT cap IMPRESSION:   1. Stable 3 mm right upper lobe nodule, recommend continued attention on followup.  2. Stable left pelvic sidewall mass measuring up to 2.9 cm.   3. 4 mm hypodensity in the right lobe of the liver which is too small to further characterize and indeterminate. Continued attention on followup is needed.  4. Stable atrophic appearance of the left kidney with percutaneous nephrostomy tube in place. Decreased enhancement in comparison with the contralateral kidney suggests some degree of renal dysfunction. Perinephric inflammatory changes are again seen, potentially indicating infection versus inflammatory response.  5. Diffusely enlarged thyroid with multiple hypoattenuating nodules.      Plan: 3 more cycles Cisplatin/Taxol/Avastin based on  and then repeat PET.      10/14/15: Cycle #7 Cisplatin/Taxol/Avastin.   11/6/15: Cycle #8 Cisplatin/Taxol/Avastin.  11/23/15: PNT exchange.  11/27/15: Cycle #9 Cisplatin/Taxol/Avastin.    3/3/21: Pap NIL.  CT CAP  IMPRESSION:  In this patient with history of cervical cancer status post  hysterectomy and left nephrectomy, there is no convincing evidence for  recurrent or metastatic disease:  1. Stable postsurgical changes, with unremarkable appearance of the  surgical beds. No suspicious lymphadenopathy within the chest, abdomen  or pelvis.  2. New ill-defined 3 mm nodule in the lateral left upper lobe is  technically indeterminant but of low suspicion. Stable appearance of  other sub-6 mm pulmonary nodules. Recommend attention on follow-up.  3. Large left-sided eccentric multinodular thyroid goiter appears  stable.                    Today she comes to clinic feeling well and is  without concern.  She forgot to take her BP medication last night but did take it now while in clinic.  She occasionally checks her BP at home but hasn't recently.  She denies any vaginal bleeding, no changes in her bowel or bladder habits, no nausea/emesis, no lower extremity edema, and no difficulties eating or sleeping. She denies any abdominal discomfort/bloating, no fevers or chills, and no chest pain or shortness of breath. She is current with her annual physical, colon cancer screening, and mammogram.  She has not received the COVID vaccine yet.  She has reservations because she had 2 family members who needed to be hospitalized afterward due to cardiac complications.                      Health Maintenance  Colonoscopy: 11/1/2013  Mammogram: 3/26/21  Annual physical: 3/26/21  COVID vaccine: Not received      Review of Systems:    Systemic           no weight changes; no fever; no chills; no night sweats; no appetite changes  Skin           no rashes, or lesions  Eye           no irritation; no changes in vision  Leni-Laryngeal           no dysphagia; no hoarseness   Pulmonary    no cough; no shortness of breath  Cardiovascular    no chest pain; no palpitations  Gastrointestinal    no diarrhea; no constipation; no abdominal pain; no changes in bowel habits; no blood in stool  Genitourinary   no urinary frequency; no urinary urgency; no dysuria; no pain; no abnormal vaginal discharge; no abnormal vaginal bleeding  Breast   no breast discharge; no breast changes; no breast pain  Musculoskeletal    no myalgias; no arthralgias; no back pain  Psychiatric           no depressed mood; no anxiety    Hematologic   no tender lymph nodes; no noticeable swellings or lumps   Endocrine    no hot flashes; no heat/cold intolerance         Neurological   no tremor; no numbness and tingling; no headaches; no difficulty sleeping      Past Medical History:    Past Medical History:   Diagnosis Date     Anemia      Cancer (H)      Metastatic squamous cell cervical carcinoma     Chronic kidney disease      Hypertension 2007     Obesity     BMI >30         Past Surgical History:    Past Surgical History:   Procedure Laterality Date     COMBINED CYSTOSCOPY, INSERT STENT URETER(S) Left 6/13/2016    Procedure: COMBINED CYSTOSCOPY, INSERT STENT URETER(S);  Surgeon: Deja Salinas MD;  [facility]     CYSTOSCOPY, RETROGRADES, COMBINED Left 6/13/2016    Procedure: COMBINED CYSTOSCOPY, RETROGRADES;  Surgeon: Deja Salinas MD;  [facility]     DAVINCI NEPHRECTOMY Left 5/3/2018    Procedure: DAVINCI XI NEPHRECTOMY;  DAVINCI XI LEFT NEPHRECTOMY;  Surgeon: Deja Salinas MD;  [facility]     GENITOURINARY SURGERY  5/2015    PNT placement     [place]CT[place]Y  2011    [place], [place]     IR PORT REMOVAL RIGHT  1/29/2019     PERCUTANEOUS NEPHROSTOMY Left 3/9/2017    Procedure: PERCUTANEOUS NEPHROSTOMY;  Surgeon: Bridger Meyer PA-C;  [facility]     PERCUTANEOUS NEPHROSTOMY Left 6/28/2017    Procedure: PERCUTANEOUS NEPHROSTOMY;  Left Nephrostomy Tube Change;  Surgeon: Bridger Meyer PA-C;  [facility]     PERCUTANEOUS NEPHROSTOMY Left 10/30/2017    Procedure: PERCUTANEOUS NEPHROSTOMY;  Left Nephrostomy Tube Change;  Surgeon: Maryann Chavez PA-C;  [facility]     REMOVE PORT VASCULAR ACCESS Right 1/29/2019    Procedure: Right Port Removal;  Surgeon: Maryann Marin PA-C;  [facility]         Health Maintenance Due   Topic Date Due     ADVANCE CARE PLANNING  Never done     COVID-19 Vaccine (1) Never done     HIV SCREENING  Never done     ZOSTER IMMUNIZATION (1 of 2) Never done     PHQ-2  01/01/2021     INFLUENZA VACCINE (1) 09/01/2021       Current Medications:     Current Outpatient Medications   Medication Sig Dispense Refill     amLODIPine (NORVASC) 5 MG tablet TAKE ONE TABLET BY MOUTH ONCE DAILY. PLEASE SCHEDULE AN APPOINTMENT WITH PRIMARY CLINIC FOR FURTHER REFILLS. 90  tablet 3     Acetaminophen (TYLENOL PO) Take 500 mg by mouth every 6 hours as needed  (Patient not taking: Reported on 9/24/2021)       estradiol (ESTRACE) 0.1 MG/GM vaginal cream Place 2 g vaginally three times a week Place small amount on fingertip and apply to urethra. (Patient not taking: Reported on 1/10/2020) 42.5 g 3     senna-docusate (SENOKOT-S;PERICOLACE) 8.6-50 MG per tablet Take 1 tablet by mouth daily as needed for constipation (Patient not taking: Reported on 9/24/2021)           Allergies:      No Known Allergies     Social History:     Social History     Tobacco Use     Smoking status: Never Smoker     Smokeless tobacco: Never Used   Substance Use Topics     Alcohol use: No     Alcohol/week: 0.0 standard drinks     Comment: Does not drink alcohol       History   Drug Use No     Comment: No drug use         Family History:     The patient's family history is notable for:    Family History   Problem Relation Age of Onset     Hypertension Brother          Physical Exam:     BP (!) 165/88   Pulse 70   Temp 98.7  F (37.1  C) (Oral)   Resp 18   Wt 98.5 kg (217 lb 1.6 oz)   SpO2 99%   BMI 36.13 kg/m    Body mass index is 36.13 kg/m .    General Appearance: healthy and alert, no distress     HEENT: no palpable nodules or masses        Cardiovascular: regular rate and rhythm, no gallops, rubs or murmurs     Respiratory: lungs clear, no rales, rhonchi or wheezes    Musculoskeletal: extremities non tender and without edema    Skin: no lesions or rashes     Neurological: normal gait, no gross defects     Psychiatric: appropriate mood and affect                               Hematological: normal cervical, supraclavicular and inguinal lymph nodes     Gastrointestinal:       abdomen soft, non-tender, non-distended, no organomegaly or masses    Genitourinary: External genitalia and urethral meatus appears normal.  Vagina is smooth without nodularity or masses.  Cervix is surgically absent.  Bimanual exam  reveal no masses, nodularity or fullness.  Recto-vaginal exam confirms these findings.      Assessment:    Angella St is a 55 year old woman with a history of recurrent cervical cancer.  Initially, she was surgically treated in 2011 with recurrence 4/2015.  She completed treatment for her recurrence with chemotherapy 11/2015.  She is here today for a surveillance visit.     16 minutes spent on the date of the encounter doing chart review, history and exam, documentation, and further activities as noted above.      Plan:     1.) Cervical cancer:  MATEUSZ on exam.  RTC in 6 months for surveillance visit and pap.  At that point she can begin extending her visits to every 6 months as she is more then 5 years post treatment.  She will continue to need annual pelvic/rectal exams and paps cytology only (due 3/2022).  Reviewed signs and symptoms for when she should contact the clinic or seek additional care.  Patient to contact the clinic with any questions or concerns in the interim.        Genetic risk factors were assessed and she does not meet qualification for referral.      Labs and/or tests ordered include:  None.     2.) Health maintenance:  Issues addressed today include following up with PCP for annual health maintenance and non-gynecologic issues.     3.) HTN: Encouraged to check her BP at home and follow up with her PCP if her readings remain >130/80.  She has refills of amlodipine available at the pharmacy.  If needed she will need to reach out to her PCP for additional refills.    4.) COVID vaccine:  Discussed that it is recommended that everyone receive the COVID vaccine.  They are considered overall safe and effective.          Ale Rodriguez, CARMELA, APRN, FNP-C  Nurse Practitioner  Division of Gynecologic Oncology  Pager: [phone]     CC  Patient Care Team:  Shamir Pugh MD as PCP - General (Internal Medicine)  Melida Rutledge APRN CNP as Referring Physician (Nurse Practitioner -  Women's Health)  Shamir Pugh MD as MD (Internal Medicine)  Felisha Watson MD as MD (Maternal & Fetal Medicine)  Deja Salinas MD as MD (Urology)  Maryse Rodriguez RN as Registered Nurse  Deja Salinas MD as MD (Urology)  Felisha Watson MD as Assigned Cancer Care Provider  Shamir Pugh MD as Assigned PCP  FELISHA WATSON

## 2021-09-24 ENCOUNTER — ONCOLOGY VISIT (OUTPATIENT)
Dept: ONCOLOGY | Facility: CLINIC | Age: 56
End: 2021-09-24
Attending: NURSE PRACTITIONER
Payer: MEDICAID

## 2021-09-24 VITALS
WEIGHT: 217.1 LBS | DIASTOLIC BLOOD PRESSURE: 88 MMHG | BODY MASS INDEX: 36.13 KG/M2 | TEMPERATURE: 98.7 F | RESPIRATION RATE: 18 BRPM | SYSTOLIC BLOOD PRESSURE: 165 MMHG | HEART RATE: 70 BPM | OXYGEN SATURATION: 99 %

## 2021-09-24 DIAGNOSIS — Z08 ENCOUNTER FOR FOLLOW-UP SURVEILLANCE OF CERVICAL CANCER: ICD-10-CM

## 2021-09-24 DIAGNOSIS — Z85.41 ENCOUNTER FOR FOLLOW-UP SURVEILLANCE OF CERVICAL CANCER: ICD-10-CM

## 2021-09-24 DIAGNOSIS — C53.9 MALIGNANT NEOPLASM OF CERVIX, UNSPECIFIED SITE (H): Primary | ICD-10-CM

## 2021-09-24 PROCEDURE — G0463 HOSPITAL OUTPT CLINIC VISIT: HCPCS

## 2021-09-24 PROCEDURE — 99212 OFFICE O/P EST SF 10 MIN: CPT | Performed by: NURSE PRACTITIONER

## 2021-09-24 ASSESSMENT — PAIN SCALES - GENERAL: PAINLEVEL: NO PAIN (0)

## 2021-09-24 NOTE — LETTER
2021      RE: [address]ella [address]  [address] [facility] [id number]       Gynecologic Oncology Follow Up Note    Date: 2021    RE: Angella St  : 1965  DAYSI: 2021    CC:  Recurrent cervical cancer    HPI:  Angella St is a 55 year old woman with a history of recurrent cervical cancer.  Initially, she was surgically treated in  with recurrence 2015.  She completed treatment for her recurrence with chemotherapy 2015.  She is here today for a surveillance visit.     Oncology History:  : Treated for cervical cancer with hysterectomy in [facility]. No adjuvant treatment.  4/24/15: [facility] [place] and had CT A/P that showed severe left hydronephrosis with moderate left hydroureter secondary to a left pelvic mass (4.9 x 4.8 cm x 3.8 cm) 4/26/15: Carson DashLuxe Lasix renogram that showed good perfusion to the right kidney. No significant perfusion on the left.  4/28/15: CT guided peritoneal biopsy of the left hemipelvic mass that showed metastatic squamous cell carcinoma, consistent with cervical primary   5/1/15: Presented to the [facility] ED with left/central pelvic pain and dizziness present since last . She was seen by OBGYN who recommended GYN ONC referral.     5/6/15: CT guided biopsy of pelvic mass again reveals squamous cell carcinoma.  5/11/15: Admitted to [facility] with abdominal pain, seen by urology for obstruction to the left urinary system. CT A/P showed marked left hydronephrosis with cortical thinning in the left kidney, consistent with chronic obstruction. IR placed 8 Finnish nephroureteral tube at [facility]. She was discharged to home with plan to follow up with GYN ONC and urology at the [facility].  5/20/15: admitted to [facility] gyn/onc service for control of abdominal pain. Treated for enterococcus/acinetobacter UTI.   6/3/2015-7/17/15: Cycles #1-3 Cisplatin/Taxol/Avastin  8/7/15: CT CAP: solid mass in left pelvic  sidewall, decreased in size from prior study. The previously identified hypermetabolic hypodense liver lesion is also not identified on this study, likely due to treatment response. Unchanged left paraaortic lymph node. No new pulmonary nodules. Atrophic left kidney. Multinodular enlarged thyroid.   8/10/15-9/22/15: Cycles #4-6 Cisplatin/Taxol/Avastin  10/9/15: CT cap IMPRESSION:   1. Stable 3 mm right upper lobe nodule, recommend continued attention on followup.  2. Stable left pelvic sidewall mass measuring up to 2.9 cm.   3. 4 mm hypodensity in the right lobe of the liver which is too small to further characterize and indeterminate. Continued attention on followup is needed.  4. Stable atrophic appearance of the left kidney with percutaneous nephrostomy tube in place. Decreased enhancement in comparison with the contralateral kidney suggests some degree of renal dysfunction. Perinephric inflammatory changes are again seen, potentially indicating infection versus inflammatory response.  5. Diffusely enlarged thyroid with multiple hypoattenuating nodules.      Plan: 3 more cycles Cisplatin/Taxol/Avastin based on  and then repeat PET.      10/14/15: Cycle #7 Cisplatin/Taxol/Avastin.   11/6/15: Cycle #8 Cisplatin/Taxol/Avastin.  11/23/15: PNT exchange.  11/27/15: Cycle #9 Cisplatin/Taxol/Avastin.    3/3/21: Pap NIL.  CT CAP  IMPRESSION:  In this patient with history of cervical cancer status post  hysterectomy and left nephrectomy, there is no convincing evidence for  recurrent or metastatic disease:  1. Stable postsurgical changes, with unremarkable appearance of the  surgical beds. No suspicious lymphadenopathy within the chest, abdomen  or pelvis.  2. New ill-defined 3 mm nodule in the lateral left upper lobe is  technically indeterminant but of low suspicion. Stable appearance of  other sub-6 mm pulmonary nodules. Recommend attention on follow-up.  3. Large left-sided eccentric multinodular thyroid goiter  appears  stable.                    Today she comes to clinic feeling well and is without concern.  She forgot to take her BP medication last night but did take it now while in clinic.  She occasionally checks her BP at home but hasn't recently.  She denies any vaginal bleeding, no changes in her bowel or bladder habits, no nausea/emesis, no lower extremity edema, and no difficulties eating or sleeping. She denies any abdominal discomfort/bloating, no fevers or chills, and no chest pain or shortness of breath. She is current with her annual physical, colon cancer screening, and mammogram.  She has not received the COVID vaccine yet.  She has reservations because she had 2 family members who needed to be hospitalized afterward due to cardiac complications.                      Health Maintenance  Colonoscopy: 11/1/2013  Mammogram: 3/26/21  Annual physical: 3/26/21  COVID vaccine: Not received      Review of Systems:    Systemic           no weight changes; no fever; no chills; no night sweats; no appetite changes  Skin           no rashes, or lesions  Eye           no irritation; no changes in vision  Leni-Laryngeal           no dysphagia; no hoarseness   Pulmonary    no cough; no shortness of breath  Cardiovascular    no chest pain; no palpitations  Gastrointestinal    no diarrhea; no constipation; no abdominal pain; no changes in bowel habits; no blood in stool  Genitourinary   no urinary frequency; no urinary urgency; no dysuria; no pain; no abnormal vaginal discharge; no abnormal vaginal bleeding  Breast   no breast discharge; no breast changes; no breast pain  Musculoskeletal    no myalgias; no arthralgias; no back pain  Psychiatric           no depressed mood; no anxiety    Hematologic   no tender lymph nodes; no noticeable swellings or lumps   Endocrine    no hot flashes; no heat/cold intolerance         Neurological   no tremor; no numbness and tingling; no headaches; no difficulty sleeping      Past Medical  History:    Past Medical History:   Diagnosis Date     Anemia      Cancer (H)     Metastatic squamous cell cervical carcinoma     Chronic kidney disease      Hypertension 2007     Obesity     BMI >30         Past Surgical History:    Past Surgical History:   Procedure Laterality Date     COMBINED CYSTOSCOPY, INSERT STENT URETER(S) Left 6/13/2016    Procedure: COMBINED CYSTOSCOPY, INSERT STENT URETER(S);  Surgeon: Deja Salinas MD;  Location: [facility] OR     CYSTOSCOPY, RETROGRADES, COMBINED Left 6/13/2016    Procedure: COMBINED CYSTOSCOPY, RETROGRADES;  Surgeon: Deja Salinas MD;  Location: [facility] OR     DAVINCI NEPHRECTOMY Left 5/3/2018    Procedure: DAVINCI XI NEPHRECTOMY;  DAVINCI XI LEFT NEPHRECTOMY;  Surgeon: Deja Salinas MD;  Location: [facility] OR     GENITOURINARY SURGERY  5/2015    PNT placement     HYSTERECTOMY  2011    [place]     IR PORT REMOVAL RIGHT  1/29/2019     PERCUTANEOUS NEPHROSTOMY Left 3/9/2017    Procedure: PERCUTANEOUS NEPHROSTOMY;  Surgeon: Bridger Meyer PA-C;  Location: [facility] OR     PERCUTANEOUS NEPHROSTOMY Left 6/28/2017    Procedure: PERCUTANEOUS NEPHROSTOMY;  Left Nephrostomy Tube Change;  Surgeon: Bridger Meyer PA-C;  Location: [facility]     PERCUTANEOUS NEPHROSTOMY Left 10/30/2017    Procedure: PERCUTANEOUS NEPHROSTOMY;  Left Nephrostomy Tube Change;  Surgeon: Maryann Chavez PA-C;  Location: [facility]     REMOVE PORT VASCULAR ACCESS Right 1/29/2019    Procedure: Right Port Removal;  Surgeon: Maryann Marin PA-C;  Location: [facility] OR         Health Maintenance Due   Topic Date Due     ADVANCE CARE PLANNING  Never done     COVID-19 Vaccine (1) Never done     HIV SCREENING  Never done     ZOSTER IMMUNIZATION (1 of 2) Never done     PHQ-2  01/01/2021     INFLUENZA VACCINE (1) 09/01/2021       Current Medications:     Current Outpatient Medications   Medication Sig Dispense Refill     amLODIPine (NORVASC) 5 MG tablet TAKE ONE TABLET BY MOUTH ONCE  DAILY. PLEASE SCHEDULE AN APPOINTMENT WITH PRIMARY CLINIC FOR FURTHER REFILLS. 90 tablet 3     Acetaminophen (TYLENOL PO) Take 500 mg by mouth every 6 hours as needed  (Patient not taking: Reported on 9/24/2021)       estradiol (ESTRACE) 0.1 MG/GM vaginal cream Place 2 g vaginally three times a week Place small amount on fingertip and apply to urethra. (Patient not taking: Reported on 1/10/2020) 42.5 g 3     senna-docusate (SENOKOT-S;PERICOLACE) 8.6-50 MG per tablet Take 1 tablet by mouth daily as needed for constipation (Patient not taking: Reported on 9/24/2021)           Allergies:      No Known Allergies     Social History:     Social History     Tobacco Use     Smoking status: Never Smoker     Smokeless tobacco: Never Used   Substance Use Topics     Alcohol use: No     Alcohol/week: 0.0 standard drinks     Comment: Does not drink alcohol       History   Drug Use No     Comment: No drug use         Family History:     The patient's family history is notable for:    Family History   Problem Relation Age of Onset     Hypertension Brother          Physical Exam:     BP (!) 165/88   Pulse 70   Temp 98.7  F (37.1  C) (Oral)   Resp 18   Wt 98.5 kg (217 lb 1.6 oz)   SpO2 99%   BMI 36.13 kg/m    Body mass index is 36.13 kg/m .    General Appearance: healthy and alert, no distress     HEENT: no palpable nodules or masses        Cardiovascular: regular rate and rhythm, no gallops, rubs or murmurs     Respiratory: lungs clear, no rales, rhonchi or wheezes    Musculoskeletal: extremities non tender and without edema    Skin: no lesions or rashes     Neurological: normal gait, no gross defects     Psychiatric: appropriate mood and affect                               Hematological: normal cervical, supraclavicular and inguinal lymph nodes     Gastrointestinal:       abdomen soft, non-tender, non-distended, no organomegaly or masses    Genitourinary: External genitalia and urethral meatus appears normal.  Vagina is  smooth without nodularity or masses.  Cervix is surgically absent.  Bimanual exam reveal no masses, nodularity or fullness.  Recto-vaginal exam confirms these findings.      Assessment:    Angella tS is a 55 year old woman with a history of recurrent cervical cancer.  Initially, she was surgically treated in 2011 with recurrence 4/2015.  She completed treatment for her recurrence with chemotherapy 11/2015.  She is here today for a surveillance visit.     16 minutes spent on the date of the encounter doing chart review, history and exam, documentation, and further activities as noted above.      Plan:     1.) Cervical cancer:  MATEUSZ on exam.  RTC in 6 months for surveillance visit and pap.  At that point she can begin extending her visits to every 6 months as she is more then 5 years post treatment.  She will continue to need annual pelvic/rectal exams and paps cytology only (due 3/2022).  Reviewed signs and symptoms for when she should contact the clinic or seek additional care.  Patient to contact the clinic with any questions or concerns in the interim.        Genetic risk factors were assessed and she does not meet qualification for referral.      Labs and/or tests ordered include:  None.     2.) Health maintenance:  Issues addressed today include following up with PCP for annual health maintenance and non-gynecologic issues.     3.) HTN: Encouraged to check her BP at home and follow up with her PCP if her readings remain >130/80.  She has refills of amlodipine available at the pharmacy.  If needed she will need to reach out to her PCP for additional refills.    4.) COVID vaccine:  Discussed that it is recommended that everyone receive the COVID vaccine.  They are considered overall safe and effective.      Ale Rodriguez, DNP, APRN, FNP-C  Nurse Practitioner  Division of Gynecologic Oncology  Pager: [phone]     CC  Patient Care Team:  Shamir Pugh MD as PCP - General (Internal  Medicine)  Melida Rutledge APRN CNP as Referring Physician (Nurse Practitioner - Women's Health)  Lam Moran MD as MD (Maternal & Fetal Medicine)  Deja Salinas MD as MD (Urology)  Maryse Rodriguez, RN as Registered Nurse

## 2021-09-24 NOTE — LETTER
2021         RE: Angella St  [address] [facility] [address]        Dear Colleague,    Thank you for referring your patient, Angella St, to the [facility]. Please see a copy of my visit note below.    Gynecologic Oncology Follow Up Note    Date: 2021    RE: Angella St  : 1965  DAYSI: 2021    CC:  Recurrent cervical cancer    HPI:  Angella St is a 55 year old woman with a history of recurrent cervical cancer.  Initially, she was surgically treated in  with recurrence 2015.  She completed treatment for her recurrence with chemotherapy 2015.  She is here today for a surveillance visit.     Oncology History:  : Treated for cervical cancer with hysterectomy in [facility]. No adjuvant treatment.  4/24/15: Presented to [facility] in [place] and had CT A/P that showed severe left hydronephrosis with moderate left hydroureter secondary to a left pelvic mass (4.9 x 4.8 cm x 3.8 cm) 4/26/15: Aldis Lasix renogram that showed good perfusion to the right kidney. No significant perfusion on the left.  4/28/15: CT guided peritoneal biopsy of the left hemipelvic mass that showed metastatic squamous cell carcinoma, consistent with cervical primary   5/1/15: Presented to the [facility] ED with left/central pelvic pain and dizziness present since last . She was seen by OBGYN who recommended GYN ONC referral.     5/6/15: CT guided biopsy of pelvic mass again reveals squamous cell carcinoma.  5/11/15: Admitted to [facility] medicine service with abdominal pain, seen by urology for obstruction to the left urinary system. CT A/P showed marked left hydronephrosis with cortical thinning in the left kidney, consistent with chronic obstruction. IR placed 8 Uzbek nephroureteral tube at [facility]. She was discharged to home with plan to follow up with GYN ONC and urology at the [facility].  5/20/15: admitted to [facility] gyn/onc service for  control of abdominal pain. Treated for enterococcus/acinetobacter UTI.   6/3/2015-7/17/15: Cycles #1-3 Cisplatin/Taxol/Avastin  8/7/15: CT CAP: solid mass in left pelvic sidewall, decreased in size from prior study. The previously identified hypermetabolic hypodense liver lesion is also not identified on this study, likely due to treatment response. Unchanged left paraaortic lymph node. No new pulmonary nodules. Atrophic left kidney. Multinodular enlarged thyroid.   8/10/15-9/22/15: Cycles #4-6 Cisplatin/Taxol/Avastin  10/9/15: CT cap IMPRESSION:   1. Stable 3 mm right upper lobe nodule, recommend continued attention on followup.  2. Stable left pelvic sidewall mass measuring up to 2.9 cm.   3. 4 mm hypodensity in the right lobe of the liver which is too small to further characterize and indeterminate. Continued attention on followup is needed.  4. Stable atrophic appearance of the left kidney with percutaneous nephrostomy tube in place. Decreased enhancement in comparison with the contralateral kidney suggests some degree of renal dysfunction. Perinephric inflammatory changes are again seen, potentially indicating infection versus inflammatory response.  5. Diffusely enlarged thyroid with multiple hypoattenuating nodules.      Plan: 3 more cycles Cisplatin/Taxol/Avastin based on  and then repeat PET.      10/14/15: Cycle #7 Cisplatin/Taxol/Avastin.   11/6/15: Cycle #8 Cisplatin/Taxol/Avastin.  11/23/15: PNT exchange.  11/27/15: Cycle #9 Cisplatin/Taxol/Avastin.    3/3/21: Pap NIL.  CT CAP  IMPRESSION:  In this patient with history of cervical cancer status post  hysterectomy and left nephrectomy, there is no convincing evidence for  recurrent or metastatic disease:  1. Stable postsurgical changes, with unremarkable appearance of the  surgical beds. No suspicious lymphadenopathy within the chest, abdomen  or pelvis.  2. New ill-defined 3 mm nodule in the lateral left upper lobe is  technically indeterminant  but of low suspicion. Stable appearance of  other sub-6 mm pulmonary nodules. Recommend attention on follow-up.  3. Large left-sided eccentric multinodular thyroid goiter appears  stable.                    Today she comes to clinic feeling well and is without concern.  She forgot to take her BP medication last night but did take it now while in clinic.  She occasionally checks her BP at home but hasn't recently.  She denies any vaginal bleeding, no changes in her bowel or bladder habits, no nausea/emesis, no lower extremity edema, and no difficulties eating or sleeping. She denies any abdominal discomfort/bloating, no fevers or chills, and no chest pain or shortness of breath. She is current with her annual physical, colon cancer screening, and mammogram.  She has not received the COVID vaccine yet.  She has reservations because she had 2 family members who needed to be hospitalized afterward due to cardiac complications.                      Health Maintenance  Colonoscopy: 11/1/2013  Mammogram: 3/26/21  Annual physical: 3/26/21  COVID vaccine: Not received      Review of Systems:    Systemic           no weight changes; no fever; no chills; no night sweats; no appetite changes  Skin           no rashes, or lesions  Eye           no irritation; no changes in vision  Leni-Laryngeal           no dysphagia; no hoarseness   Pulmonary    no cough; no shortness of breath  Cardiovascular    no chest pain; no palpitations  Gastrointestinal    no diarrhea; no constipation; no abdominal pain; no changes in bowel habits; no blood in stool  Genitourinary   no urinary frequency; no urinary urgency; no dysuria; no pain; no abnormal vaginal discharge; no abnormal vaginal bleeding  Breast   no breast discharge; no breast changes; no breast pain  Musculoskeletal    no myalgias; no arthralgias; no back pain  Psychiatric           no depressed mood; no anxiety    Hematologic   no tender lymph nodes; no noticeable swellings or lumps    Endocrine    no hot flashes; no heat/cold intolerance         Neurological   no tremor; no numbness and tingling; no headaches; no difficulty sleeping      Past Medical History:    Past Medical History:   Diagnosis Date     Anemia      Cancer (H)     Metastatic squamous cell cervical carcinoma     Chronic kidney disease      Hypertension 2007     Obesity     BMI >30         Past Surgical History:    Past Surgical History:   Procedure Laterality Date     COMBINED CYSTOSCOPY, INSERT STENT URETER(S) Left 6/13/2016    Procedure: COMBINED CYSTOSCOPY, INSERT STENT URETER(S);  Surgeon: Deja Salinas MD;  Location: [facility]     CYSTOSCOPY, RETROGRADES, COMBINED Left 6/13/2016    Procedure: COMBINED CYSTOSCOPY, RETROGRADES;  Surgeon: Deja Salinas MD;  Location: [facility]     DAVINCI NEPHRECTOMY Left 5/3/2018    Procedure: DAVINCI XI NEPHRECTOMY;  DAVINCI XI LEFT NEPHRECTOMY;  Surgeon: Deja Salinas MD;  Location: [facility] OR     GENITOURINARY SURGERY  5/2015    PNT placement     HYSTERECTOMY  2011    [place]     IR PORT REMOVAL RIGHT  1/29/2019     PERCUTANEOUS NEPHROSTOMY Left 3/9/2017    Procedure: PERCUTANEOUS NEPHROSTOMY;  Surgeon: Bridger Meyer PA-C;  Location: [facility]     PERCUTANEOUS NEPHROSTOMY Left 6/28/2017    Procedure: PERCUTANEOUS NEPHROSTOMY;  Left Nephrostomy Tube Change;  Surgeon: Bridger Meyer PA-C;  Location: [facility]     PERCUTANEOUS NEPHROSTOMY Left 10/30/2017    Procedure: PERCUTANEOUS NEPHROSTOMY;  Left Nephrostomy Tube Change;  Surgeon: Maryann Chavez PA-C;  Location: [facility]     REMOVE PORT VASCULAR ACCESS Right 1/29/2019    Procedure: Right Port Removal;  Surgeon: Maryann Marin PA-C;  Location: [facility]         Health Maintenance Due   Topic Date Due     ADVANCE CARE PLANNING  Never done     COVID-19 Vaccine (1) Never done     HIV SCREENING  Never done     ZOSTER IMMUNIZATION (1 of 2) Never done     PHQ-2  01/01/2021     INFLUENZA VACCINE (1)  09/01/2021       Current Medications:     Current Outpatient Medications   Medication Sig Dispense Refill     amLODIPine (NORVASC) 5 MG tablet TAKE ONE TABLET BY MOUTH ONCE DAILY. PLEASE SCHEDULE AN APPOINTMENT WITH PRIMARY CLINIC FOR FURTHER REFILLS. 90 tablet 3     Acetaminophen (TYLENOL PO) Take 500 mg by mouth every 6 hours as needed  (Patient not taking: Reported on 9/24/2021)       estradiol (ESTRACE) 0.1 MG/GM vaginal cream Place 2 g vaginally three times a week Place small amount on fingertip and apply to urethra. (Patient not taking: Reported on 1/10/2020) 42.5 g 3     senna-docusate (SENOKOT-S;PERICOLACE) 8.6-50 MG per tablet Take 1 tablet by mouth daily as needed for constipation (Patient not taking: Reported on 9/24/2021)           Allergies:      No Known Allergies     Social History:     Social History     Tobacco Use     Smoking status: Never Smoker     Smokeless tobacco: Never Used   Substance Use Topics     Alcohol use: No     Alcohol/week: 0.0 standard drinks     Comment: Does not drink alcohol       History   Drug Use No     Comment: No drug use         Family History:     The patient's family history is notable for:    Family History   Problem Relation Age of Onset     Hypertension Brother          Physical Exam:     BP (!) 165/88   Pulse 70   Temp 98.7  F (37.1  C) (Oral)   Resp 18   Wt 98.5 kg (217 lb 1.6 oz)   SpO2 99%   BMI 36.13 kg/m    Body mass index is 36.13 kg/m .    General Appearance: healthy and alert, no distress     HEENT: no palpable nodules or masses        Cardiovascular: regular rate and rhythm, no gallops, rubs or murmurs     Respiratory: lungs clear, no rales, rhonchi or wheezes    Musculoskeletal: extremities non tender and without edema    Skin: no lesions or rashes     Neurological: normal gait, no gross defects     Psychiatric: appropriate mood and affect                               Hematological: normal cervical, supraclavicular and inguinal lymph  nodes     Gastrointestinal:       abdomen soft, non-tender, non-distended, no organomegaly or masses    Genitourinary: External genitalia and urethral meatus appears normal.  Vagina is smooth without nodularity or masses.  Cervix is surgically absent.  Bimanual exam reveal no masses, nodularity or fullness.  Recto-vaginal exam confirms these findings.      Assessment:    Angella St is a 55 year old woman with a history of recurrent cervical cancer.  Initially, she was surgically treated in 2011 with recurrence 4/2015.  She completed treatment for her recurrence with chemotherapy 11/2015.  She is here today for a surveillance visit.     16 minutes spent on the date of the encounter doing chart review, history and exam, documentation, and further activities as noted above.      Plan:     1.) Cervical cancer:  MATEUSZ on exam.  RTC in 6 months for surveillance visit and pap.  At that point she can begin extending her visits to every 6 months as she is more then 5 years post treatment.  She will continue to need annual pelvic/rectal exams and paps cytology only (due 3/2022).  Reviewed signs and symptoms for when she should contact the clinic or seek additional care.  Patient to contact the clinic with any questions or concerns in the interim.        Genetic risk factors were assessed and she does not meet qualification for referral.      Labs and/or tests ordered include:  None.     2.) Health maintenance:  Issues addressed today include following up with PCP for annual health maintenance and non-gynecologic issues.     3.) HTN: Encouraged to check her BP at home and follow up with her PCP if her readings remain >130/80.  She has refills of amlodipine available at the pharmacy.  If needed she will need to reach out to her PCP for additional refills.    4.) COVID vaccine:  Discussed that it is recommended that everyone receive the COVID vaccine.  They are considered overall safe and effective.          Ale Rodriguez,  CARMELA, BARBARA, FNP-C  Nurse Practitioner  Division of Gynecologic Oncology  Pager: [phone]     CC  Patient Care Team:  Shamir Pugh MD as PCP - General (Internal Medicine)  Melida Rutledge APRN CNP as Referring Physician (Nurse Practitioner - [facility])  Shamir Pugh MD as MD (Internal Medicine)  Felisha Watson MD as MD (Maternal & Fetal Medicine)  Deja Salinas MD as MD (Urology)  Maryse Rodriguez RN as Registered Nurse  Deja Salinas MD as MD (Urology)  Felisha Watson MD as Assigned Cancer Care Provider  Shamir Pugh MD as Assigned PCP  FELISHA WATSON        Again, thank you for allowing me to participate in the care of your patient.        Sincerely,        BARBARA Mathews CNP

## 2021-10-03 ENCOUNTER — HEALTH MAINTENANCE LETTER (OUTPATIENT)
Age: 56
End: 2021-10-03

## 2021-10-19 ENCOUNTER — OFFICE VISIT (OUTPATIENT)
Dept: INTERNAL MEDICINE | Facility: CLINIC | Age: 56
End: 2021-10-19
Payer: MEDICAID

## 2021-10-19 VITALS
SYSTOLIC BLOOD PRESSURE: 146 MMHG | HEART RATE: 73 BPM | BODY MASS INDEX: 35.61 KG/M2 | DIASTOLIC BLOOD PRESSURE: 82 MMHG | OXYGEN SATURATION: 100 % | WEIGHT: 214 LBS

## 2021-10-19 DIAGNOSIS — E04.2 NONTOXIC MULTINODULAR GOITER: ICD-10-CM

## 2021-10-19 DIAGNOSIS — R06.83 SNORING: ICD-10-CM

## 2021-10-19 DIAGNOSIS — I10 BENIGN ESSENTIAL HYPERTENSION: Primary | ICD-10-CM

## 2021-10-19 PROCEDURE — 99214 OFFICE O/P EST MOD 30 MIN: CPT | Performed by: INTERNAL MEDICINE

## 2021-10-19 RX ORDER — AMLODIPINE BESYLATE 5 MG/1
TABLET ORAL
Qty: 90 TABLET | Refills: 3 | Status: SHIPPED | OUTPATIENT
Start: 2021-10-19 | End: 2022-11-09

## 2021-10-19 ASSESSMENT — PAIN SCALES - GENERAL: PAINLEVEL: NO PAIN (0)

## 2021-10-19 NOTE — NURSING NOTE
Chief Complaint   Patient presents with     Recheck Medication     discuss high bp       June Khan, EMT at 10:50 AM on 10/19/2021

## 2021-10-20 NOTE — PROGRESS NOTES
"SUBJECTIVE: Chief concern: Blood pressure management.  This 55-year-old woman with history of nontoxic multinodular goiter, hypertension, successfully treated cervical cancer questions whether she should adjust her dose of amlodipine.  She reports that after reducing her dose of amlodipine to 5 mg daily at the direction of Dr. Pugh, she noted acceptable blood pressure control and elected to discontinue the medication.  When she noted a subsequent blood pressure reading of 164/94, she resumed treatment with 10 mg of amlodipine, later reverting to the recommended dose of 5 mg daily.  On this dose, she reports recent blood pressure average of 130/89.6.  She has noted mild bilateral lower extremity edema, without pain, warmth, or erythema.  Angella notes no change in the size of her thyroid mass but indicates that she snores and experiences intermittent daytime somnolence.    Past Medical History: Reviewed and updated in medical record.     Adverse Drug Reactions: Reviewed and updated in medical record.     Current Medications: Reviewed and updated in medical record.     OBJECTIVE:     Vital signs: Reviewed in medical record.  General: Alert, neatly dressed and groomed, in no acute distress.  HEENT: Atraumatic and normocephalic. Eyelids, pupils, and conjunctivae appeared normal. Lips, teeth and gums appeared normal. Oropharynx showed moist mucous membranes, without exudate or erythema.  Relatively \"crowded\" soft palate with narrow airway.  Neck: Supple, with pronounced nodular thyroid region mass, without bruit. No cervical or supraclavicular lymphadenopathy.  Back: No spinal or costovertebral angle tenderness.  Chest: Clear to auscultation and percussion. Normal respiratory effort.  Cardiovascular: No jugular venous distention. Regular rate and rhythm, normal S1, S2 without murmur.  Abdomen: Bowel sounds positive; soft, nontender, without rebound, guarding, hepatosplenomegaly or mass.  Extremities: No cyanosis; " bilateral 1+ lower extremity edema.  No warmth, erythema, or calf or thigh tenderness to palpation.     ASSESSMENT:    1.  Hypertension.  For now, she will continue current treatment with amlodipine, 5 mg daily, based on previous impression that blood pressure was lower than target when using a 10-mg dose of amlodipine.  She agrees to follow her blood pressure regularly and record her measurements.  She agrees to call if average blood pressure exceeds 160/90 before a return visit in approximately 2 months.    2.  Multinodular goiter.  She has not noted an increase in the size of her thyroid mass.  She indicates that she is due for endocrinology follow-up, which she will schedule in the near future.    3.  Snoring.  In the setting narrow airway, daytime somnolence, and hypertension, I suspect if apnea.  She agrees to ask a family member to monitor her breathing during sleep.  She will proceed with a sleep clinic consultation.  If loud snoring, gasping, or pauses in breathing are observed.    PLAN:  See above.    On the date of the encounter, chart review, history and examination, discussion of the importance of screening for sleep apnea and the potential relationship between sleep apnea and hypertension, documentation and further activities as noted above required >30 minutes.         ~SRT

## 2022-02-08 NOTE — NURSING NOTE
"Oncology Rooming Note    September 24, 2021 8:13 AM   Angella St is a 55 year old female who presents for:    Chief Complaint   Patient presents with     Oncology Clinic Visit     Malignant neoplasm of cervix      Initial Vitals: BP (!) 165/88   Pulse 70   Temp 98.7  F (37.1  C) (Oral)   Resp 18   Wt 98.5 kg (217 lb 1.6 oz)   SpO2 99%   BMI 36.13 kg/m   Estimated body mass index is 36.13 kg/m  as calculated from the following:    Height as of 1/15/20: 1.651 m (5' 5\").    Weight as of this encounter: 98.5 kg (217 lb 1.6 oz). Body surface area is 2.13 meters squared.  No Pain (0) Comment: Data Unavailable   No LMP recorded. Patient has had a hysterectomy.  Allergies reviewed: Yes  Medications reviewed: Yes    Medications: MEDICATION REFILLS NEEDED TODAY. Provider was notified.  Pharmacy name entered into [facility]: [place], [place] - [id number]    Clinical concerns: Medication refill: Amlodipine       Gracie Sims LPN            " BRENNAN placed a call to get collateral from patients sister Sara Jacob and was unable to leave a message because there isnt a voice mail set up.   Brennan called [phone]

## 2022-03-17 NOTE — PROGRESS NOTES
Gynecologic Oncology Follow Up Note    Date: 3/25/2022    RE: Angella St  : 1965  DAYSI: 3/25/2022    CC:  Recurrent cervical cancer     HPI:  Angella St is a 56 year old woman with a history of recurrent cervical cancer.  Initially, she was surgically treated in  with recurrence 2015.  She completed treatment for her recurrence with chemotherapy 2015.  She is here today for a surveillance visit.      Oncology History:  : Treated for cervical cancer with hysterectomy in [facility]. No adjuvant treatment.  4/24/15: Presented to [facility] [facility] in [place] and had CT A/P that showed severe left hydronephrosis with moderate left hydroureter secondary to a left pelvic mass (4.9 x 4.8 cm x 3.8 cm) 4/26/15: [facility] Lasix renogram that showed good perfusion to the right kidney. No significant perfusion on the left.  4/28/15: CT guided peritoneal biopsy of the left hemipelvic mass that showed metastatic squamous cell carcinoma, consistent with cervical primary   5/1/15: Presented to the [facility] [place] [facility] ED with left/central pelvic pain and dizziness present since last . She was seen by OBGYN who recommended GYN ONC referral.     5/6/15: CT guided biopsy of pelvic mass again reveals squamous cell carcinoma.  5/11/15: Admitted to [facility] medicine service with abdominal pain, seen by urology for obstruction to the left urinary system. CT A/P showed marked left hydronephrosis with cortical thinning in the left kidney, consistent with chronic obstruction. IR placed 8 [place] nephroureteral tube at [facility]. She was discharged to home with plan to follow up with GYN ONC and urology at the [facility].  5/20/15: admitted to [facility] gyn/onc service for control of abdominal pain. Treated for enterococcus/acinetobacter UTI.   6/3/2015-7/17/15: Cycles #1-3 Cisplatin/Taxol/Avastin  8/7/15: CT CAP: solid mass in left pelvic sidewall, decreased in size from prior study. The previously identified  hypermetabolic hypodense liver lesion is also not identified on this study, likely due to treatment response. Unchanged left paraaortic lymph node. No new pulmonary nodules. Atrophic left kidney. Multinodular enlarged thyroid.   8/10/15-9/22/15: Cycles #4-6 Cisplatin/Taxol/Avastin  10/9/15: CT cap IMPRESSION:   1. Stable 3 mm right upper lobe nodule, recommend continued attention on followup.  2. Stable left pelvic sidewall mass measuring up to 2.9 cm.   3. 4 mm hypodensity in the right lobe of the liver which is too small to further characterize and indeterminate. Continued attention on followup is needed.  4. Stable atrophic appearance of the left kidney with percutaneous nephrostomy tube in place. Decreased enhancement in comparison with the contralateral kidney suggests some degree of renal dysfunction. Perinephric inflammatory changes are again seen, potentially indicating infection versus inflammatory response.  5. Diffusely enlarged thyroid with multiple hypoattenuating nodules.      Plan: 3 more cycles Cisplatin/Taxol/Avastin based on  and then repeat PET.      10/14/15: Cycle #7 Cisplatin/Taxol/Avastin.   11/6/15: Cycle #8 Cisplatin/Taxol/Avastin.  11/23/15: PNT exchange.  11/27/15: Cycle #9 Cisplatin/Taxol/Avastin.     3/3/21: Pap NIL.  CT CAP  IMPRESSION:  In this patient with history of cervical cancer status post  hysterectomy and left nephrectomy, there is no convincing evidence for  recurrent or metastatic disease:  1. Stable postsurgical changes, with unremarkable appearance of the  surgical beds. No suspicious lymphadenopathy within the chest, abdomen  or pelvis.  2. New ill-defined 3 mm nodule in the lateral left upper lobe is  technically indeterminant but of low suspicion. Stable appearance of  other sub-6 mm pulmonary nodules. Recommend attention on follow-up.  3. Large left-sided eccentric multinodular thyroid goiter appears  Stable.    3/25/22: Pap pending.                Today she  comes to clinic feeling well and is without concern.  She continues to intermittently check her BP at home 2 days ago this was 140/80.  She is currently taking amlodipine for her blood pressure which is managed by her PCP.  She was also found to have some thyroid nodules and had this worked up with imaging and is supposed to follow-up yearly with endocrine.  She denies any vaginal bleeding, no changes in her bowel or bladder habits, no nausea/emesis, no lower extremity edema, and no difficulties eating or sleeping. She denies any abdominal discomfort/bloating, no fevers or chills, and no chest pain or shortness of breath.                 Health Maintenance  Colonoscopy: 11/1/2013  Mammogram: 4/16/21  Annual physical: 3/26/21  COVID vaccine: Not received      Review of Systems:    Systemic           no weight changes; no fever; no chills; no night sweats; no appetite changes  Skin           no rashes, or lesions  Eye           no irritation; no changes in vision  Leni-Laryngeal           no dysphagia; no hoarseness   Pulmonary    no cough; no shortness of breath  Cardiovascular    no chest pain; no palpitations  Gastrointestinal    no diarrhea; no constipation; no abdominal pain; no changes in bowel habits; no blood in stool  Genitourinary   no urinary frequency; no urinary urgency; no dysuria; no pain; no abnormal vaginal discharge; no abnormal vaginal bleeding  Breast   no breast discharge; no breast changes; no breast pain  Musculoskeletal    no myalgias; no arthralgias; no back pain  Psychiatric           no depressed mood; no anxiety    Hematologic   no tender lymph nodes; no noticeable swellings or lumps   Endocrine    no hot flashes; no heat/cold intolerance         Neurological   no tremor; no numbness and tingling; no headaches; no difficulty sleeping      Past Medical History:    Past Medical History:   Diagnosis Date     Anemia      Cancer (H)     Metastatic squamous cell cervical carcinoma     Chronic  kidney disease      Hypertension 2007     Obesity     BMI >30         Past Surgical History:    Past Surgical History:   Procedure Laterality Date     COMBINED CYSTOSCOPY, INSERT STENT URETER(S) Left 6/13/2016    Procedure: COMBINED CYSTOSCOPY, INSERT STENT URETER(S);  Surgeon: Deja Salinas MD;  [facility]     CYSTOSCOPY, RETROGRADES, COMBINED Left 6/13/2016    Procedure: COMBINED CYSTOSCOPY, RETROGRADES;  Surgeon: Deja Salinas MD;  [facility]     DAVINCI NEPHRECTOMY Left 5/3/2018    Procedure: DAVINCI XI NEPHRECTOMY;  DAVINCI XI LEFT NEPHRECTOMY;  Surgeon: Deja Salinas MD;  [facility]     GENITOURINARY SURGERY  5/2015    PNT placement     [place]ERECTOMY  2011    [place]     IR PORT REMOVAL RIGHT  1/29/2019     PERCUTANEOUS NEPHROSTOMY Left 3/9/2017    Procedure: PERCUTANEOUS NEPHROSTOMY;  Surgeon: Bridger Meyer PA-C;  [facility]     PERCUTANEOUS NEPHROSTOMY Left 6/28/2017    Procedure: PERCUTANEOUS NEPHROSTOMY;  Left Nephrostomy Tube Change;  Surgeon: Bridger Meyer PA-C;  [facility]     PERCUTANEOUS NEPHROSTOMY Left 10/30/2017    Procedure: PERCUTANEOUS NEPHROSTOMY;  Left Nephrostomy Tube Change;  Surgeon: Maryann Chavez PA-C;  [facility]     REMOVE PORT VASCULAR ACCESS Right 1/29/2019    Procedure: Right Port Removal;  Surgeon: Maryann Marin PA-C;  [facility]         Health Maintenance Due   Topic Date Due     ADVANCE CARE PLANNING  Never done     HIV SCREENING  Never done     ZOSTER IMMUNIZATION (1 of 2) Never done     INFLUENZA VACCINE (1) 09/01/2021     PHQ-2 (once per calendar year)  01/01/2022     PREVENTIVE CARE VISIT  03/26/2022     MAMMO SCREENING  04/16/2022       Current Medications:     Current Outpatient Medications   Medication Sig Dispense Refill     amLODIPine (NORVASC) 5 MG tablet TAKE ONE TABLET BY MOUTH ONCE DAILY. PLEASE SCHEDULE AN APPOINTMENT WITH PRIMARY CLINIC FOR FURTHER REFILLS. 90  tablet 3     Acetaminophen (TYLENOL PO) Take 500 mg by mouth every 6 hours as needed  (Patient not taking: Reported on 9/24/2021)       estradiol (ESTRACE) 0.1 MG/GM vaginal cream Place 2 g vaginally three times a week Place small amount on fingertip and apply to urethra. (Patient not taking: Reported on 1/10/2020) 42.5 g 3     senna-docusate (SENOKOT-S;PERICOLACE) 8.6-50 MG per tablet Take 1 tablet by mouth daily as needed for constipation (Patient not taking: Reported on 9/24/2021)           Allergies:      No Known Allergies     Social History:     Social History     Tobacco Use     Smoking status: Never Smoker     Smokeless tobacco: Never Used   Substance Use Topics     Alcohol use: No     Alcohol/week: 0.0 standard drinks     Comment: Does not drink alcohol       History   Drug Use No     Comment: No drug use         Family History:     The patient's family history is notable for:    Family History   Problem Relation Age of Onset     Hypertension Brother          Physical Exam:     BP (!) 154/84   Pulse 71   Temp 98  F (36.7  C) (Oral)   Wt 99.8 kg (220 lb)   SpO2 100%   BMI 36.61 kg/m    Body mass index is 36.61 kg/m .    General Appearance: healthy and alert, no distress     HEENT: no palpable nodules or masses        Cardiovascular: regular rate and rhythm, no gallops, rubs or murmurs     Respiratory: lungs clear, no rales, rhonchi or wheezes    Musculoskeletal: extremities non tender and without edema    Skin: no lesions or rashes     Neurological: normal gait, no gross defects     Psychiatric: appropriate mood and affect                               Hematological: normal cervical, supraclavicular and inguinal lymph nodes     Gastrointestinal:       abdomen soft, non-tender, non-distended, no organomegaly or masses    Genitourinary: External genitalia and urethral meatus appears normal.  Vagina is smooth without nodularity or masses.  Cervix is surgically absent.  Bimanual exam reveal no masses,  nodularity or fullness.  Recto-vaginal exam confirms these findings.  Pap collected.      Assessment:    Angella St is a 56 year old woman with a history of recurrent cervical cancer.  Initially, she was surgically treated in 2011 with recurrence 4/2015.  She completed treatment for her recurrence with chemotherapy 11/2015.  She is here today for a surveillance visit.     20 minutes spent on the date of the encounter doing chart review, history and exam, documentation, and further activities as noted above.      Plan:     1.)        Cervical cancer:  MATEUSZ on exam.  RTC in 1 year for surveillance visit and pap.  These can be done here or with her PCP if they are comfortable.  She will continue to need annual pelvic/rectal exams and paps cytology only (due 3/2023).    She is inclined to continue following here.  Reviewed signs and symptoms for when she should contact the clinic or seek additional care.  Patient to contact the clinic with any questions or concerns in the interim.        Genetic risk factors were assessed and she does not meet qualification for referral.       Labs and/or tests ordered include:  None.                2.)        Health maintenance:  Issues addressed today include following up with PCP for annual health maintenance and non-gynecologic issues.      3.)        HTN: Encouraged to check her BP at home and follow up with her PCP if her readings remain >130/80.         Ale Rodriguez, DNP, APRN, FNP-C  Nurse Practitioner  Division of Gynecologic Oncology  Pager: [phone]     CC  Patient Care Team:  Shamir Pugh MD as PCP - General (Internal Medicine)  Melida Rutledge APRN CNP as Referring Physician (Nurse Practitioner - Women's Health)  Shamir Pugh MD as MD (Internal Medicine)  Lam Moran MD as MD (Maternal & Fetal Medicine)  Deja Salinas MD as MD (Urology)  Maryse Rodriguez, RN as Registered Nurse  Deja Salinas MD as MD  (Urology)  Shamir Pugh MD as Assigned PCP  Ale Rodriguez APRN CNP as Assigned Cancer Care Provider  SELF, REFERRED

## 2022-03-21 ENCOUNTER — IMMUNIZATION (OUTPATIENT)
Dept: NURSING | Facility: CLINIC | Age: 57
End: 2022-03-21
Payer: MEDICAID

## 2022-03-21 DIAGNOSIS — Z23 HIGH PRIORITY FOR 2019-NCOV VACCINE: Primary | ICD-10-CM

## 2022-03-21 PROCEDURE — 91305 COVID-19,PF,PFIZER (12+ YRS): CPT

## 2022-03-21 PROCEDURE — 0051A COVID-19,PF,PFIZER (12+ YRS): CPT

## 2022-03-25 ENCOUNTER — ONCOLOGY VISIT (OUTPATIENT)
Dept: ONCOLOGY | Facility: CLINIC | Age: 57
End: 2022-03-25
Attending: NURSE PRACTITIONER
Payer: MEDICAID

## 2022-03-25 VITALS
BODY MASS INDEX: 36.61 KG/M2 | OXYGEN SATURATION: 100 % | HEART RATE: 71 BPM | WEIGHT: 220 LBS | TEMPERATURE: 98 F | SYSTOLIC BLOOD PRESSURE: 154 MMHG | DIASTOLIC BLOOD PRESSURE: 84 MMHG

## 2022-03-25 DIAGNOSIS — Z85.41 ENCOUNTER FOR FOLLOW-UP SURVEILLANCE OF CERVICAL CANCER: ICD-10-CM

## 2022-03-25 DIAGNOSIS — C53.9 MALIGNANT NEOPLASM OF CERVIX, UNSPECIFIED SITE (H): Primary | ICD-10-CM

## 2022-03-25 DIAGNOSIS — Z08 ENCOUNTER FOR FOLLOW-UP SURVEILLANCE OF CERVICAL CANCER: ICD-10-CM

## 2022-03-25 PROCEDURE — 88175 CYTOPATH C/V AUTO FLUID REDO: CPT | Performed by: NURSE PRACTITIONER

## 2022-03-25 PROCEDURE — 99213 OFFICE O/P EST LOW 20 MIN: CPT | Performed by: NURSE PRACTITIONER

## 2022-03-25 PROCEDURE — G0463 HOSPITAL OUTPT CLINIC VISIT: HCPCS

## 2022-03-25 ASSESSMENT — PAIN SCALES - GENERAL
PAINLEVEL: NO PAIN (0)
PAINLEVEL: NO PAIN (0)

## 2022-03-25 NOTE — NURSING NOTE
Sent pap smear test down to first floor lab per providers orders.     Gracie Sims LPN on 3/25/2022 at 10:45 AM

## 2022-03-25 NOTE — LETTER
3/25/2022         RE: Angella St  [address]  [place] [address]        Dear Colleague,    Thank you for referring your patient, Angella St, to the [facility]. Please see a copy of my visit note below.    Gynecologic Oncology Follow Up Note    Date: 3/25/2022    RE: Angella St  : 1965  DAYSI: 3/25/2022    CC:  Recurrent cervical cancer     HPI:  Angella St is a 56 year old woman with a history of recurrent cervical cancer.  Initially, she was surgically treated in  with recurrence 2015.  She completed treatment for her recurrence with chemotherapy 2015.  She is here today for a surveillance visit.      Oncology History:  : Treated for cervical cancer with hysterectomy in [facility]. No adjuvant treatment.  4/24/15: [facility] [place] and had CT A/P that showed severe left hydronephrosis with moderate left hydroureter secondary to a left pelvic mass (4.9 x 4.8 cm x 3.8 cm) 4/26/15: [facility] Lasix renogram that showed good perfusion to the right kidney. No significant perfusion on the left.  4/28/15: CT guided peritoneal biopsy of the left hemipelvic mass that showed metastatic squamous cell carcinoma, consistent with cervical primary   5/1/15: Presented to the [facility] ED with left/central pelvic pain and dizziness present since last . She was seen by OBGYN who recommended GYN ONC referral.     5/6/15: CT guided biopsy of pelvic mass again reveals squamous cell carcinoma.  5/11/15: Admitted to [facility] medicine service with abdominal pain, seen by urology for obstruction to the left urinary system. CT A/P showed marked left hydronephrosis with cortical thinning in the left kidney, consistent with chronic obstruction. IR placed 8 [place] nephroureteral tube at [facility]. She was discharged to home with plan to follow up with GYN ONC and urology at the [facility].  5/20/15: admitted to [facility] gyn/onc service for control  of abdominal pain. Treated for enterococcus/acinetobacter UTI.   6/3/2015-7/17/15: Cycles #1-3 Cisplatin/Taxol/Avastin  8/7/15: CT CAP: solid mass in left pelvic sidewall, decreased in size from prior study. The previously identified hypermetabolic hypodense liver lesion is also not identified on this study, likely due to treatment response. Unchanged left paraaortic lymph node. No new pulmonary nodules. Atrophic left kidney. Multinodular enlarged thyroid.   8/10/15-9/22/15: Cycles #4-6 Cisplatin/Taxol/Avastin  10/9/15: CT cap IMPRESSION:   1. Stable 3 mm right upper lobe nodule, recommend continued attention on followup.  2. Stable left pelvic sidewall mass measuring up to 2.9 cm.   3. 4 mm hypodensity in the right lobe of the liver which is too small to further characterize and indeterminate. Continued attention on followup is needed.  4. Stable atrophic appearance of the left kidney with percutaneous nephrostomy tube in place. Decreased enhancement in comparison with the contralateral kidney suggests some degree of renal dysfunction. Perinephric inflammatory changes are again seen, potentially indicating infection versus inflammatory response.  5. Diffusely enlarged thyroid with multiple hypoattenuating nodules.      Plan: 3 more cycles Cisplatin/Taxol/Avastin based on  and then repeat PET.      10/14/15: Cycle #7 Cisplatin/Taxol/Avastin.   11/6/15: Cycle #8 Cisplatin/Taxol/Avastin.  11/23/15: PNT exchange.  11/27/15: Cycle #9 Cisplatin/Taxol/Avastin.     3/3/21: Pap NIL.  CT CAP  IMPRESSION:  In this patient with history of cervical cancer status post  hysterectomy and left nephrectomy, there is no convincing evidence for  recurrent or metastatic disease:  1. Stable postsurgical changes, with unremarkable appearance of the  surgical beds. No suspicious lymphadenopathy within the chest, abdomen  or pelvis.  2. New ill-defined 3 mm nodule in the lateral left upper lobe is  technically indeterminant but of  low suspicion. Stable appearance of  other sub-6 mm pulmonary nodules. Recommend attention on follow-up.  3. Large left-sided eccentric multinodular thyroid goiter appears  Stable.    3/25/22: Pap pending.                Today she comes to clinic feeling well and is without concern.  She continues to intermittently check her BP at home 2 days ago this was 140/80.  She is currently taking amlodipine for her blood pressure which is managed by her PCP.  She was also found to have some thyroid nodules and had this worked up with imaging and is supposed to follow-up yearly with endocrine.  She denies any vaginal bleeding, no changes in her bowel or bladder habits, no nausea/emesis, no lower extremity edema, and no difficulties eating or sleeping. She denies any abdominal discomfort/bloating, no fevers or chills, and no chest pain or shortness of breath.                 Health Maintenance  Colonoscopy: 11/1/2013  Mammogram: 4/16/21  Annual physical: 3/26/21  COVID vaccine: Not received      Review of Systems:    Systemic           no weight changes; no fever; no chills; no night sweats; no appetite changes  Skin           no rashes, or lesions  Eye           no irritation; no changes in vision  Leni-Laryngeal           no dysphagia; no hoarseness   Pulmonary    no cough; no shortness of breath  Cardiovascular    no chest pain; no palpitations  Gastrointestinal    no diarrhea; no constipation; no abdominal pain; no changes in bowel habits; no blood in stool  Genitourinary   no urinary frequency; no urinary urgency; no dysuria; no pain; no abnormal vaginal discharge; no abnormal vaginal bleeding  Breast   no breast discharge; no breast changes; no breast pain  Musculoskeletal    no myalgias; no arthralgias; no back pain  Psychiatric           no depressed mood; no anxiety    Hematologic   no tender lymph nodes; no noticeable swellings or lumps   Endocrine    no hot flashes; no heat/cold intolerance         Neurological   no  tremor; no numbness and tingling; no headaches; no difficulty sleeping      Past Medical History:    Past Medical History:   Diagnosis Date     Anemia      Cancer (H)     Metastatic squamous cell cervical carcinoma     Chronic kidney disease      Hypertension 2007     Obesity     BMI >30         Past Surgical History:    Past Surgical History:   Procedure Laterality Date     COMBINED CYSTOSCOPY, INSERT STENT URETER(S) Left 6/13/2016    Procedure: COMBINED CYSTOSCOPY, INSERT STENT URETER(S);  Surgeon: Deja Salinas MD;  [facility]     CYSTOSCOPY, RETROGRADES, COMBINED Left 6/13/2016    Procedure: COMBINED CYSTOSCOPY, RETROGRADES;  Surgeon: Deja Salinas MD;  [facility]     DAVINCI NEPHRECTOMY Left 5/3/2018    Procedure: DAVINCI XI NEPHRECTOMY;  DAVINCI XI LEFT NEPHRECTOMY;  Surgeon: Deja Salinas MD;  [facility]     GENITOURINARY SURGERY  5/2015    PNT placement     HYSTERECTOMY  2011    [place]     IR PORT REMOVAL RIGHT  1/29/2019     PERCUTANEOUS NEPHROSTOMY Left 3/9/2017    Procedure: PERCUTANEOUS NEPHROSTOMY;  Surgeon: Bridger Meyer PA-C;  [facility]     PERCUTANEOUS NEPHROSTOMY Left 6/28/2017    Procedure: PERCUTANEOUS NEPHROSTOMY;  Left Nephrostomy Tube Change;  Surgeon: Bridger Meyer PA-C;  [facility]     PERCUTANEOUS NEPHROSTOMY Left 10/30/2017    Procedure: PERCUTANEOUS NEPHROSTOMY;  Left Nephrostomy Tube Change;  Surgeon: Maryann Chavez PA-C;  [facility]     REMOVE PORT VASCULAR ACCESS Right 1/29/2019    Procedure: Right Port Removal;  Surgeon: Maryann Marin PA-C;  [facility]         Health Maintenance Due   Topic Date Due     ADVANCE CARE PLANNING  Never done     HIV SCREENING  Never done     ZOSTER IMMUNIZATION (1 of 2) Never done     INFLUENZA VACCINE (1) 09/01/2021     PHQ-2 (once per calendar year)  01/01/2022     PREVENTIVE CARE VISIT  03/26/2022     MAMMO SCREENING  04/16/2022       Current  Medications:     Current Outpatient Medications   Medication Sig Dispense Refill     amLODIPine (NORVASC) 5 MG tablet TAKE ONE TABLET BY MOUTH ONCE DAILY. PLEASE SCHEDULE AN APPOINTMENT WITH PRIMARY CLINIC FOR FURTHER REFILLS. 90 tablet 3     Acetaminophen (TYLENOL PO) Take 500 mg by mouth every 6 hours as needed  (Patient not taking: Reported on 9/24/2021)       estradiol (ESTRACE) 0.1 MG/GM vaginal cream Place 2 g vaginally three times a week Place small amount on fingertip and apply to urethra. (Patient not taking: Reported on 1/10/2020) 42.5 g 3     senna-docusate (SENOKOT-S;PERICOLACE) 8.6-50 MG per tablet Take 1 tablet by mouth daily as needed for constipation (Patient not taking: Reported on 9/24/2021)           Allergies:      No Known Allergies     Social History:     Social History     Tobacco Use     Smoking status: Never Smoker     Smokeless tobacco: Never Used   Substance Use Topics     Alcohol use: No     Alcohol/week: 0.0 standard drinks     Comment: Does not drink alcohol       History   Drug Use No     Comment: No drug use         Family History:     The patient's family history is notable for:    Family History   Problem Relation Age of Onset     Hypertension Brother          Physical Exam:     BP (!) 154/84   Pulse 71   Temp 98  F (36.7  C) (Oral)   Wt 99.8 kg (220 lb)   SpO2 100%   BMI 36.61 kg/m    Body mass index is 36.61 kg/m .    General Appearance: healthy and alert, no distress     HEENT: no palpable nodules or masses        Cardiovascular: regular rate and rhythm, no gallops, rubs or murmurs     Respiratory: lungs clear, no rales, rhonchi or wheezes    Musculoskeletal: extremities non tender and without edema    Skin: no lesions or rashes     Neurological: normal gait, no gross defects     Psychiatric: appropriate mood and affect                               Hematological: normal cervical, supraclavicular and inguinal lymph nodes     Gastrointestinal:       abdomen soft, non-tender,  non-distended, no organomegaly or masses    Genitourinary: External genitalia and urethral meatus appears normal.  Vagina is smooth without nodularity or masses.  Cervix is surgically absent.  Bimanual exam reveal no masses, nodularity or fullness.  Recto-vaginal exam confirms these findings.  Pap collected.      Assessment:    Angella St is a 56 year old woman with a history of recurrent cervical cancer.  Initially, she was surgically treated in 2011 with recurrence 4/2015.  She completed treatment for her recurrence with chemotherapy 11/2015.  She is here today for a surveillance visit.     20 minutes spent on the date of the encounter doing chart review, history and exam, documentation, and further activities as noted above.      Plan:     1.)        Cervical cancer:  MATEUSZ on exam.  RTC in 1 year for surveillance visit and pap.  These can be done here or with her PCP if they are comfortable.  She will continue to need annual pelvic/rectal exams and paps cytology only (due 3/2023).    She is inclined to continue following here.  Reviewed signs and symptoms for when she should contact the clinic or seek additional care.  Patient to contact the clinic with any questions or concerns in the interim.        Genetic risk factors were assessed and she does not meet qualification for referral.       Labs and/or tests ordered include:  None.                2.)        Health maintenance:  Issues addressed today include following up with PCP for annual health maintenance and non-gynecologic issues.      3.)        HTN: Encouraged to check her BP at home and follow up with her PCP if her readings remain >130/80.         Ale Rodriguez, CARMELA, APRN, FNP-C  Nurse Practitioner  Division of Gynecologic Oncology  Pager: [phone]     CC  Patient Care Team:  Shamir Pugh MD as PCP - General (Internal Medicine)  Melida Rutledge APRN CNP as Referring Physician (Nurse Practitioner - Women's Health)  [place]  Shamir Dubois MD as MD (Internal Medicine)  Lam Moran MD as MD (Maternal & Fetal Medicine)  Deja Salinas MD as MD (Urology)  Maryse Rodriguez, RN as Registered Nurse

## 2022-03-25 NOTE — NURSING NOTE
"Oncology Rooming Note    March 25, 2022 9:42 AM   Angella St is a 56 year old female who presents for:    Chief Complaint   Patient presents with     Oncology Clinic Visit     Malignant neoplasm of cervix     Initial Vitals: BP (!) 154/84   Pulse 71   Temp 98  F (36.7  C) (Oral)   Wt 99.8 kg (220 lb)   SpO2 100%   BMI 36.61 kg/m   Estimated body mass index is 36.61 kg/m  as calculated from the following:    Height as of 1/15/20: 1.651 m (5' 5\").    Weight as of this encounter: 99.8 kg (220 lb). Body surface area is 2.14 meters squared.  No Pain (0) Comment: Data Unavailable   No LMP recorded. Patient has had a hysterectomy.  Allergies reviewed: Yes  Medications reviewed: Yes    Medications: Medication refills not needed today.  Pharmacy name entered into EPIC: [facility] [place], [place] - [address] [facility] [id number]    Clinical concerns: none       Sofia Osorio"

## 2022-03-29 LAB
BKR LAB AP GYN ADEQUACY: NORMAL
BKR LAB AP GYN INTERPRETATION: NORMAL
BKR LAB AP HPV REFLEX: NO
BKR LAB AP PREVIOUS ABNORMAL: NORMAL
PATH REPORT.COMMENTS IMP SPEC: NORMAL
PATH REPORT.COMMENTS IMP SPEC: NORMAL
PATH REPORT.RELEVANT HX SPEC: NORMAL

## 2022-03-31 ENCOUNTER — DOCUMENTATION ONLY (OUTPATIENT)
Dept: ONCOLOGY | Facility: CLINIC | Age: 57
End: 2022-03-31
Payer: MEDICAID

## 2022-03-31 NOTE — PROGRESS NOTES
CLINICAL NUTRITION SERVICES     Reason for Contact: This patient has scored >= 6 on Nutrition question 1 and/or 2 on the Oncology Distress Screening questionaire. Nutritionist Referral recommended. See Onc Distress Screening Flowsheet    Action: RD called patient indicating reason for phone call. Left a VM with a return call back number.     Follow up: Wait for a return phone call.    Mariam Corcoran RD, LD  [phone]

## 2022-04-09 ENCOUNTER — NURSE TRIAGE (OUTPATIENT)
Dept: NURSING | Facility: CLINIC | Age: 57
End: 2022-04-09
Payer: MEDICAID

## 2022-04-09 NOTE — TELEPHONE ENCOUNTER
Patient calling - says she needs to schedule her second dose of COVID19 vaccine.  First dose was March 21, 2022 of Pfizer vaccine.    Transferred to scheduling.    Mariela De Los Santos RN  Triage Nurse Advisor      Reason for Disposition    COVID-19 vaccine, Frequently Asked Questions (FAQs)    Requesting regular office appointment    Protocols used: CORONAVIRUS (COVID-19) VACCINE QUESTIONS AND [id number] 1.18.2022, INFORMATION ONLY CALL-A-AH

## 2022-04-11 ENCOUNTER — IMMUNIZATION (OUTPATIENT)
Dept: NURSING | Facility: CLINIC | Age: 57
End: 2022-04-11
Attending: INTERNAL MEDICINE
Payer: MEDICAID

## 2022-04-11 PROCEDURE — 91305 COVID-19,PF,PFIZER (12+ YRS): CPT

## 2022-04-11 PROCEDURE — 0052A COVID-19,PF,PFIZER (12+ YRS): CPT

## 2022-05-15 ENCOUNTER — HEALTH MAINTENANCE LETTER (OUTPATIENT)
Age: 57
End: 2022-05-15

## 2022-07-08 NOTE — MR AVS SNAPSHOT
After Visit Summary   5/25/2018    Angella St    MRN: [id number]           Patient Information     Date Of Birth          1965        Visit Information        Provider Department      5/25/2018 11:15 AM Deja Salinas MD [facility]        Today's Diagnoses     Retroperitoneal abscess (H)    -  1    Recurrent pyelonephritis        Hydronephrosis, unspecified hydronephrosis type          Care Instructions    Please follow up in a week   Please go get labs today     It was a pleasure meeting with you today.  Thank you for allowing me and my team the privilege of caring for you today.  YOU are the reason we are here, and I truly hope we provided you with the excellent service you deserve.  Please let us know if there is anything else we can do for you so that we can be sure you are leaving completely satisfied with your care experience.                  Follow-ups after your visit        Your next 10 appointments already scheduled     May 25, 2018 12:15 PM CDT   LAB with [facility])    [address] [place] [id number] [phone]           Please do not eat 10-12 hours before your appointment if you are coming in fasting for labs on lipids, cholesterol, or glucose (sugar). This does not apply to pregnant women. Water, hot tea and black coffee (with nothing added) are okay. Do not drink other fluids, diet soda or chew gum.            May 29, 2018 10:00 AM CDT   (Arrive by 9:45 AM)   Return Visit with Shamir Pugh MD   [facility] ([facility])    [address] [place] [id number] [phone]            Jun 01, 2018  2:00 PM CDT   (Arrive by 1:45 PM)   Return Visit with Deja Salinas MD   [facility] ([facility]  Case Management Discharge Note      Final Note: Home with Haritha Simeon          Selected Continued Care - Admitted Since 7/5/2022         Home Medical Care Coordination complete.    Service Provider Selected Services Address Phone Fax Patient Preferred    [facility]  [facility] [address] [facility] [place] [address] [phone] --                        Transportation Services  Private: Car    Final Discharge Disposition Code: 06 - home with home health care   [place])    [address]  4th Floor  [facility] [id number] [phone]            Jun 05, 2018 10:40 AM CDT   CT ABDOMEN PELVIS W CONTRAST [facility]   [facility] [place] [place] [place])    [address] [facility] [id number] [phone]           Please bring any scans or X-rays taken at other hospitals, if similar tests were done. Also bring a list of your medicines, including vitamins, minerals and over-the-counter drugs. It is safest to leave personal items at home.  Be sure to tell your doctor:   If you have any allergies.   If there s any chance you are pregnant.   If you are breastfeeding.  How to prepare:   Do not eat or drink for 2 hours before your exam. If you need to take medicine, you may take it with small sips of water. (We may ask you to take liquid medicine as well.)   Please wear loose clothing, such as a sweat suit or jogging clothes. Avoid snaps, zippers and other metal. We may ask you to undress and put on a hospital gown.  Please arrive 30 minutes early for your CT. Once in the department you might be asked to drink water 15-20 minutes prior to your exam.  If indicated you may be asked to drink an oral contrast in advance of your CT.  If this is the case, the imaging team will let you know or be in contact with you prior to your appointment  Patients over 70 or patients with diabetes or kidney problems:   If you haven t had a blood test (creatinine test) within the last 30 days, the Cardiologist/Radiologist may require you to get this test prior to your exam.  If you have diabetes:   Continue to take your metformin medication on the day of your exam  If you have any questions, please call the Imaging Department where you will have your exam.            Jun 05, 2018 12:00 PM CDT   IR SINOGRAM INJECTION DIAGNOSTIC with UUIR2   [facility] ([facility] [place], [facility] [place] [place])     [address] [facility] [address] [phone]           1. You will need to have had a history and physical exam within 7 days of the procedure. 2. Laboratory test are to be obtained by your doctor prior to the exam (CBCP, INR and PTT) 3. Someone will need to drive you to and from the hospital. 4. If you are or may be pregnant, contact your doctor or a Radiology nurse prior to the day of the exam. 5. If you have diabetes, check with your doctor or a Radiology nurse to see if your insulin needs to be adjusted for the exam. 6. If you are taking Coumadin (to thin you blood) please contact your doctor or a Radiology nurse at least 3 days before the exam for special instructions. 7. The day before your exam you may eat your regular diet and are encouraged to drink at least 2 quarts of clear liquids. Drink no alcoholic beverages for 24 hours prior to the exam. 8. Do not eat any solid food or milk products for 6 hours prior to the exam. You may drink clear liquids until 2 hours prior to the exam. Clear liquids include the following: water, Jell-O, clear broth, apple juice or any noncarbonated drink that you can see through (no pop!) 9. The morning of the exam you may brush your teeth and take medications as directed with a sip of water. 10. Tell the Radiology nurse if you have any allergies.            Jul 12, 2018  9:20 AM CDT   CT CHEST ABDOMEN PELVIS W/O & W CONTRAST with UCCT2   [facility] CT ([facility])    [address] [id number] [phone]           Please bring any scans or X-rays taken at other hospitals, if similar tests were done. Also bring a list of your medicines, including vitamins, minerals and over-the-counter drugs. It is safest to leave personal items at home.  Be sure to tell your doctor:   If you have any allergies.   If there s any chance you are pregnant.   If you are breastfeeding.  How to prepare:    Do not eat or drink for 2 hours before your exam. If you need to take medicine, you may take it with small sips of water. (We may ask you to take liquid medicine as well.)   Please wear loose clothing, such as a sweat suit or jogging clothes. Avoid snaps, zippers and other metal. We may ask you to undress and put on a hospital gown.  Please arrive 30 minutes early for your CT. Once in the department you might be asked to drink water 15-20 minutes prior to your exam.  If indicated you may be asked to drink an oral contrast in advance of your CT.  If this is the case, the imaging team will let you know or be in contact with you prior to your appointment  Patients over 70 or patients with diabetes or kidney problems:   If you haven t had a blood test (creatinine test) within the last 30 days, the Cardiologist/Radiologist may require you to get this test prior to your exam.  If you have diabetes:   Continue to take your metformin medication on the day of your exam  If you have any questions, please call the Imaging Department where you will have your exam.            Jul 16, 2018 11:00 AM CDT   (Arrive by 10:45 AM)   Return Visit with Lam Moran MD   [facility] ([facility] [place])    [address] [place] [address]  [address] [place] [address] [phone]              Future tests that were ordered for you today     Open Future Orders        Priority Expected Expires Ordered    Lipase Routine  5/26/2019 5/25/2018    Comprehensive metabolic panel Routine  5/25/2019 5/25/2018    CBC with platelets differential Routine  5/25/2019 5/25/2018    Amylase Routine  5/26/2019 5/25/2018            Who to contact     Please call your clinic at [phone] to:    Ask questions about your health    Make or cancel appointments    Discuss your medicines    Learn about your test results    Speak to your doctor            Additional Information About Your Visit        MyChart Information  "    [employer] gives you secure access to your electronic health record. If you see a primary care provider, you can also send messages to your care team and make appointments. If you have questions, please call your primary care clinic.  If you do not have a primary care provider, please call [phone] and they will assist you.      [employer] is an electronic gateway that provides easy, online access to your medical records. With [employer], you can request a clinic appointment, read your test results, renew a prescription or communicate with your care team.     To access your existing account, please contact your [facility] or call [phone] for assistance.        Care EveryWhere ID     This is your Care EveryWhere ID. This could be used by other organizations to access your [place] medical records  [id number]        Your Vitals Were     Pulse Height BMI (Body Mass Index)             74 1.651 m (5' 5\") 34.81 kg/m2          Blood Pressure from Last 3 Encounters:   05/25/18 123/76   05/22/18 111/67   05/06/18 122/59    Weight from Last 3 Encounters:   05/25/18 94.9 kg (209 lb 3.2 oz)   05/21/18 95.3 kg (210 lb)   05/06/18 102.6 kg (226 lb 3.2 oz)                 Today's Medication Changes          These changes are accurate as of 5/25/18 11:53 AM.  If you have any questions, ask your nurse or doctor.               Start taking these medicines.        Dose/Directions    amoxicillin-clavulanate 875-125 MG per tablet   Commonly known as:  AUGMENTIN   Used for:  Retroperitoneal abscess (H)   Started by:  Deja Salinas MD        Dose:  1 tablet   Take 1 tablet by mouth 2 times daily   Quantity:  28 tablet   Refills:  0         These medicines have changed or have updated prescriptions.        Dose/Directions    amLODIPine 5 MG tablet   Commonly known as:  NORVASC   This may have changed:  Another medication with the same name was removed. Continue taking this medication, and " follow the directions you see here.   Changed by:  Deja Salinas MD        Dose:  5 mg   Take 5 mg by mouth daily   Refills:  0         Stop taking these medicines if you haven't already. Please contact your care team if you have questions.     senna-docusate 8.6-50 MG per tablet   Commonly known as:  SENOKOT-S;PERICOLACE   Stopped by:  Deja Salinas MD                Where to get your medicines      These medications were sent to [place], [place] - [address] [facility] [address]  [address] [facility] [address] [place] [address]    Hours:  TRANSPLANT PHONE NUMBER [phone] Phone:  [phone]     amoxicillin-clavulanate 875-125 MG per tablet         Some of these will need a paper prescription and others can be bought over the counter.  Ask your nurse if you have questions.     Bring a paper prescription for each of these medications     oxyCODONE IR 5 MG tablet               Information about OPIOIDS     PRESCRIPTION OPIOIDS: WHAT YOU NEED TO KNOW   You have a prescription for an opioid (narcotic) pain medicine. Opioids can cause addiction. If you have a history of chemical dependency of any type, you are at a higher risk of becoming addicted to opioids. Only take this medicine after all other options have been tried. Take it for as short a time and as few doses as possible.     Do not:    Drive. If you drive while taking these medicines, you could be arrested for driving under the influence (DUI).    Operate heavy machinery    Do any other dangerous activities while taking these medicines.     Drink any alcohol while taking these medicines.      Take with any other medicines that contain acetaminophen. Read all labels carefully. Look for the word  acetaminophen  or  Tylenol.  Ask your pharmacist if you have questions or are unsure.    Store your pills in a secure place, locked if possible. We will not replace any lost or stolen medicine. If you don t  finish your medicine, please throw away (dispose) as directed by your pharmacist. The [place] Pollution Control Agency has more information about safe disposal: https://www.pca.[facility].[place].us/living-green/managing-unwanted-medications    All opioids tend to cause constipation. Drink plenty of water and eat foods that have a lot of fiber, such as fruits, vegetables, prune juice, apple juice and high-fiber cereal. Take a laxative (Miralax, milk of magnesia, Colace, Senna) if you don t move your bowels at least every other day.          Primary Care Provider Office Phone # Fax #    Shamir Pugh [phone]       [address] [place] [address]        Equal Access to Services     DORIS RODRIGUES : Sabiha royo Sozi, waaxda luqadaha, qaybta kaalmada adecorinayajudah, jin yusuf. So [facility] [phone].    ATENCIÓN: Si habla español, tiene a andrew disposición servicios gratuitos de asistencia lingüística. Llame al [phone].    We comply with applicable federal civil rights laws and [place] laws. We do not discriminate on the basis of race, color, national origin, age, disability, sex, sexual orientation, or gender identity.            Thank you!     Thank you for choosing [facility]OLOG[facility] AND [facility] PROST[facility] UROLOGIC CANCERS  for your care. Our goal is always to provide you with excellent care. Hearing back from our patients is one way we can continue to improve our services. Please take a few minutes to complete the written survey that you may receive in the mail after your visit with us. Thank you!             Your Updated Medication List - Protect others around you: Learn how to safely use, store and throw away your medicines at www.disposemymeds.org.          This list is accurate as of 5/25/18 11:53 AM.  Always use your most recent med list.                   Brand Name Dispense Instructions for use Diagnosis    amLODIPine 5 MG tablet     NORVASC     Take 5 mg by mouth daily        amoxicillin-clavulanate 875-125 MG per tablet    AUGMENTIN    28 tablet    Take 1 tablet by mouth 2 times daily    Retroperitoneal abscess (H)       COMPRESSION STOCKINGS     2 Units    1 each daily    Leg swelling       levofloxacin 500 MG tablet    LEVAQUIN    7 tablet    Take 1 tablet (500 mg) by mouth daily    Abscess, retroperitoneal (H)       oxyCODONE IR 5 MG tablet    ROXICODONE    20 tablet    Take 1-2 tablets (5-10 mg) by mouth every 3 hours as needed for other (pain control or improvement in physical function. Hold dose for analgesic side effects.)    Recurrent pyelonephritis, Hydronephrosis, unspecified hydronephrosis type       TYLENOL PO

## 2022-07-10 ENCOUNTER — HEALTH MAINTENANCE LETTER (OUTPATIENT)
Age: 57
End: 2022-07-10

## 2022-09-11 ENCOUNTER — HEALTH MAINTENANCE LETTER (OUTPATIENT)
Age: 57
End: 2022-09-11

## 2022-11-09 ENCOUNTER — TELEPHONE (OUTPATIENT)
Dept: INTERNAL MEDICINE | Facility: CLINIC | Age: 57
End: 2022-11-09

## 2022-11-09 DIAGNOSIS — I10 BENIGN ESSENTIAL HYPERTENSION: ICD-10-CM

## 2022-11-09 RX ORDER — AMLODIPINE BESYLATE 5 MG/1
TABLET ORAL
Qty: 90 TABLET | Refills: 0 | Status: SHIPPED | OUTPATIENT
Start: 2022-11-09 | End: 2022-11-15

## 2022-11-09 NOTE — TELEPHONE ENCOUNTER
Centralized Medication Refill Team note:   amLODIPine (NORVASC) 5 MG tablet   Last Written Prescription Date:  10/19/21  Last Fill Quantity: 90,   # refills: 3  Last Office Visit : 10/19/21 Pradip  Future Office visit:  none     Lab ( Cr, Na, K last done 4/16/21) and appt due:   90 day coleen refill sent to the pharmacy - including instructions for patient to call the clinic and schedule an appointment.Scheduling has been notified to contact the pt for appointment.    154/84 Abnormal 3/25/22    MyChart sent

## 2022-11-09 NOTE — TELEPHONE ENCOUNTER
[facility] Call Center    Phone Message    May a detailed message be left on voicemail: yes     Reason for Call: Medication Refill Request    Has the patient contacted the pharmacy for the refill? Yes   Name of medication being requested: amLODIPine (NORVASC) 5 MG tablet     Provider who prescribed the medication: Dr Modesto Moseley  Pharmacy: [address] [address]  [phone]      Date medication is needed: ASAP  Patient is almost out     Action Taken: Message routed to:  [facility]    Travel Screening: Not Applicable

## 2022-11-09 NOTE — TELEPHONE ENCOUNTER
[facility] Call Center    Phone Message    May a detailed message be left on voicemail: yes     Reason for Call: Other: Patient calling to let Dr. Pugh know that she has numbness in both hands and feet and toes. She is also complaining her back of her head gets hot. Please call to discuss further. It has been going on for 3 days.      Action Taken: Message routed to:  [facility]    Travel Screening: Not Applicable

## 2022-11-15 ENCOUNTER — LAB (OUTPATIENT)
Dept: LAB | Facility: CLINIC | Age: 57
End: 2022-11-15
Payer: MEDICAID

## 2022-11-15 ENCOUNTER — OFFICE VISIT (OUTPATIENT)
Dept: INTERNAL MEDICINE | Facility: CLINIC | Age: 57
End: 2022-11-15
Payer: MEDICAID

## 2022-11-15 VITALS
OXYGEN SATURATION: 100 % | DIASTOLIC BLOOD PRESSURE: 80 MMHG | BODY MASS INDEX: 34.76 KG/M2 | WEIGHT: 216.3 LBS | SYSTOLIC BLOOD PRESSURE: 153 MMHG | HEART RATE: 77 BPM | HEIGHT: 66 IN | RESPIRATION RATE: 16 BRPM

## 2022-11-15 DIAGNOSIS — Z12.11 SCREEN FOR COLON CANCER: ICD-10-CM

## 2022-11-15 DIAGNOSIS — G62.9 PERIPHERAL POLYNEUROPATHY: ICD-10-CM

## 2022-11-15 DIAGNOSIS — I10 BENIGN ESSENTIAL HYPERTENSION: ICD-10-CM

## 2022-11-15 DIAGNOSIS — E78.5 HYPERLIPIDEMIA, UNSPECIFIED HYPERLIPIDEMIA TYPE: ICD-10-CM

## 2022-11-15 DIAGNOSIS — E04.2 NONTOXIC MULTINODULAR GOITER: Primary | ICD-10-CM

## 2022-11-15 DIAGNOSIS — Z12.39 ENCOUNTER FOR SCREENING FOR MALIGNANT NEOPLASM OF BREAST, UNSPECIFIED SCREENING MODALITY: ICD-10-CM

## 2022-11-15 LAB
ALBUMIN SERPL BCG-MCNC: 4.4 G/DL (ref 3.5–5.2)
ALP SERPL-CCNC: 57 U/L (ref 35–104)
ALT SERPL W P-5'-P-CCNC: 24 U/L (ref 10–35)
ANION GAP SERPL CALCULATED.3IONS-SCNC: 11 MMOL/L (ref 7–15)
AST SERPL W P-5'-P-CCNC: 20 U/L (ref 10–35)
BASOPHILS # BLD AUTO: 0 10E3/UL (ref 0–0.2)
BASOPHILS NFR BLD AUTO: 1 %
BILIRUB SERPL-MCNC: 0.6 MG/DL
BUN SERPL-MCNC: 13.7 MG/DL (ref 8–23)
CALCIUM SERPL-MCNC: 9.5 MG/DL (ref 8.6–10)
CHLORIDE SERPL-SCNC: 105 MMOL/L (ref 98–107)
CHOLEST SERPL-MCNC: 193 MG/DL
CREAT SERPL-MCNC: 0.65 MG/DL (ref 0.51–0.95)
DEPRECATED HCO3 PLAS-SCNC: 27 MMOL/L (ref 22–29)
EOSINOPHIL # BLD AUTO: 0.1 10E3/UL (ref 0–0.7)
EOSINOPHIL NFR BLD AUTO: 1 %
ERYTHROCYTE [DISTWIDTH] IN BLOOD BY AUTOMATED COUNT: 13.1 % (ref 10–15)
GFR SERPL CREATININE-BSD FRML MDRD: >90 ML/MIN/1.73M2
GLUCOSE SERPL-MCNC: 107 MG/DL (ref 70–99)
HCT VFR BLD AUTO: 41.9 % (ref 35–47)
HDLC SERPL-MCNC: 56 MG/DL
HGB BLD-MCNC: 13.1 G/DL (ref 11.7–15.7)
IMM GRANULOCYTES # BLD: 0 10E3/UL
IMM GRANULOCYTES NFR BLD: 0 %
LDLC SERPL CALC-MCNC: 118 MG/DL
LYMPHOCYTES # BLD AUTO: 3.9 10E3/UL (ref 0.8–5.3)
LYMPHOCYTES NFR BLD AUTO: 58 %
MCH RBC QN AUTO: 26.3 PG (ref 26.5–33)
MCHC RBC AUTO-ENTMCNC: 31.3 G/DL (ref 31.5–36.5)
MCV RBC AUTO: 84 FL (ref 78–100)
MONOCYTES # BLD AUTO: 0.4 10E3/UL (ref 0–1.3)
MONOCYTES NFR BLD AUTO: 7 %
NEUTROPHILS # BLD AUTO: 2.2 10E3/UL (ref 1.6–8.3)
NEUTROPHILS NFR BLD AUTO: 33 %
NONHDLC SERPL-MCNC: 137 MG/DL
NRBC # BLD AUTO: 0 10E3/UL
NRBC BLD AUTO-RTO: 0 /100
PLATELET # BLD AUTO: 200 10E3/UL (ref 150–450)
POTASSIUM SERPL-SCNC: 4.1 MMOL/L (ref 3.4–5.3)
PROT SERPL-MCNC: 8.2 G/DL (ref 6.4–8.3)
RBC # BLD AUTO: 4.98 10E6/UL (ref 3.8–5.2)
SODIUM SERPL-SCNC: 143 MMOL/L (ref 136–145)
TOTAL PROTEIN SERUM FOR ELP: 7.8 G/DL (ref 6.4–8.3)
TRIGL SERPL-MCNC: 95 MG/DL
TSH SERPL DL<=0.005 MIU/L-ACNC: 1.17 UIU/ML (ref 0.3–4.2)
VIT B12 SERPL-MCNC: 1094 PG/ML (ref 232–1245)
WBC # BLD AUTO: 6.6 10E3/UL (ref 4–11)

## 2022-11-15 PROCEDURE — 90471 IMMUNIZATION ADMIN: CPT | Performed by: INTERNAL MEDICINE

## 2022-11-15 PROCEDURE — 80050 GENERAL HEALTH PANEL: CPT | Performed by: PATHOLOGY

## 2022-11-15 PROCEDURE — 84165 PROTEIN E-PHORESIS SERUM: CPT | Mod: TC | Performed by: PATHOLOGY

## 2022-11-15 PROCEDURE — 82607 VITAMIN B-12: CPT | Performed by: INTERNAL MEDICINE

## 2022-11-15 PROCEDURE — 99214 OFFICE O/P EST MOD 30 MIN: CPT | Mod: 25 | Performed by: INTERNAL MEDICINE

## 2022-11-15 PROCEDURE — 80061 LIPID PANEL: CPT | Performed by: PATHOLOGY

## 2022-11-15 PROCEDURE — 90686 IIV4 VACC NO PRSV 0.5 ML IM: CPT | Performed by: INTERNAL MEDICINE

## 2022-11-15 PROCEDURE — 84155 ASSAY OF PROTEIN SERUM: CPT | Performed by: INTERNAL MEDICINE

## 2022-11-15 PROCEDURE — 36415 COLL VENOUS BLD VENIPUNCTURE: CPT | Performed by: PATHOLOGY

## 2022-11-15 PROCEDURE — 84165 PROTEIN E-PHORESIS SERUM: CPT | Mod: 26

## 2022-11-15 RX ORDER — AMLODIPINE BESYLATE 5 MG/1
5 TABLET ORAL AT BEDTIME
Qty: 90 TABLET | Refills: 3 | Status: SHIPPED | OUTPATIENT
Start: 2022-11-15 | End: 2022-12-19

## 2022-11-15 NOTE — PROGRESS NOTES
HPI  57-year-old presents today for a few issues.  She reports that while receiving chemotherapy she was experiencing some numbness and tingling in the fingertips.  This subsequently resolved but about 3 months ago she again began experiencing similar symptoms both in the fingertips as well as in the bottom of the feet which became supersensitive.  She was uncomfortable with touch or feeling in the bottom of her feet and this would bother her occasionally when she is walking or if somebody was touching her feet.  There is no rash there is no sores there is no injury or trauma in association with this.  There is no new or different medications.  She is now off the amlodipine for a couple of days as she was unable to afford it at [employer].  We provided her with some prices on good Rx which shows that it is certainly reasonably priced.  Otherwise she has been doing reasonably well  Past Medical History:   Diagnosis Date     Anemia      Cancer (H)     Metastatic squamous cell cervical carcinoma     Chronic kidney disease      Hypertension 2007     Obesity     BMI >30     Past Surgical History:   Procedure Laterality Date     COMBINED CYSTOSCOPY, INSERT STENT URETER(S) Left 6/13/2016    Procedure: COMBINED CYSTOSCOPY, INSERT STENT URETER(S);  Surgeon: Deja Salinas MD;  [facility]     CYSTOSCOPY, RETROGRADES, COMBINED Left 6/13/2016    Procedure: COMBINED CYSTOSCOPY, RETROGRADES;  Surgeon: Deja Salinas MD;  [facility] OR     DAVINCI NEPHRECTOMY Left 5/3/2018    Procedure: DAVINCI XI NEPHRECTOMY;  DAVINCI XI LEFT NEPHRECTOMY;  Surgeon: Deja Salinas MD;  Location:  OR     GENITOURINARY SURGERY  5/2015    PNT placement     HYSTERECTOMY  2011    [place], [place]     IR PORT REMOVAL RIGHT  1/29/2019     PERCUTANEOUS NEPHROSTOMY Left 3/9/2017    Procedure: PERCUTANEOUS NEPHROSTOMY;  Surgeon: Bridger Meyer PA-C;  [facility] OR     PERCUTANEOUS NEPHROSTOMY Left  "6/28/2017    Procedure: PERCUTANEOUS NEPHROSTOMY;  Left Nephrostomy Tube Change;  Surgeon: Bridger Meyer PA-C;  [facility] OR     PERCUTANEOUS NEPHROSTOMY Left 10/30/2017    Procedure: PERCUTANEOUS NEPHROSTOMY;  Left Nephrostomy Tube Change;  Surgeon: Maryann Chavez PA-C;  [facility] OR     REMOVE PORT VASCULAR ACCESS Right 1/29/2019    Procedure: Right Port Removal;  Surgeon: Maryann Marin PA-C;  [facility] OR     Family History   Problem Relation Age of Onset     Hypertension Brother          Exam:  BP (!) 153/80 (BP Location: Right arm, Patient Position: Sitting, Cuff Size: Adult Large)   Pulse 77   Resp 16   Ht 1.676 m (5' 6\")   Wt 98.1 kg (216 lb 4.8 oz)   SpO2 100%   Breastfeeding No   BMI 34.91 kg/m    216 lbs 4.8 oz  The patient is alert, oriented with a clear sensorium.   Skin shows no lesions or rashes and good turgor.   Head is normocephalic and atraumatic.    Neck shows no nodes, massive diffuse thyromegaly.     Lungs are clear.   Heart shows normal S1 and S2 without murmur or gallop.    Extremities show no edema.  Feet show skin intact and pulses intact bilaterally  Neuro sensation is intact in both hands and feet distally to monofilament light touch and vibration        ASSESSMENT  1 hypertension poor control OFF AMLODIPINE  2 hyperlipidemia needs reassessment  3 huge multinodular goiter  4 history of cervical cancer  5 status post nephrectomy for atrophic kidney  6 possible mild peripheral neuropathy  7 obesity    Plan  Reassess her labs today.  Bhumi restart the amlodipine 5 mg at bedtime and have her follow-up for blood pressure reassessment in a month.  We will do FIT testing for colon cancer screening and schedule her for mammogram at the time of her follow-up  This note was completed using Dragon voice recognition software.      Shamir Pugh MD  General Internal Medicine  Primary Care Center  [phone]        "

## 2022-11-15 NOTE — PROGRESS NOTES
At the request of Dr. Shamir Pugh, Angella St received the Influenza Vaccine Fluzone Quadrivalent [id number] Formula No Preservative vaccine today in clinic. The flu shot was given under the supervision of Dr. Shamir Pugh if assistance was needed. The immunization site was cleaned with an alcohol prep wipe. The flu shot was given without incident--see immunization list for administration details. No swelling or redness was observed at the site of injection after the immunization was given. The patient was advised to remain in fourth floor lobby of the [facility] for fifteen minutes after the injection in case of an adverse reaction.     Rico Oglesby, CRISTHIAN  12:07 PM 11/15/2022

## 2022-11-15 NOTE — NURSING NOTE
"Angella St is a 57 year old female patient that presents today in clinic for the following:    Chief Complaint   Patient presents with     Follow Up     Numbness     The patient reports numbness in her hands and feet over the last three months. She reports this occurred after chemotherapy. She reports pain to the touch in her hands and feet.      Foot pain     Headache     The patient reports a hot feeling inside her head. She reports a hot sensation posteriorly which she noticed last week. She reports that eating sugar makes it worse.      Medication refill     amLODIPine (NORVASC) 5 MG tablet     The patient's allergies and medications were reviewed as noted. A set of vitals were recorded as noted without incident: BP (!) 153/80 (BP Location: Right arm, Patient Position: Sitting, Cuff Size: Adult Large)   Pulse 77   Resp 16   Ht 1.676 m (5' 6\")   Wt 98.1 kg (216 lb 4.8 oz)   SpO2 100%   Breastfeeding No   BMI 34.91 kg/m  . The patient does not have any other questions for the provider.    Rico Oglesby, EMT  11:14 AM 11/15/2022  "

## 2022-11-16 LAB
ALBUMIN SERPL ELPH-MCNC: 4.2 G/DL (ref 3.7–5.1)
ALPHA1 GLOB SERPL ELPH-MCNC: 0.3 G/DL (ref 0.2–0.4)
ALPHA2 GLOB SERPL ELPH-MCNC: 0.8 G/DL (ref 0.5–0.9)
B-GLOBULIN SERPL ELPH-MCNC: 1 G/DL (ref 0.6–1)
GAMMA GLOB SERPL ELPH-MCNC: 1.5 G/DL (ref 0.7–1.6)
M PROTEIN SERPL ELPH-MCNC: 0 G/DL
PROT PATTERN SERPL ELPH-IMP: NORMAL

## 2022-12-06 PROCEDURE — 82274 ASSAY TEST FOR BLOOD FECAL: CPT | Performed by: INTERNAL MEDICINE

## 2022-12-08 LAB — HEMOCCULT STL QL IA: NEGATIVE

## 2022-12-19 ENCOUNTER — OFFICE VISIT (OUTPATIENT)
Dept: INTERNAL MEDICINE | Facility: CLINIC | Age: 57
End: 2022-12-19
Payer: MEDICAID

## 2022-12-19 VITALS
OXYGEN SATURATION: 98 % | HEART RATE: 95 BPM | HEIGHT: 66 IN | DIASTOLIC BLOOD PRESSURE: 80 MMHG | SYSTOLIC BLOOD PRESSURE: 145 MMHG | BODY MASS INDEX: 35.42 KG/M2 | WEIGHT: 220.4 LBS

## 2022-12-19 DIAGNOSIS — H10.13 ALLERGIC CONJUNCTIVITIS, BILATERAL: Primary | ICD-10-CM

## 2022-12-19 DIAGNOSIS — I10 BENIGN ESSENTIAL HYPERTENSION: ICD-10-CM

## 2022-12-19 PROCEDURE — 99214 OFFICE O/P EST MOD 30 MIN: CPT | Performed by: INTERNAL MEDICINE

## 2022-12-19 RX ORDER — AMLODIPINE BESYLATE 5 MG/1
5 TABLET ORAL AT BEDTIME
Qty: 90 TABLET | Refills: 3 | Status: SHIPPED | OUTPATIENT
Start: 2022-12-19 | End: 2023-05-15

## 2022-12-19 RX ORDER — OLOPATADINE HYDROCHLORIDE 1 MG/ML
1 SOLUTION/ DROPS OPHTHALMIC 2 TIMES DAILY
Qty: 5 ML | Refills: 1 | Status: SHIPPED | OUTPATIENT
Start: 2022-12-19 | End: 2023-04-03

## 2022-12-19 NOTE — PROGRESS NOTES
HPI  57-year-old returns today for reevaluation of her blood pressure.  She has not been monitoring her blood pressure at home although she does have a blood pressure machine.  She reports no problems on the amlodipine.  No side effects or ill effects from this.  She is also not been doing any regular exercise.  We did discuss additional blood pressure control and she would like to pursue lifestyle change and we discussed this in some detail.  We will continue on the same dose of amlodipine and she will begin to monitor the blood pressure at home I will plan to review this in another 3 to 4 months.  She has had more itching and watery eyes recently which is not had any known allergic exposures.  Past Medical History:   Diagnosis Date     Anemia      Cancer (H)     Metastatic squamous cell cervical carcinoma     Chronic kidney disease      Hypertension 2007     Obesity     BMI >30     Past Surgical History:   Procedure Laterality Date     COMBINED CYSTOSCOPY, INSERT STENT URETER(S) Left 6/13/2016    Procedure: COMBINED CYSTOSCOPY, INSERT STENT URETER(S);  Surgeon: Deja Salinas MD;  [facility]     CYSTOSCOPY, RETROGRADES, COMBINED Left 6/13/2016    Procedure: COMBINED CYSTOSCOPY, RETROGRADES;  Surgeon: Deja Salinas MD;  [facility]     DAVINCI NEPHRECTOMY Left 5/3/2018    Procedure: DAVINCI XI NEPHRECTOMY;  DAVINCI XI LEFT NEPHRECTOMY;  Surgeon: Deja Salinas MD;  Location: [facility]     GENITOURINARY SURGERY  5/2015    PNT placement     [place]CT[place]  2011    [place]     IR PORT REMOVAL RIGHT  1/29/2019     PERCUTANEOUS NEPHROSTOMY Left 3/9/2017    Procedure: PERCUTANEOUS NEPHROSTOMY;  Surgeon: Bridger Meyer PA-C;  [facility]: [facility]     PERCUTANEOUS NEPHROSTOMY Left 6/28/2017    Procedure: PERCUTANEOUS NEPHROSTOMY;  Left Nephrostomy Tube Change;  Surgeon: Bridger Meyer PA-C;  [facility]     PERCUTANEOUS NEPHROSTOMY Left 10/30/2017     "Procedure: PERCUTANEOUS NEPHROSTOMY;  Left Nephrostomy Tube Change;  Surgeon: Maryann Chavez PA-C;  Location:  OR     REMOVE PORT VASCULAR ACCESS Right 1/29/2019    Procedure: Right Port Removal;  Surgeon: Maryann Marin PA-C;  Location:  OR     Family History   Problem Relation Age of Onset     Hypertension Brother          Exam:  BP (!) 145/80   Pulse 95   Ht 1.676 m (5' 6\")   Wt 100 kg (220 lb 6.4 oz)   SpO2 98%   BMI 35.57 kg/m    220 lbs 6.4 oz  The patient is alert, oriented with a clear sensorium.   Skin shows no lesions or rashes and good turgor.   Head is normocephalic and atraumatic.    Eyes show bilateral mild conjunctival injection  Neck shows thyromegaly.     Lungs are clear.   Heart shows normal S1 and S2 without murmur or gallop.    Extremities show no edema.        ASSESSMENT  1 hypertension improved on AMLODIPINE  2 Allergic conjunctivitis  3 huge multinodular goiter euthyroid  4 IGT   5 status post nephrectomy for atrophic kidney  6 possible mild peripheral neuropathy  7 obesity  8 hyperlipidemia   9 history of cervical cancer    Plan  We discussed nonpharmacologic blood pressure control measures including increasing the potassium in the diet through fruits and vegetables restricting salt and increasing exercise.  We will also have her monitor blood pressure at home and bring these in for review in another 3 to 4 months.  However follow-up sooner immediately if any increase symptoms or problems.  This note was completed using Dragon voice recognition software.      Shamir Pugh MD  General Internal Medicine  [facility]  [phone]        "

## 2023-03-31 NOTE — PROGRESS NOTES
Gynecologic Oncology Return Visit Note    Date: Apr 3, 2023     RE: Angella St  : 1965  DAYSI: Apr 3, 2023     CC:  Recurrent cervical cancer     HPI:  Angella St is a 57 year old woman with a history of recurrent cervical cancer.  Initially, she was surgically treated in  with recurrence 2015.  She completed treatment for her recurrence with chemotherapy 2015.  She is here today for a surveillance visit.      Oncology History:  : Treated for cervical cancer with hysterectomy in [facility]. No adjuvant treatment.  4/24/15: [facility] in [place] and had CT A/P that showed severe left hydronephrosis with moderate left hydroureter secondary to a left pelvic mass (4.9 x 4.8 cm x 3.8 cm) 4/26/15: [facility] [employer] [facility] Lasix renogram that showed good perfusion to the right kidney. No significant perfusion on the left.  4/28/15: CT guided peritoneal biopsy of the left hemipelvic mass that showed metastatic squamous cell carcinoma, consistent with cervical primary   5/1/15: Presented to the [facility] ED with left/central pelvic pain and dizziness present since last . She was seen by OBGYN who recommended GYN ONC referral.     5/6/15: CT guided biopsy of pelvic mass again reveals squamous cell carcinoma.  5/11/15: Admitted to [facility] medicine service with abdominal pain, seen by urology for obstruction to the left urinary system. CT A/P showed marked left hydronephrosis with cortical thinning in the left kidney, consistent with chronic obstruction. IR placed 8 [place] nephroureteral tube at [facility]. She was discharged to home with plan to follow up with GYN ONC and urology at the [facility].  5/20/15: admitted to [facility] gyn/onc service for control of abdominal pain. Treated for enterococcus/acinetobacter UTI.   6/3/2015-7/17/15: Cycles #1-3 Cisplatin/Taxol/Avastin  8/7/15: CT CAP: solid mass in left pelvic sidewall, decreased in size from prior study. The previously identified  hypermetabolic hypodense liver lesion is also not identified on this study, likely due to treatment response. Unchanged left paraaortic lymph node. No new pulmonary nodules. Atrophic left kidney. Multinodular enlarged thyroid.   8/10/15-9/22/15: Cycles #4-6 Cisplatin/Taxol/Avastin  10/9/15: CT cap IMPRESSION:   1. Stable 3 mm right upper lobe nodule, recommend continued attention on followup.  2. Stable left pelvic sidewall mass measuring up to 2.9 cm.   3. 4 mm hypodensity in the right lobe of the liver which is too small to further characterize and indeterminate. Continued attention on followup is needed.  4. Stable atrophic appearance of the left kidney with percutaneous nephrostomy tube in place. Decreased enhancement in comparison with the contralateral kidney suggests some degree of renal dysfunction. Perinephric inflammatory changes are again seen, potentially indicating infection versus inflammatory response.  5. Diffusely enlarged thyroid with multiple hypoattenuating nodules.      Plan: 3 more cycles Cisplatin/Taxol/Avastin based on  and then repeat PET.      10/14/15: Cycle #7 Cisplatin/Taxol/Avastin.   11/6/15: Cycle #8 Cisplatin/Taxol/Avastin.  11/23/15: PNT exchange.  11/27/15: Cycle #9 Cisplatin/Taxol/Avastin.     3/3/21: Pap NIL.  CT CAP  IMPRESSION:  In this patient with history of cervical cancer status post  hysterectomy and left nephrectomy, there is no convincing evidence for  recurrent or metastatic disease:  1. Stable postsurgical changes, with unremarkable appearance of the  surgical beds. No suspicious lymphadenopathy within the chest, abdomen  or pelvis.  2. New ill-defined 3 mm nodule in the lateral left upper lobe is  technically indeterminant but of low suspicion. Stable appearance of  other sub-6 mm pulmonary nodules. Recommend attention on follow-up.  3. Large left-sided eccentric multinodular thyroid goiter appears  Stable.     3/25/22: Pap NIL.  4/3/23: Pap  pending.           Today she comes to clinic feeling well overall and without concern.  She has HTN is on Norvasc.  She has the ability to take her BP at home but has not checked her BP recently.  She denies any vaginal bleeding or discharge, no changes in her bowel or bladder habits, no nausea/emesis, no lower extremity edema, and no difficulties eating or sleeping. She denies any abdominal discomfort/bloating, no fevers or chills, and no chest pain or shortness of breath.  She is current with her annual physical and colon cancer screening.  She is due for her mammogram.  She is hoping to have her allergy eye drops refilled, these are usually prescribed by her PCP.          Health Maintenance  FIT:12/6/22, negative, repeat in 1 year  Mammogram: 4/16/21  Annual physical: 11/15/22        Review of Systems:    Systemic           no weight changes; no fever; no chills; no night sweats; no appetite changes  Skin           no rashes, or lesions  Eye           no irritation; no changes in vision  Leni-Laryngeal           no dysphagia; no hoarseness   Pulmonary    no cough; no shortness of breath  Cardiovascular    no chest pain; no palpitations; +HTN  Gastrointestinal    no diarrhea; no constipation; no abdominal pain; no changes in bowel habits; no blood in stool  Genitourinary   no urinary frequency; no urinary urgency; no dysuria; no pain; no abnormal vaginal discharge; no abnormal vaginal bleeding  Breast   no breast discharge; no breast changes; no breast pain  Musculoskeletal    no myalgias; no arthralgias; no back pain  Psychiatric           no depressed mood; no anxiety    Hematologic   no tender lymph nodes; no noticeable swellings or lumps   Endocrine    no hot flashes; no heat/cold intolerance         Neurological   no tremor; no numbness and tingling; no headaches; no difficulty sleeping      Past Medical History:    Past Medical History:   Diagnosis Date     Anemia      Cancer (H)     Metastatic squamous cell  cervical carcinoma     Chronic kidney disease      Hypertension 2007     Obesity     BMI >30         Past Surgical History:    Past Surgical History:   Procedure Laterality Date     COMBINED CYSTOSCOPY, INSERT STENT URETER(S) Left 6/13/2016    Procedure: COMBINED CYSTOSCOPY, INSERT STENT URETER(S);  Surgeon: Deja Salinas MD;  [facility]     CYSTOSCOPY, RETROGRADES, COMBINED Left 6/13/2016    Procedure: COMBINED CYSTOSCOPY, RETROGRADES;  Surgeon: Deja Salinas MD;  [facility]     DAVINCI NEPHRECTOMY Left 5/3/2018    Procedure: DAVINCI XI NEPHRECTOMY;  DAVINCI XI LEFT NEPHRECTOMY;  Surgeon: Deja Salinas MD;  Location: [facility]     GENITOURINARY SURGERY  5/2015    PNT placement     [place]STERECTOMY  2011    [place]     IR PORT REMOVAL RIGHT  1/29/2019     PERCUTANEOUS NEPHROSTOMY Left 3/9/2017    Procedure: PERCUTANEOUS NEPHROSTOMY;  Surgeon: Bridger Meyer PA-C;  [facility]     PERCUTANEOUS NEPHROSTOMY Left 6/28/2017    Procedure: PERCUTANEOUS NEPHROSTOMY;  Left Nephrostomy Tube Change;  Surgeon: Bridger Meyer PA-C;  [facility]     PERCUTANEOUS NEPHROSTOMY Left 10/30/2017    Procedure: PERCUTANEOUS NEPHROSTOMY;  Left Nephrostomy Tube Change;  Surgeon: Maryann Chavez PA-C;  [facility]     REMOVE PORT VASCULAR ACCESS Right 1/29/2019    Procedure: Right Port Removal;  Surgeon: Maryann Marin PA-C;  [facility]         Health Maintenance Due   Topic Date Due     ADVANCE CARE PLANNING  Never done     HEPATITIS B IMMUNIZATION (1 of 3 - 3-dose series) Never done     HIV SCREENING  Never done     ZOSTER IMMUNIZATION (1 of 2) Never done     YEARLY PREVENTIVE VISIT  03/26/2022     MAMMO SCREENING  04/16/2022     COVID-19 Vaccine (3 - Booster for Pfizer series) 06/06/2022     PHQ-2 (once per calendar year)  01/01/2023       Current Medications:     Current Outpatient Medications   Medication Sig Dispense Refill     amLODIPine (NORVASC) 5  MG tablet Take 1 tablet (5 mg) by mouth At Bedtime 90 tablet 3     olopatadine (PATANOL) 0.1 % ophthalmic solution Place 1 drop into both eyes 2 times daily 5 mL 1         Allergies:      No Known Allergies     Social History:     Social History     Tobacco Use     Smoking status: Never     Smokeless tobacco: Never   Vaping Use     Vaping status: Not on file   Substance Use Topics     Alcohol use: No     Alcohol/week: 0.0 standard drinks of alcohol     Comment: Does not drink alcohol       History   Drug Use No     Comment: No drug use         Family History:     The patient's family history is notable for:    Family History   Problem Relation Age of Onset     Hypertension Brother          Physical Exam:     BP (!) 147/82   Pulse 78   Temp 98.2  F (36.8  C) (Oral)   Resp 20   Wt 99.2 kg (218 lb 12.8 oz)   SpO2 100%   BMI 35.32 kg/m    Body mass index is 35.32 kg/m .    General Appearance: healthy and alert, no distress     HEENT: no palpable nodules or masses        Cardiovascular: regular rate and rhythm, no gallops, rubs or murmurs     Respiratory: lungs clear, no rales, rhonchi or wheezes    Musculoskeletal: extremities non tender and without edema    Skin: no lesions or rashes     Neurological: normal gait, no gross defects     Psychiatric: appropriate mood and affect                               Hematological: normal cervical, supraclavicular and inguinal lymph nodes     Gastrointestinal:       abdomen soft, non-tender, non-distended, no organomegaly or masses    Genitourinary: External genitalia and urethral meatus appears normal.  Vagina is smooth without nodularity or masses.  Cervix is surgically absent.  Bimanual exam reveal no masses, nodularity or fullness.  Recto-vaginal exam confirms these findings.  Pap collected.      Assessment:    Angella St is a 57 year old woman with a history of recurrent cervical cancer.  Initially, she was surgically treated in 2011 with recurrence 4/2015.  She  completed treatment for her recurrence with chemotherapy 11/2015.  She is here today for a surveillance visit.        20 minutes spent on the date of the encounter doing chart review, history and exam, documentation, and further activities as noted above.      Plan:     1.)        Cervical cancer:  MATEUSZ on exam.  RTC in 1 year for surveillance visit and pap.  She will continue to need annual pelvic/rectal exams and paps cytology only (collected today).   Reviewed signs and symptoms for when she should contact the clinic or seek additional care.  Patient to contact the clinic with any questions or concerns in the interim.        Genetic risk factors were assessed and she does not meet qualification for referral.       Labs and/or tests ordered include:  None.                2.)        Health maintenance:  Issues addressed today include following up with PCP for annual health maintenance and non-gynecologic issues.      3.)        HTN: BP today was 147/82.  Encouraged to check her BP at home and follow up with her PCP if her readings remain >130/80.      4.) Allergic conjunctivitis: Refilled her Patanol 0.1% eye gtts, encouraged her to reach out to her PCP moving forward for refills.    Ale Rodriguez, DNP, APRN, FNP-C  Nurse Practitioner  Division of Gynecologic Oncology  Pager: [phone]     CC  Patient Care Team:  Shamir Pugh MD as PCP - General (Internal Medicine)  Melida Rutledge APRN CNP as Referring Physician (Nurse Practitioner - Women's Health)  Shamir Pugh MD as MD (Internal Medicine)  Lam Moran MD as MD (Maternal & Fetal Medicine)  Deja Salinas MD as MD (Urology)  Maryse Rodriguez, TONYA as Registered Nurse  Deja Salinas MD as MD (Urology)  Shamir Pugh MD as Assigned PCP  Ale Rodriguez APRN CNP as Assigned Cancer Care Provider  SELF, REFERRED

## 2023-04-03 ENCOUNTER — ONCOLOGY VISIT (OUTPATIENT)
Dept: ONCOLOGY | Facility: CLINIC | Age: 58
End: 2023-04-03
Attending: NURSE PRACTITIONER
Payer: MEDICAID

## 2023-04-03 VITALS
WEIGHT: 218.8 LBS | RESPIRATION RATE: 20 BRPM | DIASTOLIC BLOOD PRESSURE: 82 MMHG | TEMPERATURE: 98.2 F | BODY MASS INDEX: 35.32 KG/M2 | SYSTOLIC BLOOD PRESSURE: 147 MMHG | HEART RATE: 78 BPM | OXYGEN SATURATION: 100 %

## 2023-04-03 DIAGNOSIS — H10.13 ALLERGIC CONJUNCTIVITIS, BILATERAL: ICD-10-CM

## 2023-04-03 DIAGNOSIS — C53.9 MALIGNANT NEOPLASM OF CERVIX, UNSPECIFIED SITE (H): Primary | ICD-10-CM

## 2023-04-03 DIAGNOSIS — Z08 ENCOUNTER FOR FOLLOW-UP SURVEILLANCE OF CERVICAL CANCER: ICD-10-CM

## 2023-04-03 DIAGNOSIS — Z85.41 ENCOUNTER FOR FOLLOW-UP SURVEILLANCE OF CERVICAL CANCER: ICD-10-CM

## 2023-04-03 PROCEDURE — 99213 OFFICE O/P EST LOW 20 MIN: CPT | Performed by: NURSE PRACTITIONER

## 2023-04-03 PROCEDURE — G0463 HOSPITAL OUTPT CLINIC VISIT: HCPCS | Performed by: NURSE PRACTITIONER

## 2023-04-03 PROCEDURE — 88175 CYTOPATH C/V AUTO FLUID REDO: CPT | Performed by: NURSE PRACTITIONER

## 2023-04-03 RX ORDER — OLOPATADINE HYDROCHLORIDE 1 MG/ML
1 SOLUTION/ DROPS OPHTHALMIC 2 TIMES DAILY
Qty: 5 ML | Refills: 0 | Status: SHIPPED | OUTPATIENT
Start: 2023-04-03

## 2023-04-03 ASSESSMENT — PAIN SCALES - GENERAL: PAINLEVEL: NO PAIN (0)

## 2023-04-03 NOTE — LETTER
4/3/2023         RE: Angella St  [address]  [facility] [address]        Dear Colleague,    Thank you for referring your patient, Angella St, to the [facility]. Please see a copy of my visit note below.    Gynecologic Oncology Return Visit Note    Date: Apr 3, 2023     RE: Angella St  : 1965  DAYSI: Apr 3, 2023     CC:  Recurrent cervical cancer     HPI:  Angella St is a 57 year old woman with a history of recurrent cervical cancer.  Initially, she was surgically treated in  with recurrence 2015.  She completed treatment for her recurrence with chemotherapy 2015.  She is here today for a surveillance visit.      Oncology History:  : Treated for cervical cancer with hysterectomy in [facility]. No adjuvant treatment.  4/24/15: Presented to [facility] in [place] and had CT A/P that showed severe left hydronephrosis with moderate left hydroureter secondary to a left pelvic mass (4.9 x 4.8 cm x 3.8 cm) 4/26/15: [facility] that showed good perfusion to the right kidney. No significant perfusion on the left.  4/28/15: CT guided peritoneal biopsy of the left hemipelvic mass that showed metastatic squamous cell carcinoma, consistent with cervical primary   5/1/15: Presented to the [facility] ED with left/central pelvic pain and dizziness present since last . She was seen by OBGYN who recommended GYN ONC referral.     5/6/15: CT guided biopsy of pelvic mass again reveals squamous cell carcinoma.  5/11/15: [place] [facility] [place] with abdominal pain, seen by urology for obstruction to the left urinary system. CT A/P showed marked left hydronephrosis with cortical thinning in the left kidney, consistent with chronic obstruction. IR placed 8 Wolof nephroureteral tube [place] [facility] She was discharged to home with plan to follow up with GYN ONC and urology at the [facility].  5/20/15: admitted to [facility] gyn/onc service  for control of abdominal pain. Treated for enterococcus/acinetobacter UTI.   6/3/2015-7/17/15: Cycles #1-3 Cisplatin/Taxol/Avastin  8/7/15: CT CAP: solid mass in left pelvic sidewall, decreased in size from prior study. The previously identified hypermetabolic hypodense liver lesion is also not identified on this study, likely due to treatment response. Unchanged left paraaortic lymph node. No new pulmonary nodules. Atrophic left kidney. Multinodular enlarged thyroid.   8/10/15-9/22/15: Cycles #4-6 Cisplatin/Taxol/Avastin  10/9/15: CT cap IMPRESSION:   1. Stable 3 mm right upper lobe nodule, recommend continued attention on followup.  2. Stable left pelvic sidewall mass measuring up to 2.9 cm.   3. 4 mm hypodensity in the right lobe of the liver which is too small to further characterize and indeterminate. Continued attention on followup is needed.  4. Stable atrophic appearance of the left kidney with percutaneous nephrostomy tube in place. Decreased enhancement in comparison with the contralateral kidney suggests some degree of renal dysfunction. Perinephric inflammatory changes are again seen, potentially indicating infection versus inflammatory response.  5. Diffusely enlarged thyroid with multiple hypoattenuating nodules.      Plan: 3 more cycles Cisplatin/Taxol/Avastin based on  and then repeat PET.      10/14/15: Cycle #7 Cisplatin/Taxol/Avastin.   11/6/15: Cycle #8 Cisplatin/Taxol/Avastin.  11/23/15: PNT exchange.  11/27/15: Cycle #9 Cisplatin/Taxol/Avastin.     3/3/21: Pap NIL.  CT CAP  IMPRESSION:  In this patient with history of cervical cancer status post  hysterectomy and left nephrectomy, there is no convincing evidence for  recurrent or metastatic disease:  1. Stable postsurgical changes, with unremarkable appearance of the  surgical beds. No suspicious lymphadenopathy within the chest, abdomen  or pelvis.  2. New ill-defined 3 mm nodule in the lateral left upper lobe is  technically  indeterminant but of low suspicion. Stable appearance of  other sub-6 mm pulmonary nodules. Recommend attention on follow-up.  3. Large left-sided eccentric multinodular thyroid goiter appears  Stable.     3/25/22: Pap NIL.  4/3/23: Pap pending.           Today she comes to clinic feeling well overall and without concern.  She has HTN is on Norvasc.  She has the ability to take her BP at home but has not checked her BP recently.  She denies any vaginal bleeding or discharge, no changes in her bowel or bladder habits, no nausea/emesis, no lower extremity edema, and no difficulties eating or sleeping. She denies any abdominal discomfort/bloating, no fevers or chills, and no chest pain or shortness of breath.  She is current with her annual physical and colon cancer screening.  She is due for her mammogram.  She is hoping to have her allergy eye drops refilled, these are usually prescribed by her PCP.          Health Maintenance  FIT:12/6/22, negative, repeat in 1 year  Mammogram: 4/16/21  Annual physical: 11/15/22        Review of Systems:    Systemic           no weight changes; no fever; no chills; no night sweats; no appetite changes  Skin           no rashes, or lesions  Eye           no irritation; no changes in vision  Leni-Laryngeal           no dysphagia; no hoarseness   Pulmonary    no cough; no shortness of breath  Cardiovascular    no chest pain; no palpitations; +HTN  Gastrointestinal    no diarrhea; no constipation; no abdominal pain; no changes in bowel habits; no blood in stool  Genitourinary   no urinary frequency; no urinary urgency; no dysuria; no pain; no abnormal vaginal discharge; no abnormal vaginal bleeding  Breast   no breast discharge; no breast changes; no breast pain  Musculoskeletal    no myalgias; no arthralgias; no back pain  Psychiatric           no depressed mood; no anxiety    Hematologic   no tender lymph nodes; no noticeable swellings or lumps   Endocrine    no hot flashes; no  heat/cold intolerance         Neurological   no tremor; no numbness and tingling; no headaches; no difficulty sleeping      Past Medical History:    Past Medical History:   Diagnosis Date     Anemia      Cancer (H)     Metastatic squamous cell cervical carcinoma     Chronic kidney disease      Hypertension 2007     Obesity     BMI >30         Past Surgical History:    Past Surgical History:   Procedure Laterality Date     COMBINED CYSTOSCOPY, INSERT STENT URETER(S) Left 6/13/2016    Procedure: COMBINED CYSTOSCOPY, INSERT STENT URETER(S);  Surgeon: Deja Salinas MD;  [facility]     CYSTOSCOPY, RETROGRADES, COMBINED Left 6/13/2016    Procedure: COMBINED CYSTOSCOPY, RETROGRADES;  Surgeon: Deja Salinas MD;  [facility]     DAVINCI NEPHRECTOMY Left 5/3/2018    Procedure: DAVINCI XI NEPHRECTOMY;  DAVINCI XI LEFT NEPHRECTOMY;  Surgeon: Deja Salinas MD;  [facility]     GENITOURINARY SURGERY  5/2015    PNT placement     HYSTERECTOMY  2011    [place]     IR PORT REMOVAL RIGHT  1/29/2019     PERCUTANEOUS NEPHROSTOMY Left 3/9/2017    Procedure: PERCUTANEOUS NEPHROSTOMY;  Surgeon: Bridger Meyer PA-C;  [facility]     PERCUTANEOUS NEPHROSTOMY Left 6/28/2017    Procedure: PERCUTANEOUS NEPHROSTOMY;  Left Nephrostomy Tube Change;  Surgeon: Bridger Meyer PA-C;  [facility]     PERCUTANEOUS NEPHROSTOMY Left 10/30/2017    Procedure: PERCUTANEOUS NEPHROSTOMY;  Left Nephrostomy Tube Change;  Surgeon: Maryann Chavez PA-C;  [facility]     REMOVE PORT VASCULAR ACCESS Right 1/29/2019    Procedure: Right Port Removal;  Surgeon: Maryann Marin PA-C;  [facility]         Health Maintenance Due   Topic Date Due     ADVANCE CARE PLANNING  Never done     HEPATITIS B IMMUNIZATION (1 of 3 - 3-dose series) Never done     HIV SCREENING  Never done     ZOSTER IMMUNIZATION (1 of 2) Never done     YEARLY PREVENTIVE VISIT  03/26/2022     MAMMO SCREENING   04/16/2022     COVID-19 Vaccine (3 - Booster for Pfizer series) 06/06/2022     PHQ-2 (once per calendar year)  01/01/2023       Current Medications:     Current Outpatient Medications   Medication Sig Dispense Refill     amLODIPine (NORVASC) 5 MG tablet Take 1 tablet (5 mg) by mouth At Bedtime 90 tablet 3     olopatadine (PATANOL) 0.1 % ophthalmic solution Place 1 drop into both eyes 2 times daily 5 mL 1         Allergies:      No Known Allergies     Social History:     Social History     Tobacco Use     Smoking status: Never     Smokeless tobacco: Never   Vaping Use     Vaping status: Not on file   Substance Use Topics     Alcohol use: No     Alcohol/week: 0.0 standard drinks of alcohol     Comment: Does not drink alcohol       History   Drug Use No     Comment: No drug use         Family History:     The patient's family history is notable for:    Family History   Problem Relation Age of Onset     Hypertension Brother          Physical Exam:     BP (!) 147/82   Pulse 78   Temp 98.2  F (36.8  C) (Oral)   Resp 20   Wt 99.2 kg (218 lb 12.8 oz)   SpO2 100%   BMI 35.32 kg/m    Body mass index is 35.32 kg/m .    General Appearance: healthy and alert, no distress     HEENT: no palpable nodules or masses        Cardiovascular: regular rate and rhythm, no gallops, rubs or murmurs     Respiratory: lungs clear, no rales, rhonchi or wheezes    Musculoskeletal: extremities non tender and without edema    Skin: no lesions or rashes     Neurological: normal gait, no gross defects     Psychiatric: appropriate mood and affect                               Hematological: normal cervical, supraclavicular and inguinal lymph nodes     Gastrointestinal:       abdomen soft, non-tender, non-distended, no organomegaly or masses    Genitourinary: External genitalia and urethral meatus appears normal.  Vagina is smooth without nodularity or masses.  Cervix is surgically absent.  Bimanual exam reveal no masses, nodularity or fullness.   Recto-vaginal exam confirms these findings.  Pap collected.      Assessment:    Angella St is a 57 year old woman with a history of recurrent cervical cancer.  Initially, she was surgically treated in 2011 with recurrence 4/2015.  She completed treatment for her recurrence with chemotherapy 11/2015.  She is here today for a surveillance visit.        20 minutes spent on the date of the encounter doing chart review, history and exam, documentation, and further activities as noted above.      Plan:     1.)        Cervical cancer:  MATEUSZ on exam.  RTC in 1 year for surveillance visit and pap.  She will continue to need annual pelvic/rectal exams and paps cytology only (collected today).   Reviewed signs and symptoms for when she should contact the clinic or seek additional care.  Patient to contact the clinic with any questions or concerns in the interim.      Genetic risk factors were assessed and she does not meet qualification for referral.     Labs and/or tests ordered include:  None.                2.)        Health maintenance:  Issues addressed today include following up with PCP for annual health maintenance and non-gynecologic issues.      3.)        HTN: BP today was 147/82.  Encouraged to check her BP at home and follow up with her PCP if her readings remain >130/80.      4.) Allergic conjunctivitis: Refilled her Patanol 0.1% eye gtts, encouraged her to reach out to her PCP moving forward for refills.    Ale Rodriguez, CARMELA, APRN, FNP-C  Nurse Practitioner  Division of Gynecologic Oncology  Pager: [phone]     CC  Patient Care Team:  Shamir Pugh MD as PCP - General (Internal Medicine)  Melida Rutledge APRN CNP as Referring Physician (Nurse Practitioner - Women's Health)  Shamir Pugh MD as MD (Internal Medicine)  Lam Moran MD as MD (Maternal & Fetal Medicine)  Deja Salinas MD as MD (Urology)  Maryse Rodriguez, RN as Registered Nurse  Rita,  Deja Horn MD as MD (Urology)  Keaton, Shamir Dubois MD as Assigned PCP  Ale Rodriguez APRN CNP as Assigned Cancer Care Provider  SELF, REFERRED      Again, thank you for allowing me to participate in the care of your patient.        Sincerely,        BARBARA Mathews CNP

## 2023-04-03 NOTE — NURSING NOTE
"Oncology Rooming Note    April 3, 2023 10:26 AM   Angella St is a 57 year old female who presents for:    Chief Complaint   Patient presents with     Oncology Clinic Visit     Malignant neoplasm of cervix     Initial Vitals: BP (!) 147/82   Pulse 78   Temp 98.2  F (36.8  C) (Oral)   Resp 20   Wt 99.2 kg (218 lb 12.8 oz)   SpO2 100%   BMI 35.32 kg/m   Estimated body mass index is 35.32 kg/m  as calculated from the following:    Height as of 12/19/22: 1.676 m (5' 6\").    Weight as of this encounter: 99.2 kg (218 lb 12.8 oz). Body surface area is 2.15 meters squared.  No Pain (0) Comment: Data Unavailable   No LMP recorded. Patient has had a hysterectomy.  Allergies reviewed: Yes  Medications reviewed: Yes    Medications: Refill Patanol  Pharmacy name entered into [facility]: [facility] [place], [place] - [address] [place] [address] [phone]    Clinical concerns: None      Mary Jane Bell"

## 2023-05-15 ENCOUNTER — LAB (OUTPATIENT)
Dept: LAB | Facility: CLINIC | Age: 58
End: 2023-05-15
Payer: MEDICAID

## 2023-05-15 ENCOUNTER — OFFICE VISIT (OUTPATIENT)
Dept: INTERNAL MEDICINE | Facility: CLINIC | Age: 58
End: 2023-05-15
Payer: MEDICAID

## 2023-05-15 VITALS
OXYGEN SATURATION: 99 % | HEART RATE: 59 BPM | BODY MASS INDEX: 35.19 KG/M2 | DIASTOLIC BLOOD PRESSURE: 85 MMHG | WEIGHT: 218 LBS | SYSTOLIC BLOOD PRESSURE: 145 MMHG

## 2023-05-15 DIAGNOSIS — E04.2 NONTOXIC MULTINODULAR GOITER: ICD-10-CM

## 2023-05-15 DIAGNOSIS — R03.0 WHITE COAT SYNDROME WITHOUT DIAGNOSIS OF HYPERTENSION: ICD-10-CM

## 2023-05-15 DIAGNOSIS — E78.5 HYPERLIPIDEMIA, UNSPECIFIED HYPERLIPIDEMIA TYPE: ICD-10-CM

## 2023-05-15 DIAGNOSIS — R73.02 IGT (IMPAIRED GLUCOSE TOLERANCE): ICD-10-CM

## 2023-05-15 DIAGNOSIS — I10 BENIGN ESSENTIAL HYPERTENSION: Primary | ICD-10-CM

## 2023-05-15 DIAGNOSIS — E11.9 TYPE 2 DIABETES MELLITUS WITHOUT COMPLICATION, WITHOUT LONG-TERM CURRENT USE OF INSULIN (H): ICD-10-CM

## 2023-05-15 LAB
ANION GAP SERPL CALCULATED.3IONS-SCNC: 9 MMOL/L (ref 7–15)
BUN SERPL-MCNC: 15.9 MG/DL (ref 8–23)
CALCIUM SERPL-MCNC: 9.6 MG/DL (ref 8.6–10)
CHLORIDE SERPL-SCNC: 103 MMOL/L (ref 98–107)
CHOLEST SERPL-MCNC: 230 MG/DL
CREAT SERPL-MCNC: 0.58 MG/DL (ref 0.51–0.95)
DEPRECATED HCO3 PLAS-SCNC: 29 MMOL/L (ref 22–29)
GFR SERPL CREATININE-BSD FRML MDRD: >90 ML/MIN/1.73M2
GLUCOSE SERPL-MCNC: 148 MG/DL (ref 70–99)
HBA1C MFR BLD: 8.1 %
HDLC SERPL-MCNC: 56 MG/DL
LDLC SERPL CALC-MCNC: 150 MG/DL
NONHDLC SERPL-MCNC: 174 MG/DL
POTASSIUM SERPL-SCNC: 4.1 MMOL/L (ref 3.4–5.3)
SODIUM SERPL-SCNC: 141 MMOL/L (ref 136–145)
TRIGL SERPL-MCNC: 120 MG/DL
TSH SERPL DL<=0.005 MIU/L-ACNC: 1.2 UIU/ML (ref 0.3–4.2)

## 2023-05-15 PROCEDURE — 83036 HEMOGLOBIN GLYCOSYLATED A1C: CPT | Performed by: INTERNAL MEDICINE

## 2023-05-15 PROCEDURE — 84443 ASSAY THYROID STIM HORMONE: CPT | Performed by: PATHOLOGY

## 2023-05-15 PROCEDURE — 80061 LIPID PANEL: CPT | Performed by: PATHOLOGY

## 2023-05-15 PROCEDURE — 99214 OFFICE O/P EST MOD 30 MIN: CPT | Performed by: INTERNAL MEDICINE

## 2023-05-15 PROCEDURE — 80048 BASIC METABOLIC PNL TOTAL CA: CPT | Performed by: PATHOLOGY

## 2023-05-15 PROCEDURE — 36415 COLL VENOUS BLD VENIPUNCTURE: CPT | Performed by: PATHOLOGY

## 2023-05-15 RX ORDER — AMLODIPINE BESYLATE 5 MG/1
5 TABLET ORAL AT BEDTIME
Qty: 90 TABLET | Refills: 3 | Status: SHIPPED | OUTPATIENT
Start: 2023-05-15 | End: 2023-07-17

## 2023-05-15 RX ORDER — ROSUVASTATIN CALCIUM 10 MG/1
10 TABLET, COATED ORAL DAILY
Qty: 90 TABLET | Refills: 3 | Status: SHIPPED | OUTPATIENT
Start: 2023-05-15 | End: 2023-07-17

## 2023-05-15 NOTE — NURSING NOTE
Angella St is a 57 year old female that presents in clinic today for the following:     Chief Complaint   Patient presents with     Follow Up     Pt here for follow up        The patient's allergies and medications were reviewed. The patient's vitals were obtained without incident. The patient does not have any other questions for the provider.     Keven Ohara, EMT at 8:59 AM on 5/15/2023.  Primary Care Clinic: [phone]

## 2023-05-15 NOTE — PROGRESS NOTES
HPI  57-year-old turns today for reevaluation of her blood pressure.  Unfortunately she has not been monitoring her blood pressure at home and did not take her amlodipine this morning as usual.  She is unaware of what her blood pressure runs outside the clinic.  We discussed a 24-hour blood pressure monitor study and she agreed.  Otherwise she has been active at work she is on her feet walking and exercising frequently while at work and not doing much in the way of walking outside of work.  She is adding some salt with her cooking but not adding it at the table.  She is good on consuming fruits and vegetables by her report.  Otherwise she has been feeling well no specific complaints or concerns today.  Past Medical History:   Diagnosis Date     Anemia      Cancer (H)     Metastatic squamous cell cervical carcinoma     Chronic kidney disease      Hypertension 2007     Obesity     BMI >30     Past Surgical History:   Procedure Laterality Date     COMBINED CYSTOSCOPY, INSERT STENT URETER(S) Left 6/13/2016    Procedure: COMBINED CYSTOSCOPY, INSERT STENT URETER(S);  Surgeon: Deja Salinas MD;  [facility]     CYSTOSCOPY, RETROGRADES, COMBINED Left 6/13/2016    Procedure: COMBINED CYSTOSCOPY, RETROGRADES;  Surgeon: Deja Salinas MD;  [facility]     DAVINCI NEPHRECTOMY Left 5/3/2018    Procedure: DAVINCI XI NEPHRECTOMY;  DAVINCI XI LEFT NEPHRECTOMY;  Surgeon: Deja Salinas MD;  [facility]: [facility]     GENITOURINARY SURGERY  5/2015    PNT placement     HYSTERECTOMY  2011    [place], [place]     IR PORT REMOVAL RIGHT  1/29/2019     PERCUTANEOUS NEPHROSTOMY Left 3/9/2017    Procedure: PERCUTANEOUS NEPHROSTOMY;  Surgeon: Bridger Meyer PA-C;  [facility]     PERCUTANEOUS NEPHROSTOMY Left 6/28/2017    Procedure: PERCUTANEOUS NEPHROSTOMY;  Left Nephrostomy Tube Change;  Surgeon: Bridger Meyer PA-C;  [facility]     PERCUTANEOUS NEPHROSTOMY Left 10/30/2017     Procedure: PERCUTANEOUS NEPHROSTOMY;  Left Nephrostomy Tube Change;  Surgeon: Maryann Chavez PA-C;  Location:  OR     REMOVE PORT VASCULAR ACCESS Right 1/29/2019    Procedure: Right Port Removal;  Surgeon: Maryann Marin PA-C;  Location:  OR     Family History   Problem Relation Age of Onset     Hypertension Brother          Exam:  BP (!) 145/85 (BP Location: Right arm, Patient Position: Sitting, Cuff Size: Adult Large)   Pulse 59   Wt 98.9 kg (218 lb)   SpO2 99%   BMI 35.19 kg/m    218 lbs 0 oz  The patient is alert, oriented with a clear sensorium.   Skin shows no lesions or rashes and good turgor.   Head is normocephalic and atraumatic.    Neck shows massive thyromegaly.     Lungs are clear.   Heart shows normal S1 and S2 without murmur or gallop.    Extremities show no edema.      ASSESSMENT  1 hypertension on Amlodipine  2 hyperlipidemia needs rosuvastatin  3 huge multinodular goiter euthyroid  4 Diabetes needs metformin  5 status post nephrectomy for atrophic kidney  6 peripheral neuropathy  7 obesity  8 history of cervical cancer    Plan  We will continue the amlodipine and have her take it at bedtime so she takes it more consistently.  We will also get a 24 blood pressure monitor study to see when she is running when she is not in the clinic.  We will reassess her labs today including A1c and blood sugar along with the lipids.    Follow-up in 6 to 8 weeks    This note was completed using Dragon voice recognition software.      Shamir Pugh MD  General Internal Medicine  Primary Care Center  [phone]

## 2023-05-30 ENCOUNTER — HOSPITAL ENCOUNTER (OUTPATIENT)
Dept: CARDIOLOGY | Facility: CLINIC | Age: 58
Discharge: HOME | End: 2023-05-30
Attending: INTERNAL MEDICINE | Admitting: INTERNAL MEDICINE
Payer: MEDICAID

## 2023-05-30 DIAGNOSIS — R03.0 WHITE COAT SYNDROME WITHOUT DIAGNOSIS OF HYPERTENSION: ICD-10-CM

## 2023-05-30 PROCEDURE — 93790 AMBL BP MNTR W/SW I&R: CPT | Performed by: INTERNAL MEDICINE

## 2023-05-30 PROCEDURE — 93786 AMBL BP MNTR W/SW REC ONLY: CPT

## 2023-06-03 ENCOUNTER — HEALTH MAINTENANCE LETTER (OUTPATIENT)
Age: 58
End: 2023-06-03

## 2023-07-17 ENCOUNTER — OFFICE VISIT (OUTPATIENT)
Dept: INTERNAL MEDICINE | Facility: CLINIC | Age: 58
End: 2023-07-17
Payer: MEDICAID

## 2023-07-17 ENCOUNTER — ANCILLARY PROCEDURE (OUTPATIENT)
Dept: MAMMOGRAPHY | Facility: CLINIC | Age: 58
End: 2023-07-17
Attending: INTERNAL MEDICINE
Payer: MEDICAID

## 2023-07-17 VITALS
DIASTOLIC BLOOD PRESSURE: 86 MMHG | WEIGHT: 215.9 LBS | SYSTOLIC BLOOD PRESSURE: 151 MMHG | BODY MASS INDEX: 34.85 KG/M2 | HEART RATE: 62 BPM | OXYGEN SATURATION: 99 %

## 2023-07-17 DIAGNOSIS — E11.9 TYPE 2 DIABETES MELLITUS WITHOUT COMPLICATION, WITHOUT LONG-TERM CURRENT USE OF INSULIN (H): ICD-10-CM

## 2023-07-17 DIAGNOSIS — E04.2 NONTOXIC MULTINODULAR GOITER: Primary | ICD-10-CM

## 2023-07-17 DIAGNOSIS — Z12.31 VISIT FOR SCREENING MAMMOGRAM: ICD-10-CM

## 2023-07-17 DIAGNOSIS — I10 BENIGN ESSENTIAL HYPERTENSION: ICD-10-CM

## 2023-07-17 DIAGNOSIS — E78.5 HYPERLIPIDEMIA, UNSPECIFIED HYPERLIPIDEMIA TYPE: ICD-10-CM

## 2023-07-17 PROCEDURE — 99214 OFFICE O/P EST MOD 30 MIN: CPT | Performed by: INTERNAL MEDICINE

## 2023-07-17 PROCEDURE — 77067 SCR MAMMO BI INCL CAD: CPT | Mod: GC | Performed by: RADIOLOGY

## 2023-07-17 RX ORDER — ROSUVASTATIN CALCIUM 10 MG/1
10 TABLET, COATED ORAL DAILY
Qty: 90 TABLET | Refills: 3 | Status: SHIPPED | OUTPATIENT
Start: 2023-07-17 | End: 2024-01-31

## 2023-07-17 RX ORDER — AMLODIPINE BESYLATE 5 MG/1
5 TABLET ORAL AT BEDTIME
Qty: 90 TABLET | Refills: 3 | Status: SHIPPED | OUTPATIENT
Start: 2023-07-17 | End: 2023-10-25

## 2023-07-17 NOTE — PROGRESS NOTES
HPI  57-year-old presents today for follow-up regarding her blood pressure blood sugar and cholesterol.  She has been doing well.  She is taking the blood pressure medication at bedtime.  She is tolerating that well.  We reviewed her 24-hour blood pressure monitor study which was excellent with an average of 150/61.  She did not start the metformin or the rosuvastatin.  We reviewed her labs and reviewed the rationale for this and she agreed so we resent the prescriptions.  Otherwise she has been feeling well has no other new or different symptoms or problems today.  Past Medical History:   Diagnosis Date     Anemia      Cancer (H)     Metastatic squamous cell cervical carcinoma     Chronic kidney disease      Hypertension 2007     Obesity     BMI >30     Past Surgical History:   Procedure Laterality Date     COMBINED CYSTOSCOPY, INSERT STENT URETER(S) Left 6/13/2016    Procedure: COMBINED CYSTOSCOPY, INSERT STENT URETER(S);  Surgeon: Deja Salinas MD;  [facility]     CYSTOSCOPY, RETROGRADES, COMBINED Left 6/13/2016    Procedure: COMBINED CYSTOSCOPY, RETROGRADES;  Surgeon: Deja Salinas MD;  [facility]     DAVINCI NEPHRECTOMY Left 5/3/2018    Procedure: DAVINCI XI NEPHRECTOMY;  DAVINCI XI LEFT NEPHRECTOMY;  Surgeon: Deja Salinas MD;  Location:  OR     GENITOURINARY SURGERY  5/2015    PNT placement     HYSTERECTOMY  2011    [place], [place]     IR PORT REMOVAL RIGHT  1/29/2019     PERCUTANEOUS NEPHROSTOMY Left 3/9/2017    Procedure: PERCUTANEOUS NEPHROSTOMY;  Surgeon: Bridger Meyer PA-C;  [facility]     PERCUTANEOUS NEPHROSTOMY Left 6/28/2017    Procedure: PERCUTANEOUS NEPHROSTOMY;  Left Nephrostomy Tube Change;  Surgeon: Bridger Meyer PA-C;  [facility]     PERCUTANEOUS NEPHROSTOMY Left 10/30/2017    Procedure: PERCUTANEOUS NEPHROSTOMY;  Left Nephrostomy Tube Change;  Surgeon: Maryann Chavez PA-C;  [facility]     REMOVE PORT  VASCULAR ACCESS Right 1/29/2019    Procedure: Right Port Removal;  Surgeon: Maryann Marin PA-C;  [facility]     Family History   Problem Relation Age of Onset     Hypertension Brother          Exam:  BP (!) 151/86 (BP Location: Right arm, Patient Position: Sitting, Cuff Size: Adult Large)   Pulse 62   Wt 97.9 kg (215 lb 14.4 oz)   SpO2 99%   BMI 34.85 kg/m    215 lbs 14.4 oz  The patient is alert, oriented with a clear sensorium.   Skin shows no lesions or rashes and good turgor.   Head is normocephalic and atraumatic.    Neck shows massive thyromegaly.     Lungs are clear.   Heart shows normal S1 and S2 without murmur or gallop.    Extremities show no edema.      The 10-year ASCVD risk score (Ronald BAIG, et al., 2019) is: 25.6%    Values used to calculate the score:      Age: 57 years      Sex: Female      Is Non- : Yes      Diabetic: Yes      Tobacco smoker: No      Systolic Blood Pressure: 151 mmHg      Is BP treated: Yes      HDL Cholesterol: 56 mg/dL      Total Cholesterol: 230 mg/dL    ASSESSMENT  1 hypertension normal 24 hr BP (115/63)  2 hyperlipidemia not on rosuvastatin  3 huge multinodular goiter euthyroid  4 Diabetes needs metformin  5 status post nephrectomy for atrophic kidney  6 peripheral neuropathy  7 obesity  8 history of cervical cancer    Plan  We will get an updated ultrasound on her thyroid.  We will have her start the rosuvastatin 10 mg daily the metformin 500 mg daily plan to reassess her labs on this in another 3 months along with her thyroid function.    This note was completed using Dragon voice recognition software.      Shamir Pugh MD  General Internal Medicine  Primary Care Center  [phone]

## 2023-07-17 NOTE — NURSING NOTE
Angella St is a 57 year old female that presents in clinic today for the following:     Chief Complaint   Patient presents with     Follow Up     Pt here for follow up        The patient's allergies and medications were reviewed. The patient's vitals were obtained without incident. The patient does not have any other questions for the provider.     Keven Ohara, EMT at 10:14 AM on 7/17/2023.  Primary Care Clinic: [phone]

## 2023-09-07 ENCOUNTER — TELEPHONE (OUTPATIENT)
Dept: INTERNAL MEDICINE | Facility: CLINIC | Age: 58
End: 2023-09-07
Payer: MEDICAID

## 2023-10-07 ENCOUNTER — HEALTH MAINTENANCE LETTER (OUTPATIENT)
Age: 58
End: 2023-10-07

## 2023-10-25 ENCOUNTER — ANCILLARY PROCEDURE (OUTPATIENT)
Dept: ULTRASOUND IMAGING | Facility: CLINIC | Age: 58
End: 2023-10-25
Attending: INTERNAL MEDICINE
Payer: MEDICAID

## 2023-10-25 ENCOUNTER — OFFICE VISIT (OUTPATIENT)
Dept: OPHTHALMOLOGY | Facility: CLINIC | Age: 58
End: 2023-10-25
Attending: INTERNAL MEDICINE
Payer: MEDICAID

## 2023-10-25 ENCOUNTER — LAB (OUTPATIENT)
Dept: LAB | Facility: CLINIC | Age: 58
End: 2023-10-25
Payer: MEDICAID

## 2023-10-25 ENCOUNTER — OFFICE VISIT (OUTPATIENT)
Dept: INTERNAL MEDICINE | Facility: CLINIC | Age: 58
End: 2023-10-25
Payer: MEDICAID

## 2023-10-25 VITALS
SYSTOLIC BLOOD PRESSURE: 130 MMHG | BODY MASS INDEX: 34.42 KG/M2 | HEIGHT: 66 IN | WEIGHT: 214.2 LBS | HEART RATE: 72 BPM | DIASTOLIC BLOOD PRESSURE: 65 MMHG | OXYGEN SATURATION: 100 %

## 2023-10-25 DIAGNOSIS — H10.13 ALLERGIC CONJUNCTIVITIS OF BOTH EYES: ICD-10-CM

## 2023-10-25 DIAGNOSIS — H57.89 EYE IRRITATION: Primary | ICD-10-CM

## 2023-10-25 DIAGNOSIS — H04.123 DRY EYE SYNDROME OF BOTH EYES: Primary | ICD-10-CM

## 2023-10-25 DIAGNOSIS — E04.2 NONTOXIC MULTINODULAR GOITER: ICD-10-CM

## 2023-10-25 DIAGNOSIS — H11.041 STATIONARY PERIPHERAL PTERYGIUM OF RIGHT EYE: ICD-10-CM

## 2023-10-25 DIAGNOSIS — H25.13 NUCLEAR SENILE CATARACT OF BOTH EYES: ICD-10-CM

## 2023-10-25 DIAGNOSIS — E11.9 TYPE 2 DIABETES MELLITUS WITHOUT COMPLICATION, WITHOUT LONG-TERM CURRENT USE OF INSULIN (H): ICD-10-CM

## 2023-10-25 DIAGNOSIS — H11.152 PINGUECULA OF LEFT EYE: ICD-10-CM

## 2023-10-25 DIAGNOSIS — R73.02 IGT (IMPAIRED GLUCOSE TOLERANCE): ICD-10-CM

## 2023-10-25 DIAGNOSIS — Z01.00 DIABETIC EYE EXAM (H): Primary | ICD-10-CM

## 2023-10-25 DIAGNOSIS — E11.9 DIABETIC EYE EXAM (H): ICD-10-CM

## 2023-10-25 DIAGNOSIS — H52.03 HYPEROPIA OF BOTH EYES: ICD-10-CM

## 2023-10-25 DIAGNOSIS — E11.9 DIABETIC EYE EXAM (H): Primary | ICD-10-CM

## 2023-10-25 DIAGNOSIS — Z01.00 DIABETIC EYE EXAM (H): ICD-10-CM

## 2023-10-25 DIAGNOSIS — I10 BENIGN ESSENTIAL HYPERTENSION: ICD-10-CM

## 2023-10-25 DIAGNOSIS — E04.1 THYROID NODULE: ICD-10-CM

## 2023-10-25 LAB
ANION GAP SERPL CALCULATED.3IONS-SCNC: 10 MMOL/L (ref 7–15)
BUN SERPL-MCNC: 11.5 MG/DL (ref 8–23)
CALCIUM SERPL-MCNC: 9.5 MG/DL (ref 8.6–10)
CHLORIDE SERPL-SCNC: 104 MMOL/L (ref 98–107)
CHOLEST SERPL-MCNC: 204 MG/DL
CREAT SERPL-MCNC: 0.63 MG/DL (ref 0.51–0.95)
CREAT UR-MCNC: 70.4 MG/DL
DEPRECATED HCO3 PLAS-SCNC: 27 MMOL/L (ref 22–29)
EGFRCR SERPLBLD CKD-EPI 2021: >90 ML/MIN/1.73M2
GLUCOSE SERPL-MCNC: 137 MG/DL (ref 70–99)
HBA1C MFR BLD: 7.4 %
HDLC SERPL-MCNC: 57 MG/DL
LDLC SERPL CALC-MCNC: 132 MG/DL
MICROALBUMIN UR-MCNC: 27.9 MG/L
MICROALBUMIN/CREAT UR: 39.63 MG/G CR (ref 0–25)
NONHDLC SERPL-MCNC: 147 MG/DL
POTASSIUM SERPL-SCNC: 3.8 MMOL/L (ref 3.4–5.3)
SODIUM SERPL-SCNC: 141 MMOL/L (ref 135–145)
TRIGL SERPL-MCNC: 76 MG/DL
TSH SERPL DL<=0.005 MIU/L-ACNC: 1.18 UIU/ML (ref 0.3–4.2)

## 2023-10-25 PROCEDURE — 82570 ASSAY OF URINE CREATININE: CPT | Performed by: INTERNAL MEDICINE

## 2023-10-25 PROCEDURE — 83036 HEMOGLOBIN GLYCOSYLATED A1C: CPT | Performed by: INTERNAL MEDICINE

## 2023-10-25 PROCEDURE — G0463 HOSPITAL OUTPT CLINIC VISIT: HCPCS | Performed by: OPTOMETRIST

## 2023-10-25 PROCEDURE — 36415 COLL VENOUS BLD VENIPUNCTURE: CPT | Performed by: PATHOLOGY

## 2023-10-25 PROCEDURE — 76536 US EXAM OF HEAD AND NECK: CPT | Mod: GC | Performed by: STUDENT IN AN ORGANIZED HEALTH CARE EDUCATION/TRAINING PROGRAM

## 2023-10-25 PROCEDURE — 99214 OFFICE O/P EST MOD 30 MIN: CPT | Performed by: INTERNAL MEDICINE

## 2023-10-25 PROCEDURE — 99000 SPECIMEN HANDLING OFFICE-LAB: CPT | Performed by: PATHOLOGY

## 2023-10-25 PROCEDURE — 80048 BASIC METABOLIC PNL TOTAL CA: CPT | Performed by: PATHOLOGY

## 2023-10-25 PROCEDURE — 84443 ASSAY THYROID STIM HORMONE: CPT | Performed by: PATHOLOGY

## 2023-10-25 PROCEDURE — 92004 COMPRE OPH EXAM NEW PT 1/>: CPT | Performed by: OPTOMETRIST

## 2023-10-25 PROCEDURE — 92015 DETERMINE REFRACTIVE STATE: CPT

## 2023-10-25 PROCEDURE — 80061 LIPID PANEL: CPT | Performed by: PATHOLOGY

## 2023-10-25 PROCEDURE — 92134 CPTRZ OPH DX IMG PST SGM RTA: CPT | Performed by: OPTOMETRIST

## 2023-10-25 RX ORDER — CARBOXYMETHYLCELLULOSE SODIUM 5 MG/ML
1 SOLUTION/ DROPS OPHTHALMIC
Qty: 400 EACH | Refills: 11 | Status: SHIPPED | OUTPATIENT
Start: 2023-10-25

## 2023-10-25 RX ORDER — OLOPATADINE HYDROCHLORIDE 2 MG/ML
1 SOLUTION/ DROPS OPHTHALMIC DAILY
Qty: 15 ML | Refills: 11 | Status: SHIPPED | OUTPATIENT
Start: 2023-10-25

## 2023-10-25 RX ORDER — AMLODIPINE BESYLATE 5 MG/1
5 TABLET ORAL AT BEDTIME
Qty: 90 TABLET | Refills: 3 | Status: SHIPPED | OUTPATIENT
Start: 2023-10-25 | End: 2024-01-31

## 2023-10-25 ASSESSMENT — SLIT LAMP EXAM - LIDS
COMMENTS: NORMAL
COMMENTS: NORMAL

## 2023-10-25 ASSESSMENT — CONF VISUAL FIELD
OD_SUPERIOR_NASAL_RESTRICTION: 0
OS_SUPERIOR_NASAL_RESTRICTION: 0
OD_SUPERIOR_TEMPORAL_RESTRICTION: 0
OD_INFERIOR_NASAL_RESTRICTION: 0
METHOD: COUNTING FINGERS
OD_INFERIOR_TEMPORAL_RESTRICTION: 0
OS_SUPERIOR_TEMPORAL_RESTRICTION: 0
OS_INFERIOR_TEMPORAL_RESTRICTION: 0
OS_NORMAL: 1
OD_NORMAL: 1
OS_INFERIOR_NASAL_RESTRICTION: 0

## 2023-10-25 ASSESSMENT — VISUAL ACUITY
OS_CC: 20/30
OD_PH_CC: 20/25
METHOD: SNELLEN - LINEAR
OD_CC: 20/30
OS_PH_CC+: +3
OD_CC+: -2
OS_PH_CC: 20/30
OS_CC+: +1
CORRECTION_TYPE: GLASSES

## 2023-10-25 ASSESSMENT — EXTERNAL EXAM - RIGHT EYE: OD_EXAM: NORMAL

## 2023-10-25 ASSESSMENT — REFRACTION_WEARINGRX
OS_AXIS: 015
OS_SPHERE: +1.75
OD_CYLINDER: +0.75
OS_CYLINDER: +1.00
OS_ADD: +2.25
OD_AXIS: 169
OD_ADD: +2.25
OD_SPHERE: +1.75

## 2023-10-25 ASSESSMENT — REFRACTION_MANIFEST
OS_ADD: +2.50
OD_AXIS: 071
OD_CYLINDER: +1.50
OD_ADD: +2.50
OS_SPHERE: +1.75
OD_SPHERE: +3.00
OS_CYLINDER: +0.50
OS_AXIS: 010

## 2023-10-25 ASSESSMENT — TONOMETRY
OD_IOP_MMHG: 17
IOP_METHOD: TONOPEN
OS_IOP_MMHG: 16

## 2023-10-25 ASSESSMENT — EXTERNAL EXAM - LEFT EYE: OS_EXAM: NORMAL

## 2023-10-25 NOTE — PROGRESS NOTES
Angella is a 57 year old that presents in clinic today for the following:     Chief Complaint   Patient presents with     Follow Up     Pt here for 3 month follow up           10/25/2023     8:24 AM   Additional Questions   Roomed by Tran Cisneros   Accompanied by N/A       Screenings as of today     PHQ-2 Total Score (Adult) - Positive if 3 or more points; Administer   PHQ-9 if positive 0        Tran Cisneros at 8:32 AM on 10/25/2023    HPI  57-year-old returns today for reevaluation of her blood pressure cholesterol and blood sugar.  She did not start either the metformin or the rosuvastatin.  Instead she altered her diet increase the fruits and vegetables in the diet and increased her physical activity and exercise.  She has manage radiculopathy pounds in weight with this and we did review her labs today showing some improvement with the triglycerides and cholesterol values.  She is more physically active otherwise feeling well.  Although she was in the reverse for check-in apparently she was not sent to x-ray for the ultrasound of the thyroid and will get that done later this morning.  Otherwise she has been feeling well other than some bilateral eye irritation.  This has been present for the last week or 2 not associated any visual symptoms but is associated with a scratchiness and irritation in the eyes bilaterally.  No past history of similar problems  Past Medical History:   Diagnosis Date     Anemia      Cancer (H)     Metastatic squamous cell cervical carcinoma     Chronic kidney disease      Hypertension 2007     Obesity     BMI >30     Past Surgical History:   Procedure Laterality Date     COMBINED CYSTOSCOPY, INSERT STENT URETER(S) Left 6/13/2016    Procedure: COMBINED CYSTOSCOPY, INSERT STENT URETER(S);  Surgeon: Deja Salinas MD;  [facility]     CYSTOSCOPY, RETROGRADES, COMBINED Left 6/13/2016    Procedure: COMBINED CYSTOSCOPY, RETROGRADES;  Surgeon: Deja Salinas MD;   "[facility]     DAVINCI NEPHRECTOMY Left 5/3/2018    Procedure: DAVINCI XI NEPHRECTOMY;  DAVINCI XI LEFT NEPHRECTOMY;  Surgeon: Deja Salinas MD;  Location: [facility]     GENITOURINARY SURGERY  5/2015    PNT placement     HYSTERECTOMY  2011    [place]     IR PORT REMOVAL RIGHT  1/29/2019     PERCUTANEOUS NEPHROSTOMY Left 3/9/2017    Procedure: PERCUTANEOUS NEPHROSTOMY;  Surgeon: Bridger Meyer PA-C;  [facility]     PERCUTANEOUS NEPHROSTOMY Left 6/28/2017    Procedure: PERCUTANEOUS NEPHROSTOMY;  Left Nephrostomy Tube Change;  Surgeon: Bridger Meyer PA-C;  [facility]     PERCUTANEOUS NEPHROSTOMY Left 10/30/2017    Procedure: PERCUTANEOUS NEPHROSTOMY;  Left Nephrostomy Tube Change;  Surgeon: Maryann Chavez PA-C;  [facility]     REMOVE PORT VASCULAR ACCESS Right 1/29/2019    Procedure: Right Port Removal;  Surgeon: Maryann Marin PA-C;  [facility]     Family History   Problem Relation Age of Onset     Hypertension Brother          Exam:  /65 (BP Location: Left arm, Patient Position: Sitting, Cuff Size: Adult Large)   Pulse 72   Ht 1.676 m (5' 5.98\")   Wt 97.2 kg (214 lb 3.2 oz)   SpO2 100%   BMI 34.59 kg/m    214 lbs 3.2 oz  The patient is alert, oriented with a clear sensorium.   Skin shows no lesions or rashes and good turgor.   Eyes show bilateral arcus senilis she has some mild conjunctival injection has some melanotic pigmentation in both conjunctiva and some conjunctival extrusion onto the lens on the right.  Funduscopic examination is unremarkable.  Head is normocephalic and atraumatic.    Neck shows no massive thyromegaly with apparent large node in the lower right pole.     Lungs are clear.   Heart shows normal S1 and S2 without murmur or gallop.    Extremities show no edema.    Labs reviewed:  Results for orders placed or performed in visit on 10/25/23   Lipid Profile     Status: Abnormal   Result Value Ref Range    Cholesterol 204 (H) " <200 mg/dL    Triglycerides 76 <150 mg/dL    Direct Measure HDL 57 >=50 mg/dL    LDL Cholesterol Calculated 132 (H) <=100 mg/dL    Non HDL Cholesterol 147 (H) <130 mg/dL    Narrative    Cholesterol  Desirable:  <200 mg/dL    Triglycerides  Normal:  Less than 150 mg/dL  Borderline High:  150-199 mg/dL  High:  200-499 mg/dL  Very High:  Greater than or equal to 500 mg/dL    Direct Measure HDL  Female:  Greater than or equal to 50 mg/dL   Male:  Greater than or equal to 40 mg/dL    LDL Cholesterol  Desirable:  <100mg/dL  Above Desirable:  100-129 mg/dL   Borderline High:  130-159 mg/dL   High:  160-189 mg/dL   Very High:  >= 190 mg/dL    Non HDL Cholesterol  Desirable:  130 mg/dL  Above Desirable:  130-159 mg/dL  Borderline High:  160-189 mg/dL  High:  190-219 mg/dL  Very High:  Greater than or equal to 220 mg/dL   Basic metabolic panel     Status: Abnormal   Result Value Ref Range    Sodium 141 135 - 145 mmol/L    Potassium 3.8 3.4 - 5.3 mmol/L    Chloride 104 98 - 107 mmol/L    Carbon Dioxide (CO2) 27 22 - 29 mmol/L    Anion Gap 10 7 - 15 mmol/L    Urea Nitrogen 11.5 8.0 - 23.0 mg/dL    Creatinine 0.63 0.51 - 0.95 mg/dL    GFR Estimate >90 >60 mL/min/1.73m2    Calcium 9.5 8.6 - 10.0 mg/dL    Glucose 137 (H) 70 - 99 mg/dL   TSH with free T4 reflex     Status: Normal   Result Value Ref Range    TSH 1.18 0.30 - 4.20 uIU/mL         The 10-year ASCVD risk score (Ronald BAIG, et al., 2019) is: 15.2%    Values used to calculate the score:      Age: 57 years      Sex: Female      Is Non- : Yes      Diabetic: Yes      Tobacco smoker: No      Systolic Blood Pressure: 130 mmHg      Is BP treated: Yes      HDL Cholesterol: 57 mg/dL      Total Cholesterol: 204 mg/dL    ASSESSMENT  1 hypertension good control  2 hyperlipidemia not on rosuvastatin  3 huge multinodular goiter euthyroid needs enddocrinology and bx  4 Diabetes not on metformin  5 status post nephrectomy for atrophic kidney  6 peripheral  neuropathy  7 obesity  8 history of cervical cancer  9 Bilateral eye irritation uncertain etiology    Plan  We will refer to the eye clinic regarding her eye symptoms.  We will have her start the metformin and after discussing this she agreed.  She will hold off on the rosuvastatin for now she is recently had her influenza vaccine she declined the COVID vaccination.  We will plan to have her follow-up in 3 months for repeat lipids A1c and BMP    This note was completed using Dragon voice recognition software.      Shamir Pugh MD  General Internal Medicine  [facility]  [phone]

## 2023-10-25 NOTE — NURSING NOTE
Chief Complaints and History of Present Illnesses   Patient presents with    New Patient     Chief Complaint(s) and History of Present Illness(es)       New Patient              Laterality: both eyes    Associated symptoms: burning.  Negative for flashes and floaters    Treatments tried: eye drops and artificial tears    Pain scale: 0/10              Comments    New patient for DM eye exam.    The patient notes she has burning eyes.  The Patonol  seems to stop the burning.  She notes her vision is stable with glasses.  Lab Results       Component                Value               Date                       A1C                      7.4                 10/25/2023                 A1C                      8.1                 05/15/2023                 A1C                      6.2                 05/05/2018                 A1C                      5.6                 01/29/2016             Leslie Mullins, COA, COA 1:44 PM 10/25/2023

## 2023-10-25 NOTE — COMMUNITY RESOURCES LIST (ENGLISH)
10/25/2023   [facility] - Outpatient Clinics  N/A  For additional resource needs, please contact your health insurance member services or your primary care team.  Phone: [phone]   Email: N/A   Address: [address] [place] [address]   Hours: N/A        Transportation       Free or low-cost transportation  1  [facility] - Bus Passes - Free or low-cost transportation Distance: 7.44 miles      In-Person   [address] [place] [address]  Language: English  Hours: Tue 9:30 AM - 11:30 AM , Thu 9:30 AM - 11:30 AM  Fees: Free   Phone: (749) 430-6161 Email: info@[employer] Website: http://www.[employer]/     2  [facility] Distance: 7.86 miles      In-Person   [address] [place] [address]  Language: English  Hours: Mon - Wed 9:30 AM - 12:00 PM , Mon - Wed 1:00 PM - 2:00 PM Appt. Only  Fees: Free   Phone: (770) 611-4811 Email: info@saintolaf.org Website: http://www.saintolaf.org/     Transportation to medical appointments  3  [place] [employer] Distance: 1.05 miles      In-Person   [address] [facility] [address] [place] [address]  Language: English  Hours: Mon - Sat 4:00 AM - 6:00 PM  Fees: Insurance, Self Pay   Phone: (991) 310-7160 Email: delighttransportation1@MobileWebsites.Jenn Rykert Website: https://helpmeconnect.web.[facility].mn./HelpMeConnect/Providers/Delight_Transportation/Transportation/2?returnUrl=%2FHelpMeConnect%2FSearch%2FBasicNeeds%2FTransportation%2FTransportationServices%3Fstart%3D40     4  [facility] -   Family Wellness (AIFW) Distance: 3.68 miles      In-Person   [address] [place] [address]  Language: Nepali, Belarusian, English, Gujarati, Dank, Vietnamese, Maori, Georgian, Wolof, Mohawk  Hours: Mon - Wed 9:00 AM - 5:00 PM , Thu 12:00 PM - 6:00 PM , Fri 9:00 AM - 5:00 PM , Sun 10:30 AM - 2:00 PM Appt. Only  Fees: Free   Phone: (894) 227-8384 Email: info@[employer].org Website: https://www.ObsEvaw.org/          Important Numbers &  Websites       [facility]   [address].org  Poison Control   (450) 760-1796 [place]  Suicide and Crisis Lifeline   987 988lifeline.org  Childhelp [place] Child Abuse Hotline   789.296.7956 Childhelphotline.org  [place] Sexual Assault Hotline   (140) 232-8677 (HOPE) [facility] Runaway Safeline   (743) 545-7771 (RUNAWAY) [facility].org  Pregnancy & Postpartum Support [place]   Call/text [phone] Ppsupport[place].org  Substance Abuse National Helpline ([facility]   985-707-HELP (5057) Findtreatment.gov  Emergency Services   912

## 2023-10-25 NOTE — PROGRESS NOTES
HPI:  Patient is referred by PCP for eye irritation. Patient complains of burning in both eyes. Patanol helps.     Social history. From [facility] (moved here in 2016). 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     Eye Medications:  Patanol both eyes    Assessment and Plan:    #   Dry Eye Syndrome, both eyes.  Contributes to fluctuating vision and red eyes.   Symptoms of dry eyes include blurry vision, eye pain, grittiness, burning, redness, eye irritation, and tearing. The goal is not to eliminate, but to improve symptoms.   Preservative free artificial tears every 2 hours daily both eyes. Refresh or Systane.    Start FML 4 times daily both eyes for 10 days. Started 10/25/2023.     #   Pterygium, right eye.   Contributing to redness and dry eyes. Treat dry eyes.     #   Pinguecula, left eye.   Treat dry eyes.     #   Allergic Conjunctivitis, both eyes.   Signs and symptoms of allergic conjunctivitis includes itching and discharge.   Pataday extra strength once daily, both eyes.     #   No diabetic, retinopathy, both eyes.   Macular OCT 10/25/2023: Both eyes: no cme  Last HA1C was 7.4 on 10/25/2023.   Goal is to keep HA1C under 7.0 to prevent blindness.   Recommend annual diabetic eye exam with macular OCT     #   Hyperopia, both eyes.   Glasses prescription given.     #   Cataract, both eyes.   Mildly visually significant - still okay to monitor for now.   Recommend UV protection.     #   Glaucoma suspect due to cupping, ethnicity, and age, both eyes.   We discussed that glaucoma is a treatable blinding disease. There is no cure for glaucoma. There are treatments to slow down progression. The only way to treat glaucoma is to lower the eye pressure with eye drops,  lasers, and/or surgeries. Recommend regular follow ups to rule out glaucoma.   Return for baseline visual field 24-2, optic nerve OCT, pachymetry, and gonioscopy.          Patient consented to a dilated eye exam:    Yes. Side effects discussed.    Medical History:  Past Medical History:   Diagnosis Date    Anemia     Cancer (H)     Metastatic squamous cell cervical carcinoma    Chronic kidney disease     Hypertension 2007    Obesity     BMI >30       Medications:  Current Outpatient Medications   Medication Sig Dispense Refill    amLODIPine (NORVASC) 5 MG tablet Take 1 tablet (5 mg) by mouth at bedtime 90 tablet 3    metFORMIN (GLUCOPHAGE) 500 MG tablet Take 1 tablet (500 mg) by mouth daily (with breakfast) 90 tablet 3    olopatadine (PATANOL) 0.1 % ophthalmic solution Place 1 drop into both eyes 2 times daily 5 mL 0    rosuvastatin (CRESTOR) 10 MG tablet Take 1 tablet (10 mg) by mouth daily 90 tablet 3   Complete documentation of historical and exam elements from today's encounter can be found in the full encounter summary report (not reduplicated in this progress note). I personally obtained the chief complaint(s) and history of present illness.  I confirmed and edited as necessary the review of systems, past medical/surgical history, family history, social history, and examination findings as documented by others; and I examined the patient myself. I personally reviewed the relevant tests, images, and reports as documented above. I formulated and edited as necessary the assessment and plan and discussed the findings and management plan with the patient and family. - Leland Sena OD

## 2023-10-25 NOTE — COMMUNITY RESOURCES LIST (ENGLISH)
10/25/2023   [facility] [place] - Outpatient Clinics  N/A  For additional resource needs, please contact your health insurance member services or your primary care team.  Phone: [phone]   Email: N/A   Address: [address] [place] [address]   Hours: N/A        Transportation       Free or low-cost transportation  1  [facility] - Bus Passes - Free or low-cost transportation Distance: 7.44 miles      In-Person   [address] [place] [address]  Language: English  Hours: Tue 9:30 AM - 11:30 AM , Thu 9:30 AM - 11:30 AM  Fees: Free   Phone: (613) 928-9230 Email: info@[employer] Website: http://www.ZipMatch/     2  [facility] Distance: 7.86 miles      In-Person   [address] [place] [address]  Language: English  Hours: Mon - Wed 9:30 AM - 12:00 PM , Mon - Wed 1:00 PM - 2:00 PM Appt. Only  Fees: Free   Phone: (911) 720-4663 Email: info@saintolaf.org Website: http://www.saintolaf.org/     Transportation to medical appointments  3  [place] [employer] Distance: 1.05 miles      In-Person   [address] [facility] [address] [place] [address]  Language: English  Hours: Mon - Sat 4:00 AM - 6:00 PM  Fees: Insurance, Self Pay   Phone: (883) 850-9819 Email: delighttransportation1@iPerceptions.[employer] Website: https://helpmeconnect.web.[facility].mn./HelpMeConnect/Providers/Delight_Transportation/Transportation/2?returnUrl=%2FHelpMeConnect%2FSearch%2FBasicNeeds%2FTransportation%2FTransportationServices%3Fstart%3D40     4  [facility] -   Family Wellness (AIFW) Distance: 3.68 miles      In-Person   [address] [place] [address]  Language: Faroese, Wolof, English, Gujarati, Dank, Thai, Yakut, Japanese, Pashto, Sami  Hours: Mon - Wed 9:00 AM - 5:00 PM , Thu 12:00 PM - 6:00 PM , Fri 9:00 AM - 5:00 PM , Sun 10:30 AM - 2:00 PM Appt. Only  Fees: Free   Phone: (757) 752-4771 Email: info@[employer].org Website: https://www.[employer]w.org/          Important Numbers &  Websites       [facility]   [address].org  Poison Control   (980) 602-1123 [place]po[place].org  Suicide and Crisis Lifeline   980 988lifeline.org  Childhelp [place] Child Abuse Hotline   976.946.5174 Childhelphotline.org  [place] Sexual Assault Hotline   (898) 180-4785 (HOPE) [facility] Runaway Safeline   (676) 268-4645 (RUNAWAY) 1800[facility].org  Pregnancy & Postpartum Support [place]   Call/text [phone] Ppsupportmn.org  Substance Abuse National Helpline ([facility]   035-166-HELP (1055) Findtreatment.gov  Emergency Services   91

## 2023-10-25 NOTE — COMMUNITY RESOURCES LIST (ENGLISH)
10/25/2023   [facility] - Outpatient Clinics  N/A  For additional resource needs, please contact your health insurance member services or your primary care team.  Phone: [phone]   Email: N/A   Address: [address] [place] [address]   Hours: N/A        Transportation       Free or low-cost transportation  1  [facility] - Bus Passes - Free or low-cost transportation Distance: 7.44 miles      In-Person   [address] [place] [address]  Language: English  Hours: Tue 9:30 AM - 11:30 AM , Thu 9:30 AM - 11:30 AM  Fees: Free   Phone: (933) 574-3246 Email: info@[employer] Website: http://www.[employer]/     2  [facility] Distance: 7.86 miles      In-Person   [address] [place] [address]  Language: English  Hours: Mon - Wed 9:30 AM - 12:00 PM , Mon - Wed 1:00 PM - 2:00 PM Appt. Only  Fees: Free   Phone: (137) 498-2415 Email: info@saintolaf.org Website: http://www.saintolaf.org/     Transportation to medical appointments  3  [place] [employer] Distance: 1.05 miles      In-Person   [address] [facility] [address] [place] [address]  Language: English  Hours: Mon - Sat 4:00 AM - 6:00 PM  Fees: Insurance, Self Pay   Phone: (129) 932-8606 Email: delighttransportation1@[employer].Nimia Website: https://helpmeconnect.web.[facility].Pending sale to [facility].mn./HelpMeConnect/Providers/Delight_Transportation/Transportation/2?returnUrl=%2FHelpMeConnect%2FSearch%2FBasicNeeds%2FTransportation%2FTransportationServices%3Fstart%3D40     4  [facility] -   Family Wellness (AIFW) Distance: 3.68 miles      In-Person   [address] [place] [address]  Language: Azeri, Yakut, English, Gujarati, Dank, Arabic, Urdu, Lao, Estonian, Tamazight  Hours: Mon - Wed 9:00 AM - 5:00 PM , Thu 12:00 PM - 6:00 PM , Fri 9:00 AM - 5:00 PM , Sun 10:30 AM - 2:00 PM Appt. Only  Fees: Free   Phone: (831) 442-5816 Email: info@[employer].org Website: https://www.[employer].w.org/          Important Numbers &  Websites       [facility]   [address].org  Poison Control   (347) 701-6560 [place]po[place]  Suicide and Crisis Lifeline   987 988lifeline.org  Childhelp [place] Child Abuse Hotline   734.489.1396 Childhelphotline.org  [place] Sexual Assault Hotline   (584) 273-1163 (HOPE) [facility] Runaway Safeline   (369) 730-7958 (RUNAWAY) 1800runa[facility].org  Pregnancy & Postpartum Support [place]   Call/text [phone] Ppsupportmn.org  Substance Abuse National Helpline ([facility]   814-870-HELP (1112) Findtreatment.gov  Emergency Services   918

## 2024-01-31 ENCOUNTER — OFFICE VISIT (OUTPATIENT)
Dept: INTERNAL MEDICINE | Facility: CLINIC | Age: 59
End: 2024-01-31
Payer: MEDICAID

## 2024-01-31 ENCOUNTER — LAB (OUTPATIENT)
Dept: LAB | Facility: CLINIC | Age: 59
End: 2024-01-31
Payer: MEDICAID

## 2024-01-31 VITALS
HEART RATE: 68 BPM | WEIGHT: 214.3 LBS | HEIGHT: 66 IN | DIASTOLIC BLOOD PRESSURE: 87 MMHG | BODY MASS INDEX: 34.44 KG/M2 | SYSTOLIC BLOOD PRESSURE: 159 MMHG | OXYGEN SATURATION: 100 %

## 2024-01-31 DIAGNOSIS — E04.1 THYROID NODULE: ICD-10-CM

## 2024-01-31 DIAGNOSIS — R73.02 IGT (IMPAIRED GLUCOSE TOLERANCE): ICD-10-CM

## 2024-01-31 DIAGNOSIS — E04.2 NONTOXIC MULTINODULAR GOITER: Primary | ICD-10-CM

## 2024-01-31 DIAGNOSIS — E11.9 TYPE 2 DIABETES MELLITUS WITHOUT COMPLICATION, WITHOUT LONG-TERM CURRENT USE OF INSULIN (H): ICD-10-CM

## 2024-01-31 DIAGNOSIS — E78.5 HYPERLIPIDEMIA, UNSPECIFIED HYPERLIPIDEMIA TYPE: ICD-10-CM

## 2024-01-31 DIAGNOSIS — Z12.11 SPECIAL SCREENING FOR MALIGNANT NEOPLASMS, COLON: ICD-10-CM

## 2024-01-31 DIAGNOSIS — I10 BENIGN ESSENTIAL HYPERTENSION: ICD-10-CM

## 2024-01-31 LAB
ANION GAP SERPL CALCULATED.3IONS-SCNC: 10 MMOL/L (ref 7–15)
BUN SERPL-MCNC: 9.9 MG/DL (ref 8–23)
CALCIUM SERPL-MCNC: 9.5 MG/DL (ref 8.8–10.2)
CHLORIDE SERPL-SCNC: 105 MMOL/L (ref 98–107)
CHOLEST SERPL-MCNC: 214 MG/DL
CREAT SERPL-MCNC: 0.56 MG/DL (ref 0.51–0.95)
DEPRECATED HCO3 PLAS-SCNC: 28 MMOL/L (ref 22–29)
EGFRCR SERPLBLD CKD-EPI 2021: >90 ML/MIN/1.73M2
FASTING STATUS PATIENT QL REPORTED: YES
GLUCOSE SERPL-MCNC: 129 MG/DL (ref 70–99)
HBA1C MFR BLD: 7.7 %
HDLC SERPL-MCNC: 58 MG/DL
LDLC SERPL CALC-MCNC: 133 MG/DL
NONHDLC SERPL-MCNC: 156 MG/DL
POTASSIUM SERPL-SCNC: 3.9 MMOL/L (ref 3.4–5.3)
SODIUM SERPL-SCNC: 143 MMOL/L (ref 135–145)
TRIGL SERPL-MCNC: 113 MG/DL

## 2024-01-31 PROCEDURE — 83036 HEMOGLOBIN GLYCOSYLATED A1C: CPT | Performed by: INTERNAL MEDICINE

## 2024-01-31 PROCEDURE — 99214 OFFICE O/P EST MOD 30 MIN: CPT | Performed by: INTERNAL MEDICINE

## 2024-01-31 PROCEDURE — 80061 LIPID PANEL: CPT | Performed by: PATHOLOGY

## 2024-01-31 PROCEDURE — 99000 SPECIMEN HANDLING OFFICE-LAB: CPT | Performed by: PATHOLOGY

## 2024-01-31 PROCEDURE — 36415 COLL VENOUS BLD VENIPUNCTURE: CPT | Performed by: PATHOLOGY

## 2024-01-31 PROCEDURE — 80048 BASIC METABOLIC PNL TOTAL CA: CPT | Performed by: PATHOLOGY

## 2024-01-31 RX ORDER — AMLODIPINE BESYLATE 10 MG/1
10 TABLET ORAL AT BEDTIME
Qty: 90 TABLET | Refills: 3 | Status: SHIPPED | OUTPATIENT
Start: 2024-01-31

## 2024-01-31 RX ORDER — ROSUVASTATIN CALCIUM 10 MG/1
10 TABLET, COATED ORAL DAILY
Qty: 90 TABLET | Refills: 3 | Status: SHIPPED | OUTPATIENT
Start: 2024-01-31

## 2024-01-31 NOTE — PROGRESS NOTES
Angella is a 58 year old that presents in clinic today for the following:     Chief Complaint   Patient presents with    Follow Up     Pt would like to discuss sugar and cholesterol levels.           1/31/2024     9:29 AM   Additional Questions   Roomed by Tran Cisneros   Accompanied by N/A     Screenings as of today     PHQ-2 Total Score (Adult) - Positive if 3 or more points; Administer   PHQ-9 if positive 0        Tran Cisneros at 9:39 AM on 1/31/2024    HPI  58-year-old presents today for reevaluation.  She stopped taking the rosuvastatin and stopped taking the metformin.  Felt the metformin was causing some aching and discomfort in her back.  She has not taken the rosuvastatin at this time.  She feels the back symptoms all resolved once he went off the metformin.  She is interested in alternatives for blood sugar control and has investigated berberine.  We reviewed a  study on this and discussed the potential use of Jardiance 10 mg daily elected to prescribe the Jardiance.  She has not seen endocrinology she has been frustrated regarding her thyroid states that it has been biopsied in the past however recent ultrasound showed 2 suspicious nodules and this needs to be further evaluated and potentially rebiopsy and we discussed seeing endocrinology.  She did not want to have a biopsy done at this point.  Otherwise blood pressure is up today and we discussed potential use of an ARB but she would prefer to increase the dose of the amlodipine.  Will also reassess her labs today including the cholesterol and A1c off the medications.  Past Medical History:   Diagnosis Date    Anemia     Cancer (H)     Metastatic squamous cell cervical carcinoma    Chronic kidney disease     Hypertension 2007    Obesity     BMI >30     Past Surgical History:   Procedure Laterality Date    COMBINED CYSTOSCOPY, INSERT STENT URETER(S) Left 6/13/2016    Procedure: COMBINED CYSTOSCOPY, INSERT STENT URETER(S);  Surgeon: Deja Salinas  "MD Justina;  [facility]    CYSTOSCOPY, RETROGRADES, COMBINED Left 6/13/2016    Procedure: COMBINED CYSTOSCOPY, RETROGRADES;  Surgeon: Deja Salinas MD;  [facility]    DAVINCI NEPHRECTOMY Left 5/3/2018    Procedure: DAVINCI XI NEPHRECTOMY;  DAVINCI XI LEFT NEPHRECTOMY;  Surgeon: Deja Salinas MD;  Location: [facility]    GENITOURINARY SURGERY  5/2015    PNT placement    HYSTERECTOMY  2011    [place]    IR PORT REMOVAL RIGHT  1/29/2019    PERCUTANEOUS NEPHROSTOMY Left 3/9/2017    Procedure: PERCUTANEOUS NEPHROSTOMY;  Surgeon: Bridger Meyer PA-C;  [facility]    PERCUTANEOUS NEPHROSTOMY Left 6/28/2017    Procedure: PERCUTANEOUS NEPHROSTOMY;  Left Nephrostomy Tube Change;  Surgeon: Bridger Meyer PA-C;  [facility]    PERCUTANEOUS NEPHROSTOMY Left 10/30/2017    Procedure: PERCUTANEOUS NEPHROSTOMY;  Left Nephrostomy Tube Change;  Surgeon: Maryann Chavez PA-C;  [facility]    REMOVE PORT VASCULAR ACCESS Right 1/29/2019    Procedure: Right Port Removal;  Surgeon: Maryann Marin PA-C;  [facility]     Family History   Problem Relation Age of Onset    Hypertension Brother          Exam:  BP (!) 159/87 (BP Location: Right arm, Patient Position: Sitting, Cuff Size: Adult Regular)   Pulse 68   Ht 1.676 m (5' 5.98\")   Wt 97.2 kg (214 lb 4.8 oz)   SpO2 100%   BMI 34.61 kg/m    214 lbs 4.8 oz  The patient is alert, oriented with a clear sensorium.   Skin shows no lesions or rashes and good turgor.   Head is normocephalic and atraumatic.    Neck shows massive thyromegaly.     Lungs are clear.   Heart shows normal S1 and S2 without murmur or gallop.      Labs reviewed:  Results for orders placed or performed in visit on 01/31/24   Lipid Profile     Status: Abnormal   Result Value Ref Range    Cholesterol 214 (H) <200 mg/dL    Triglycerides 113 <150 mg/dL    Direct Measure HDL 58 >=50 mg/dL    LDL Cholesterol Calculated 133 (H) <=100 mg/dL    Non " HDL Cholesterol 156 (H) <130 mg/dL    Patient Fasting > 8hrs? Yes     Narrative    Cholesterol  Desirable:  <200 mg/dL    Triglycerides  Normal:  Less than 150 mg/dL  Borderline High:  150-199 mg/dL  High:  200-499 mg/dL  Very High:  Greater than or equal to 500 mg/dL    Direct Measure HDL  Female:  Greater than or equal to 50 mg/dL   Male:  Greater than or equal to 40 mg/dL    LDL Cholesterol  Desirable:  <100mg/dL  Above Desirable:  100-129 mg/dL   Borderline High:  130-159 mg/dL   High:  160-189 mg/dL   Very High:  >= 190 mg/dL    Non HDL Cholesterol  Desirable:  130 mg/dL  Above Desirable:  130-159 mg/dL  Borderline High:  160-189 mg/dL  High:  190-219 mg/dL  Very High:  Greater than or equal to 220 mg/dL   Hemoglobin A1c     Status: Abnormal   Result Value Ref Range    Hemoglobin A1C 7.7 (H) <5.7 %   Basic metabolic panel     Status: Abnormal   Result Value Ref Range    Sodium 143 135 - 145 mmol/L    Potassium 3.9 3.4 - 5.3 mmol/L    Chloride 105 98 - 107 mmol/L    Carbon Dioxide (CO2) 28 22 - 29 mmol/L    Anion Gap 10 7 - 15 mmol/L    Urea Nitrogen 9.9 8.0 - 23.0 mg/dL    Creatinine 0.56 0.51 - 0.95 mg/dL    GFR Estimate >90 >60 mL/min/1.73m2    Calcium 9.5 8.8 - 10.2 mg/dL    Glucose 129 (H) 70 - 99 mg/dL       ASSESSMENT  1 hypertension inadequately controlled  2 hyperlipidemia not on rosuvastatin  3 huge multinodular goiter euthyroid needs endocrinology and bx  4 Diabetes does not tolerate metformin  5 status post nephrectomy for atrophic kidney  6 peripheral neuropathy  7 obesity  8 history of cervical cancer    Plan  Will increase amlodipine to 10 mg daily we will reassess her labs today and plan reassessment in another 3 months.  Will refer to endocrinology and start Jardiance 10 mg daily.  She will continue to investigate the berberine.    This note was completed using Dragon voice recognition software.      Shamir Pugh MD  General Internal Medicine  [facility]  [phone]

## 2024-01-31 NOTE — COMMUNITY RESOURCES LIST (ENGLISH)
01/31/2024   [facility] - Outpatient Clinics  N/A  For additional resource needs, please contact your health insurance member services or your primary care team.  Phone: [phone]   Email: N/A   Address: [facility] [place] [address]   Hours: N/A        Food and Nutrition       Food pantry  1  [facility] Emergency Assistance Response (NEAR) - Food Shelf Distance: 1.2 miles      Pickup   [address] [place] [address]  Language: English  Hours: Mon 1:30 PM - 4:30 PM Appt. Only, Tue 1:30 PM - 4:30 PM , Wed 1:30 PM - 4:30 PM Appt. Only, Thu 1:30 PM - 6:00 PM , Fri 1:30 PM - 4:30 PM Appt. Only  Fees: Free   Phone: (789) 513-5613 Email: jing@[employer] Website: http://www.InnFocus Incfoods"[employer]."f.Evtron     2  [facility] Food Shelf Distance: 1.92 miles      Pickup   [address] [place] [address]  Language: English  Hours: Thu 2:00 PM - 6:00 PM , Thu 3:00 PM - 6:00 PM  Fees: Free   Phone: (110) 193-6192 Email: foodshelf@[employer].org Website: https://BrandBacker.org/serve/food-shelf/     SNAP application assistance  3  [employer] [place] Distance: 15.03 miles      Phone/Virtual   [address] [address]  Language: English, Hmong, [place] Islander, Nigerien, Nicaraguan  Hours: Mon - Fri 8:30 AM - 4:30 PM  Fees: Free   Phone: (318) 485-7176 Email: helpline@hungersolutions.org Website: https://www.hungersolutions.org/programs/[place]-food-helpline/     4  [facility] Distance: 1.66 miles      Phone/Virtual   [address] [place] [address]  Language: English, Nicaraguan  Hours: Mon 9:00 AM - 1:00 PM , Tue - Thu 9:00 AM - 4:00 PM , Fri 9:00 AM - 1:00 PM  Fees: Free   Phone: (378) 670-1690 Email: info@Fanhuan.com.Evtron Website: http://www.Bandhappy.org/     Soup kitchen or free meals  5  [facility] - Loaves and Fishes - Loaves and Fishes Distance: 2.07 miles      [facility]   [address] [place]  [address]  Language: English  Hours: Wed 5:30 PM - 6:30 PM  Fees: Free   Phone: (447) 904-8894 Email: therese@[facility].[employer] Website: https://www.thereseWoofound.Just Soles/     6  [facility] - Serving Up Nutrition Now for Youth Distance: 2.41 miles      Pickup   [address] [place] [address]  Language: English  Hours: Mon 11:30 AM - 1:30 PM , Wed 11:30 AM - 1:30 PM , Fri 11:30 AM - 1:30 PM  Fees: Free   Phone: (909) 871-3475 Ext.107 Email: admin@[facility] Website: http://[facility].org          Transportation       Free or low-cost transportation  7  [facility] - Bus Passes - Free or low-cost transportation Distance: 7.44 miles      In-Person   [address] [place] [address]  Language: English  Hours: Tue 9:30 AM - 11:30 AM , Thu 9:30 AM - 11:30 AM  Fees: Free   Phone: (544) 114-3513 Email: info@coy.Just Soles Website: http://www.coy.org/     8  [facility] Distance: 7.86 miles      In-Person   [address] [place] [address]  Language: English  Hours: Mon - Wed 9:30 AM - 12:00 PM , Mon - Wed 1:00 PM - 2:00 PM Appt. Only  Fees: Free   Phone: (633) 957-8709 Email: info@saintola.org Website: http://www.saintola.org/     Transportation to medical appointments  9  [employer] Distance: 1.05 miles      In-Person   [address] [address]  Language: English  Hours: Mon - Sat 4:00 AM - 6:00 PM  Fees: Insurance, Self Pay   Phone: (307) 927-8915 Email: delighttransportation1@[employer] Website: https://helpmeconnect.web.[facility]./HelpMeConnect/Providers/Delight_Transportation/Transportation/2?returnUrl=%2FHelpMeConnect%2FSearch%2FBasicNeeds%2FTransportation%2FTransportationServices%3Fstart%3D40     10  [facility]   Family Wellness (AIFW) Distance: 3.68 miles      In-Person   [address] [facility] [place] [address]  Language: Estonian, Slovenian, English, Gujarati, Dank, Danish, Portuguese,  Romansh, Syriac, Malay  Hours: Mon - Wed 9:00 AM - 5:00 PM , Thu 12:00 PM - 6:00 PM , Fri 9:00 AM - 5:00 PM , Sun 10:30 AM - 2:00 PM Appt. Only  Fees: Free   Phone: (306) 361-6225 Email: info@[employer]ouchr Website: https://www.StrongView.CellPhire/          Important Numbers & Websites       [address].org  Poison Control   (813) 447-5914 [place]po[place]  Suicide and Crisis Lifeline   [address] [place], [place] [address]line.org  Childhelp [place] Child Abuse Hotline   833.532.3417 Childhelphotline.org  National Sexual Assault Hotline   (856) 361-4862 (HOPE) [facility] Runaway Safeline   (689) 743-5535 (RUNAWAY) [facility].org  Pregnancy & Postpartum Support [place]   Call/text 527-666-4085 Ppsupportmn.org  Substance Abuse National Helpline ([facility]   799-065-HELP (7000) Findtreatment.gov  Emergency Services   911

## 2024-02-03 PROCEDURE — 82274 ASSAY TEST FOR BLOOD FECAL: CPT | Performed by: INTERNAL MEDICINE

## 2024-02-03 PROCEDURE — 99000 SPECIMEN HANDLING OFFICE-LAB: CPT | Performed by: PATHOLOGY

## 2024-02-06 LAB — HEMOCCULT STL QL IA: NEGATIVE

## 2024-02-12 ENCOUNTER — TELEPHONE (OUTPATIENT)
Dept: ENDOCRINOLOGY | Facility: CLINIC | Age: 59
End: 2024-02-12

## 2024-02-12 NOTE — TELEPHONE ENCOUNTER
Patient call:     Appointment type: New Endocrine   Provider: Stone   Return date: 2/19 @ 5 pm   Speciality phone number: [phone]  Additional appointment(s) needed: N/A   Additional notes: mobile out of service, home phone kept ringing, MyC x1     To schedulers: please offer her  virtual visit with me 2/19/2024 at 5 PM.    Celine Ritter MD   Endocrine triage     Mary Jane Conway on 2/12/2024 at 2:47 PM

## 2024-02-14 ENCOUNTER — TELEPHONE (OUTPATIENT)
Dept: ENDOCRINOLOGY | Facility: CLINIC | Age: 59
End: 2024-02-14

## 2024-02-14 NOTE — TELEPHONE ENCOUNTER
Need to put in 1 before phone number or it wont dial out   Angel Lundberg on 2/14/2024 at 9:35 AM

## 2024-02-14 NOTE — TELEPHONE ENCOUNTER
DX, Referring NOTES: Nontoxic thyroid goiter, referred by Dr. Pugh    For Cancer Patients: Need the original operative and surgical pathology reports and all imaging reports/images related to the disease (includes all thyroid US, nuclear thyroid and total body scans, PET scans, chest CT reports since prior to the diagnosis ).   APPT DATE: 2/19/202   NOTES (FOR ALL VISITS) STATUS DETAILS   OFFICE NOTES from referring provider [facility]:  1/31/24, 10/25/23 - PCC OV with Dr. Pugh   OFFICE NOTES from other specialist [facility]:  6/1/18 - ENDO OV with Dr. Zee  12/28/15, 10/12/15 - GYN ONC OV with Dr. Moran   ED NOTES N/A    OPERATIVE REPORT  (thyroid, pituitary, adrenal, parathyroid)  (All op notes related to diagnoses) N/A    MEDICATION LIST Internal    IMAGING      CT (HEAD/NECK/CHEST/ABDOMEN) [facility]:  3/2/21 - CT Chest/Abd/pelvis  7/13/20 - CT Chest/Abd/pelvis  1/13/20 - CT Chest/Abd/Pelvis  4/30/19 - CT Abd/Pelvis   ULTRASOUND (HEAD/NECK)  * Include FNAs [facility]:  10/25/23 - US Thyroid  4/16/21 - US Thyroid  10/28/19 - US Thyroid FNA  4/4/18 - US Thyroid   1/6/16 - US Thyroid FNA  11/3/15 - US Thyroid FNA  10/13/15 - US Thyroid    LABS     DIABETES: HBGA1C, CREATININE, FASTING LIPIDS, MICROALBUMIN URINE, POTASSIUM, TSH, T4    THYROID: TSH, T4, CBC, THYRODLONULIN, TOTAL T3, FREE T4, CALCITONIN, CEA [facility]:  1/31/24 - BMP  1/31/24 - HBGA1C  1/31/24 - Lipid  10/25/23 - TSH, T4  11/15/22 - CBC  11/15/22 - CMP  3/2/21 - Creatinine   PATHOLOGY REPORTS WITH CASE NUMBER  *Surgical path reports for endocrine organs (ovaries, testes, pancreas, etc) Internal   [facility]:  10/28/19 - Thyroid FNA (Case: [id number])  1/6/16 - Thyroid FNA (Case: [id number])  11/3/15 - Thyroid FNA (Case: [id number])

## 2024-02-14 NOTE — TELEPHONE ENCOUNTER
Spoke with pt and scheduled per Dr. Stone Ritter, Celine COTTER MD  [id number]; Shamir Pugh MD; [facility]  To schedulers : please offer her  virtual visit with me 2/19/2024 at 5 PM.      Celine Ritter MD  Endocrine triage    Angel Lundberg on 2/14/2024 at 9:34 AM

## 2024-02-19 ENCOUNTER — VIRTUAL VISIT (OUTPATIENT)
Dept: ENDOCRINOLOGY | Facility: CLINIC | Age: 59
End: 2024-02-19
Attending: INTERNAL MEDICINE

## 2024-02-19 ENCOUNTER — PRE VISIT (OUTPATIENT)
Dept: ENDOCRINOLOGY | Facility: CLINIC | Age: 59
End: 2024-02-19

## 2024-02-19 VITALS — BODY MASS INDEX: 34.07 KG/M2 | WEIGHT: 211 LBS

## 2024-02-19 DIAGNOSIS — E04.1 THYROID NODULE: ICD-10-CM

## 2024-02-19 DIAGNOSIS — E04.2 NONTOXIC MULTINODULAR GOITER: ICD-10-CM

## 2024-02-19 DIAGNOSIS — E04.9 SUBSTERNAL GOITER: Primary | ICD-10-CM

## 2024-02-19 PROCEDURE — 99205 OFFICE O/P NEW HI 60 MIN: CPT | Mod: 95

## 2024-02-19 PROCEDURE — 99417 PROLNG OP E/M EACH 15 MIN: CPT

## 2024-02-19 NOTE — NURSING NOTE
Is the patient currently in the state of [place]? YES    Visit mode:VIDEO    If the visit is dropped, the patient can be reconnected by: VIDEO VISIT: Text to cell phone:   Telephone Information:   Mobile [phone]       Will anyone else be joining the visit? NO  (If patient encounters technical issues they should call [phone]    How would you like to obtain your AVS? MyChart    Are changes needed to the allergy or medication list? No    Reason for visit: RECHECK and Video Visit    Nuvia COTTRELL

## 2024-02-19 NOTE — LETTER
2/19/2024       RE: Angella St  [address]  [facility] [address]     Dear Colleague,    Thank you for referring your patient, Angella St, to the [facility] ENDOCRINOLOGY [facility] [place] at [facility]. Please see a copy of my visit note below.    Endocrine Consult Video visit note-     Attending Assessment/Plan :     Substernal goiter with multiple nodules left > right causing significant airway deviation to the right.  She is originally from iodine deficiency area (during her childhood ) in [place] which may have played a role in causing this goiter.   Chest CT without contrast to evaluate the airway  Discussed possible surgical intervention -- she may be interested in this.   Counseled against too much iodine exposure now - avoid CT contrast if possible.  Do not supplement iodine (dietary iodine is OK).     DM2- not addressed     BMI 34.6 1/31/24     Due to the COVID 19 pandemic this visit was a video visit in order to help prevent spread of infection in this patient and the general population. The patient gave verbal consent for the visit today. I have independently reviewed and interpreted labs, imaging as indicated. Distant Location (provider location):  Off-site  Mode of Communication:  Video Conference via [employer]  Chart review/prep time 1    Visit Start time 1708  Visit Stop time  1722   _76_ minutes spent on the date of the encounter doing chart review, history and exam, documentation and further activities as noted above.    Celine Ritter MD    Chief complaint:  Angella is a 58 year old female seen in consultation at the request of Dr Shamir Pugh for nontoxic MNG   I have reviewed Care Everywhere including [facility], [facility], [facility]  ab reports, imaging reports and provider notes as indicated.      HISTORY OF PRESENT ILLNESS    2010 she lst learned of goiter when at the [employer] . She was advisd to take iodine .    The record documents that the thyroid has been followed with US and CT since 2015.    FNAB has been performed in various thyroid nodules over time, 11/3/15, 1/6/16 and 10/28/19. The 2019 FNAB procedure involved FNAB  of 4 left sided nodules and one on the right .  IN all cases, cytology has been benign.  The most recent thyorid US concluded there was some enlargement of multiple nodules .  Angella notes her daughters tell her they see the goiter enlarging.   She currently has not neck, voice or throat symptoms .     Endocrine relevant labs are as follows:  12/28/15 TSH 2.84  10/25/23 TSH 1.18  1/31/24 Ca 9.5, creatinine 0.56, HgbA1c 7.7%    Relevant imaging is as follows: (as read by me as it pertains to endocrine relevant organs)  10/13/15 US thyroid and neck:  Left dominant thyroid nodule 4.7 x 3.2 x 5.8 cm  11/13/15 FNAB ; 1/6/16 FNAB  12/22/15 PET low FDG activity anterior dominant left thyroid nodule   3/2/21 CT CAP: compared with 8/7/15   large left thyroid mass extends substernally causing significant tracheal deviation to the right-- similar appearance 2015   10/25/23 thyroid US compared with 4/16/2021-- very difficult comparison on the left   Right # 1 1 x 0.9 x 1.1 was 0.8 x 0.5 x 0.8 cm  Right # 2 0.6 x 0.4 x 0.7   Right # 3 extends into isthmus 2.1 x 1.4 x 2.4 (this may be 2 nodules side by side), hypoechoic; was 1.3 x 1.2 x 2.0 (not comparable measurement) FNAB 10/28/19  Left 5 x 4.1 x 7.8 overall? Multiple contiguous nodules  Left # 4  4.9 x 3.1 x 5.8 cm   Left # 5  x 4.1   Left # 6 anterior lateral border 2.9 x 1.7 x 3.3 was left # 3  Left # 7 6.6 x 4.4 x 4.1 most inferior , hypoechoic was left # 4 5.5 x 4.2 x 4  Left # 8 4.2 x 3.2 x 3.7     No history of childhood radiation exposure    REVIEW OF SYSTEMS  Weight stable 211 down from 214.   No choking   Sleep on her sides -- 2 pillows at times  Voice is ok; singing is OK   Her daughters are telling its getting bigger  Cardiac: negative  Respiratory:  negative;   GI: negative   No pains  No headavhes   He is not checking BS/    10 system ROS otherwise as per the HPI or negative    Past Medical History  Past Medical History:   Diagnosis Date    Anemia     Chronic kidney disease     DM2 (diabetes mellitus, type 2) (H) 2023    Hydronephrosis 2015    Hypertension 2007    Metastatic squamous cell carcinoma involving intra-abdominal site with unknown primary site (H) 04/2015    Metastatic squamous cell cervical carcinoma in pelvic mass    Obesity     BMI >30    Prediabetes 2018     Past Surgical History:   Procedure Laterality Date    COMBINED CYSTOSCOPY, INSERT STENT URETER(S) Left 6/13/2016    Procedure: COMBINED CYSTOSCOPY, INSERT STENT URETER(S);  Surgeon: Deja Salinas MD;  [facility]    CYSTOSCOPY, RETROGRADES, COMBINED Left 6/13/2016    Procedure: COMBINED CYSTOSCOPY, RETROGRADES;  Surgeon: Deja Salinas MD;  [facility]    DAVINCI NEPHRECTOMY Left 5/3/2018    Procedure: DAVINCI XI NEPHRECTOMY;  DAVINCI XI LEFT NEPHRECTOMY;  Surgeon: Deja Salinas MD;  Location: [facility] OR    GENITOURINARY SURGERY  5/2015    PNT placement    HYSTERECTOMY  2011    [place], [place]    IR PORT REMOVAL RIGHT  1/29/2019    PERCUTANEOUS NEPHROSTOMY Left 3/9/2017    Procedure: PERCUTANEOUS NEPHROSTOMY;  Surgeon: Bridger Meyer PA-C;  [facility]    PERCUTANEOUS NEPHROSTOMY Left 6/28/2017    Procedure: PERCUTANEOUS NEPHROSTOMY;  Left Nephrostomy Tube Change;  Surgeon: Bridger Meyer PA-C;  [facility]    PERCUTANEOUS NEPHROSTOMY Left 10/30/2017    Procedure: PERCUTANEOUS NEPHROSTOMY;  Left Nephrostomy Tube Change;  Surgeon: Maryann Chavez PA-C;  [facility]    REMOVE PORT VASCULAR ACCESS Right 1/29/2019    Procedure: Right Port Removal;  Surgeon: Maryann Marin PA-C;  [facility]       Medications  Current Outpatient Medications   Medication Sig Dispense Refill    amLODIPine (NORVASC) 10 MG tablet Take 1  "tablet (10 mg) by mouth at bedtime 90 tablet 3    carboxymethylcellulose PF (CARBOXYMETHYLCELLULOSE SODIUM) 0.5 % ophthalmic solution Place 1 drop into both eyes every 2 hours (while awake) Preservative free artificial tears, single use vials. 400 each 11    empagliflozin (JARDIANCE) 10 MG TABS tablet Take 1 tablet (10 mg) by mouth daily 90 tablet 1    olopatadine (PATADAY) 0.2 % ophthalmic solution Place 0.05 mLs (1 drop) into both eyes daily Pataday extra strength 15 mL 11    olopatadine (PATANOL) 0.1 % ophthalmic solution Place 1 drop into both eyes 2 times daily 5 mL 0    rosuvastatin (CRESTOR) 10 MG tablet Take 1 tablet (10 mg) by mouth daily 90 tablet 3     Allergies  No Known Allergies    Family History  Family History   Problem Relation Age of Onset    Hypertension Brother     Thyroid Disease No family hx of     Cancer No family hx of     Goiter No family hx of     Diabetes No family hx of      Social History  Social History     Tobacco Use    Smoking status: Never    Smokeless tobacco: Never   Substance Use Topics    Alcohol use: No     Alcohol/week: 0.0 standard drinks of alcohol     Comment: Does not drink alcohol    Drug use: No     Comment: No drug use     Originally from [facility] . She recalls seeing goiter in many people when there     Physical Exam  Body mass index is 34.07 kg/m .  BP Readings from Last 1 Encounters:   01/31/24 (!) 159/87      Pulse Readings from Last 1 Encounters:   01/31/24 68      Resp Readings from Last 1 Encounters:   04/03/23 20      Temp Readings from Last 1 Encounters:   04/03/23 98.2  F (36.8  C) (Oral)      SpO2 Readings from Last 1 Encounters:   01/31/24 100%      Wt Readings from Last 1 Encounters:   02/19/24 95.7 kg (211 lb)      Ht Readings from Last 1 Encounters:   01/31/24 1.676 m (5' 5.98\")       GENERAL: pleasant woman in no distress; normal voice   SKIN: Visible skin clear. No significant rash, abnormal pigmentation or lesions.  EYES: glasses; Eyes grossly " normal to inspection.  No discharge or erythema, or obvious scleral/conjunctival abnormalities.  NECK: visible goiter is present , full neck, but otherwise hard to quantify on video;   RESP: No audible wheeze, cough, or  increased work of breathing.    NEURO: Awake, alert, responds appropriately to questions.  Mentation and speech fluent.  PSYCH:affect normal and appearance well-groomed.    DATA REVIEW    US THYROID 10/25/2023 11:59 AM  COMPARISON: Thyroid US 4/16/2021.  HISTORY: Nontoxic multinodular goiter.   FINDINGS:   Thyroid parenchyma: Homogenous.  The right lobe of the thyroid measures: 6.6 x 2.1 x 1.8 cm.  The left lobe of the thyroid measures: 7.8 x 5.0 x 4.1 cm.  The thyroid isthmus measures: 8 mm.  Nodule 1:  Location: Right superior lobe  Size: 1.1 x 1.0 x 0.9 cm  Composition: Solid or almost completely solid (2 points)  Echogenicity: Hypoechoic (2 points)  Shape: Wider than tall (0 points)  Margin: Smooth (0 points)  Echogenic Foci: Peripheral/rim calcifications (2 points)  Stability: Enlarging, previously 0.8 x 0.5 x 0.8 cm  TIRADS: TR4 (4-6 points)   Nodule 2:  Location: Right superior lobe  Size: 0.7 x 0.6 x 0.4 cm  Composition: Solid or almost completely solid (2 points)  Echogenicity: Hypoechoic (2 points)  Shape: Wider than tall (0 points)  Margin: Ill-defined (0 points)  Echogenic Foci: None or large comet tail artifact (0 points)  Stability: Slight increase in size, previously 0.5 x 0.4 x 0.6 cm  TIRADS: TR3 (3 points)   Nodule 3:  Location: Right inferior lobe  Size: 2.4 x 1.4 x 1.4 cm  Composition: Solid or almost completely solid (2 points)  Echogenicity: Hyperechoic or isoechoic (1 point)  Shape: Wider than tall (0 points)  Margin: Ill-defined (0 points)  Echogenic Foci: Macro-calcifications (1 point)  Stability: Stable to minimal increase in size, previously 2.3 x 1.3 x 1.2 cm  TIRADS: TR4 (4-6 points)    NOdule 4:  Location: Left superior lobe  Size: 5.8 x 4.9 x 3.1 cm  Composition: Solid  or almost completely solid (2 points)  Echogenicity: Hypoechoic (2 points)  Shape: Wider than tall (0 points)  Margin: Smooth (0 points)  Echogenic Foci: None or large comet tail artifact (0 points)  Stability: Enlarging, previously 4.3 x 2.7 x 2.2 cm; though measured with different technique on current ultrasound so part of the  increased size likely secondary to technique.  TIRADS: TR4 (4-6 points)   Nodule 5:  Location: Left mid lobe  Size: 4.1 x 4.4 x 3.5 cm (measured on cine trans images)  Composition: Solid or almost completely solid (2 points)  Echogenicity: Hyperechoic or isoechoic (1 point)  Shape: Wider than tall (0 points)  Margin: Smooth (0 points)  Echogenic Foci: Macro-calcifications (1 point)  Stability: Slight increase in size, previously 4.0 x 3.1 x 3.9 cm  TIRADS: TR4 (4-6 points)   Nodule 6:  Location: Left mid/inferior lobe  Size: 3.3 x 2.9 x 1.7 cm  Composition: Mixed cystic and solid (1 point)  Echogenicity: Hypoechoic (2 points)  Shape: Wider than tall (0 points)  Margin: Smooth (0 points)  Echogenic Foci: Punctate echogenic foci (3 points)  Stability: Decreasing, previously 3.6 x 2.8 x 3.2 cm  TIRADS: TR4 (4-6 points)   Nodule 7:  Location: Left inferior lobe  Size: 4.1 x 6.6 x 4.5 cm  Composition: Solid or almost completely solid (2 points)  Echogenicity: Hypoechoic (2 points)  Shape: Wider than tall (0 points)  Margin: Ill-defined (0 points)  Echogenic Foci: Macro-calcifications (1 point)  Stability: Enlarging, previously 5.5 x 4.2 x 4.0 cm  TIRADS: TR4 (4-6 points)   Nodule 8:  Location: Left mid/inferior lobe  Size: 3.7 x 4.2 x 3.2 cm  Composition: Solid or almost completely solid (2 points)  Echogenicity: Hyperechoic or isoechoic (1 point)  Shape: Wider than tall (0 points)  Margin: Smooth (0 points)  Echogenic Foci: None or large comet tail artifact (0 points)  Stability: Appears more conspicuous/enlarged in size compared to prior, review of cine images from prior ultrasound showed  smaller  similar nodule which was not measured separately.  TIRADS: TR3 (3 points)                                                             Impression: Multiple thyroid nodules as described, majority of thenodules appear enlarged in size compared to prior ultrasound from 4/16/2021.   GIOVANNA LITTLE MD      Patient Name: ADRIANA WHITAKER  MR#: [id number]  Specimen #: [id number]  Collected: 11/3/2015  Received: 11/3/2015  Reported: 11/5/2015 14:25  Ordering Phy(s): ANDRES BENSON  SPECIMEN/STAIN PROCESS:  A: FNA-thyroid- Left #3       Pap-Cyto x 3, Diff Quick Stain-cyto x 3  B: FNA-thyroid- Right #3       Pap-Cyto x 3, Diff Quick Stain-cyto x 3  ----------------------------------------------------------------  CYTOLOGIC INTERPRETATION:  A.  Thyroid, left #3, ultrasound-guided fine needle aspiration:   Benign Consistent with a benign nodule (includes adenomatoid nodule, colloid nodule, etc.)  Specimen Adequacy: Satisfactory for evaluation.  The [place] Implied Risk of Malignancy and Recommended Clinical Management:  Benign has a 0-3% risk of malignancy, recommended management is clinical follow-up  B.  Thyroid, right #3, ultrasound-guided fine needle aspiration:  Benign  Consistent with a benign nodule (includes adenomatoid nodule, colloid nodule, etc.)  Specimen Adequacy: Satisfactory for evaluation.  The [place] Implied Risk of Malignancy and Recommended Clinical  Management:  Benign has a 0-3% risk of malignancy, recommended management is clinical follow-up  I have personally reviewed all specimens and/or slides, including the listed special stains, and used them with my medical judgment to determine the final diagnosis.  Electronically signed out by:  Sandra Ontiveros M.D., [facility]  Processed and screened at [facility]  CLINICAL HISTORY:  The patient is a 50-year-old woman with history of cervical cancer and  multiple thyroid nodules.  GROSS  A.  FNA-thyroid- Left #3:  Received are 3 fixed slides, processed for Pap  stain, and 3 air dried slides, processed for Diff Quik stain. Thyroid  panel tube held  B. FNA-thyroid- Right #3: Received are 3 fixed slides, processed for Pap  stain, and 3 air dried slides, processed for Diff Quik stain. Thyroid  panel tube held.  INTRAOPERATIVE CONSULTATION:  FNA Performance: Fine needle aspiration was not performed by [facility],   Pathology staff.  Immediate Adequacy: On site specimen adequacy evaluation was performed  by Dr. MICHAEL Tran III, MD via telepathology.  Onsite adequacy/interpretation:  Pass A1: Adequate, Pass A2: put directly into thyroid panel tube, Pass  A3 and A4: Adequate  Pass B1: Inadequate, Pass B2: put directly into thyroid panel tube, Pass  B3 and B4: Inadequate  MICROSCOPIC:  Microscopic examination performed.  Berenice Martinez MD, cytology fellow, Sandra Ontiveros MD  CPT Codes:  A: 33084-SPIZ-DRQ, 57004-LDHW-DLD, 41461-UAFO  B: 08967-WISG-AWQ, 94146-ZIZE-UIB, 91500-JJOZ  TESTING LAB LOCATION:  [address]   [address]  [phone]  COLLECTION SITE:  Client:  [facility]  Location:  [facility])    Resulting Agency [facility]              Specimen Collected: 11/03/15  1:58 PM Last Resulted: 11/05/15  2:25 PM             Patient Name: ADRIANA WHITAKER  MR#: [id number]  Specimen #: [id number]  Collected: 1/6/2016  Received: 1/6/2016  Reported: 1/7/2016 16:23  Ordering Phy(s): FELISHA WATSON  SPECIMEN/STAIN PROCESS:  FNA-thyroid- Left #3       Pap-Cyto x 3, Diff Quick Stain-cyto x 3  -----------------------------------------------------------  CYTOLOGIC INTERPRETATION:   Thyroid, Left #3, Ultrasound-guided fine needle aspiration:   Benign  Consistent with a benign nodule (includes adenomatoid nodule, colloid  nodule, etc.)  Specimen Adequacy: Satisfactory for evaluation.  The [place] Implied  Risk of Malignancy and Recommended Clinical  Management:  Benign has a 0-3% risk of malignancy, recommended management is clinical  follow-up  I have personally reviewed all specimens and/or slides, including the  listed special stains, and used them with my medical judgment to  determine the final diagnosis.  Electronically signed out by:  Marc Cano M.D., [facility]  Processed and screened at [facility]  CLINICAL HISTORY:  The patient is a 54-year-old female with a history of cervical cancer,  was shown to have a heterogeneous nodular enlargement of the thyroid  gland with a hypermetabolic thyroid nodule by PET scan.  GROSS:  FNA-thyroid- Left #3:  Received are 3 fixed slides, processed for Pap  stain, and 3 air dried slides, processed for Diff Quik stain. Thyroid  panel tube held.  INTRAOPERATIVE CONSULTATION:  FNA Performance: Fine needle aspiration was not performed by [facility],   Pathology staff.  Immediate Adequacy: On site specimen adequacy evaluation was performed  by Dr. NEMO Churchill MD via telepathology.  Onsite adequacy/interpretation:  Pass A1: Adequate, Pass A2: put directly into thyroid panel tube, Pass  A3 and A4: Adequate  MICROSCOPIC:  Microscopic examination is performed.  Berenice Martinez MD, cytology fellow, Jerome Tran MD  CPT Codes:   51578-IGKV-FGJ, 32534-FHTS-VJX  A: 77169-TBZO  TESTING LAB LOCATION:  [facility], [address]   [address]  [phone]  COLLECTION SITE:  Client:  [facility]  Location:  [facility] ()    Resulting Agency [facility]              Specimen Collected: 01/06/16  1:59 PM Last Resulted: 01/07/16  4:23 PM               Patient Name: ADRIANA WHITAKER  MR#: [id number]  Specimen #: [id number]  Collected: 10/28/2019  Received: 10/28/2019  Reported: 10/29/2019 16:57  Ordering Phy(s): THOMAS TELLO  For  improved result formatting, select 'View Enhanced Report Format' under   Linked Documents section.  SPECIMEN/STAIN PROCESS:  A: Thyroid, right inferior #3 , ultrasound guided fine needle aspiration       Pap-Cyto x 3, Diff Quick Stain-cyto x 3  B: Thyroid, left superior #1, ultrasound guided fine needle aspiration       Pap-Cyto x 3, Diff Quick Stain-cyto x 3  C: Thyroid, left superior #2 , ultrasound guided fine needle aspiration       Pap-Cyto x 3, Diff Quick Stain-cyto x 3  D: Thyroid, left mid #3 , ultrasound guided fine needle aspiration       Pap-Cyto x 3, Diff Quick Stain-cyto x 3  E: Thyroid, left inferior #4 , ultrasound guided fine needle aspiration       Pap-Cyto x 3, Diff Quick Stain-cyto x 3  ----------------------------------------------------------------  CYTOLOGIC INTERPRETATION:  A.  Thyroid, right inferior #3 , ultrasound guided fine needle aspiration:  - Benign  Consistent with a benign nodule (includes adenomatoid nodule, colloid  nodule, etc.)  The [place] implied risk of malignancy and recommended clinical  management:  Benign has a 0-3% risk of malignancy, recommended management is clinical  follow-up  Specimen Adequacy: Satisfactory for evaluation.  B.  Thyroid, left superior #1, ultrasound guided fine needle aspiration:  - Benign  Consistent with a benign nodule (includes adenomatoid nodule, colloid  nodule, etc.)  The [place] implied risk of malignancy and recommended clinical  management:  Benign has a 0-3% risk of malignancy, recommended management is clinical  follow-up  Specimen Adequacy: Satisfactory for evaluation.  C.  Thyroid, left superior #2 , ultrasound guided fine needle aspiration:  - Benign  Consistent with a benign nodule (includes adenomatoid nodule, colloid  nodule, etc.)  The [place] implied risk of malignancy and recommended clinical  management:  Benign has a 0-3% risk of malignancy, recommended management is clinical  follow-up  Specimen Adequacy: Satisfactory for  evaluation.  D.  Thyroid, left mid #3 , ultrasound guided fine needle aspiration:  - Benign  Consistent with a benign nodule (includes adenomatoid nodule, colloid  nodule, etc.)  The [place] implied risk of malignancy and recommended clinical  management:  Benign has a 0-3% risk of malignancy, recommended management is clinical  follow-up  Specimen Adequacy: Satisfactory for evaluation.  E.  Thyroid, left inferior #4 , ultrasound guided fine needle aspiration:  - Benign  Consistent with a benign nodule (includes adenomatoid nodule, colloid  nodule, etc.)  The [place] implied risk of malignancy and recommended clinical  management:  Benign has a 0-3% risk of malignancy, recommended management is clinical  follow-up  Specimen Adequacy: Satisfactory for evaluation.  I have personally reviewed all specimens and/or slides, including the  listed special stains, and used them  with my medical judgement to determine or confirm the final diagnosis.  Electronically signed out by:  Jerome Tran M.D., [facility]  CLINICAL HISTORY:  Right inferior #3 (1.1 x 1.1 x 2.1  cm)  Left superior #1 (2.9 x 2.2 x 2.1 cm)  Left superior #2 (3.5 x 2.8 x 2.9 cm)  Left mid #3 (3.4 x 2.3 x 3.4 cm)  Left inferior #4 (5.3 x 4.3 x 4.0 cm)  GROSS:  A. Thyroid, right inferior #3 , ultrasound guided fine needle aspiration:   Received are 3 fixed slides,  processed for Pap stain, and 3 air dried slides, processed for Diff Quik  stain. Afirma tube held.  B. Thyroid, left superior #1, ultrasound guided fine needle aspiration:    Received are 3 fixed slides,  processed for Pap stain, and 3 air dried slides, processed for Diff Quik  stain. Afirma tube held.  C. Thyroid, left superior #2 , ultrasound guided fine needle aspiration:    Received are 3 fixed slides,  processed for Pap stain, and 3 air dried slides, processed for Diff Quik  stain. Afirma tube held.  D. Thyroid, left mid #3 , ultrasound guided fine needle aspiration:    Received are 3  fixed slides, processed  for Pap stain, and 3 air dried slides, processed for Diff Quik stain.  Afirma tube held.  E. Thyroid, left inferior #4 , ultrasound guided fine needle aspiration:    Received are 3 fixed slides,  processed for Pap stain, and 3 air dried slides, processed for Diff Quik  stain. Afirma tube held.  INTRAOPERATIVE CONSULTATION:  FNA Performance: Fine needle aspiration was not performed by [facility],   Pathology staff.  Aspirate immediate study/adequacy:  I, Dr. MAYRA Tran III, MD attest that I immediately examined smears while  the procedure was underway and  determined or confirmed the adequacy of the specimens via telepathology.  It is of note that the final assessment and report may be performed and  signed by a different pathologist.  Onsite adequacy/interpretation:  Pass A1 adequate, Pass A2 put into afirma, Pass A3-A4 adequate.  Pass B1 inadequate, Pass B2 put into afirma, Pass B3-B4 adequate.  Pass C1 inadequate, Pass C2 put into afrima, Pass C3 inadequate, Pass C4  adequate.  Pass D1 adequate, Pass D2 put into afirma, Pass D3-D4 adequate.  Pass [id number], Pass E2 put into afirma, Pass E3 adequate, Pass E4  inadequate.  MICROSCOPIC:  Microscopic examination is performed.  Neri Hopkins MD Cytopathology fellow      Jerome Tran III, MD  Attending  CPT Codes:  A: 02919-XXSS-BPB, 97393-YLIJ-DCR, 17453-ADIW  B: 55601-KFBH, 11685-LQXY-XXA, 47662-TRID-YWW  C: 95582-WMFD, 68212-PXZJ-NFN, 75810-JZBI-QFI  D: 91769-RSRK, 92020-WOSC-GCK, 85330-WEZE-NEY  E: 26882-GQKO, 56329-FWIH-PHY, 50537-WFTW-BKY  COLLECTION SITE:  Client:  [facility]  Location:  [facility] (B)  The technical component of this testing was completed at the [facility], with the professional component performed   at the [facility], [address] [place] [phone]  ([phone])  Resident  [id number]    Resulting Agency [facility]              Specimen Collected: 10/28/19 11:02 AM Last Resulted: 10/29/19  4:58 PM

## 2024-02-19 NOTE — PROGRESS NOTES
Endocrine Consult Video visit note-     Attending Assessment/Plan :     Substernal goiter with multiple nodules left > right causing significant airway deviation to the right.  She is originally from iodine deficiency area (during her childhood ) in [place] which may have played a role in causing this goiter.   Chest CT without contrast to evaluate the airway  Discussed possible surgical intervention -- she may be interested in this.   Counseled against too much iodine exposure now - avoid CT contrast if possible.  Do not supplement iodine (dietary iodine is OK).     DM2- not addressed     BMI 34.6 1/31/24     Due to the COVID 19 pandemic this visit was a video visit in order to help prevent spread of infection in this patient and the general population. The patient gave verbal consent for the visit today. I have independently reviewed and interpreted labs, imaging as indicated. Distant Location (provider location):  Off-site  Mode of Communication:  Video Conference via Movigo  Chart review/prep time 1    Visit Start time 1708  Visit Stop time  1722   _76_ minutes spent on the date of the encounter doing chart review, history and exam, documentation and further activities as noted above.    Celine Ritter MD    Chief complaint:  Angella is a 58 year old female seen in consultation at the request of Dr Shamir Pugh for nontoxic MNG   I have reviewed Care Everywhere including [facility], [facility], [facility]  ab reports, imaging reports and provider notes as indicated.      HISTORY OF PRESENT ILLNESS    2010 she lst learned of goiter when at the [employer] . She was advisd to take iodine .   The record documents that the thyroid has been followed with US and CT since 2015.    FNAB has been performed in various thyroid nodules over time, 11/3/15, 1/6/16 and 10/28/19. The 2019 FNAB procedure involved FNAB  of 4 left sided nodules and one on the right .  IN all cases, cytology has been benign.  The  most recent thyorid US concluded there was some enlargement of multiple nodules .  Angella notes her daughters tell her they see the goiter enlarging.   She currently has not neck, voice or throat symptoms .     Endocrine relevant labs are as follows:  12/28/15 TSH 2.84  10/25/23 TSH 1.18  1/31/24 Ca 9.5, creatinine 0.56, HgbA1c 7.7%    Relevant imaging is as follows: (as read by me as it pertains to endocrine relevant organs)  10/13/15 US thyroid and neck:  Left dominant thyroid nodule 4.7 x 3.2 x 5.8 cm  11/13/15 FNAB ; 1/6/16 FNAB  12/22/15 PET low FDG activity anterior dominant left thyroid nodule   3/2/21 CT CAP: compared with 8/7/15   large left thyroid mass extends substernally causing significant tracheal deviation to the right-- similar appearance 2015   10/25/23 thyroid US compared with 4/16/2021-- very difficult comparison on the left   Right # 1 1 x 0.9 x 1.1 was 0.8 x 0.5 x 0.8 cm  Right # 2 0.6 x 0.4 x 0.7   Right # 3 extends into isthmus 2.1 x 1.4 x 2.4 (this may be 2 nodules side by side), hypoechoic; was 1.3 x 1.2 x 2.0 (not comparable measurement) FNAB 10/28/19  Left 5 x 4.1 x 7.8 overall? Multiple contiguous nodules  Left # 4  4.9 x 3.1 x 5.8 cm   Left # 5  x 4.1   Left # 6 anterior lateral border 2.9 x 1.7 x 3.3 was left # 3  Left # 7 6.6 x 4.4 x 4.1 most inferior , hypoechoic was left # 4 5.5 x 4.2 x 4  Left # 8 4.2 x 3.2 x 3.7     No history of childhood radiation exposure    REVIEW OF SYSTEMS  Weight stable 211 down from 214.   No choking   Sleep on her sides -- 2 pillows at times  Voice is ok; singing is OK   Her daughters are telling its getting bigger  Cardiac: negative  Respiratory: negative;   GI: negative   No pains  No headavhes   He is not checking BS/    10 system ROS otherwise as per the HPI or negative    Past Medical History  Past Medical History:   Diagnosis Date    Anemia     Chronic kidney disease     DM2 (diabetes mellitus, type 2) (H) 2023    Hydronephrosis 2015    Hypertension  2007    Metastatic squamous cell carcinoma involving intra-abdominal site with unknown primary site (H) 04/2015    Metastatic squamous cell cervical carcinoma in pelvic mass    Obesity     BMI >30    Prediabetes 2018     Past Surgical History:   Procedure Laterality Date    COMBINED CYSTOSCOPY, INSERT STENT URETER(S) Left 6/13/2016    Procedure: COMBINED CYSTOSCOPY, INSERT STENT URETER(S);  Surgeon: Deja Salinas MD;  [facility]    CYSTOSCOPY, RETROGRADES, COMBINED Left 6/13/2016    Procedure: COMBINED CYSTOSCOPY, RETROGRADES;  Surgeon: Deja Salinas MD;  [facility]    DAVINCI NEPHRECTOMY Left 5/3/2018    Procedure: DAVINCI XI NEPHRECTOMY;  DAVINCI XI LEFT NEPHRECTOMY;  Surgeon: Deja Salinas MD;  Location: [facility]    GENITOURINARY SURGERY  5/2015    PNT placement    HYSTERECTOMY  2011    [place], [place]    IR PORT REMOVAL RIGHT  1/29/2019    PERCUTANEOUS NEPHROSTOMY Left 3/9/2017    Procedure: PERCUTANEOUS NEPHROSTOMY;  Surgeon: Bridger Meyer PA-C;  [facility]    PERCUTANEOUS NEPHROSTOMY Left 6/28/2017    Procedure: PERCUTANEOUS NEPHROSTOMY;  Left Nephrostomy Tube Change;  Surgeon: Bridger Meyer PA-C;  [facility]    PERCUTANEOUS NEPHROSTOMY Left 10/30/2017    Procedure: PERCUTANEOUS NEPHROSTOMY;  Left Nephrostomy Tube Change;  Surgeon: Maryann Chavez PA-C;  [facility]    REMOVE PORT VASCULAR ACCESS Right 1/29/2019    Procedure: Right Port Removal;  Surgeon: Maryann Marin PA-C;  [facility]       Medications  Current Outpatient Medications   Medication Sig Dispense Refill    amLODIPine (NORVASC) 10 MG tablet Take 1 tablet (10 mg) by mouth at bedtime 90 tablet 3    carboxymethylcellulose PF (CARBOXYMETHYLCELLULOSE SODIUM) 0.5 % ophthalmic solution Place 1 drop into both eyes every 2 hours (while awake) Preservative free artificial tears, single use vials. 400 each 11    empagliflozin (JARDIANCE) 10 MG TABS tablet Take 1 tablet  "(10 mg) by mouth daily 90 tablet 1    olopatadine (PATADAY) 0.2 % ophthalmic solution Place 0.05 mLs (1 drop) into both eyes daily Pataday extra strength 15 mL 11    olopatadine (PATANOL) 0.1 % ophthalmic solution Place 1 drop into both eyes 2 times daily 5 mL 0    rosuvastatin (CRESTOR) 10 MG tablet Take 1 tablet (10 mg) by mouth daily 90 tablet 3     Allergies  No Known Allergies    Family History  Family History   Problem Relation Age of Onset    Hypertension Brother     Thyroid Disease No family hx of     Cancer No family hx of     Goiter No family hx of     Diabetes No family hx of      Social History  Social History     Tobacco Use    Smoking status: Never    Smokeless tobacco: Never   Substance Use Topics    Alcohol use: No     Alcohol/week: 0.0 standard drinks of alcohol     Comment: Does not drink alcohol    Drug use: No     Comment: No drug use     Originally from [facility] . She recalls seeing goiter in many people when there     Physical Exam  Body mass index is 34.07 kg/m .  BP Readings from Last 1 Encounters:   01/31/24 (!) 159/87      Pulse Readings from Last 1 Encounters:   01/31/24 68      Resp Readings from Last 1 Encounters:   04/03/23 20      Temp Readings from Last 1 Encounters:   04/03/23 98.2  F (36.8  C) (Oral)      SpO2 Readings from Last 1 Encounters:   01/31/24 100%      Wt Readings from Last 1 Encounters:   02/19/24 95.7 kg (211 lb)      Ht Readings from Last 1 Encounters:   01/31/24 1.676 m (5' 5.98\")       GENERAL: pleasant woman in no distress; normal voice   SKIN: Visible skin clear. No significant rash, abnormal pigmentation or lesions.  EYES: glasses; Eyes grossly normal to inspection.  No discharge or erythema, or obvious scleral/conjunctival abnormalities.  NECK: visible goiter is present , full neck, but otherwise hard to quantify on video;   RESP: No audible wheeze, cough, or  increased work of breathing.    NEURO: Awake, alert, responds appropriately to questions.  " Mentation and speech fluent.  PSYCH:affect normal and appearance well-groomed.    DATA REVIEW    US THYROID 10/25/2023 11:59 AM  COMPARISON: Thyroid US 4/16/2021.  HISTORY: Nontoxic multinodular goiter.   FINDINGS:   Thyroid parenchyma: Homogenous.  The right lobe of the thyroid measures: 6.6 x 2.1 x 1.8 cm.  The left lobe of the thyroid measures: 7.8 x 5.0 x 4.1 cm.  The thyroid isthmus measures: 8 mm.  Nodule 1:  Location: Right superior lobe  Size: 1.1 x 1.0 x 0.9 cm  Composition: Solid or almost completely solid (2 points)  Echogenicity: Hypoechoic (2 points)  Shape: Wider than tall (0 points)  Margin: Smooth (0 points)  Echogenic Foci: Peripheral/rim calcifications (2 points)  Stability: Enlarging, previously 0.8 x 0.5 x 0.8 cm  TIRADS: TR4 (4-6 points)   Nodule 2:  Location: Right superior lobe  Size: 0.7 x 0.6 x 0.4 cm  Composition: Solid or almost completely solid (2 points)  Echogenicity: Hypoechoic (2 points)  Shape: Wider than tall (0 points)  Margin: Ill-defined (0 points)  Echogenic Foci: None or large comet tail artifact (0 points)  Stability: Slight increase in size, previously 0.5 x 0.4 x 0.6 cm  TIRADS: TR3 (3 points)   Nodule 3:  Location: Right inferior lobe  Size: 2.4 x 1.4 x 1.4 cm  Composition: Solid or almost completely solid (2 points)  Echogenicity: Hyperechoic or isoechoic (1 point)  Shape: Wider than tall (0 points)  Margin: Ill-defined (0 points)  Echogenic Foci: Macro-calcifications (1 point)  Stability: Stable to minimal increase in size, previously 2.3 x 1.3 x 1.2 cm  TIRADS: TR4 (4-6 points)    NOdule 4:  Location: Left superior lobe  Size: 5.8 x 4.9 x 3.1 cm  Composition: Solid or almost completely solid (2 points)  Echogenicity: Hypoechoic (2 points)  Shape: Wider than tall (0 points)  Margin: Smooth (0 points)  Echogenic Foci: None or large comet tail artifact (0 points)  Stability: Enlarging, previously 4.3 x 2.7 x 2.2 cm; though measured with different technique on current  ultrasound so part of the  increased size likely secondary to technique.  TIRADS: TR4 (4-6 points)   Nodule 5:  Location: Left mid lobe  Size: 4.1 x 4.4 x 3.5 cm (measured on cine trans images)  Composition: Solid or almost completely solid (2 points)  Echogenicity: Hyperechoic or isoechoic (1 point)  Shape: Wider than tall (0 points)  Margin: Smooth (0 points)  Echogenic Foci: Macro-calcifications (1 point)  Stability: Slight increase in size, previously 4.0 x 3.1 x 3.9 cm  TIRADS: TR4 (4-6 points)   Nodule 6:  Location: Left mid/inferior lobe  Size: 3.3 x 2.9 x 1.7 cm  Composition: Mixed cystic and solid (1 point)  Echogenicity: Hypoechoic (2 points)  Shape: Wider than tall (0 points)  Margin: Smooth (0 points)  Echogenic Foci: Punctate echogenic foci (3 points)  Stability: Decreasing, previously 3.6 x 2.8 x 3.2 cm  TIRADS: TR4 (4-6 points)   Nodule 7:  Location: Left inferior lobe  Size: 4.1 x 6.6 x 4.5 cm  Composition: Solid or almost completely solid (2 points)  Echogenicity: Hypoechoic (2 points)  Shape: Wider than tall (0 points)  Margin: Ill-defined (0 points)  Echogenic Foci: Macro-calcifications (1 point)  Stability: Enlarging, previously 5.5 x 4.2 x 4.0 cm  TIRADS: TR4 (4-6 points)   Nodule 8:  Location: Left mid/inferior lobe  Size: 3.7 x 4.2 x 3.2 cm  Composition: Solid or almost completely solid (2 points)  Echogenicity: Hyperechoic or isoechoic (1 point)  Shape: Wider than tall (0 points)  Margin: Smooth (0 points)  Echogenic Foci: None or large comet tail artifact (0 points)  Stability: Appears more conspicuous/enlarged in size compared to prior, review of cine images from prior ultrasound showed smaller  similar nodule which was not measured separately.  TIRADS: TR3 (3 points)                                                             Impression: Multiple thyroid nodules as described, majority of thenodules appear enlarged in size compared to prior ultrasound from 4/16/2021.   GIOVANNA LITTLE MD       Patient Name: ADRIANA WHITAKER  MR#: [id number]  Specimen #: [id number]  Collected: 11/3/2015  Received: 11/3/2015  Reported: 11/5/2015 14:25  Ordering Phy(s): ANDRES BENSON  SPECIMEN/STAIN PROCESS:  A: FNA-thyroid- Left #3       Pap-Cyto x 3, Diff Quick Stain-cyto x 3  B: FNA-thyroid- Right #3       Pap-Cyto x 3, Diff Quick Stain-cyto x 3  ----------------------------------------------------------------  CYTOLOGIC INTERPRETATION:  A.  Thyroid, left #3, ultrasound-guided fine needle aspiration:   Benign Consistent with a benign nodule (includes adenomatoid nodule, colloid nodule, etc.)  Specimen Adequacy: Satisfactory for evaluation.  [facility] Implied Risk of Malignancy and Recommended Clinical Management:  Benign has a 0-3% risk of malignancy, recommended management is clinical follow-up  B.  Thyroid, right #3, ultrasound-guided fine needle aspiration:  Benign  Consistent with a benign nodule (includes adenomatoid nodule, colloid nodule, etc.)  Specimen Adequacy: Satisfactory for evaluation.  [facility] Implied Risk of Malignancy and Recommended Clinical  Management:  Benign has a 0-3% risk of malignancy, recommended management is clinical follow-up  I have personally reviewed all specimens and/or slides, including the listed special stains, and used them with my medical judgment to determine the final diagnosis.  Electronically signed out by:  Sandra Ontiveros M.D., [facility]  Processed and screened at [facility]  CLINICAL HISTORY:  The patient is a 50-year-old woman with history of cervical cancer and  multiple thyroid nodules.  GROSS  A. FNA-thyroid- Left #3:  Received are 3 fixed slides, processed for Pap  stain, and 3 air dried slides, processed for Diff Quik stain. Thyroid  panel tube held  B. FNA-thyroid- Right #3: Received are 3 fixed slides, processed for Pap  stain, and 3 air dried slides, processed for Diff Quik stain. Thyroid  panel  tube held.  INTRAOPERATIVE CONSULTATION:  FNA Performance: Fine needle aspiration was not performed by [facility],   Pathology staff.  Immediate Adequacy: On site specimen adequacy evaluation was performed  by Dr. MICHAEL Tran III, MD via telepathology.  Onsite adequacy/interpretation:  Pass A1: Adequate, Pass A2: put directly into thyroid panel tube, Pass  A3 and A4: Adequate  Pass B1: Inadequate, Pass B2: put directly into thyroid panel tube, Pass  B3 and B4: Inadequate  MICROSCOPIC:  Microscopic examination performed.  Berenice Martinez MD, cytology fellow, Sandra Ontiveros MD  CPT Codes:  A: 30676-VAMR-MJB, 47164-TGJJ-MLE, 15519-TCND  B: 94891-GDGN-GAB, 92542-SLCZ-OUD, 06267-JCIH  TESTING LAB LOCATION:  [facility], [address]   [address]  [phone]  COLLECTION SITE:  Client:  [facility]  Location:  [facility])    Resulting Agency [facility]              Specimen Collected: 11/03/15  1:58 PM Last Resulted: 11/05/15  2:25 PM             Patient Name: ADRIANA WHITAKER  MR#: [id number]  Specimen #: [id number]  Collected: 1/6/2016  Received: 1/6/2016  Reported: 1/7/2016 16:23  Ordering Phy(s): FELISHA WATSON  SPECIMEN/STAIN PROCESS:  FNA-thyroid- Left #3       Pap-Cyto x 3, Diff Quick Stain-cyto x 3  -----------------------------------------------------------  CYTOLOGIC INTERPRETATION:   Thyroid, Left #3, Ultrasound-guided fine needle aspiration:   Benign  Consistent with a benign nodule (includes adenomatoid nodule, colloid  nodule, etc.)  Specimen Adequacy: Satisfactory for evaluation.  [facility] Implied Risk of Malignancy and Recommended Clinical  Management:  Benign has a 0-3% risk of malignancy, recommended management is clinical  follow-up  I have personally reviewed all specimens and/or slides, including the  listed special stains, and used them with my medical judgment to  determine the final  diagnosis.  Electronically signed out by:  Marc Cano M.D., [facility]  Processed and screened at [facility]  CLINICAL HISTORY:  The patient is a 54-year-old female with a history of cervical cancer,  was shown to have a heterogeneous nodular enlargement of the thyroid  gland with a hypermetabolic thyroid nodule by PET scan.  GROSS:  FNA-thyroid- Left #3:  Received are 3 fixed slides, processed for Pap  stain, and 3 air dried slides, processed for Diff Quik stain. Thyroid  panel tube held.  INTRAOPERATIVE CONSULTATION:  FNA Performance: Fine needle aspiration was not performed by [facility],   Pathology staff.  Immediate Adequacy: On site specimen adequacy evaluation was performed  by Dr. NEMO Churchill MD via telepathology.  Onsite adequacy/interpretation:  Pass A1: Adequate, Pass A2: put directly into thyroid panel tube, Pass  A3 and A4: Adequate  MICROSCOPIC:  Microscopic examination is performed.  Berenice Martinez MD, cytology fellow, Jerome Tran MD  CPT Codes:   81322-UDJS-OFP, 54862-NTKL-REU  A: 45330-LCGP  TESTING LAB LOCATION:  [facility], [address]   [address]  [phone]  COLLECTION SITE:  Client:  [facility]  Location:  [facility] ()    Resulting Agency [facility]              Specimen Collected: 01/06/16  1:59 PM Last Resulted: 01/07/16  4:23 PM               Patient Name: ADRIANA WHITAKER  MR#: [id number]  Specimen #: [id number]  Collected: 10/28/2019  Received: 10/28/2019  Reported: 10/29/2019 16:57  Ordering Phy(s): THOMAS TELLO  For improved result formatting, select 'View Enhanced Report Format' under   Linked Documents section.  SPECIMEN/STAIN PROCESS:  A: Thyroid, right inferior #3 , ultrasound guided fine needle aspiration       Pap-Cyto x 3, Diff Quick Stain-cyto x 3  B: Thyroid, left superior #1, ultrasound guided fine  needle aspiration       Pap-Cyto x 3, Diff Quick Stain-cyto x 3  C: Thyroid, left superior #2 , ultrasound guided fine needle aspiration       Pap-Cyto x 3, Diff Quick Stain-cyto x 3  D: Thyroid, left mid #3 , ultrasound guided fine needle aspiration       Pap-Cyto x 3, Diff Quick Stain-cyto x 3  E: Thyroid, left inferior #4 , ultrasound guided fine needle aspiration       Pap-Cyto x 3, Diff Quick Stain-cyto x 3  ----------------------------------------------------------------  CYTOLOGIC INTERPRETATION:  A.  Thyroid, right inferior #3 , ultrasound guided fine needle aspiration:  - Benign  Consistent with a benign nodule (includes adenomatoid nodule, colloid  nodule, etc.)  The [employer] implied risk of malignancy and recommended clinical  management:  Benign has a 0-3% risk of malignancy, recommended management is clinical  follow-up  Specimen Adequacy: Satisfactory for evaluation.  B.  Thyroid, left superior #1, ultrasound guided fine needle aspiration:  - Benign  Consistent with a benign nodule (includes adenomatoid nodule, colloid  nodule, etc.)  The [employer] implied risk of malignancy and recommended clinical  management:  Benign has a 0-3% risk of malignancy, recommended management is clinical  follow-up  Specimen Adequacy: Satisfactory for evaluation.  C.  Thyroid, left superior #2 , ultrasound guided fine needle aspiration:  - Benign  Consistent with a benign nodule (includes adenomatoid nodule, colloid  nodule, etc.)  The [employer] implied risk of malignancy and recommended clinical  management:  Benign has a 0-3% risk of malignancy, recommended management is clinical  follow-up  Specimen Adequacy: Satisfactory for evaluation.  D.  Thyroid, left mid #3 , ultrasound guided fine needle aspiration:  - Benign  Consistent with a benign nodule (includes adenomatoid nodule, colloid  nodule, etc.)  The [employer] implied risk of malignancy and recommended clinical  management:  Benign has a 0-3% risk of malignancy,  recommended management is clinical  follow-up  Specimen Adequacy: Satisfactory for evaluation.  E.  Thyroid, left inferior #4 , ultrasound guided fine needle aspiration:  - Benign  Consistent with a benign nodule (includes adenomatoid nodule, colloid  nodule, etc.)  [facility] [place] implied risk of malignancy and recommended clinical  management:  Benign has a 0-3% risk of malignancy, recommended management is clinical  follow-up  Specimen Adequacy: Satisfactory for evaluation.  I have personally reviewed all specimens and/or slides, including the  listed special stains, and used them  with my medical judgement to determine or confirm the final diagnosis.  Electronically signed out by:  Jerome Tran M.D., [facility]  CLINICAL HISTORY:  Right inferior #3 (1.1 x 1.1 x 2.1  cm)  Left superior #1 (2.9 x 2.2 x 2.1 cm)  Left superior #2 (3.5 x 2.8 x 2.9 cm)  Left mid #3 (3.4 x 2.3 x 3.4 cm)  Left inferior #4 (5.3 x 4.3 x 4.0 cm)  GROSS:  A. Thyroid, right inferior #3 , ultrasound guided fine needle aspiration:   Received are 3 fixed slides,  processed for Pap stain, and 3 air dried slides, processed for Diff Quik  stain. Afirma tube held.  B. Thyroid, left superior #1, ultrasound guided fine needle aspiration:    Received are 3 fixed slides,  processed for Pap stain, and 3 air dried slides, processed for Diff Quik  stain. Afirma tube held.  C. Thyroid, left superior #2 , ultrasound guided fine needle aspiration:    Received are 3 fixed slides,  processed for Pap stain, and 3 air dried slides, processed for Diff Quik  stain. Afirma tube held.  D. Thyroid, left mid #3 , ultrasound guided fine needle aspiration:    Received are 3 fixed slides, processed  for Pap stain, and 3 air dried slides, processed for Diff Quik stain.  Afirma tube held.  E. Thyroid, left inferior #4 , ultrasound guided fine needle aspiration:    Received are 3 fixed slides,  processed for Pap stain, and 3 air dried slides, processed for Diff  Quik  stain. Afirma tube held.  INTRAOPERATIVE CONSULTATION:  FNA Performance: Fine needle aspiration was not performed by [facility],   Pathology staff.  Aspirate immediate study/adequacy:  I, Dr. MAYRA Tran III, MD attest that I immediately examined smears while  the procedure was underway and  determined or confirmed the adequacy of the specimens via telepathology.  It is of note that the final assessment and report may be performed and  signed by a different pathologist.  Onsite adequacy/interpretation:  Pass A1 adequate, Pass A2 put into afirma, Pass A3-A4 adequate.  Pass B1 inadequate, Pass B2 put into afirma, Pass B3-B4 adequate.  Pass C1 inadequate, Pass C2 put into afrima, Pass C3 inadequate, Pass C4  adequate.  Pass D1 adequate, Pass D2 put into afirma, Pass D3-D4 adequate.  Pass [id number], Pass E2 put into afirma, Pass E3 adequate, Pass E4  inadequate.  MICROSCOPIC:  Microscopic examination is performed.  Neri Hopkins MD Cytopathology fellow      Jerome Tran III, MD  Attending  CPT Codes:  A: 68298-OZZX-RDK, 56424-XHLD-CUL, 39067-TQXP  B: 36855-TKSO, 09837-XDIF-BBR, 17370-OJHZ-QXG  C: 47117-NKUO, 78769-VSNP-UCO, 07603-TTOL-SXP  D: 11299-TPHF, 83435-RHNF-IMB, 46415-VJRV-FIZ  E: 54412-UZWR, 35798-ATGX-VDI, 50504-LXDJ-FFD  COLLECTION SITE:  Client:  [facility]  Location:  [facility] ([facility]  The technical component of this testing was completed at the [facility], with the professional component performed   at the [facility], [address] [place] [id number] ([phone])  Resident  [id number]    Resulting Agency [facility]              Specimen Collected: 10/28/19 11:02 AM Last Resulted: 10/29/19  4:58 PM

## 2024-02-22 ENCOUNTER — DOCUMENTATION ONLY (OUTPATIENT)
Dept: INTERNAL MEDICINE | Facility: CLINIC | Age: 59
End: 2024-02-22

## 2024-02-22 ENCOUNTER — TELEPHONE (OUTPATIENT)
Dept: INTERNAL MEDICINE | Facility: CLINIC | Age: 59
End: 2024-02-22

## 2024-02-22 NOTE — TELEPHONE ENCOUNTER
[facility] Call Center    Phone Message    May a detailed message be left on voicemail: yes     Reason for Call: Form or Letter   Type or form/letter needing completion: Professional Statement of Need  Provider: Keaton  Date form needed: 02/22/2024  Once completed: Fax form to: [phone] , need ASAP, she is at risk of losing her home, please call if questions.      Action Taken: Message routed to:  [facility] PCC    Travel Screening: Not Applicable

## 2024-02-22 NOTE — PROGRESS NOTES
Type of Form Received: Professional statement of need    Form Received (Date) 2/22/24   Form Filled out Yes 3/6/24   Placed in provider folder Yes     2/27 Dr Pugh requests visit to fill out the form, in person or phone.

## 2024-02-22 NOTE — TELEPHONE ENCOUNTER
We did not receive the Professional Statement of Need and so I called Geoffrey back and had him fax us the form for Kenneth Aguayo on 2/22/2024 at 2:30 PM

## 2024-02-28 ENCOUNTER — TELEPHONE (OUTPATIENT)
Dept: INTERNAL MEDICINE | Facility: CLINIC | Age: 59
End: 2024-02-28

## 2024-02-28 NOTE — TELEPHONE ENCOUNTER
Spoke with Geoffrey and let him know that Dr Pugh wants a visit to fill out the forms completely. Geoffrey said he would let the patient know.

## 2024-03-06 ENCOUNTER — OFFICE VISIT (OUTPATIENT)
Dept: INTERNAL MEDICINE | Facility: CLINIC | Age: 59
End: 2024-03-06
Payer: COMMERCIAL

## 2024-03-06 ENCOUNTER — LAB (OUTPATIENT)
Dept: LAB | Facility: CLINIC | Age: 59
End: 2024-03-06
Payer: COMMERCIAL

## 2024-03-06 VITALS
SYSTOLIC BLOOD PRESSURE: 130 MMHG | BODY MASS INDEX: 33.75 KG/M2 | WEIGHT: 210 LBS | HEIGHT: 66 IN | DIASTOLIC BLOOD PRESSURE: 79 MMHG | HEART RATE: 69 BPM | OXYGEN SATURATION: 98 %

## 2024-03-06 DIAGNOSIS — E04.2 NONTOXIC MULTINODULAR GOITER: Primary | ICD-10-CM

## 2024-03-06 DIAGNOSIS — E11.9 TYPE 2 DIABETES MELLITUS WITHOUT COMPLICATION, WITHOUT LONG-TERM CURRENT USE OF INSULIN (H): ICD-10-CM

## 2024-03-06 DIAGNOSIS — E78.5 HYPERLIPIDEMIA, UNSPECIFIED HYPERLIPIDEMIA TYPE: ICD-10-CM

## 2024-03-06 LAB
ANION GAP SERPL CALCULATED.3IONS-SCNC: 10 MMOL/L (ref 7–15)
BUN SERPL-MCNC: 10.5 MG/DL (ref 8–23)
CALCIUM SERPL-MCNC: 9.3 MG/DL (ref 8.8–10.2)
CHLORIDE SERPL-SCNC: 106 MMOL/L (ref 98–107)
CHOLEST SERPL-MCNC: 123 MG/DL
CREAT SERPL-MCNC: 0.69 MG/DL (ref 0.51–0.95)
DEPRECATED HCO3 PLAS-SCNC: 26 MMOL/L (ref 22–29)
EGFRCR SERPLBLD CKD-EPI 2021: >90 ML/MIN/1.73M2
FASTING STATUS PATIENT QL REPORTED: NORMAL
GLUCOSE SERPL-MCNC: 111 MG/DL (ref 70–99)
HDLC SERPL-MCNC: 50 MG/DL
LDLC SERPL CALC-MCNC: 62 MG/DL
NONHDLC SERPL-MCNC: 73 MG/DL
POTASSIUM SERPL-SCNC: 3.7 MMOL/L (ref 3.4–5.3)
SODIUM SERPL-SCNC: 142 MMOL/L (ref 135–145)
TRIGL SERPL-MCNC: 56 MG/DL

## 2024-03-06 PROCEDURE — 99214 OFFICE O/P EST MOD 30 MIN: CPT | Performed by: INTERNAL MEDICINE

## 2024-03-06 PROCEDURE — 36415 COLL VENOUS BLD VENIPUNCTURE: CPT | Performed by: PATHOLOGY

## 2024-03-06 PROCEDURE — 80061 LIPID PANEL: CPT | Performed by: PATHOLOGY

## 2024-03-06 PROCEDURE — 80048 BASIC METABOLIC PNL TOTAL CA: CPT | Performed by: PATHOLOGY

## 2024-03-06 NOTE — PROGRESS NOTES
HPI  58-year-old returns today for some forms regarding her housing.  It was unclear on completing these forms and she indicated that she does have some mobility problems and also had me fill out a [employer] mobility form.  She denied a disabling condition however has some multiple medical problems.  She also indicated that she is somewhat mobility limited but can walk up to 9 blocks.  She has some needs in regards to financial planning as well as monitoring her vital signs at home and this was indicated on her forms.  Otherwise she is back on rosuvastatin and tolerating that well.  She is taking the Jardiance and apparently tolerating that well.  She was seen by endocrinology recommended to have a cyst chest CT scan that has yet to be scheduled so we will arrange to get that done.  Otherwise she has been feeling well and has no other complaints or problems today  Past Medical History:   Diagnosis Date    Anemia     Chronic kidney disease     DM2 (diabetes mellitus, type 2) (H) 2023    Hydronephrosis 2015    Hypertension 2007    Metastatic squamous cell carcinoma involving intra-abdominal site with unknown primary site (H) 04/2015    Metastatic squamous cell cervical carcinoma in pelvic mass    Obesity     BMI >30    Prediabetes 2018    Substernal goiter 2/19/2024     Past Surgical History:   Procedure Laterality Date    COMBINED CYSTOSCOPY, INSERT STENT URETER(S) Left 6/13/2016    Procedure: COMBINED CYSTOSCOPY, INSERT STENT URETER(S);  Surgeon: Deja Salinas MD;  [facility]    CYSTOSCOPY, RETROGRADES, COMBINED Left 6/13/2016    Procedure: COMBINED CYSTOSCOPY, RETROGRADES;  Surgeon: Deja Salinas MD;  [facility]    DAVINCI NEPHRECTOMY Left 5/3/2018    Procedure: DAVINCI XI NEPHRECTOMY;  DAVINCI XI LEFT NEPHRECTOMY;  Surgeon: Deja Salinas MD;  Location:  OR    GENITOURINARY SURGERY  5/2015    PNT placement    [place]STERECTOMY  2011    [place]    IR PORT REMOVAL RIGHT   "1/29/2019    PERCUTANEOUS NEPHROSTOMY Left 3/9/2017    Procedure: PERCUTANEOUS NEPHROSTOMY;  Surgeon: Bridger Meyer PA-C;  [facility]    PERCUTANEOUS NEPHROSTOMY Left 6/28/2017    Procedure: PERCUTANEOUS NEPHROSTOMY;  Left Nephrostomy Tube Change;  Surgeon: Bridger Meyer PA-C;  [facility]    PERCUTANEOUS NEPHROSTOMY Left 10/30/2017    Procedure: PERCUTANEOUS NEPHROSTOMY;  Left Nephrostomy Tube Change;  Surgeon: Maryann Chavez PA-C;  [facility]    REMOVE PORT VASCULAR ACCESS Right 1/29/2019    Procedure: Right Port Removal;  Surgeon: Maryann Marin PA-C;  [facility]     Family History   Problem Relation Age of Onset    Hypertension Brother     Thyroid Disease No family hx of     Cancer No family hx of     Goiter No family hx of     Diabetes No family hx of          Exam:  /79 (BP Location: Right arm, Patient Position: Sitting, Cuff Size: Adult Large)   Pulse 69   Ht 1.676 m (5' 5.98\")   Wt 95.3 kg (210 lb)   SpO2 98%   BMI 33.91 kg/m    210 lbs 0 oz  The patient is alert, oriented with a clear sensorium.   Skin shows no lesions or rashes and good turgor.   Head is normocephalic and atraumatic.    Neck shows massive thyromegaly.       Labs reviewed:  Results for orders placed or performed in visit on 03/06/24   Lipid Profile     Status: None   Result Value Ref Range    Cholesterol 123 <200 mg/dL    Triglycerides 56 <150 mg/dL    Direct Measure HDL 50 >=50 mg/dL    LDL Cholesterol Calculated 62 <=100 mg/dL    Non HDL Cholesterol 73 <130 mg/dL    Patient Fasting > 8hrs? Unknown     Narrative    Cholesterol  Desirable:  <200 mg/dL    Triglycerides  Normal:  Less than 150 mg/dL  Borderline High:  150-199 mg/dL  High:  200-499 mg/dL  Very High:  Greater than or equal to 500 mg/dL    Direct Measure HDL  Female:  Greater than or equal to 50 mg/dL   Male:  Greater than or equal to 40 mg/dL    LDL Cholesterol  Desirable:  <100mg/dL  Above Desirable:  100-129 mg/dL "   Borderline High:  130-159 mg/dL   High:  160-189 mg/dL   Very High:  >= 190 mg/dL    Non HDL Cholesterol  Desirable:  130 mg/dL  Above Desirable:  130-159 mg/dL  Borderline High:  160-189 mg/dL  High:  190-219 mg/dL  Very High:  Greater than or equal to 220 mg/dL   Basic metabolic panel     Status: Abnormal   Result Value Ref Range    Sodium 142 135 - 145 mmol/L    Potassium 3.7 3.4 - 5.3 mmol/L    Chloride 106 98 - 107 mmol/L    Carbon Dioxide (CO2) 26 22 - 29 mmol/L    Anion Gap 10 7 - 15 mmol/L    Urea Nitrogen 10.5 8.0 - 23.0 mg/dL    Creatinine 0.69 0.51 - 0.95 mg/dL    GFR Estimate >90 >60 mL/min/1.73m2    Calcium 9.3 8.8 - 10.2 mg/dL    Glucose 111 (H) 70 - 99 mg/dL         ASSESSMENT  1 hypertension improved control  2 hyperlipidemia on rosuvastatin  3 huge multinodular goiter euthyroid needs CT to evaluate  4 Diabetes does not tolerate metformin on jardiance  5 status post nephrectomy for atrophic kidney  6 peripheral neuropathy  7 obesity  8 history of cervical cancer    Plan  We will get her set up for the CT scan we will check her BMP and lipids today back on the rosuvastatin.  Will complete her forms both for housing and for [employer] mobility.  Over 30 minutes spent on the day of service in chart review, patient contact, record completion and review and notification of lab reports    This note was completed using Dragon voice recognition software.      Shamir Pugh MD  General Internal Medicine  [facility]  [phone]

## 2024-03-19 ENCOUNTER — TELEPHONE (OUTPATIENT)
Dept: INTERNAL MEDICINE | Facility: CLINIC | Age: 59
End: 2024-03-19
Payer: COMMERCIAL

## 2024-04-17 ENCOUNTER — OFFICE VISIT (OUTPATIENT)
Dept: FAMILY MEDICINE | Facility: CLINIC | Age: 59
End: 2024-04-17
Payer: COMMERCIAL

## 2024-04-17 VITALS
OXYGEN SATURATION: 100 % | SYSTOLIC BLOOD PRESSURE: 136 MMHG | TEMPERATURE: 97.5 F | RESPIRATION RATE: 16 BRPM | DIASTOLIC BLOOD PRESSURE: 83 MMHG | HEART RATE: 71 BPM | WEIGHT: 203.2 LBS | BODY MASS INDEX: 32.66 KG/M2 | HEIGHT: 66 IN

## 2024-04-17 DIAGNOSIS — Z01.84 IMMUNITY STATUS TESTING: ICD-10-CM

## 2024-04-17 DIAGNOSIS — Z71.84 TRAVEL ADVICE ENCOUNTER: Primary | ICD-10-CM

## 2024-04-17 PROCEDURE — 86735 MUMPS ANTIBODY: CPT | Mod: 59 | Performed by: NURSE PRACTITIONER

## 2024-04-17 PROCEDURE — 36415 COLL VENOUS BLD VENIPUNCTURE: CPT | Performed by: NURSE PRACTITIONER

## 2024-04-17 PROCEDURE — 86765 RUBEOLA ANTIBODY: CPT | Performed by: NURSE PRACTITIONER

## 2024-04-17 PROCEDURE — 90713 POLIOVIRUS IPV SC/IM: CPT | Performed by: NURSE PRACTITIONER

## 2024-04-17 PROCEDURE — 90472 IMMUNIZATION ADMIN EACH ADD: CPT | Performed by: NURSE PRACTITIONER

## 2024-04-17 PROCEDURE — 87340 HEPATITIS B SURFACE AG IA: CPT | Performed by: NURSE PRACTITIONER

## 2024-04-17 PROCEDURE — 99402 PREV MED CNSL INDIV APPRX 30: CPT | Mod: 25 | Performed by: NURSE PRACTITIONER

## 2024-04-17 PROCEDURE — 86708 HEPATITIS A ANTIBODY: CPT | Performed by: NURSE PRACTITIONER

## 2024-04-17 PROCEDURE — 86706 HEP B SURFACE ANTIBODY: CPT | Performed by: NURSE PRACTITIONER

## 2024-04-17 PROCEDURE — 86762 RUBELLA ANTIBODY: CPT | Performed by: NURSE PRACTITIONER

## 2024-04-17 PROCEDURE — 90471 IMMUNIZATION ADMIN: CPT | Performed by: NURSE PRACTITIONER

## 2024-04-17 PROCEDURE — 90717 YELLOW FEVER VACCINE SUBQ: CPT | Performed by: NURSE PRACTITIONER

## 2024-04-17 PROCEDURE — 90691 TYPHOID VACCINE IM: CPT | Performed by: NURSE PRACTITIONER

## 2024-04-17 RX ORDER — AZITHROMYCIN 500 MG/1
500 TABLET, FILM COATED ORAL DAILY
Qty: 3 TABLET | Refills: 0 | Status: SHIPPED | OUTPATIENT
Start: 2024-04-17 | End: 2024-04-20

## 2024-04-17 RX ORDER — ATOVAQUONE AND PROGUANIL HYDROCHLORIDE 250; 100 MG/1; MG/1
1 TABLET, FILM COATED ORAL DAILY
Qty: 30 TABLET | Refills: 0 | Status: SHIPPED | OUTPATIENT
Start: 2024-04-17 | End: 2024-04-17

## 2024-04-17 RX ORDER — ATOVAQUONE AND PROGUANIL HYDROCHLORIDE 250; 100 MG/1; MG/1
1 TABLET, FILM COATED ORAL DAILY
Qty: 30 TABLET | Refills: 0 | Status: SHIPPED | OUTPATIENT
Start: 2024-04-17

## 2024-04-17 ASSESSMENT — PAIN SCALES - GENERAL: PAINLEVEL: NO PAIN (0)

## 2024-04-17 NOTE — PATIENT INSTRUCTIONS
Thank you for visiting the [facility] : [phone]  Today April 17, 2024 you received the    Yellow Fever (YF)    Polio (IPV) Vaccine    Typhoid - injectable. This vaccine is valid for two years.     Follow up vaccine appointments can be made as a NURSE ONLY visit at the Travel Clinic, (BE PREPARED TO WAIT, ) or at designated [facility].    If you are receiving the Rabies vaccines series, it is important that you follow the exact schedule ordered.     Pre-travel     We recommend that you purchase Medical Evacuation Insurance prior to your departure.  Https://wwwnc.cdc.gov/travel/page/insurance    [place] your travel plans with the  Department of State through STEP ( Smart Traveler Enrollment Program ) https://step.state.gov.  STEP is a free service to allow U[place] citizens and nationals traveling and living abroad to enroll their trip with the nearest U.S. Embassy or Consulate.    Animal Exposure: Avoid all mammals even if they look healthy.  If there is a bite, scratch or even a lick, wash area immediately with soap and water for 15 minutes and seek medical care within 24 hours for evaluation of Rabies post exposure treatment.  Contact your Medical Evacuation Insurance.    Repiratory illness prevention strategies ( Covid and Influenza ) CDC recommendations:  Consider wearing a mask in crowded or poorly ventilated indoor areas, including on public transportation and in transportation hubs.  Hand washing: frequent, thorough handwashing with soap and water for 20 seconds. Use an alcohol-based hand  with at least 60% alcohol if soap and water are not readily available. Avoid touching face, nose, eyes, mouth unless you have done appropriate hand washing as above.  VACCINES: Staying up to date on COVID-19 vaccines significantly lowers the risk of getting very sick, being hospitalized, or dying from COVID-19. CDC recommends that everyone stay up to date on  their COVID-19 vaccines, especially people with weakened immune systems.    Travel Covid 19 Testing:  updated 12/06/2021  International travelers: Pre-travel:  See country specific Embassy websites or airline websites.    Post travel: CDC recommends getting tested 3-5 days after your trip     Post-travel illness:  Contact your provider or [facility] if you develop a fever, rash, cough, diarrhea or other symptoms for up to 1 year after travel.  Inform your healthcare provider when and where you traveled to.    Please call the [facility] with any questions [phone]  Or send your provider a 'My Chart' note.

## 2024-04-17 NOTE — PROGRESS NOTES
"Nurse Note ( Pre-Travel Consult)    Itinerary:  [facility]    Departure Date: 5/6/2024    Return Date: 5/26/2024    Length of Trip: 20 days    Reason for Travel: Visiting friends and relatives         Urban or rural: urban    Accommodations: Family home    IMMUNIZATION HISTORY  Have you received any immunizations within the past 4 weeks?  No  Have you ever fainted from having your blood drawn or from an injection?  No  Have you ever had a fever reaction to vaccination?  Yes - Pt reports COVID vaccine  Have you ever had any bad reaction or side effect from any vaccination?  No  Have you ever had hepatitis A or B vaccine?  No  Do you live (or work closely) with anyone who has AIDS, an AIDS-like condition, any other immune disorder or who is on chemotherapy for cancer?  No  Do you have a family history of immunodeficiency?  Yes - Pt reports brother had cancer  Have you received any injection of immune globulin or any blood products during the past 12 months?  No    Patient roomed by TONYA Laboy    Subjective  Angella St is a 58 year old female seen today with   for counsultation for international travel.   Patient will be departing in  3 week(s) and  traveling alone.  This will be her first return to [facility]    Patient itinerary :  will be in the urban region of  [place] which risk for Malaria, Yellow Fever, food borne illnesses, motor vehicle accidents, and Typhoid. exposure.      Patient's activities will include visiting friends and relatives.    Patient's country of birth is [facility]  IN [place]  since immigration of 9 years     Special medical concerns: thyroid nodule   Hx of Colon CA : Negative bx 2015 \"clear of CA in 2015\"    Pre-travel questionnaire was completed by patient and reviewed by provider.     Vitals: BP (!) 144/76 (BP Location: Left arm, Patient Position: Sitting, Cuff Size: Adult Large)   Pulse 71   Temp 97.5  F (36.4  C) (Temporal)   Resp 16   Ht 1.679 m (5' 6.1\")   Wt 92.2 kg (203 lb 3.2 oz)   " SpO2 100%   BMI 32.70 kg/m    BMI= Body mass index is 32.7 kg/m .    EXAM:  General:  Well-nourished, well-developed in no acute distress.  Appears to be stated age, interacts appropriately and expresses understanding of information given to patient.    Current Outpatient Medications   Medication Sig Dispense Refill    amLODIPine (NORVASC) 10 MG tablet Take 1 tablet (10 mg) by mouth at bedtime 90 tablet 3    carboxymethylcellulose PF (CARBOXYMETHYLCELLULOSE SODIUM) 0.5 % ophthalmic solution Place 1 drop into both eyes every 2 hours (while awake) Preservative free artificial tears, single use vials. 400 each 11    empagliflozin (JARDIANCE) 10 MG TABS tablet Take 1 tablet (10 mg) by mouth daily 90 tablet 1    olopatadine (PATADAY) 0.2 % ophthalmic solution Place 0.05 mLs (1 drop) into both eyes daily Pataday extra strength 15 mL 11    olopatadine (PATANOL) 0.1 % ophthalmic solution Place 1 drop into both eyes 2 times daily 5 mL 0    rosuvastatin (CRESTOR) 10 MG tablet Take 1 tablet (10 mg) by mouth daily 90 tablet 3     Patient Active Problem List   Diagnosis    Cervical cancer (H)    Encounter for long-term current use of medication    Abdominal pain    Hypertension    Drug-induced peripheral neuropathy (H24)    Hydronephrosis    Recurrent pyelonephritis    Abscess after procedure    Nontoxic multinodular goiter    Thyroid nodule    Atrophic kidney    Morbid obesity (H)    Diabetes mellitus, type 2 (H)    Substernal goiter     No Known Allergies      Immunizations discussed include:   Covid 19: Declined  Not concerned about risk of disease  Hepatitis A:  Titers drawn  Hepatitis B: Titers drawn  Influenza: Up to date  Typhoid: Ordered/given today, risks, benefits and side effects reviewed  Rabies: Declined  reviewed managment of a animal bite or scratch (washing wound, seek medical care within 24 hours for post exposure prophylaxis )  Yellow Fever: Yellow Fever ordered/given today - side effects, precautions,  allergies, risks discussed. Patient expressed understanding.  Maltese Encephalitis: Not indicated  Meningococcus: Not indicated  Tetanus/Diphtheria: Up to date  Measles/Mumps/Rubella: Titers drawn  Cholera: Not needed  Polio: Ordered/given today, risks, benefits and side effects reviewed  Pneumococcal: Under age of 65  Varicella: Immune by disease history per patient report  Shingrix: Not indicated  HPV:  Not indicated     TB: short stay     Altitude Exposure on this trip: no  Past tolerance to Altitude: na    ASSESSMENT/PLAN:  Angella was seen today for travel clinic.    Diagnoses and all orders for this visit:    Travel advice encounter  -     Discontinue: atovaquone-proguanil (MALARONE) 250-100 MG tablet; Take 1 tablet by mouth daily Start 2 days before exposure to Malaria and continue daily till  7 days after exposure.  -     atovaquone-proguanil (MALARONE) 250-100 MG tablet; Take 1 tablet by mouth daily Start 2 days before exposure to Malaria and continue daily till  7 days after exposure.  -     azithromycin (ZITHROMAX) 500 MG tablet; Take 1 tablet (500 mg) by mouth daily for 3 doses Take 1 tablet a day for up to 3 days for severe diarrhea    Immunity status testing  -     Hepatitis B surface antigen; Future  -     Hepatitis B Surface Antibody; Future  -     Hepatitis A Antibody Total; Future  -     Rubella Antibody IgG; Future  -     Rubeola Antibody IgG; Future  -     Mumps Immune Status, IgG; Future  -     Hepatitis B surface antigen  -     Hepatitis B Surface Antibody  -     Hepatitis A Antibody Total  -     Rubella Antibody IgG  -     Rubeola Antibody IgG  -     Mumps Immune Status, IgG    Other orders  -     YELLOW FEVER, LIVE SQ  -     TYPHOID VACCINE, IM  -     POLIOVIRUS 6W-18Y (IPOL)      I have reviewed general recommendations for safe travel   including: food/water precautions, insect precautions, safer sex   practices given high prevalence of Zika, HIV and other STDs,   roadway safety. Educational  materials and [employer] report provided.    Malaraia prophylaxis recommended: Malarone  Symptomatic treatment for traveler's diarrhea: azithromycin        Evacuation insurance advised and resources were provided to patient.    Total visit time 30 minutes  with over 50% of time spent counseling patient and shared decision making as detailed above.    Ale Solo CNP  Certificate in Travel Health

## 2024-04-18 LAB
HAV AB SER QL IA: REACTIVE
HBV SURFACE AB SERPL IA-ACNC: 3.87 M[IU]/ML
HBV SURFACE AB SERPL IA-ACNC: NONREACTIVE M[IU]/ML
HBV SURFACE AG SERPL QL IA: NONREACTIVE
MEV IGG SER IA-ACNC: >300 AU/ML
MEV IGG SER IA-ACNC: POSITIVE
MUMPS ANTIBODY IGG INSTRUMENT VALUE: 147 AU/ML
MUV IGG SER QL IA: POSITIVE
RUBV IGG SERPL QL IA: 30.5 INDEX
RUBV IGG SERPL QL IA: POSITIVE

## 2024-04-19 NOTE — RESULT ENCOUNTER NOTE
Pj Perales,    Your blood test shows that you are immune or protected against Hepatitis A, Measles, Mumps and Rubella.  You do not these vaccines       You are not protected against Hepatitis B . I do recommend that you begin the Hepatitis B series of vaccines.      You may do this at most pharmacies but I will also place an order through the [facility] system.    Safe travels  Ale Solo (Lori) CNP  [facility]  Certificate in Travel Health

## 2024-04-23 ENCOUNTER — ALLIED HEALTH/NURSE VISIT (OUTPATIENT)
Dept: FAMILY MEDICINE | Facility: CLINIC | Age: 59
End: 2024-04-23
Payer: COMMERCIAL

## 2024-04-23 DIAGNOSIS — Z23 ENCOUNTER FOR IMMUNIZATION: Primary | ICD-10-CM

## 2024-04-23 PROCEDURE — 90746 HEPB VACCINE 3 DOSE ADULT IM: CPT

## 2024-04-23 PROCEDURE — 90471 IMMUNIZATION ADMIN: CPT

## 2024-04-23 PROCEDURE — 99207 PR NO CHARGE NURSE ONLY: CPT

## 2024-04-23 NOTE — PROGRESS NOTES
Prior to immunization administration, verified patients identity using patient s name and date of birth. Please see Immunization Activity for additional information.     Screening Questionnaire for Adult Immunization    Are you sick today?   No   Do you have allergies to medications, food, a vaccine component or latex?   No   Have you ever had a serious reaction after receiving a vaccination?   No   Do you have a long-term health problem with heart, lung, kidney, or metabolic disease (e.g., diabetes), asthma, a blood disorder, no spleen, complement component deficiency, a cochlear implant, or a spinal fluid leak?  Are you on long-term aspirin therapy?   Yes   Do you have cancer, leukemia, HIV/AIDS, or any other immune system problem?   No   Do you have a parent, brother, or sister with an immune system problem?   No   In the past 3 months, have you taken medications that affect  your immune system, such as prednisone, other steroids, or anticancer drugs; drugs for the treatment of rheumatoid arthritis, Crohn s disease, or psoriasis; or have you had radiation treatments?   No   Have you had a seizure, or a brain or other nervous system problem?   No   During the past year, have you received a transfusion of blood or blood    products, or been given immune (gamma) globulin or antiviral drug?   No   For women: Are you pregnant or is there a chance you could become       pregnant during the next month?   No   Have you received any vaccinations in the past 4 weeks?   Yes     Immunization questionnaire was positive for at least one answer.  Notified Meri Solo.    I have reviewed the following standing orders:   This patient is due and qualifies for the Hepatitis B vaccine.    Click here for Hepatitis B Standing Order    I have reviewed the vaccines inclusion and exclusion criteria; No concerns regarding eligibility.     Patient instructed to remain in clinic for 15 minutes afterwards, and to report any adverse reactions.      Screening performed by Eunice Wyatt on 4/23/2024 at 9:18 AM.

## 2024-05-01 ENCOUNTER — OFFICE VISIT (OUTPATIENT)
Dept: INTERNAL MEDICINE | Facility: CLINIC | Age: 59
End: 2024-05-01
Payer: COMMERCIAL

## 2024-05-01 ENCOUNTER — LAB (OUTPATIENT)
Dept: LAB | Facility: CLINIC | Age: 59
End: 2024-05-01
Payer: COMMERCIAL

## 2024-05-01 VITALS
SYSTOLIC BLOOD PRESSURE: 135 MMHG | HEART RATE: 68 BPM | HEIGHT: 66 IN | BODY MASS INDEX: 32.3 KG/M2 | OXYGEN SATURATION: 99 % | WEIGHT: 201 LBS | DIASTOLIC BLOOD PRESSURE: 79 MMHG

## 2024-05-01 DIAGNOSIS — E11.9 TYPE 2 DIABETES MELLITUS WITHOUT COMPLICATION, WITHOUT LONG-TERM CURRENT USE OF INSULIN (H): ICD-10-CM

## 2024-05-01 DIAGNOSIS — E11.9 TYPE 2 DIABETES MELLITUS WITHOUT COMPLICATION, WITHOUT LONG-TERM CURRENT USE OF INSULIN (H): Primary | ICD-10-CM

## 2024-05-01 DIAGNOSIS — E04.2 NONTOXIC MULTINODULAR GOITER: ICD-10-CM

## 2024-05-01 LAB
ANION GAP SERPL CALCULATED.3IONS-SCNC: 9 MMOL/L (ref 7–15)
BUN SERPL-MCNC: 12.6 MG/DL (ref 8–23)
CALCIUM SERPL-MCNC: 9.2 MG/DL (ref 8.8–10.2)
CHLORIDE SERPL-SCNC: 106 MMOL/L (ref 98–107)
CREAT SERPL-MCNC: 0.67 MG/DL (ref 0.51–0.95)
CREAT UR-MCNC: 122 MG/DL
DEPRECATED HCO3 PLAS-SCNC: 26 MMOL/L (ref 22–29)
EGFRCR SERPLBLD CKD-EPI 2021: >90 ML/MIN/1.73M2
GLUCOSE SERPL-MCNC: 102 MG/DL (ref 70–99)
HBA1C MFR BLD: 6.4 %
MICROALBUMIN UR-MCNC: 33 MG/L
MICROALBUMIN/CREAT UR: 27.05 MG/G CR (ref 0–25)
POTASSIUM SERPL-SCNC: 4.1 MMOL/L (ref 3.4–5.3)
SODIUM SERPL-SCNC: 141 MMOL/L (ref 135–145)

## 2024-05-01 PROCEDURE — 36415 COLL VENOUS BLD VENIPUNCTURE: CPT | Performed by: PATHOLOGY

## 2024-05-01 PROCEDURE — 82043 UR ALBUMIN QUANTITATIVE: CPT | Performed by: INTERNAL MEDICINE

## 2024-05-01 PROCEDURE — 99213 OFFICE O/P EST LOW 20 MIN: CPT | Performed by: INTERNAL MEDICINE

## 2024-05-01 PROCEDURE — 99000 SPECIMEN HANDLING OFFICE-LAB: CPT | Performed by: PATHOLOGY

## 2024-05-01 PROCEDURE — 83036 HEMOGLOBIN GLYCOSYLATED A1C: CPT | Performed by: INTERNAL MEDICINE

## 2024-05-01 PROCEDURE — 80048 BASIC METABOLIC PNL TOTAL CA: CPT | Performed by: PATHOLOGY

## 2024-05-01 NOTE — PROGRESS NOTES
HPI  58-year-old returns today for reevaluation of her diabetes blood pressure lipids and thyroid.  She has not yet been able to see endocrinology.  Is been recommended that she get a CT scan of the thyroid and so we will set that up.  She has not noticed any pain or discomfort in the neck related to the thyroid.  Otherwise she has been doing reasonably well.  She has had no associated chest pain or dyspnea she has been to the travel clinic regarding an upcoming trip to [facility].  She had no swelling in her feet or ankles blood pressure has been under good control.  Past Medical History:   Diagnosis Date     Anemia      Chronic kidney disease      DM2 (diabetes mellitus, type 2) (H) 2023     Hydronephrosis 2015     Hypertension 2007     Metastatic squamous cell carcinoma involving intra-abdominal site with unknown primary site (H) 04/2015    Metastatic squamous cell cervical carcinoma in pelvic mass     Obesity     BMI >30     Prediabetes 2018     Substernal goiter 2/19/2024     Past Surgical History:   Procedure Laterality Date     COMBINED CYSTOSCOPY, INSERT STENT URETER(S) Left 6/13/2016    Procedure: COMBINED CYSTOSCOPY, INSERT STENT URETER(S);  Surgeon: Deja Salinas MD;  [facility]     CYSTOSCOPY, RETROGRADES, COMBINED Left 6/13/2016    Procedure: COMBINED CYSTOSCOPY, RETROGRADES;  Surgeon: Deja Salinas MD;  [facility]     DAVINCI NEPHRECTOMY Left 5/3/2018    Procedure: DAVINCI XI NEPHRECTOMY;  DAVINCI XI LEFT NEPHRECTOMY;  Surgeon: Deaj Salinas MD;  [facility]     GENITOURINARY SURGERY  5/2015    PNT placement     HYSTERECTOMY  2011    [place], [place]     IR PORT REMOVAL RIGHT  1/29/2019     PERCUTANEOUS NEPHROSTOMY Left 3/9/2017    Procedure: PERCUTANEOUS NEPHROSTOMY;  Surgeon: Bridger Meyer PA-C;  [facility]     PERCUTANEOUS NEPHROSTOMY Left 6/28/2017    Procedure: PERCUTANEOUS NEPHROSTOMY;  Left Nephrostomy Tube Change;  Surgeon: Betsy  "Bridger Hernández PA-C;  [facility]     PERCUTANEOUS NEPHROSTOMY Left 10/30/2017    Procedure: PERCUTANEOUS NEPHROSTOMY;  Left Nephrostomy Tube Change;  Surgeon: Maryann Chavez PA-C;  [facility]     REMOVE PORT VASCULAR ACCESS Right 1/29/2019    Procedure: Right Port Removal;  Surgeon: Maryann Marin PA-C;  [facility]     Family History   Problem Relation Age of Onset     Hypertension Brother      Thyroid Disease No family hx of      Cancer No family hx of      Goiter No family hx of      Diabetes No family hx of          Exam:  /79 (BP Location: Right arm, Patient Position: Sitting, Cuff Size: Adult Large)   Pulse 68   Ht 1.679 m (5' 6.1\")   Wt 91.2 kg (201 lb)   SpO2 99%   BMI 32.34 kg/m    201 lbs 0 oz  The patient is alert, oriented with a clear sensorium.   Skin shows no lesions or rashes and good turgor.   Head is normocephalic and atraumatic.    Neck shows massive thyromegaly.     Lungs are clear.   Heart shows normal S1 and S2 without murmur or gallop.    Extremities show no edema.    Labs reviewed:  Results for orders placed or performed in visit on 05/01/24   Hemoglobin A1c     Status: Abnormal   Result Value Ref Range    Hemoglobin A1C 6.4 (H) <5.7 %   Basic metabolic panel     Status: Abnormal   Result Value Ref Range    Sodium 141 135 - 145 mmol/L    Potassium 4.1 3.4 - 5.3 mmol/L    Chloride 106 98 - 107 mmol/L    Carbon Dioxide (CO2) 26 22 - 29 mmol/L    Anion Gap 9 7 - 15 mmol/L    Urea Nitrogen 12.6 8.0 - 23.0 mg/dL    Creatinine 0.67 0.51 - 0.95 mg/dL    GFR Estimate >90 >60 mL/min/1.73m2    Calcium 9.2 8.8 - 10.2 mg/dL    Glucose 102 (H) 70 - 99 mg/dL   Albumin Random Urine Quantitative with Creat Ratio     Status: Abnormal   Result Value Ref Range    Creatinine Urine mg/dL 122.0 mg/dL    Albumin Urine mg/L 33.0 mg/L    Albumin Urine mg/g Cr 27.05 (H) 0.00 - 25.00 mg/g Cr       ASSESSMENT  1 hypertension fair control  2 hyperlipidemia on rosuvastatin  3 huge " multinodular goiter euthyroid needs CT to evaluate  4 Diabetes does not tolerate metformin on jardiance well controlled  5 status post nephrectomy for atrophic kidney  6 peripheral neuropathy  7 obesity  8 history of cervical cancer    Plan  Will reassess her labs today and have her arrange a CT scan of the thyroid follow-up with endocrinology follow-up with me in another 3 to 6 months    This note was completed using Dragon voice recognition software.      Shamir Pugh MD  General Internal Medicine  [facility]  [phone]

## 2024-05-01 NOTE — PROGRESS NOTES
Angella is a 58 year old that presents in clinic today for the following:     Chief Complaint   Patient presents with    Follow Up     Pt here to discuss diabetes, high cholesterol, and high blood pressure           5/1/2024     9:07 AM   Additional Questions   Roomed by Tran LOPEZ   Accompanied by N/A     Screenings from encounters over the past 10 days    No data recorded       Tran Cisneros at 9:15 AM on 5/1/2024

## 2024-05-01 NOTE — PROGRESS NOTES
Angella is a 58 year old that presents in clinic today for the following:     Chief Complaint   Patient presents with    Follow Up     Pt here to discuss diabetes, high cholesterol, and high blood pressure           5/1/2024     9:07 AM   Additional Questions   Roomed by Tran LOPEZ   Accompanied by N/A     Screenings from encounters over the past 10 days    No data recorded       Tran Cisneros at 9:15 AM on 5/1/2024    Answers submitted by the patient for this visit:  Lipid Visit (Submitted on 5/1/2024)  Chief Complaint: Chronic problems general questions HPI Form  Are you regularly taking any medication or supplement to lower your cholesterol?: Yes  Are you having muscle aches or other side effects that you think could be caused by your cholesterol lowering medication?: No  Hypertension Visit (Submitted on 5/1/2024)  Chief Complaint: Chronic problems general questions HPI Form  Do you check your blood pressure regularly outside of the clinic?: Yes  Are your blood pressures ever more than 140 on the top number (systolic) OR more than 90 on the bottom number (diastolic)? (For example, greater than 140/90): No  Are you following a low salt diet?: Yes  Diabetes Visit (Submitted on 5/1/2024)  Chief Complaint: Chronic problems general questions HPI Form  Frequency of checking blood sugars:: a few times a month  What time of day are you checking your blood sugars : before meals  Have you had any blood sugars above 200?: No  Have you had any blood sugars below 70?: No  Hypoglycemia symptoms:: none  Diabetic concerns:: other  Paraesthesia present:: weight loss  General Questionnaire (Submitted on 5/1/2024)  Chief Complaint: Chronic problems general questions HPI Form  How many servings of fruits and vegetables do you eat daily?: 4 or more  On average, how many sweetened beverages do you drink each day (Examples: soda, juice, sweet tea, etc.  Do NOT count diet or artificially sweetened beverages)?: 1  How many minutes a day  do you exercise enough to make your heart beat faster?: 30 to 60  How many days a week do you exercise enough to make your heart beat faster?: 6  How many days per week do you miss taking your medication?: 0

## 2024-07-07 ENCOUNTER — HEALTH MAINTENANCE LETTER (OUTPATIENT)
Age: 59
End: 2024-07-07

## 2024-09-15 ENCOUNTER — HEALTH MAINTENANCE LETTER (OUTPATIENT)
Age: 59
End: 2024-09-15

## 2024-09-20 ENCOUNTER — DOCUMENTATION ONLY (OUTPATIENT)
Dept: INTERNAL MEDICINE | Facility: CLINIC | Age: 59
End: 2024-09-20
Payer: COMMERCIAL

## 2024-09-20 NOTE — PROGRESS NOTES
Type of Form Received: [facility] Professional Statement of Need    Form Received (Date) 9/19/24   Form Filled out Yes, faxed 9/25/24   Placed in provider folder Yes

## 2024-09-24 ENCOUNTER — VIRTUAL VISIT (OUTPATIENT)
Dept: ENDOCRINOLOGY | Facility: CLINIC | Age: 59
End: 2024-09-24
Payer: COMMERCIAL

## 2024-09-24 DIAGNOSIS — E04.2 NONTOXIC MULTINODULAR GOITER: ICD-10-CM

## 2024-09-24 PROCEDURE — 99215 OFFICE O/P EST HI 40 MIN: CPT | Mod: 95 | Performed by: STUDENT IN AN ORGANIZED HEALTH CARE EDUCATION/TRAINING PROGRAM

## 2024-09-24 PROCEDURE — G2211 COMPLEX E/M VISIT ADD ON: HCPCS | Mod: 95 | Performed by: STUDENT IN AN ORGANIZED HEALTH CARE EDUCATION/TRAINING PROGRAM

## 2024-09-24 ASSESSMENT — PAIN SCALES - GENERAL: PAINLEVEL: NO PAIN (0)

## 2024-09-24 NOTE — PROGRESS NOTES
Endocrinology Clinic Visit 9/24/2024      Video-Visit Details    Type of service:  Video Visit    Joined the call at 9/24/2024, 12:37:00 pm.  Left the call at 9/24/2024, 12:49:50 pm.    Originating Location (pt. Location): Home        Distant Location (provider location):  Off-site    Mode of Communication:  Video Conference via [employer]    Physician has received verbal consent for a Video Visit from the patient? Yes    I spent a total of 40 minutes on the date of encounter reviewing medical records, evaluating the patient, coordinating care and documenting in the EHR, as detailed above.      NAME:  Angella St  PCP:  JarrettShamir tamayo Silvino  MRN:  [id number]  Reason for Consult:  MNG  Requesting Provider:  Shamir Pugh    Chief Complaint     Chief Complaint   Patient presents with    RECHECK       History of Present Illness     Angella St is a 58 year old female who is seen in video visit for Non toxic MNG     She was previously seen by Dr. Zee in 2018 and 2020 and recently by Dr. Ritter on 2/2024 for the same problem.  Part of my note is from Dr. Ritter confirmed with patient.    She was discovered to have thyroid goiter back in 2015 during her cervical cancer evaluation. She underwent PET/CT scan which showed heterogenous thyroid nodules with increased FDG uptake. The record documents that the thyroid has been followed with US and CT since 2015.    FNAB has been performed in various thyroid nodules over time, 11/3/15, 1/6/16 and 10/28/19. The 2019 FNAB procedure involved FNAB  of 4 left sided nodules and one on the right .  IN all cases, cytology has been benign.  The most recent thyorid US 10/2023 concluded there was some enlargement of multiple nodules .      She denied any compressive sx, no anterior neck discomfort, no difficulty swallowing or SOB lying down. She had CT C/A/P for cervical cancer surveillance which showed large thyroid with tracheal deviation to the rt.       No  history of childhood radiation exposure.    Most recent TSH 10/2023 wnl.    Family hx noncontributory   Social: she has 3 children.    Problem List     Patient Active Problem List   Diagnosis    Cervical cancer (H)    Encounter for long-term current use of medication    Abdominal pain    Hypertension    Drug-induced peripheral neuropathy (H)    Hydronephrosis    Recurrent pyelonephritis    Abscess after procedure    Nontoxic multinodular goiter    Thyroid nodule    Atrophic kidney    Morbid obesity (H)    Diabetes mellitus, type 2 (H)    Substernal goiter        Medications     Current Outpatient Medications   Medication Sig Dispense Refill    amLODIPine (NORVASC) 10 MG tablet Take 1 tablet (10 mg) by mouth at bedtime 90 tablet 3    atovaquone-proguanil (MALARONE) 250-100 MG tablet Take 1 tablet by mouth daily Start 2 days before exposure to [place] and continue daily till  7 days after exposure. 30 tablet 0    carboxymethylcellulose PF (CARBOXYMETHYLCELLULOSE SODIUM) 0.5 % ophthalmic solution Place 1 drop into both eyes every 2 hours (while awake) Preservative free artificial tears, single use vials. 400 each 11    empagliflozin (JARDIANCE) 10 MG TABS tablet Take 1 tablet (10 mg) by mouth daily 90 tablet 1    olopatadine (PATADAY) 0.2 % ophthalmic solution Place 0.05 mLs (1 drop) into both eyes daily Pataday extra strength 15 mL 11    olopatadine (PATANOL) 0.1 % ophthalmic solution Place 1 drop into both eyes 2 times daily 5 mL 0    rosuvastatin (CRESTOR) 10 MG tablet Take 1 tablet (10 mg) by mouth daily 90 tablet 3     No current facility-administered medications for this visit.        Allergies     No Known Allergies    Medical / Surgical History     Past Medical History:   Diagnosis Date    Anemia     Chronic kidney disease     DM2 (diabetes mellitus, type 2) (H) 2023    Hydronephrosis 2015    Hypertension 2007    Metastatic squamous cell carcinoma involving intra-abdominal site with unknown primary site (H)  04/2015    Metastatic squamous cell cervical carcinoma in pelvic mass    Obesity     BMI >30    Prediabetes 2018    Substernal goiter 2/19/2024     Past Surgical History:   Procedure Laterality Date    COMBINED CYSTOSCOPY, INSERT STENT URETER(S) Left 6/13/2016    Procedure: COMBINED CYSTOSCOPY, INSERT STENT URETER(S);  Surgeon: Deja Salinas MD;  Location: [facility]    CYSTOSCOPY, RETROGRADES, COMBINED Left 6/13/2016    Procedure: COMBINED CYSTOSCOPY, RETROGRADES;  Surgeon: Deja Salinas MD;  Location: [facility]    DAVINCI NEPHRECTOMY Left 5/3/2018    Procedure: DAVINCI XI NEPHRECTOMY;  DAVINCI XI LEFT NEPHRECTOMY;  Surgeon: Deja Salinas MD;  Location: [facility]    GENITOURINARY SURGERY  5/2015    PNT placement    HYSTERECTOMY  2011    [place]    IR PORT REMOVAL RIGHT  1/29/2019    PERCUTANEOUS NEPHROSTOMY Left 3/9/2017    Procedure: PERCUTANEOUS NEPHROSTOMY;  Surgeon: Bridger Meyer PA-C;  [facility]    PERCUTANEOUS NEPHROSTOMY Left 6/28/2017    Procedure: PERCUTANEOUS NEPHROSTOMY;  Left Nephrostomy Tube Change;  Surgeon: Bridger Meyer PA-C;  [facility]    PERCUTANEOUS NEPHROSTOMY Left 10/30/2017    Procedure: PERCUTANEOUS NEPHROSTOMY;  Left Nephrostomy Tube Change;  Surgeon: Maryann Chavez PA-C;  [facility]    REMOVE PORT VASCULAR ACCESS Right 1/29/2019    Procedure: Right Port Removal;  Surgeon: Maryann Marin PA-C;  Location: [facility]       Social History     Social History     Socioeconomic History    Marital status:      Spouse name: Not on file    Number of children: 3    Years of education: Not on file    Highest education level: Not on file   Occupational History    Occupation: no working   Tobacco Use    Smoking status: Never    Smokeless tobacco: Never   Vaping Use    Vaping status: Never Used   Substance and Sexual Activity    Alcohol use: No     Alcohol/week: 0.0 standard drinks of alcohol     Comment: Does not drink alcohol     Drug use: No     Comment: No drug use    Sexual activity: Not Currently   Other Topics Concern    Parent/sibling w/ CABG, MI or angioplasty before 65F 55M? Not Asked   Social History Narrative    Mom  from [place] in her 40's    Dad  in his 60's presumed to be from hunger as a result of the on going war in [facility] at the time.    Patient has a brother and a sister who are alive and well.      Patient has three children:  Female age 37 alive and well; Female age 35 alive and well; Female age 33 alive and well    Patient reports  is in [facility].     Social Determinants of Health     Financial Resource Strain: Low Risk  (2024)    Financial Resource Strain     Within the past 12 months, have you or your family members you live with been unable to get utilities (heat, electricity) when it was really needed?: No   Food Insecurity: High Risk (2024)    Food Insecurity     Within the past 12 months, did you worry that your food would run out before you got money to buy more?: No     Within the past 12 months, did the food you bought just not last and you didn t have money to get more?: Yes   Transportation Needs: High Risk (2024)    Transportation Needs     Within the past 12 months, has lack of transportation kept you from medical appointments, getting your medicines, non-medical meetings or appointments, work, or from getting things that you need?: Yes   Physical Activity: Not on file   Stress: Not on file   Social Connections: Not on file   Interpersonal Safety: Low Risk  (2024)    Interpersonal Safety     Do you feel physically and emotionally safe where you currently live?: Yes     Within the past 12 months, have you been hit, slapped, kicked or otherwise physically hurt by someone?: No     Within the past 12 months, have you been humiliated or emotionally abused in other ways by your partner or ex-partner?: No   Housing Stability: Low Risk  (2024)    Housing Stability     Do you  have housing? : Yes     Are you worried about losing your housing?: No       Family History     Family History   Problem Relation Age of Onset    Hypertension Brother     Thyroid Disease No family hx of     Cancer No family hx of     Goiter No family hx of     Diabetes No family hx of        ROS     12 ROS completed, pertinent positive and negative in HPI    Physical Exam   There were no vitals taken for this visit.   GENERAL: alert and no distress  EYES: Eyes grossly normal to inspection.  No discharge or erythema, or obvious scleral/conjunctival abnormalities.  RESP: No audible wheeze, cough, or visible cyanosis.    SKIN: Visible skin clear. No significant rash, abnormal pigmentation or lesions.  NEURO: Cranial nerves grossly intact.  Mentation and speech appropriate for age.  PSYCH: Appropriate affect, tone, and pace of words     Labs/Imaging     Pertinent Labs were reviewed and updated in EPIC and discussed briefly.  Radiology Results were  reviewed and updated in EPIC and discussed briefly.    Most recent thyroid US:  COMPARISON: Thyroid US 4/16/2021.     HISTORY: Nontoxic multinodular goiter.      FINDINGS:   Thyroid parenchyma: Homogenous.  The right lobe of the thyroid measures: 6.6 x 2.1 x 1.8 cm.  The left lobe of the thyroid measures: 7.8 x 5.0 x 4.1 cm.  The thyroid isthmus measures: 8 mm.     Nodule 1:  Location: Right superior lobe  Size: 1.1 x 1.0 x 0.9 cm  Composition: Solid or almost completely solid (2 points)  Echogenicity: Hypoechoic (2 points)  Shape: Wider than tall (0 points)  Margin: Smooth (0 points)  Echogenic Foci: Peripheral/rim calcifications (2 points)  Stability: Enlarging, previously 0.8 x 0.5 x 0.8 cm  TIRADS: TR4 (4-6 points)      Nodule 2:  Location: Right superior lobe  Size: 0.7 x 0.6 x 0.4 cm  Composition: Solid or almost completely solid (2 points)  Echogenicity: Hypoechoic (2 points)  Shape: Wider than tall (0 points)  Margin: Ill-defined (0 points)  Echogenic Foci: None or  large comet tail artifact (0 points)  Stability: Slight increase in size, previously 0.5 x 0.4 x 0.6 cm  TIRADS: TR3 (3 points)      Nodule 3:  Location: Right inferior lobe  Size: 2.4 x 1.4 x 1.4 cm  Composition: Solid or almost completely solid (2 points)  Echogenicity: Hyperechoic or isoechoic (1 point)  Shape: Wider than tall (0 points)  Margin: Ill-defined (0 points)  Echogenic Foci: Macro-calcifications (1 point)  Stability: Stable to minimal increase in size, previously 2.3 x 1.3 x  1.2 cm  TIRADS: TR4 (4-6 points)      Nodule 4:  Location: Left superior lobe  Size: 5.8 x 4.9 x 3.1 cm  Composition: Solid or almost completely solid (2 points)  Echogenicity: Hypoechoic (2 points)  Shape: Wider than tall (0 points)  Margin: Smooth (0 points)  Echogenic Foci: None or large comet tail artifact (0 points)  Stability: Enlarging, previously 4.3 x 2.7 x 2.2 cm; though measured  with different technique on current ultrasound so part of the  increased size likely secondary to technique.  TIRADS: TR4 (4-6 points)      Nodule 5:  Location: Left mid lobe  Size: 4.1 x 4.4 x 3.5 cm (measured on cine trans images)  Composition: Solid or almost completely solid (2 points)  Echogenicity: Hyperechoic or isoechoic (1 point)  Shape: Wider than tall (0 points)  Margin: Smooth (0 points)  Echogenic Foci: Macro-calcifications (1 point)  Stability: Slight increase in size, previously 4.0 x 3.1 x 3.9 cm  TIRADS: TR4 (4-6 points)      Nodule 6:  Location: Left mid/inferior lobe  Size: 3.3 x 2.9 x 1.7 cm  Composition: Mixed cystic and solid (1 point)  Echogenicity: Hypoechoic (2 points)  Shape: Wider than tall (0 points)  Margin: Smooth (0 points)  Echogenic Foci: Punctate echogenic foci (3 points)  Stability: Decreasing, previously 3.6 x 2.8 x 3.2 cm  TIRADS: TR4 (4-6 points)      Nodule 7:  Location: Left inferior lobe  Size: 4.1 x 6.6 x 4.5 cm  Composition: Solid or almost completely solid (2 points)  Echogenicity: Hypoechoic (2  "points)  Shape: Wider than tall (0 points)  Margin: Ill-defined (0 points)  Echogenic Foci: Macro-calcifications (1 point)  Stability: Enlarging, previously 5.5 x 4.2 x 4.0 cm  TIRADS: TR4 (4-6 points)      Nodule 8:  Location: Left mid/inferior lobe  Size: 3.7 x 4.2 x 3.2 cm  Composition: Solid or almost completely solid (2 points)  Echogenicity: Hyperechoic or isoechoic (1 point)  Shape: Wider than tall (0 points)  Margin: Smooth (0 points)  Echogenic Foci: None or large comet tail artifact (0 points)  Stability: Appears more conspicuous/enlarged in size compared to  prior, review of cine images from prior ultrasound showed smaller  similar nodule which was not measured separately.  TIRADS: TR3 (3 points)                                                                       Impression: Multiple thyroid nodules as described, majority of the  nodules appear enlarged in size compared to prior ultrasound from 4/16/2021.       Summary of recent findings:   Lab Results   Component Value Date    A1C 6.4 05/01/2024    A1C 7.7 01/31/2024    A1C 7.4 10/25/2023    A1C 8.1 05/15/2023    A1C 6.2 05/05/2018    A1C 5.6 01/29/2016       TSH   Date Value Ref Range Status   10/25/2023 1.18 0.30 - 4.20 uIU/mL Final   05/15/2023 1.20 0.30 - 4.20 uIU/mL Final   11/15/2022 1.17 0.30 - 4.20 uIU/mL Final   04/16/2021 1.74 0.40 - 4.00 mU/L Final   11/18/2019 1.51 0.40 - 4.00 mU/L Final   04/04/2018 1.81 0.40 - 4.00 mU/L Final   12/28/2015 2.84 0.40 - 4.00 mU/L Final       Creatinine   Date Value Ref Range Status   05/01/2024 0.67 0.51 - 0.95 mg/dL Final   04/16/2021 0.74 0.52 - 1.04 mg/dL Final       Recent Labs   Lab Test 03/06/24  0816 01/31/24  1101   CHOL 123 214*   HDL 50 58   LDL 62 133*   TRIG 56 113       No results found for: \"[id number]\", \"[id number]\", \"[id number]\"    I personally reviewed the patient's outside records from [facility] EMR. Summary of pertinent findings in HPI.    Impression / Plan     1. Nontoxic large substernal " goiter  2. Multiple enlarging thyroid nodules  3. 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 Possibly growing in iodine deficient area, RF for goiter  We reviewed her thyroid images again. She has evidence of tracheal deviation on CT but she remains asymptomatic. There is evidence of enlargement of multiple nodules on most recent US 10/2023. Given the size of her nodules it would be reasonable to consider surgery even is asymptomatic. She was wondering if there could be any medication to help with shrinking the nodules or any other alternative to surgery, I explained that there isn't. We briefly discussed thyroidectomy and she reported she would like to discuss it further with surgeon, ENT referral placed.      5. DM2  6. obesity  Not addressed. Managed by PCP          Test and/or medications prescribed today:  Orders Placed This Encounter   Procedures    US Thyroid    Adult ENT  Referral       The longitudinal plan of care for the diagnosis(es)/condition(s) as documented were addressed during this visit. Due to the added complexity in care, I will continue to support Angella in the subsequent management and with ongoing continuity of care.        Rashid Stewart MD  Endocrinology, Diabetes and Metabolism  [facility]

## 2024-09-24 NOTE — NURSING NOTE
Current patient location: [address] [place] [address] N  [facility] [id number]    Is the patient currently in the state of [place]? YES    Visit mode:VIDEO    If the visit is dropped, the patient can be reconnected by: VIDEO VISIT: Send to e-mail at: [email or web address]    Will anyone else be joining the visit? NO  (If patient encounters technical issues they should call [phone]    How would you like to obtain your AVS? MyChart    Are changes needed to the allergy or medication list? No    Are refills needed on medications prescribed by this physician? NO    Rooming Documentation:  Questionnaire(s) not done per department protocol    Reason for visit: RECHECK Shelby Kocher VVF

## 2024-09-24 NOTE — LETTER
9/24/2024       RE: Angella St  [address]  [facility] [address]     Dear Colleague,    Thank you for referring your patient, Angella St, to the [facility] ENDOCRINOLOGY CLINIC [place] at [facility]. Please see a copy of my visit note below.    Endocrinology Clinic Visit 9/24/2024      Video-Visit Details    Type of service:  Video Visit    Joined the call at 9/24/2024, 12:37:00 pm.  Left the call at 9/24/2024, 12:49:50 pm.    Originating Location (pt. Location): Home        Distant Location (provider location):  Off-site    Mode of Communication:  Video Conference via [email or web address]    Physician has received verbal consent for a Video Visit from the patient? Yes    I spent a total of 40 minutes on the date of encounter reviewing medical records, evaluating the patient, coordinating care and documenting in the EHR, as detailed above.      NAME:  Angella St  PCP:  Shamir Pugh  MRN:  [id number]  Reason for Consult:  MNG  Requesting Provider:  Shamir Pugh    Chief Complaint     Chief Complaint   Patient presents with     RECHECK       History of Present Illness     Angella St is a 58 year old female who is seen in video visit for Non toxic MNG     She was previously seen by Dr. Zee in 2018 and 2020 and recently by Dr. Ritter on 2/2024 for the same problem.  Part of my note is from Dr. Ritter confirmed with patient.    She was discovered to have thyroid goiter back in 2015 during her cervical cancer evaluation. She underwent PET/CT scan which showed heterogenous thyroid nodules with increased FDG uptake. The record documents that the thyroid has been followed with US and CT since 2015.    FNAB has been performed in various thyroid nodules over time, 11/3/15, 1/6/16 and 10/28/19. The 2019 FNAB procedure involved FNAB  of 4 left sided nodules and one on the right .  IN all cases, cytology has been benign.  The most recent  thyorid US 10/2023 concluded there was some enlargement of multiple nodules .      She denied any compressive sx, no anterior neck discomfort, no difficulty swallowing or SOB lying down. She had CT C/A/P for cervical cancer surveillance which showed large thyroid with tracheal deviation to the rt.       No history of childhood radiation exposure.    Most recent TSH 10/2023 wnl.    Family hx noncontributory   Social: she has 3 children.    Problem List     Patient Active Problem List   Diagnosis     Cervical cancer (H)     Encounter for long-term current use of medication     Abdominal pain     Hypertension     Drug-induced peripheral neuropathy (H)     Hydronephrosis     Recurrent pyelonephritis     Abscess after procedure     Nontoxic multinodular goiter     Thyroid nodule     Atrophic kidney     Morbid obesity (H)     Diabetes mellitus, type 2 (H)     Substernal goiter        Medications     Current Outpatient Medications   Medication Sig Dispense Refill     amLODIPine (NORVASC) 10 MG tablet Take 1 tablet (10 mg) by mouth at bedtime 90 tablet 3     atovaquone-proguanil (MALARONE) 250-100 MG tablet Take 1 tablet by mouth daily Start 2 days before exposure to [place]aria and continue daily till  7 days after exposure. 30 tablet 0     carboxymethylcellulose PF (CARBOXYMETHYLCELLULOSE SODIUM) 0.5 % ophthalmic solution Place 1 drop into both eyes every 2 hours (while awake) Preservative free artificial tears, single use vials. 400 each 11     empagliflozin (JARDIANCE) 10 MG TABS tablet Take 1 tablet (10 mg) by mouth daily 90 tablet 1     olopatadine (PATADAY) 0.2 % ophthalmic solution Place 0.05 mLs (1 drop) into both eyes daily Pataday extra strength 15 mL 11     olopatadine (PATANOL) 0.1 % ophthalmic solution Place 1 drop into both eyes 2 times daily 5 mL 0     rosuvastatin (CRESTOR) 10 MG tablet Take 1 tablet (10 mg) by mouth daily 90 tablet 3     No current facility-administered medications for this visit.         Allergies     No Known Allergies    Medical / Surgical History     Past Medical History:   Diagnosis Date     Anemia      Chronic kidney disease      DM2 (diabetes mellitus, type 2) (H) 2023     Hydronephrosis 2015     Hypertension 2007     Metastatic squamous cell carcinoma involving intra-abdominal site with unknown primary site (H) 04/2015    Metastatic squamous cell cervical carcinoma in pelvic mass     Obesity     BMI >30     Prediabetes 2018     Substernal goiter 2/19/2024     Past Surgical History:   Procedure Laterality Date     COMBINED CYSTOSCOPY, INSERT STENT URETER(S) Left 6/13/2016    Procedure: COMBINED CYSTOSCOPY, INSERT STENT URETER(S);  Surgeon: Deja Salinas MD;  [facility]: [facility]     CYSTOSCOPY, RETROGRADES, COMBINED Left 6/13/2016    Procedure: COMBINED CYSTOSCOPY, RETROGRADES;  Surgeon: Deja Salinas MD;  [facility]     DAVINCI NEPHRECTOMY Left 5/3/2018    Procedure: DAVINCI XI NEPHRECTOMY;  DAVINCI XI LEFT NEPHRECTOMY;  Surgeon: Deja Salinas MD;  Location: [facility]     GENITOURINARY SURGERY  5/2015    PNT placement     HYSTERECTOMY  2011    [place]     IR PORT REMOVAL RIGHT  1/29/2019     PERCUTANEOUS NEPHROSTOMY Left 3/9/2017    Procedure: PERCUTANEOUS NEPHROSTOMY;  Surgeon: Bridger Meyer PA-C;  [facility]     PERCUTANEOUS NEPHROSTOMY Left 6/28/2017    Procedure: PERCUTANEOUS NEPHROSTOMY;  Left Nephrostomy Tube Change;  Surgeon: Bridger Meyer PA-C;  [facility]     PERCUTANEOUS NEPHROSTOMY Left 10/30/2017    Procedure: PERCUTANEOUS NEPHROSTOMY;  Left Nephrostomy Tube Change;  Surgeon: Maryann Chavez PA-C;  [facility]     REMOVE PORT VASCULAR ACCESS Right 1/29/2019    Procedure: Right Port Removal;  Surgeon: Maryann Marin PA-C;  Location: [facility]       Social History     Social History     Socioeconomic History     Marital status:      Spouse name: Not on file     Number of children: 3     Years of  education: Not on file     Highest education level: Not on file   Occupational History     Occupation: no working   Tobacco Use     Smoking status: Never     Smokeless tobacco: Never   Vaping Use     Vaping status: Never Used   Substance and Sexual Activity     Alcohol use: No     Alcohol/week: 0.0 standard drinks of alcohol     Comment: Does not drink alcohol     Drug use: No     Comment: No drug use     Sexual activity: Not Currently   Other Topics Concern     Parent/sibling w/ CABG, MI or angioplasty before 65F 55M? Not Asked   Social History Narrative    Mom  from [place] in her 40's    Dad  in his 60's presumed to be from hunger as a result of the on going war in [facility] at the time.    Patient has a brother and a sister who are alive and well.      Patient has three children:  Female age 37 alive and well; Female age 35 alive and well; Female age 33 alive and well    Patient reports  is in [facility].     Social Determinants of Health     Financial Resource Strain: Low Risk  (2024)    Financial Resource Strain      Within the past 12 months, have you or your family members you live with been unable to get utilities (heat, electricity) when it was really needed?: No   Food Insecurity: High Risk (2024)    Food Insecurity      Within the past 12 months, did you worry that your food would run out before you got money to buy more?: No      Within the past 12 months, did the food you bought just not last and you didn t have money to get more?: Yes   Transportation Needs: High Risk (2024)    Transportation Needs      Within the past 12 months, has lack of transportation kept you from medical appointments, getting your medicines, non-medical meetings or appointments, work, or from getting things that you need?: Yes   Physical Activity: Not on file   Stress: Not on file   Social Connections: Not on file   Interpersonal Safety: Low Risk  (2024)    Interpersonal Safety      Do you feel  physically and emotionally safe where you currently live?: Yes      Within the past 12 months, have you been hit, slapped, kicked or otherwise physically hurt by someone?: No      Within the past 12 months, have you been humiliated or emotionally abused in other ways by your partner or ex-partner?: No   Housing Stability: Low Risk  (1/31/2024)    Housing Stability      Do you have housing? : Yes      Are you worried about losing your housing?: No       Family History     Family History   Problem Relation Age of Onset     Hypertension Brother      Thyroid Disease No family hx of      Cancer No family hx of      Goiter No family hx of      Diabetes No family hx of        ROS     12 ROS completed, pertinent positive and negative in HPI    Physical Exam   There were no vitals taken for this visit.   GENERAL: alert and no distress  EYES: Eyes grossly normal to inspection.  No discharge or erythema, or obvious scleral/conjunctival abnormalities.  RESP: No audible wheeze, cough, or visible cyanosis.    SKIN: Visible skin clear. No significant rash, abnormal pigmentation or lesions.  NEURO: Cranial nerves grossly intact.  Mentation and speech appropriate for age.  PSYCH: Appropriate affect, tone, and pace of words     Labs/Imaging     Pertinent Labs were reviewed and updated in EPIC and discussed briefly.  Radiology Results were  reviewed and updated in EPIC and discussed briefly.    Most recent thyroid US:  COMPARISON: Thyroid US 4/16/2021.     HISTORY: Nontoxic multinodular goiter.      FINDINGS:   Thyroid parenchyma: Homogenous.  The right lobe of the thyroid measures: 6.6 x 2.1 x 1.8 cm.  The left lobe of the thyroid measures: 7.8 x 5.0 x 4.1 cm.  The thyroid isthmus measures: 8 mm.     Nodule 1:  Location: Right superior lobe  Size: 1.1 x 1.0 x 0.9 cm  Composition: Solid or almost completely solid (2 points)  Echogenicity: Hypoechoic (2 points)  Shape: Wider than tall (0 points)  Margin: Smooth (0 points)  Echogenic  Foci: Peripheral/rim calcifications (2 points)  Stability: Enlarging, previously 0.8 x 0.5 x 0.8 cm  TIRADS: TR4 (4-6 points)      Nodule 2:  Location: Right superior lobe  Size: 0.7 x 0.6 x 0.4 cm  Composition: Solid or almost completely solid (2 points)  Echogenicity: Hypoechoic (2 points)  Shape: Wider than tall (0 points)  Margin: Ill-defined (0 points)  Echogenic Foci: None or large comet tail artifact (0 points)  Stability: Slight increase in size, previously 0.5 x 0.4 x 0.6 cm  TIRADS: TR3 (3 points)      Nodule 3:  Location: Right inferior lobe  Size: 2.4 x 1.4 x 1.4 cm  Composition: Solid or almost completely solid (2 points)  Echogenicity: Hyperechoic or isoechoic (1 point)  Shape: Wider than tall (0 points)  Margin: Ill-defined (0 points)  Echogenic Foci: Macro-calcifications (1 point)  Stability: Stable to minimal increase in size, previously 2.3 x 1.3 x  1.2 cm  TIRADS: TR4 (4-6 points)      Nodule 4:  Location: Left superior lobe  Size: 5.8 x 4.9 x 3.1 cm  Composition: Solid or almost completely solid (2 points)  Echogenicity: Hypoechoic (2 points)  Shape: Wider than tall (0 points)  Margin: Smooth (0 points)  Echogenic Foci: None or large comet tail artifact (0 points)  Stability: Enlarging, previously 4.3 x 2.7 x 2.2 cm; though measured  with different technique on current ultrasound so part of the  increased size likely secondary to technique.  TIRADS: TR4 (4-6 points)      Nodule 5:  Location: Left mid lobe  Size: 4.1 x 4.4 x 3.5 cm (measured on cine trans images)  Composition: Solid or almost completely solid (2 points)  Echogenicity: Hyperechoic or isoechoic (1 point)  Shape: Wider than tall (0 points)  Margin: Smooth (0 points)  Echogenic Foci: Macro-calcifications (1 point)  Stability: Slight increase in size, previously 4.0 x 3.1 x 3.9 cm  TIRADS: TR4 (4-6 points)      Nodule 6:  Location: Left mid/inferior lobe  Size: 3.3 x 2.9 x 1.7 cm  Composition: Mixed cystic and solid (1  point)  Echogenicity: Hypoechoic (2 points)  Shape: Wider than tall (0 points)  Margin: Smooth (0 points)  Echogenic Foci: Punctate echogenic foci (3 points)  Stability: Decreasing, previously 3.6 x 2.8 x 3.2 cm  TIRADS: TR4 (4-6 points)      Nodule 7:  Location: Left inferior lobe  Size: 4.1 x 6.6 x 4.5 cm  Composition: Solid or almost completely solid (2 points)  Echogenicity: Hypoechoic (2 points)  Shape: Wider than tall (0 points)  Margin: Ill-defined (0 points)  Echogenic Foci: Macro-calcifications (1 point)  Stability: Enlarging, previously 5.5 x 4.2 x 4.0 cm  TIRADS: TR4 (4-6 points)      Nodule 8:  Location: Left mid/inferior lobe  Size: 3.7 x 4.2 x 3.2 cm  Composition: Solid or almost completely solid (2 points)  Echogenicity: Hyperechoic or isoechoic (1 point)  Shape: Wider than tall (0 points)  Margin: Smooth (0 points)  Echogenic Foci: None or large comet tail artifact (0 points)  Stability: Appears more conspicuous/enlarged in size compared to  prior, review of cine images from prior ultrasound showed smaller  similar nodule which was not measured separately.  TIRADS: TR3 (3 points)                                                                       Impression: Multiple thyroid nodules as described, majority of the  nodules appear enlarged in size compared to prior ultrasound from 4/16/2021.       Summary of recent findings:   Lab Results   Component Value Date    A1C 6.4 05/01/2024    A1C 7.7 01/31/2024    A1C 7.4 10/25/2023    A1C 8.1 05/15/2023    A1C 6.2 05/05/2018    A1C 5.6 01/29/2016       TSH   Date Value Ref Range Status   10/25/2023 1.18 0.30 - 4.20 uIU/mL Final   05/15/2023 1.20 0.30 - 4.20 uIU/mL Final   11/15/2022 1.17 0.30 - 4.20 uIU/mL Final   04/16/2021 1.74 0.40 - 4.00 mU/L Final   11/18/2019 1.51 0.40 - 4.00 mU/L Final   04/04/2018 1.81 0.40 - 4.00 mU/L Final   12/28/2015 2.84 0.40 - 4.00 mU/L Final       Creatinine   Date Value Ref Range Status   05/01/2024 0.67 0.51 - 0.95 mg/dL  "Final   04/16/2021 0.74 0.52 - 1.04 mg/dL Final       Recent Labs   Lab Test 03/06/24  0816 01/31/24  1101   CHOL 123 214*   HDL 50 58   LDL 62 133*   TRIG 56 113       No results found for: \"[id number]\", \"[id number]\", \"[id number]\"    I personally reviewed the patient's outside records from [employer] EMR. Summary of pertinent findings in HPI.    Impression / Plan     1. Nontoxic large substernal goiter  2. Multiple enlarging thyroid nodules  3. 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 Possibly growing in iodine deficient area, RF for goiter  We reviewed her thyroid images again. She has evidence of tracheal deviation on CT but she remains asymptomatic. There is evidence of enlargement of multiple nodules on most recent US 10/2023. Given the size of her nodules it would be reasonable to consider surgery even is asymptomatic. She was wondering if there could be any medication to help with shrinking the nodules or any other alternative to surgery, I explained that there isn't. We briefly discussed thyroidectomy and she reported she would like to discuss it further with surgeon, ENT referral placed.      5. DM2  6. obesity  Not addressed. Managed by PCP          Test and/or medications prescribed today:  Orders Placed This Encounter   Procedures     US Thyroid     Adult ENT  Referral       The longitudinal plan of care for the diagnosis(es)/condition(s) as documented were addressed during this visit. Due to the added complexity in care, I will continue to support Angella in the subsequent management and with ongoing continuity of care.        Rashid Stewart MD  Endocrinology, Diabetes and Metabolism  [facility]      Error       Again, thank you for allowing me to participate in the care of your patient.      Sincerely,    Rashid Stewart MD    "

## 2024-09-27 NOTE — TELEPHONE ENCOUNTER
"FUTURE VISIT INFORMATION      FUTURE VISIT INFORMATION:  Date: 11/22/24  Time: 9:40 AM  [place] [facility] [place]  REFERRAL INFORMATION:  Referring provider:    Referring providers clinic:    Reason for visit/diagnosis:  nontoxic substernal goiter. patient is asymptomatic but considering surgery given size and tracheal deviation. scheduled per referral notes     RECORDS REQUESTED FROM      Clinic name Comments Records Status Imaging Status   [facility]  9/24/24 referral/ OV notes- Rashid Stewart MD    2/19/24 OV notes- Celine Tucker [facility]  [facility]    [facility] 10/25/23 US THYROID   4/16/23 US THYROID   10/28/19 US BIOPSY THYROID FNA  *additional images in PACS [facility] PACS   [facility] 10/28/19 - Thyroid FNA (Case: [id number])  1/6/16 - Thyroid FNA (Case: [id number])  11/3/15 - Thyroid FNA (Case: [id number])  Epic            \"Please notify/message CSS if patient completed outside imaging prior to scheduled appointment and/or any outside records that might have been missed at pre visit -Thanks\"  "

## 2024-10-10 ENCOUNTER — OFFICE VISIT (OUTPATIENT)
Dept: FAMILY MEDICINE | Facility: CLINIC | Age: 59
End: 2024-10-10
Payer: COMMERCIAL

## 2024-10-10 VITALS
DIASTOLIC BLOOD PRESSURE: 85 MMHG | OXYGEN SATURATION: 100 % | SYSTOLIC BLOOD PRESSURE: 143 MMHG | BODY MASS INDEX: 31.27 KG/M2 | TEMPERATURE: 98 F | RESPIRATION RATE: 16 BRPM | HEART RATE: 71 BPM | HEIGHT: 66 IN | WEIGHT: 194.6 LBS

## 2024-10-10 DIAGNOSIS — Z23 NEED FOR HEPATITIS B VACCINATION: ICD-10-CM

## 2024-10-10 DIAGNOSIS — Z71.84 ENCOUNTER FOR COUNSELING FOR TRAVEL: Primary | ICD-10-CM

## 2024-10-10 PROCEDURE — 99402 PREV MED CNSL INDIV APPRX 30: CPT | Mod: 25 | Performed by: PHYSICIAN ASSISTANT

## 2024-10-10 PROCEDURE — 90471 IMMUNIZATION ADMIN: CPT | Performed by: PHYSICIAN ASSISTANT

## 2024-10-10 PROCEDURE — 90746 HEPB VACCINE 3 DOSE ADULT IM: CPT | Performed by: PHYSICIAN ASSISTANT

## 2024-10-10 RX ORDER — LOPERAMIDE HYDROCHLORIDE 2 MG/1
2 TABLET ORAL 4 TIMES DAILY PRN
Qty: 40 TABLET | Refills: 0 | Status: SHIPPED | OUTPATIENT
Start: 2024-10-10

## 2024-10-10 RX ORDER — ATOVAQUONE AND PROGUANIL HYDROCHLORIDE 250; 100 MG/1; MG/1
1 TABLET, FILM COATED ORAL DAILY
Qty: 26 TABLET | Refills: 0 | Status: SHIPPED | OUTPATIENT
Start: 2024-10-10

## 2024-10-10 RX ORDER — AZITHROMYCIN 500 MG/1
TABLET, FILM COATED ORAL
Qty: 3 TABLET | Refills: 0 | Status: SHIPPED | OUTPATIENT
Start: 2024-10-10

## 2024-10-10 NOTE — PROGRESS NOTES
SUBJECTIVE: Angella St , a 58 year old  female, presents for counseling and information regarding upcoming travel to [facility]. Special medical concerns include: none. She anticipates the following unusual exposures: none.    Itinerary:  [facility]    Departure Date: 10/19/24 Return date: 11/5/24    Reason for travel (i.e. Business, pleasure): pleasure    Visiting an urban or rural area?: urban    Accommodations (i.e. hotel, hostel, friends, family, etc): family    IMMUNIZATION HISTORY  Have you received any vaccinations in the past 4 weeks? If so, which? No  Have you ever fainted from having your blood drawn or from an injection?  No  Have you ever had any bad reaction or side effect from any vaccination?  If so, which? No  Do you live (or work closely) with anyone who has AIDS, an AIDS-like condition, any other immune disorder or who is on chemotherapy for cancer?  No  Have you received any injection of immune globulin or any blood products during the past 12 months?  No    GENERAL MEDICAL HISTORY  Do you have a medical condition that requires medicine or doctor follow-up visits?  No  Do you have a medical condition that is stable now, but that may recur while traveling?  yes  Has your spleen been removed?  No  Have you had an illness or a fever in the past 48 hours?  No  Are you pregnant, or might you become pregnant on this trip?  Any chance of pregnancy?  No  Are you breastfeeding?  No  Do you have HIV, AIDS, an AIDS-like condition, any other immune disorder, leukemia or cancer?  No  Have you had your thymus gland removed or history of problems with your thymus, such as myasthenia gravis, DiGeorge syndrome, or thymoma?  No  Do you have a severely low platelet count (thrombocytopenia) or a blood clotting disorder?  No  Have you ever had a convulsion, seizure, epilepsy, neurologic condition or brain infection?  No  Do you have any stomach conditions?  No  Do you have severe renal or kidney impairment?  No  Do you  have a history of mental health concerns?  No  Do you get yeast infections often?  No  Do you have psoriasis?  No  Do you get motion sickness?  No  Have you ever had headaches, nausea, vomiting, or breathing problems from altitude exposure?  No    MEDICINES  Are you taking:   Steroids, prednisone, anti-cancer drugs, or medicines that suppress your immune system? No  Antibiotics or sulfonamides? No  Oral contraceptives (birth control pills)? No  Aspirin therapy (children and teens)? No    ALLERGIES  Are you allergic to:  Any medicines? No  Any foods or other? No  Neomycin, formalin, or fish products? No      Past Medical History:   Diagnosis Date    Anemia     Chronic kidney disease     DM2 (diabetes mellitus, type 2) (H) 2023    Hydronephrosis 2015    Hypertension 2007    Metastatic squamous cell carcinoma involving intra-abdominal site with unknown primary site (H) 04/2015    Metastatic squamous cell cervical carcinoma in pelvic mass    Obesity     BMI >30    Prediabetes 2018    Substernal goiter 2/19/2024      Immunization History   Administered Date(s) Administered    COVID-19 Monovalent 12+ (Pfizer 2022) 03/21/2022, 04/11/2022    Flu, Unspecified 10/04/2023    Hepatitis A Immunity: Titer/MD Dx 04/17/2024    Hepatitis B, Adult 04/23/2024    Influenza (IIV3) PF 12/29/2015    Influenza Vaccine >6 months,quad, PF 01/14/2019, 11/18/2019, 11/15/2022    Influenza Vaccine, 6+MO IM (QUADRIVALENT W/PRESERVATIVES) 03/28/2022    MMR 03/28/2022    MMR Not Indicated - By Titer 04/17/2024    Pneumo Conj 13-V (2010&after) 01/25/2016    Poliovirus, inactivated (IPV) 04/17/2024    TDAP Vaccine (Boostrix) 01/25/2016    Typhoid IM 04/17/2024    Yellow Fever 04/17/2024       Current Outpatient Medications   Medication Sig Dispense Refill    amLODIPine (NORVASC) 10 MG tablet Take 1 tablet (10 mg) by mouth at bedtime 90 tablet 3    atovaquone-proguanil (MALARONE) 250-100 MG tablet Take 1 tablet by mouth daily Start 2 days before  exposure to [place]aria and continue daily till  7 days after exposure. 30 tablet 0    carboxymethylcellulose PF (CARBOXYMETHYLCELLULOSE SODIUM) 0.5 % ophthalmic solution Place 1 drop into both eyes every 2 hours (while awake) Preservative free artificial tears, single use vials. 400 each 11    empagliflozin (JARDIANCE) 10 MG TABS tablet Take 1 tablet (10 mg) by mouth daily 90 tablet 1    olopatadine (PATADAY) 0.2 % ophthalmic solution Place 0.05 mLs (1 drop) into both eyes daily Pataday extra strength 15 mL 11    olopatadine (PATANOL) 0.1 % ophthalmic solution Place 1 drop into both eyes 2 times daily 5 mL 0    rosuvastatin (CRESTOR) 10 MG tablet Take 1 tablet (10 mg) by mouth daily 90 tablet 3     No Known Allergies     EXAM: deferred    Immunizations discussed include: Hepatitis B  Malaria prophylaxis recommended: Malarone  Symptomatic treatment for traveler's diarrhea: bismuth subsalicylate, loperamide/diphenoxylate, and azithromycin      ASSESSMENT/PLAN:    (Z71.84) Encounter for counseling for travel  (primary encounter diagnosis)    Comment: Hepatitis B vaccines today. Patient will return or follow-up with PCP as needed. Prophylaxis given for Traveler's diarrhea and Malaria. All questions were answered.     Plan: atovaquone-proguanil (MALARONE) 250-100 MG         tablet, azithromycin (ZITHROMAX) 500 MG tablet,        loperamide (IMODIUM A-D) 2 MG tablet            (Z23) Need for hepatitis B vaccination  Comment:   Plan: HEPATITIS B VACCINE (20 YEARS AND OLDER)         (ENGERIX-B/RECOMBIVAX)              I have reviewed general recommendations for safe travel   including: food/water precautions, insect avoidance, safe sex   practices given high prevalence of HIV and other STDs,   roadway safety. Educational materials and links to the [facility]   Traveler's health website have been provided.    Total time 23 minutes, greater than 50 percent in counseling   and coordination of care.

## 2024-10-10 NOTE — PROGRESS NOTES
Prior to immunization administration, verified patients identity using patient s name and date of birth. Please see Immunization Activity for additional information.     Screening Questionnaire for Adult Immunization    Are you sick today?   No   Do you have allergies to medications, food, a vaccine component or latex?   No   Have you ever had a serious reaction after receiving a vaccination?   No   Do you have a long-term health problem with heart, lung, kidney, or metabolic disease (e.g., diabetes), asthma, a blood disorder, no spleen, complement component deficiency, a cochlear implant, or a spinal fluid leak?  Are you on long-term aspirin therapy?   yes   Do you have cancer, leukemia, HIV/AIDS, or any other immune system problem?   No   Do you have a parent, brother, or sister with an immune system problem?   No   In the past 3 months, have you taken medications that affect  your immune system, such as prednisone, other steroids, or anticancer drugs; drugs for the treatment of rheumatoid arthritis, Crohn s disease, or psoriasis; or have you had radiation treatments?   No   Have you had a seizure, or a brain or other nervous system problem?   No   During the past year, have you received a transfusion of blood or blood    products, or been given immune (gamma) globulin or antiviral drug?   No   For women: Are you pregnant or is there a chance you could become       pregnant during the next month?   No   Have you received any vaccinations in the past 4 weeks?   No     Immunization questionnaire answers were all negative.      Patient instructed to remain in clinic for 15 minutes afterwards, and to report any adverse reactions.     Screening performed by Nori Jackson MA on 10/10/2024 at 9:04 AM.

## 2024-10-10 NOTE — PATIENT INSTRUCTIONS
"See travel packet provided  Recommend ultrathon (mosquito repellant), pepto bismol and imodium  The food and drink choices you make while traveling can impact your likelihood of getting sick.   If you aren't sure if a food or drink is safe, the saying \" BOIL IT, COOK IT, PEEL IT, OR FORGET IT\" can help you decide whether it's okay to consume.   Also bring hand  and sun screen with you.  Safe Travels     If you first start to get mild to moderate diarrhea, take imodium.      If diarrhea is severe or you have a fever with the diarrhea, take the antibiotic (azithromycin).      Today October 10, 2024 you received the    Hepatitis B Vaccine - Please return on 4/10/25 or later for your 3rd and final dose..    These appointments can be made as a NURSE ONLY visit.    **It is very important for the vaccinations to be given on the scheduled day(s), this helps ensure you receive the full effectiveness of the vaccine.**    Please call [facility] with any questions [phone]    Thank you for visiting [facility]    "

## 2024-11-22 ENCOUNTER — PRE VISIT (OUTPATIENT)
Dept: OTOLARYNGOLOGY | Facility: CLINIC | Age: 59
End: 2024-11-22

## 2024-11-24 ENCOUNTER — HEALTH MAINTENANCE LETTER (OUTPATIENT)
Age: 59
End: 2024-11-24

## 2024-12-09 ENCOUNTER — TELEPHONE (OUTPATIENT)
Dept: ENDOCRINOLOGY | Facility: CLINIC | Age: 59
End: 2024-12-09
Payer: COMMERCIAL

## 2024-12-09 NOTE — TELEPHONE ENCOUNTER
Left Voicemail (1st Attempt) and Sent Mychart (1st Attempt) for the patient to call back and schedule the following:    Appointment type: RETURN ENDOCRINE  Provider: Rashid Stewart MD  Return date: 09/2025  Specialty phone number: [phone]  Additional appointment(s) needed: --  Additonal Notes:  Letter sent to pt home address

## 2024-12-09 NOTE — LETTER
"12/9/2024       Rashid May MD has requested a follow-up visit on or near 09/2025. You may have orders for other visits and tests as well.    Please call [phone] to schedule these visits.      (If you prefer, some clinics allow you to schedule at [email or web address]. Click the \"Visits\" tab, then choose \"Schedule an Appointment.\")      Sincerely, [facility]   "

## 2024-12-23 DIAGNOSIS — E11.9 TYPE 2 DIABETES MELLITUS WITHOUT COMPLICATION, WITHOUT LONG-TERM CURRENT USE OF INSULIN (H): ICD-10-CM

## 2024-12-30 NOTE — TELEPHONE ENCOUNTER
LVD:  5/1/2024  [facility] [place]  [place] 1/22/25 [facility]    5/1/24 A1c=6.4 Sol refill provided  Refilled per protocol.

## 2025-01-11 ENCOUNTER — HEALTH MAINTENANCE LETTER (OUTPATIENT)
Age: 60
End: 2025-01-11

## 2025-01-22 ENCOUNTER — LAB (OUTPATIENT)
Dept: LAB | Facility: CLINIC | Age: 60
End: 2025-01-22
Payer: COMMERCIAL

## 2025-01-22 ENCOUNTER — OFFICE VISIT (OUTPATIENT)
Dept: INTERNAL MEDICINE | Facility: CLINIC | Age: 60
End: 2025-01-22
Payer: COMMERCIAL

## 2025-01-22 VITALS
DIASTOLIC BLOOD PRESSURE: 72 MMHG | WEIGHT: 201.2 LBS | SYSTOLIC BLOOD PRESSURE: 119 MMHG | TEMPERATURE: 98.2 F | RESPIRATION RATE: 16 BRPM | HEIGHT: 66 IN | OXYGEN SATURATION: 97 % | HEART RATE: 72 BPM | BODY MASS INDEX: 32.33 KG/M2

## 2025-01-22 DIAGNOSIS — E11.9 TYPE 2 DIABETES MELLITUS WITHOUT COMPLICATION, WITHOUT LONG-TERM CURRENT USE OF INSULIN (H): ICD-10-CM

## 2025-01-22 DIAGNOSIS — E04.2 NONTOXIC MULTINODULAR GOITER: ICD-10-CM

## 2025-01-22 DIAGNOSIS — Z12.31 ENCOUNTER FOR SCREENING MAMMOGRAM FOR BREAST CANCER: ICD-10-CM

## 2025-01-22 DIAGNOSIS — E78.5 HYPERLIPIDEMIA, UNSPECIFIED HYPERLIPIDEMIA TYPE: Primary | ICD-10-CM

## 2025-01-22 DIAGNOSIS — E78.5 HYPERLIPIDEMIA, UNSPECIFIED HYPERLIPIDEMIA TYPE: ICD-10-CM

## 2025-01-22 LAB
ALBUMIN SERPL BCG-MCNC: 4.4 G/DL (ref 3.5–5.2)
ALP SERPL-CCNC: 51 U/L (ref 40–150)
ALT SERPL W P-5'-P-CCNC: 22 U/L (ref 0–50)
ANION GAP SERPL CALCULATED.3IONS-SCNC: 10 MMOL/L (ref 7–15)
AST SERPL W P-5'-P-CCNC: 23 U/L (ref 0–45)
BASOPHILS # BLD AUTO: 0 10E3/UL (ref 0–0.2)
BASOPHILS NFR BLD AUTO: 0 %
BILIRUB SERPL-MCNC: 0.5 MG/DL
BUN SERPL-MCNC: 9.9 MG/DL (ref 8–23)
CALCIUM SERPL-MCNC: 9.4 MG/DL (ref 8.8–10.4)
CHLORIDE SERPL-SCNC: 104 MMOL/L (ref 98–107)
CHOLEST SERPL-MCNC: 148 MG/DL
CREAT SERPL-MCNC: 0.67 MG/DL (ref 0.51–0.95)
EGFRCR SERPLBLD CKD-EPI 2021: >90 ML/MIN/1.73M2
EOSINOPHIL # BLD AUTO: 0.1 10E3/UL (ref 0–0.7)
EOSINOPHIL NFR BLD AUTO: 1 %
ERYTHROCYTE [DISTWIDTH] IN BLOOD BY AUTOMATED COUNT: 12.8 % (ref 10–15)
EST. AVERAGE GLUCOSE BLD GHB EST-MCNC: 131 MG/DL
FASTING STATUS PATIENT QL REPORTED: YES
FASTING STATUS PATIENT QL REPORTED: YES
GLUCOSE SERPL-MCNC: 109 MG/DL (ref 70–99)
HBA1C MFR BLD: 6.2 %
HCO3 SERPL-SCNC: 27 MMOL/L (ref 22–29)
HCT VFR BLD AUTO: 41 % (ref 35–47)
HDLC SERPL-MCNC: 52 MG/DL
HGB BLD-MCNC: 13.4 G/DL (ref 11.7–15.7)
IMM GRANULOCYTES # BLD: 0 10E3/UL
IMM GRANULOCYTES NFR BLD: 0 %
LDLC SERPL CALC-MCNC: 85 MG/DL
LYMPHOCYTES # BLD AUTO: 2.7 10E3/UL (ref 0.8–5.3)
LYMPHOCYTES NFR BLD AUTO: 56 %
MCH RBC QN AUTO: 26.9 PG (ref 26.5–33)
MCHC RBC AUTO-ENTMCNC: 32.7 G/DL (ref 31.5–36.5)
MCV RBC AUTO: 82 FL (ref 78–100)
MONOCYTES # BLD AUTO: 0.4 10E3/UL (ref 0–1.3)
MONOCYTES NFR BLD AUTO: 8 %
NEUTROPHILS # BLD AUTO: 1.7 10E3/UL (ref 1.6–8.3)
NEUTROPHILS NFR BLD AUTO: 35 %
NONHDLC SERPL-MCNC: 96 MG/DL
NRBC # BLD AUTO: 0 10E3/UL
NRBC BLD AUTO-RTO: 0 /100
PLATELET # BLD AUTO: 184 10E3/UL (ref 150–450)
POTASSIUM SERPL-SCNC: 4 MMOL/L (ref 3.4–5.3)
PROT SERPL-MCNC: 8.1 G/DL (ref 6.4–8.3)
RBC # BLD AUTO: 4.99 10E6/UL (ref 3.8–5.2)
SODIUM SERPL-SCNC: 141 MMOL/L (ref 135–145)
TRIGL SERPL-MCNC: 53 MG/DL
TSH SERPL DL<=0.005 MIU/L-ACNC: 0.97 UIU/ML (ref 0.3–4.2)
WBC # BLD AUTO: 4.9 10E3/UL (ref 4–11)

## 2025-01-22 PROCEDURE — 80061 LIPID PANEL: CPT | Performed by: PATHOLOGY

## 2025-01-22 PROCEDURE — 84443 ASSAY THYROID STIM HORMONE: CPT | Performed by: PATHOLOGY

## 2025-01-22 PROCEDURE — 80053 COMPREHEN METABOLIC PANEL: CPT | Performed by: PATHOLOGY

## 2025-01-22 PROCEDURE — 99000 SPECIMEN HANDLING OFFICE-LAB: CPT | Performed by: PATHOLOGY

## 2025-01-22 PROCEDURE — 83036 HEMOGLOBIN GLYCOSYLATED A1C: CPT | Performed by: INTERNAL MEDICINE

## 2025-01-22 PROCEDURE — 36415 COLL VENOUS BLD VENIPUNCTURE: CPT | Performed by: PATHOLOGY

## 2025-01-22 PROCEDURE — 85025 COMPLETE CBC W/AUTO DIFF WBC: CPT | Performed by: PATHOLOGY

## 2025-01-22 RX ORDER — ROSUVASTATIN CALCIUM 10 MG/1
10 TABLET, COATED ORAL DAILY
Qty: 90 TABLET | Refills: 3 | Status: SHIPPED | OUTPATIENT
Start: 2025-01-22

## 2025-01-22 NOTE — PROGRESS NOTES
Angella is a 59 year old that presents in clinic today for the following:     Chief Complaint   Patient presents with    Follow Up           1/22/2025     9:20 AM   Additional Questions   Roomed by Tran LOPEZ   Accompanied by N/A     Screenings as of today     PHQ-2 Total Score (Adult) - Positive if 3 or more points; Administer   PHQ-9 if positive  0        Tran Cisneros at 9:27 AM on 1/22/2025

## 2025-01-22 NOTE — PROGRESS NOTES
HPI  59-year-old presents today for reevaluation.  She continues to do well with her massive goiter she reports that she is not having any symptoms or problems in association with this.  We did discuss that given the enlarging nodules she is likely going to be looking at frequent biopsies of this in the future for cancer evaluation and screening.  She is not enthusiastic about this but will plan to talk to the surgeon regarding this.  She at this point she is not having any respiratory difficulty or not having any symptoms that she attributes to the thyroid.  She is physically active she is tolerating this well.  She has had no problems on her present medications she is taking the Jardiance her weight is down.  She attributes that slight increase today to wearing some heavy winter boots.  Past Medical History:   Diagnosis Date    Anemia     Chronic kidney disease     DM2 (diabetes mellitus, type 2) (H) 2023    Hydronephrosis 2015    Hypertension 2007    Metastatic squamous cell carcinoma involving intra-abdominal site with unknown primary site (H) 04/2015    Metastatic squamous cell cervical carcinoma in pelvic mass    Obesity     BMI >30    Prediabetes 2018    Substernal goiter 2/19/2024     Past Surgical History:   Procedure Laterality Date    COMBINED CYSTOSCOPY, INSERT STENT URETER(S) Left 6/13/2016    Procedure: COMBINED CYSTOSCOPY, INSERT STENT URETER(S);  Surgeon: Deja Salinas MD;  Location: [facility] OR    CYSTOSCOPY, RETROGRADES, COMBINED Left 6/13/2016    Procedure: COMBINED CYSTOSCOPY, RETROGRADES;  Surgeon: Deja Salinas MD;  Location: [facility]    DAVINCI NEPHRECTOMY Left 5/3/2018    Procedure: DAVINCI XI NEPHRECTOMY;  DAVINCI XI LEFT NEPHRECTOMY;  Surgeon: Deja Salinas MD;  Location:  OR    GENITOURINARY SURGERY  5/2015    PNT placement    HYSTERECTOMY  2011    [place]    IR PORT REMOVAL RIGHT  1/29/2019    PERCUTANEOUS NEPHROSTOMY Left 3/9/2017    Procedure:  "PERCUTANEOUS NEPHROSTOMY;  Surgeon: Bridger Meyer PA-C;  [facility]: [facility]    PERCUTANEOUS NEPHROSTOMY Left 6/28/2017    Procedure: PERCUTANEOUS NEPHROSTOMY;  Left Nephrostomy Tube Change;  Surgeon: Bridger Meyer PA-C;  [facility]: [facility]    PERCUTANEOUS NEPHROSTOMY Left 10/30/2017    Procedure: PERCUTANEOUS NEPHROSTOMY;  Left Nephrostomy Tube Change;  Surgeon: Maryann Chavez PA-C;  Location: [facility]    REMOVE PORT VASCULAR ACCESS Right 1/29/2019    Procedure: Right Port Removal;  Surgeon: Maryann Marin PA-C;  Location: [facility] OR     Family History   Problem Relation Age of Onset    Hypertension Brother     Thyroid Disease No family hx of     Cancer No family hx of     Goiter No family hx of     Diabetes No family hx of          Exam:  /72 (BP Location: Right arm, Patient Position: Sitting, Cuff Size: Adult Regular)   Pulse 72   Temp 98.2  F (36.8  C)   Resp 16   Ht 1.679 m (5' 6.1\")   Wt 91.3 kg (201 lb 3.2 oz)   SpO2 97%   BMI 32.37 kg/m    201 lbs 3.2 oz  The patient is alert, oriented with a clear sensorium.   Skin shows no lesions or rashes and good turgor.   Head is normocephalic and atraumatic.    Neck shows no nodes, thyromegaly.     Lungs are clear.   Heart shows normal S1 and S2 without murmur or gallop.    Extremities show no edema.      ASSESSMENT  1 hypertension good control  2 hyperlipidemia on rosuvastatin  3 huge multinodular goiter euthyroid   4 Diabetes on jardiance needs F/U  5 status post nephrectomy for atrophic kidney  6 peripheral neuropathy  7 obesity  8 history of cervical cancer    Plan  Will reassess her labs today.  She is due for mammogram.  She declined vaccination for flu and COVID.  Will have her see ENT surgeon and she was encouraged to consider thyroidectomy if it is felt to be a surgical option for her.    This note was completed using Dragon voice recognition software.      Shamir Pugh MD  General Internal Medicine  Primary Care " [place]  [phone]

## 2025-01-23 ENCOUNTER — PATIENT OUTREACH (OUTPATIENT)
Dept: CARE COORDINATION | Facility: CLINIC | Age: 60
End: 2025-01-23
Payer: COMMERCIAL

## 2025-02-03 DIAGNOSIS — I10 BENIGN ESSENTIAL HYPERTENSION: ICD-10-CM

## 2025-02-06 RX ORDER — AMLODIPINE BESYLATE 10 MG/1
10 TABLET ORAL AT BEDTIME
Qty: 90 TABLET | Refills: 2 | Status: SHIPPED | OUTPATIENT
Start: 2025-02-06

## 2025-02-06 NOTE — TELEPHONE ENCOUNTER
Last Written Prescription:  amLODIPine (NORVASC) 10 MG tablet   90 tablet 3 1/31/2024     ----------------------  Last Visit Date: 1-  Future Visit Date: none  ----------------------    Refill decision: Medication refilled per  Medication Refill in Ambulatory Care  policy.    Request from pharmacy:  Requested Prescriptions   Pending Prescriptions Disp Refills    amLODIPine (NORVASC) 10 MG tablet 90 tablet 3     Sig: Take 1 tablet (10 mg) by mouth at bedtime.       Calcium Channel Blockers Protocol  Passed - 2/6/2025  7:43 AM        Passed - Most recent blood pressure under 140/90 in past 12 months     BP Readings from Last 3 Encounters:   01/22/25 119/72   10/10/24 (!) 143/85   05/01/24 135/79       No data recorded            Passed - Medication is active on med list        Passed - Medication is indicated for associated diagnosis        Passed - GFR is on file in the past 12 months and most recent GFR is normal        Passed - Recent (12 mo) or future (90 days) visit within the authorizing provider's specialty     The patient must have completed an in-person or virtual visit within the past 12 months or has a future visit scheduled within the next 90 days with the authorizing provider s specialty.  Urgent care and e-visits do not qualify as an office visit for this protocol.          Passed - Patient is age 18 or older        Passed - No active pregnancy on record        Passed - No positive pregnancy test in past 12 months

## 2025-04-26 ENCOUNTER — HEALTH MAINTENANCE LETTER (OUTPATIENT)
Age: 60
End: 2025-04-26

## 2025-07-19 ENCOUNTER — HEALTH MAINTENANCE LETTER (OUTPATIENT)
Age: 60
End: 2025-07-19

## 2025-08-09 ENCOUNTER — HEALTH MAINTENANCE LETTER (OUTPATIENT)
Age: 60
End: 2025-08-09

## (undated) DEVICE — GOWN XLG DISP 9545

## (undated) DEVICE — KNIFE HANDLE W/15 BLADE 371615

## (undated) DEVICE — SYR 10ML LL W/O NDL

## (undated) DEVICE — Device

## (undated) DEVICE — GLOVE PROTEXIS POWDER FREE SMT 7.0  2D72PT70X

## (undated) DEVICE — GLOVE PROTEXIS MICRO 6.5  2D73PM65

## (undated) DEVICE — SOL WATER IRRIG 1000ML BOTTLE 2F7114

## (undated) DEVICE — GLOVE PROTEXIS W/NEU-THERA 6.5  2D73TE65

## (undated) DEVICE — PACK DAVINCI UROLOGY SBA15UDFSG

## (undated) DEVICE — SU ETHILON 2-0 FS 18" 664H

## (undated) DEVICE — DRSG PRIMAPORE 02X3" 7133

## (undated) DEVICE — DRSG DRAIN 2X2" 7087

## (undated) DEVICE — LINEN TOWEL PACK X5 5464

## (undated) DEVICE — ENDO POUCH UNIV RETRIEVAL SYSTEM INZII 10MM CD001

## (undated) DEVICE — DEVICE ANCHORING FLEXI-TRAK 37449

## (undated) DEVICE — TUBING SUCTION MEDI-VAC SOFT 3/16"X20' N520A

## (undated) DEVICE — SU MONOCRYL 4-0 PS-2 18" UND Y496G

## (undated) DEVICE — TAPE DURAPORE 3" SILK 1538-3

## (undated) DEVICE — PACK CENTRAL LINE INSERTION SAN32CLFCG

## (undated) DEVICE — PREP CHLORAPREP W/ORANGE TINT 10.5ML 260715

## (undated) DEVICE — SOL NACL 0.9% IRRIG 500ML BOTTLE 2F7123

## (undated) DEVICE — ESU GROUND PAD ADULT W/CORD E7507

## (undated) DEVICE — BLADE KNIFE SURG 10 371110

## (undated) DEVICE — GLOVE PROTEXIS POWDER FREE SMT 7.5  2D72PT75X

## (undated) DEVICE — SU DERMABOND ADVANCED .7ML DNX12

## (undated) DEVICE — DRSG PRIMAPORE 03 1/8X6" 66000318

## (undated) DEVICE — TUBING CONMED AIRSEAL SMOKE EVAC INSUFFLATION ASM-EVAC

## (undated) DEVICE — NDL INSUFFLATION 120MM VERRES 172015

## (undated) DEVICE — CLIP ENDO HEMO-LOC PURPLE LG 544240

## (undated) DEVICE — BAG DRAIN BILIARY NEPHROSTOMY REMINGTON 600ML LF 600-D

## (undated) DEVICE — DAVINCI XI GRASPER ENDOWRIST PROGRASP 470093

## (undated) DEVICE — SURGICEL HEMOSTAT 2X14" 1951

## (undated) DEVICE — DAVINCI XI DRAPE ARM 470015

## (undated) DEVICE — SUCTION IRR STRYKERFLOW II W/TIP 250-070-520

## (undated) DEVICE — DAVINCI XI DRAPE COLUMN 470341

## (undated) DEVICE — SU PROLENE 4-0 RB-1DA 36" 8557H

## (undated) DEVICE — SOL NACL 0.9% INJ 250ML BAG 2B1322Q

## (undated) DEVICE — DRAIN JACKSON PRATT CHANNEL 19FR ROUND HUBLESS SIL JP-2230

## (undated) DEVICE — SU MONOCRYL 4-0 P-3 18" UND Y494G

## (undated) DEVICE — CONNECTOR ONE-LINK INJECTION SITE LF 7N8399

## (undated) DEVICE — DAVINCI HOT SHEARS TIP COVER  400180

## (undated) DEVICE — DAVINCI XI SEAL UNIVERSAL 5-8MM 470361

## (undated) DEVICE — GLOVE PROTEXIS POWDER FREE SMT 8.0  2D72PT80X

## (undated) DEVICE — DRAPE LAP W/POUCH 9430

## (undated) DEVICE — ENDO TROCAR OPTICAL 12MM VERSAPORT PLUS W/FIX CAN ONB12STF

## (undated) DEVICE — SOL NACL 0.9% INJ 1000ML BAG 2B1324X

## (undated) DEVICE — DRSG GAUZE 2X2" 8042

## (undated) DEVICE — COVER EASY EQUIP BAG W/BAND LATEX FREE EZ-28

## (undated) DEVICE — DAVINCI XI CAUTERY HOOK 470183

## (undated) DEVICE — STPL ENDO RELOAD 45MM VASCULAR MEDIUM TAN EGIA45AVM

## (undated) DEVICE — SOL NACL 0.9% IRRIG 1000ML BOTTLE 2F7124

## (undated) DEVICE — STPL ENDO HANDLE GIA ULTRA UNIVERSAL STD EGIAUSTND

## (undated) DEVICE — SU VICRYL 0 UR-6 27" J603H

## (undated) DEVICE — DRAIN SKATER 08FRX35CM 755608035

## (undated) DEVICE — SPONGE RAY-TEC 4X8" 7318

## (undated) DEVICE — SU PROLENE 3-0 RB-1DA 30" 8872H

## (undated) DEVICE — COVER ULTRASOUND PROBE W/GEL FLEXI-FEEL 6"X58" LF  25-FF658

## (undated) DEVICE — TROCAR AIRSEAL BLADELESS 12X120MM IAS12-120

## (undated) DEVICE — SU SILK 2-0 SH 30" K833H

## (undated) DEVICE — DRAIN JACKSON PRATT RESERVOIR 100ML SU130-1305

## (undated) DEVICE — DRSG TEGADERM 4X4 3/4" 1626

## (undated) DEVICE — CATH TRAY FOLEY SURESTEP 16FR DRAIN BAG STATOCK A899916

## (undated) RX ORDER — DEXAMETHASONE SODIUM PHOSPHATE 4 MG/ML
INJECTION, SOLUTION INTRA-ARTICULAR; INTRALESIONAL; INTRAMUSCULAR; INTRAVENOUS; SOFT TISSUE
Status: DISPENSED
Start: 2018-05-03

## (undated) RX ORDER — CEFAZOLIN SODIUM 2 G/100ML
INJECTION, SOLUTION INTRAVENOUS
Status: DISPENSED
Start: 2018-05-21

## (undated) RX ORDER — LIDOCAINE HYDROCHLORIDE 10 MG/ML
INJECTION, SOLUTION EPIDURAL; INFILTRATION; INTRACAUDAL; PERINEURAL
Status: DISPENSED
Start: 2018-05-21

## (undated) RX ORDER — LIDOCAINE HYDROCHLORIDE 10 MG/ML
INJECTION, SOLUTION EPIDURAL; INFILTRATION; INTRACAUDAL; PERINEURAL
Status: DISPENSED
Start: 2019-10-28

## (undated) RX ORDER — LIDOCAINE HYDROCHLORIDE 20 MG/ML
JELLY TOPICAL
Status: DISPENSED
Start: 2019-06-14

## (undated) RX ORDER — CEFAZOLIN SODIUM 2 G/100ML
INJECTION, SOLUTION INTRAVENOUS
Status: DISPENSED
Start: 2018-05-03

## (undated) RX ORDER — FENTANYL CITRATE 50 UG/ML
INJECTION, SOLUTION INTRAMUSCULAR; INTRAVENOUS
Status: DISPENSED
Start: 2018-06-19

## (undated) RX ORDER — GENTAMICIN 40 MG/ML
INJECTION, SOLUTION INTRAMUSCULAR; INTRAVENOUS
Status: DISPENSED
Start: 2017-03-09

## (undated) RX ORDER — CEFAZOLIN SODIUM 1 G/3ML
INJECTION, POWDER, FOR SOLUTION INTRAMUSCULAR; INTRAVENOUS
Status: DISPENSED
Start: 2018-05-03

## (undated) RX ORDER — HEPARIN SODIUM (PORCINE) LOCK FLUSH IV SOLN 100 UNIT/ML 100 UNIT/ML
SOLUTION INTRAVENOUS
Status: DISPENSED
Start: 2019-01-29

## (undated) RX ORDER — FENTANYL CITRATE 50 UG/ML
INJECTION, SOLUTION INTRAMUSCULAR; INTRAVENOUS
Status: DISPENSED
Start: 2018-05-03

## (undated) RX ORDER — PROPOFOL 10 MG/ML
INJECTION, EMULSION INTRAVENOUS
Status: DISPENSED
Start: 2018-05-03

## (undated) RX ORDER — HEPARIN SODIUM (PORCINE) LOCK FLUSH IV SOLN 100 UNIT/ML 100 UNIT/ML
SOLUTION INTRAVENOUS
Status: DISPENSED
Start: 2019-01-07

## (undated) RX ORDER — FENTANYL CITRATE 50 UG/ML
INJECTION, SOLUTION INTRAMUSCULAR; INTRAVENOUS
Status: DISPENSED
Start: 2018-05-21

## (undated) RX ORDER — LIDOCAINE HYDROCHLORIDE 20 MG/ML
INJECTION, SOLUTION EPIDURAL; INFILTRATION; INTRACAUDAL; PERINEURAL
Status: DISPENSED
Start: 2018-05-03

## (undated) RX ORDER — AMPICILLIN 1 G/1
INJECTION, POWDER, FOR SOLUTION INTRAMUSCULAR; INTRAVENOUS
Status: DISPENSED
Start: 2017-06-28

## (undated) RX ORDER — GABAPENTIN 300 MG/1
CAPSULE ORAL
Status: DISPENSED
Start: 2019-01-29

## (undated) RX ORDER — ALBUMIN, HUMAN INJ 5% 5 %
SOLUTION INTRAVENOUS
Status: DISPENSED
Start: 2018-05-03

## (undated) RX ORDER — HYDROMORPHONE HYDROCHLORIDE 1 MG/ML
INJECTION, SOLUTION INTRAMUSCULAR; INTRAVENOUS; SUBCUTANEOUS
Status: DISPENSED
Start: 2018-05-03

## (undated) RX ORDER — LIDOCAINE HYDROCHLORIDE 20 MG/ML
INJECTION, SOLUTION EPIDURAL; INFILTRATION; INTRACAUDAL; PERINEURAL
Status: DISPENSED
Start: 2017-10-30

## (undated) RX ORDER — ACETAMINOPHEN 325 MG/1
TABLET ORAL
Status: DISPENSED
Start: 2019-01-29

## (undated) RX ORDER — HEPARIN SODIUM (PORCINE) LOCK FLUSH IV SOLN 100 UNIT/ML 100 UNIT/ML
SOLUTION INTRAVENOUS
Status: DISPENSED
Start: 2018-01-08

## (undated) RX ORDER — GENTAMICIN 40 MG/ML
INJECTION, SOLUTION INTRAMUSCULAR; INTRAVENOUS
Status: DISPENSED
Start: 2017-06-28

## (undated) RX ORDER — ONDANSETRON 2 MG/ML
INJECTION INTRAMUSCULAR; INTRAVENOUS
Status: DISPENSED
Start: 2018-05-03

## (undated) RX ORDER — HEPARIN SODIUM,PORCINE 10 UNIT/ML
VIAL (ML) INTRAVENOUS
Status: DISPENSED
Start: 2019-01-29

## (undated) RX ORDER — PROPOFOL 10 MG/ML
INJECTION, EMULSION INTRAVENOUS
Status: DISPENSED
Start: 2017-10-30

## (undated) RX ORDER — BUPIVACAINE HYDROCHLORIDE 2.5 MG/ML
INJECTION, SOLUTION EPIDURAL; INFILTRATION; INTRACAUDAL
Status: DISPENSED
Start: 2018-05-03

## (undated) RX ORDER — LIDOCAINE HYDROCHLORIDE 10 MG/ML
INJECTION, SOLUTION EPIDURAL; INFILTRATION; INTRACAUDAL; PERINEURAL
Status: DISPENSED
Start: 2018-06-19

## (undated) RX ORDER — HEPARIN SODIUM (PORCINE) LOCK FLUSH IV SOLN 100 UNIT/ML 100 UNIT/ML
SOLUTION INTRAVENOUS
Status: DISPENSED
Start: 2018-07-12

## (undated) RX ORDER — NEOSTIGMINE METHYLSULFATE 1 MG/ML
VIAL (ML) INJECTION
Status: DISPENSED
Start: 2018-05-03